# Patient Record
Sex: MALE | Race: BLACK OR AFRICAN AMERICAN | NOT HISPANIC OR LATINO
[De-identification: names, ages, dates, MRNs, and addresses within clinical notes are randomized per-mention and may not be internally consistent; named-entity substitution may affect disease eponyms.]

---

## 2017-01-05 ENCOUNTER — RESULT REVIEW (OUTPATIENT)
Age: 32
End: 2017-01-05

## 2017-01-09 ENCOUNTER — APPOINTMENT (OUTPATIENT)
Dept: NEPHROLOGY | Facility: CLINIC | Age: 32
End: 2017-01-09

## 2017-01-09 ENCOUNTER — LABORATORY RESULT (OUTPATIENT)
Age: 32
End: 2017-01-09

## 2017-01-09 VITALS
BODY MASS INDEX: 20.45 KG/M2 | WEIGHT: 151.01 LBS | DIASTOLIC BLOOD PRESSURE: 99 MMHG | HEIGHT: 72 IN | SYSTOLIC BLOOD PRESSURE: 146 MMHG | OXYGEN SATURATION: 98 % | HEART RATE: 70 BPM

## 2017-02-01 LAB
ALBUMIN SERPL ELPH-MCNC: 4 G/DL
ALP BLD-CCNC: 62 U/L
ALT SERPL-CCNC: 14 U/L
ANION GAP SERPL CALC-SCNC: 19 MMOL/L
APPEARANCE: CLEAR
AST SERPL-CCNC: 11 U/L
BASOPHILS # BLD AUTO: 0.01 K/UL
BASOPHILS NFR BLD AUTO: 0.1 %
BILIRUB SERPL-MCNC: 0.5 MG/DL
BILIRUBIN URINE: NEGATIVE
BKV DNA SPEC QL NAA+PROBE: NORMAL
BLOOD URINE: ABNORMAL
BUN SERPL-MCNC: 59 MG/DL
CALCIUM SERPL-MCNC: 9.2 MG/DL
CHLORIDE SERPL-SCNC: 106 MMOL/L
CMV DNA SPEC QL NAA+PROBE: NOT DETECTED IU/ML
CMVPCR LOG: NOT DETECTED LOGIU/ML
CO2 SERPL-SCNC: 21 MMOL/L
COLOR: YELLOW
CREAT SERPL-MCNC: 5.88 MG/DL
EOSINOPHIL # BLD AUTO: 0.13 K/UL
EOSINOPHIL NFR BLD AUTO: 1.6 %
GLUCOSE QUALITATIVE U: NORMAL MG/DL
GLUCOSE SERPL-MCNC: 84 MG/DL
HCT VFR BLD CALC: 34 %
HGB BLD-MCNC: 10.8 G/DL
IMM GRANULOCYTES NFR BLD AUTO: 0.3 %
KETONES URINE: NEGATIVE
LEUKOCYTE ESTERASE URINE: NEGATIVE
LYMPHOCYTES # BLD AUTO: 1.91 K/UL
LYMPHOCYTES NFR BLD AUTO: 24 %
MAN DIFF?: NORMAL
MCHC RBC-ENTMCNC: 27.5 PG
MCHC RBC-ENTMCNC: 31.8 GM/DL
MCV RBC AUTO: 86.5 FL
MONOCYTES # BLD AUTO: 0.72 K/UL
MONOCYTES NFR BLD AUTO: 9 %
NEUTROPHILS # BLD AUTO: 5.18 K/UL
NEUTROPHILS NFR BLD AUTO: 65 %
NITRITE URINE: NEGATIVE
PH URINE: 6
PLATELET # BLD AUTO: 193 K/UL
POTASSIUM SERPL-SCNC: 4.1 MMOL/L
PROT SERPL-MCNC: 6.6 G/DL
PROTEIN URINE: 100 MG/DL
RBC # BLD: 3.93 M/UL
RBC # FLD: 14.2 %
SODIUM SERPL-SCNC: 146 MMOL/L
SPECIFIC GRAVITY URINE: 1.01
TACROLIMUS SERPL-MCNC: 5.8 NG/ML
UROBILINOGEN URINE: NORMAL MG/DL
WBC # FLD AUTO: 7.97 K/UL

## 2017-02-13 ENCOUNTER — APPOINTMENT (OUTPATIENT)
Dept: NEPHROLOGY | Facility: CLINIC | Age: 32
End: 2017-02-13

## 2017-02-13 VITALS
BODY MASS INDEX: 20.81 KG/M2 | SYSTOLIC BLOOD PRESSURE: 148 MMHG | HEART RATE: 61 BPM | DIASTOLIC BLOOD PRESSURE: 103 MMHG | OXYGEN SATURATION: 98 % | WEIGHT: 153.63 LBS | HEIGHT: 72 IN

## 2017-04-10 ENCOUNTER — OTHER (OUTPATIENT)
Age: 32
End: 2017-04-10

## 2017-05-04 ENCOUNTER — RX RENEWAL (OUTPATIENT)
Age: 32
End: 2017-05-04

## 2017-05-16 ENCOUNTER — RX RENEWAL (OUTPATIENT)
Age: 32
End: 2017-05-16

## 2017-06-01 ENCOUNTER — RX RENEWAL (OUTPATIENT)
Age: 32
End: 2017-06-01

## 2017-06-19 ENCOUNTER — APPOINTMENT (OUTPATIENT)
Dept: NEPHROLOGY | Facility: CLINIC | Age: 32
End: 2017-06-19

## 2017-07-01 ENCOUNTER — OUTPATIENT (OUTPATIENT)
Dept: OUTPATIENT SERVICES | Facility: HOSPITAL | Age: 32
LOS: 1 days | End: 2017-07-01
Payer: MEDICAID

## 2017-07-14 DIAGNOSIS — R69 ILLNESS, UNSPECIFIED: ICD-10-CM

## 2017-07-31 ENCOUNTER — OTHER (OUTPATIENT)
Age: 32
End: 2017-07-31

## 2017-08-01 PROCEDURE — G9001: CPT

## 2017-08-14 ENCOUNTER — APPOINTMENT (OUTPATIENT)
Dept: NEPHROLOGY | Facility: CLINIC | Age: 32
End: 2017-08-14
Payer: MEDICARE

## 2017-08-14 ENCOUNTER — LABORATORY RESULT (OUTPATIENT)
Age: 32
End: 2017-08-14

## 2017-08-14 VITALS
WEIGHT: 154.32 LBS | DIASTOLIC BLOOD PRESSURE: 110 MMHG | HEIGHT: 72 IN | HEART RATE: 67 BPM | BODY MASS INDEX: 20.9 KG/M2 | OXYGEN SATURATION: 98 % | SYSTOLIC BLOOD PRESSURE: 160 MMHG

## 2017-08-14 VITALS — SYSTOLIC BLOOD PRESSURE: 160 MMHG | DIASTOLIC BLOOD PRESSURE: 100 MMHG

## 2017-08-14 PROCEDURE — 99214 OFFICE O/P EST MOD 30 MIN: CPT

## 2017-08-14 RX ORDER — AMLODIPINE BESYLATE 5 MG/1
5 TABLET ORAL DAILY
Qty: 30 | Refills: 5 | Status: DISCONTINUED | COMMUNITY
Start: 2016-12-31 | End: 2017-08-14

## 2017-08-16 LAB
25(OH)D3 SERPL-MCNC: 51 NG/ML
ALBUMIN SERPL ELPH-MCNC: 4.6 G/DL
ALP BLD-CCNC: 76 U/L
ALT SERPL-CCNC: 12 U/L
ANION GAP SERPL CALC-SCNC: 20 MMOL/L
APPEARANCE: CLEAR
AST SERPL-CCNC: 12 U/L
BASOPHILS # BLD AUTO: 0.02 K/UL
BASOPHILS NFR BLD AUTO: 0.2 %
BILIRUB SERPL-MCNC: 0.9 MG/DL
BILIRUBIN URINE: NEGATIVE
BLOOD URINE: ABNORMAL
BUN SERPL-MCNC: 99 MG/DL
CALCIUM SERPL-MCNC: 9.7 MG/DL
CALCIUM SERPL-MCNC: 9.7 MG/DL
CHLORIDE SERPL-SCNC: 100 MMOL/L
CHOLEST SERPL-MCNC: 212 MG/DL
CHOLEST/HDLC SERPL: 4.1 RATIO
CO2 SERPL-SCNC: 18 MMOL/L
COLOR: YELLOW
CREAT SERPL-MCNC: 11.38 MG/DL
CREAT SPEC-SCNC: 117 MG/DL
CREAT/PROT UR: 1.2 RATIO
EOSINOPHIL # BLD AUTO: 0.07 K/UL
EOSINOPHIL NFR BLD AUTO: 0.8 %
GLUCOSE QUALITATIVE U: NORMAL MG/DL
GLUCOSE SERPL-MCNC: 101 MG/DL
HBA1C MFR BLD HPLC: 4.9 %
HCT VFR BLD CALC: 37.4 %
HDLC SERPL-MCNC: 52 MG/DL
HGB BLD-MCNC: 12.9 G/DL
IMM GRANULOCYTES NFR BLD AUTO: 0.2 %
KETONES URINE: NEGATIVE
LDLC SERPL CALC-MCNC: 145 MG/DL
LEUKOCYTE ESTERASE URINE: NEGATIVE
LYMPHOCYTES # BLD AUTO: 1.67 K/UL
LYMPHOCYTES NFR BLD AUTO: 19 %
MAGNESIUM SERPL-MCNC: 2.1 MG/DL
MAN DIFF?: NORMAL
MCHC RBC-ENTMCNC: 29.3 PG
MCHC RBC-ENTMCNC: 34.5 GM/DL
MCV RBC AUTO: 85 FL
MONOCYTES # BLD AUTO: 0.48 K/UL
MONOCYTES NFR BLD AUTO: 5.5 %
NEUTROPHILS # BLD AUTO: 6.51 K/UL
NEUTROPHILS NFR BLD AUTO: 74.3 %
NITRITE URINE: NEGATIVE
PARATHYROID HORMONE INTACT: 287 PG/ML
PH URINE: 6
PHOSPHATE SERPL-MCNC: 3.6 MG/DL
PLATELET # BLD AUTO: 192 K/UL
POTASSIUM SERPL-SCNC: 5.1 MMOL/L
PROT SERPL-MCNC: 7.3 G/DL
PROT UR-MCNC: 139 MG/DL
PROTEIN URINE: 300 MG/DL
RBC # BLD: 4.4 M/UL
RBC # FLD: 13.2 %
SODIUM SERPL-SCNC: 138 MMOL/L
SPECIFIC GRAVITY URINE: 1.01
TACROLIMUS SERPL-MCNC: 3.9 NG/ML
TRIGL SERPL-MCNC: 75 MG/DL
URATE SERPL-MCNC: 9.2 MG/DL
UROBILINOGEN URINE: NORMAL MG/DL
WBC # FLD AUTO: 8.77 K/UL

## 2017-09-28 ENCOUNTER — APPOINTMENT (OUTPATIENT)
Dept: TRANSPLANT | Facility: CLINIC | Age: 32
End: 2017-09-28

## 2017-09-29 ENCOUNTER — LABORATORY RESULT (OUTPATIENT)
Age: 32
End: 2017-09-29

## 2017-09-29 ENCOUNTER — APPOINTMENT (OUTPATIENT)
Dept: NEPHROLOGY | Facility: CLINIC | Age: 32
End: 2017-09-29
Payer: MEDICARE

## 2017-09-29 VITALS
WEIGHT: 149.91 LBS | HEART RATE: 63 BPM | BODY MASS INDEX: 20.31 KG/M2 | SYSTOLIC BLOOD PRESSURE: 147 MMHG | OXYGEN SATURATION: 98 % | DIASTOLIC BLOOD PRESSURE: 100 MMHG | HEIGHT: 72 IN

## 2017-09-29 PROCEDURE — 99214 OFFICE O/P EST MOD 30 MIN: CPT

## 2017-09-30 LAB
ALBUMIN SERPL ELPH-MCNC: 4.5 G/DL
ALP BLD-CCNC: 71 U/L
ALT SERPL-CCNC: 9 U/L
ANION GAP SERPL CALC-SCNC: 19 MMOL/L
APPEARANCE: CLEAR
AST SERPL-CCNC: 10 U/L
BASOPHILS # BLD AUTO: 0.01 K/UL
BASOPHILS NFR BLD AUTO: 0.1 %
BILIRUB SERPL-MCNC: 1.2 MG/DL
BILIRUBIN URINE: NEGATIVE
BLOOD URINE: NEGATIVE
BUN SERPL-MCNC: 85 MG/DL
CALCIUM SERPL-MCNC: 9.9 MG/DL
CHLORIDE SERPL-SCNC: 103 MMOL/L
CO2 SERPL-SCNC: 22 MMOL/L
COLOR: YELLOW
CREAT SERPL-MCNC: 13.2 MG/DL
CREAT SPEC-SCNC: 148 MG/DL
CREAT/PROT UR: 1.1 RATIO
EOSINOPHIL # BLD AUTO: 0.1 K/UL
EOSINOPHIL NFR BLD AUTO: 1.2 %
GLUCOSE QUALITATIVE U: NORMAL MG/DL
GLUCOSE SERPL-MCNC: 86 MG/DL
HCT VFR BLD CALC: 35.6 %
HGB BLD-MCNC: 12 G/DL
IMM GRANULOCYTES NFR BLD AUTO: 0.1 %
KETONES URINE: NEGATIVE
LDH SERPL-CCNC: 186 U/L
LEUKOCYTE ESTERASE URINE: NEGATIVE
LYMPHOCYTES # BLD AUTO: 1.77 K/UL
LYMPHOCYTES NFR BLD AUTO: 21.4 %
MAGNESIUM SERPL-MCNC: 2 MG/DL
MAN DIFF?: NORMAL
MCHC RBC-ENTMCNC: 29.6 PG
MCHC RBC-ENTMCNC: 33.7 GM/DL
MCV RBC AUTO: 87.7 FL
MONOCYTES # BLD AUTO: 0.72 K/UL
MONOCYTES NFR BLD AUTO: 8.7 %
NEUTROPHILS # BLD AUTO: 5.65 K/UL
NEUTROPHILS NFR BLD AUTO: 68.5 %
NITRITE URINE: NEGATIVE
PH URINE: 6.5
PHOSPHATE SERPL-MCNC: 5.2 MG/DL
PLATELET # BLD AUTO: 182 K/UL
POTASSIUM SERPL-SCNC: 5.4 MMOL/L
PROT SERPL-MCNC: 7.5 G/DL
PROT UR-MCNC: 156 MG/DL
PROTEIN URINE: 300 MG/DL
RBC # BLD: 4.06 M/UL
RBC # FLD: 13.4 %
SODIUM SERPL-SCNC: 144 MMOL/L
SPECIFIC GRAVITY URINE: 1.01
TACROLIMUS SERPL-MCNC: 3 NG/ML
URATE SERPL-MCNC: 9 MG/DL
UROBILINOGEN URINE: NORMAL MG/DL
WBC # FLD AUTO: 8.26 K/UL

## 2017-10-02 ENCOUNTER — APPOINTMENT (OUTPATIENT)
Dept: NEPHROLOGY | Facility: CLINIC | Age: 32
End: 2017-10-02
Payer: MEDICARE

## 2017-10-04 LAB — CMV DNA SPEC QL NAA+PROBE: NOT DETECTED

## 2017-10-16 ENCOUNTER — RX RENEWAL (OUTPATIENT)
Age: 32
End: 2017-10-16

## 2018-01-01 ENCOUNTER — OUTPATIENT (OUTPATIENT)
Dept: OUTPATIENT SERVICES | Facility: HOSPITAL | Age: 33
LOS: 1 days | End: 2018-01-01
Payer: MEDICAID

## 2018-01-01 PROCEDURE — G9001: CPT

## 2018-01-02 ENCOUNTER — APPOINTMENT (OUTPATIENT)
Dept: NEPHROLOGY | Facility: CLINIC | Age: 33
End: 2018-01-02
Payer: MEDICARE

## 2018-01-02 ENCOUNTER — OTHER (OUTPATIENT)
Age: 33
End: 2018-01-02

## 2018-01-02 VITALS
BODY MASS INDEX: 21 KG/M2 | HEIGHT: 72 IN | RESPIRATION RATE: 14 BRPM | SYSTOLIC BLOOD PRESSURE: 151 MMHG | WEIGHT: 155.05 LBS | DIASTOLIC BLOOD PRESSURE: 93 MMHG | HEART RATE: 66 BPM | TEMPERATURE: 98 F | OXYGEN SATURATION: 97 %

## 2018-01-02 PROCEDURE — 99214 OFFICE O/P EST MOD 30 MIN: CPT

## 2018-01-03 ENCOUNTER — INPATIENT (INPATIENT)
Facility: HOSPITAL | Age: 33
LOS: 6 days | Discharge: HOME | DRG: 698 | End: 2018-01-10
Attending: INTERNAL MEDICINE | Admitting: STUDENT IN AN ORGANIZED HEALTH CARE EDUCATION/TRAINING PROGRAM
Payer: MEDICARE

## 2018-01-03 VITALS
TEMPERATURE: 98 F | RESPIRATION RATE: 20 BRPM | DIASTOLIC BLOOD PRESSURE: 106 MMHG | HEART RATE: 66 BPM | OXYGEN SATURATION: 98 % | SYSTOLIC BLOOD PRESSURE: 158 MMHG

## 2018-01-03 DIAGNOSIS — N18.5 CHRONIC KIDNEY DISEASE, STAGE 5: ICD-10-CM

## 2018-01-03 DIAGNOSIS — E87.5 HYPERKALEMIA: ICD-10-CM

## 2018-01-03 DIAGNOSIS — I10 ESSENTIAL (PRIMARY) HYPERTENSION: ICD-10-CM

## 2018-01-03 DIAGNOSIS — N17.9 ACUTE KIDNEY FAILURE, UNSPECIFIED: ICD-10-CM

## 2018-01-03 DIAGNOSIS — Z94.0 KIDNEY TRANSPLANT STATUS: ICD-10-CM

## 2018-01-03 DIAGNOSIS — D89.9 DISORDER INVOLVING THE IMMUNE MECHANISM, UNSPECIFIED: ICD-10-CM

## 2018-01-03 LAB
25(OH)D3 SERPL-MCNC: 54.9 NG/ML
ALBUMIN SERPL ELPH-MCNC: 4.3 G/DL — SIGNIFICANT CHANGE UP (ref 3.3–5)
ALBUMIN SERPL ELPH-MCNC: 4.4 G/DL
ALP BLD-CCNC: 52 U/L
ALP SERPL-CCNC: 65 U/L — SIGNIFICANT CHANGE UP (ref 40–120)
ALT FLD-CCNC: 32 U/L RC — SIGNIFICANT CHANGE UP (ref 10–45)
ALT SERPL-CCNC: 29 U/L
ANION GAP SERPL CALC-SCNC: 16 MMOL/L — SIGNIFICANT CHANGE UP (ref 5–17)
ANION GAP SERPL CALC-SCNC: 20 MMOL/L — HIGH (ref 5–17)
ANION GAP SERPL CALC-SCNC: 22 MMOL/L
APPEARANCE UR: CLEAR — SIGNIFICANT CHANGE UP
APTT BLD: 28.5 SEC — SIGNIFICANT CHANGE UP (ref 27.5–37.4)
AST SERPL-CCNC: 14 U/L
AST SERPL-CCNC: 14 U/L — SIGNIFICANT CHANGE UP (ref 10–40)
BASOPHILS # BLD AUTO: 0 K/UL
BASOPHILS # BLD AUTO: 0 K/UL — SIGNIFICANT CHANGE UP (ref 0–0.2)
BASOPHILS NFR BLD AUTO: 0 %
BASOPHILS NFR BLD AUTO: 0.3 % — SIGNIFICANT CHANGE UP (ref 0–2)
BILIRUB SERPL-MCNC: 0.4 MG/DL — SIGNIFICANT CHANGE UP (ref 0.2–1.2)
BILIRUB SERPL-MCNC: 0.9 MG/DL
BILIRUB UR-MCNC: NEGATIVE — SIGNIFICANT CHANGE UP
BUN SERPL-MCNC: 119 MG/DL — HIGH (ref 7–23)
BUN SERPL-MCNC: 120 MG/DL
BUN SERPL-MCNC: 121 MG/DL — HIGH (ref 7–23)
CALCIUM SERPL-MCNC: 10.2 MG/DL — SIGNIFICANT CHANGE UP (ref 8.4–10.5)
CALCIUM SERPL-MCNC: 9.4 MG/DL — SIGNIFICANT CHANGE UP (ref 8.4–10.5)
CALCIUM SERPL-MCNC: 9.8 MG/DL
CALCIUM SERPL-MCNC: 9.8 MG/DL
CHLORIDE SERPL-SCNC: 96 MMOL/L — SIGNIFICANT CHANGE UP (ref 96–108)
CHLORIDE SERPL-SCNC: 97 MMOL/L
CHLORIDE SERPL-SCNC: 98 MMOL/L — SIGNIFICANT CHANGE UP (ref 96–108)
CHOLEST SERPL-MCNC: 171 MG/DL
CHOLEST/HDLC SERPL: 4.2 RATIO
CO2 SERPL-SCNC: 20 MMOL/L
CO2 SERPL-SCNC: 23 MMOL/L — SIGNIFICANT CHANGE UP (ref 22–31)
CO2 SERPL-SCNC: 24 MMOL/L — SIGNIFICANT CHANGE UP (ref 22–31)
COLOR SPEC: COLORLESS — SIGNIFICANT CHANGE UP
CREAT SERPL-MCNC: 21.5 MG/DL — HIGH (ref 0.5–1.3)
CREAT SERPL-MCNC: 21.62 MG/DL — HIGH (ref 0.5–1.3)
CREAT SERPL-MCNC: 23.41 MG/DL
DIFF PNL FLD: NEGATIVE — SIGNIFICANT CHANGE UP
EOSINOPHIL # BLD AUTO: 0.05 K/UL
EOSINOPHIL # BLD AUTO: 0.3 K/UL — SIGNIFICANT CHANGE UP (ref 0–0.5)
EOSINOPHIL NFR BLD AUTO: 0.6 %
EOSINOPHIL NFR BLD AUTO: 3.4 % — SIGNIFICANT CHANGE UP (ref 0–6)
FERRITIN SERPL-MCNC: 599 NG/ML
GAS PNL BLDV: SIGNIFICANT CHANGE UP
GAS PNL BLDV: SIGNIFICANT CHANGE UP
GLUCOSE BLDC GLUCOMTR-MCNC: 172 MG/DL — HIGH (ref 70–99)
GLUCOSE SERPL-MCNC: 100 MG/DL
GLUCOSE SERPL-MCNC: 65 MG/DL — LOW (ref 70–99)
GLUCOSE SERPL-MCNC: 87 MG/DL — SIGNIFICANT CHANGE UP (ref 70–99)
GLUCOSE UR QL: NEGATIVE — SIGNIFICANT CHANGE UP
HBA1C MFR BLD HPLC: 4.7 %
HCT VFR BLD CALC: 28.9 % — LOW (ref 39–50)
HCT VFR BLD CALC: 30.4 %
HDLC SERPL-MCNC: 41 MG/DL
HGB BLD-MCNC: 10.3 G/DL
HGB BLD-MCNC: 9.8 G/DL — LOW (ref 13–17)
IMM GRANULOCYTES NFR BLD AUTO: 0 %
INR BLD: 0.95 RATIO — SIGNIFICANT CHANGE UP (ref 0.88–1.16)
IRON SATN MFR SERPL: 42 %
IRON SERPL-MCNC: 96 UG/DL
KETONES UR-MCNC: NEGATIVE — SIGNIFICANT CHANGE UP
LDLC SERPL CALC-MCNC: 117 MG/DL
LEUKOCYTE ESTERASE UR-ACNC: NEGATIVE — SIGNIFICANT CHANGE UP
LYMPHOCYTES # BLD AUTO: 1.46 K/UL
LYMPHOCYTES # BLD AUTO: 3.5 K/UL — HIGH (ref 1–3.3)
LYMPHOCYTES # BLD AUTO: 38 % — SIGNIFICANT CHANGE UP (ref 13–44)
LYMPHOCYTES NFR BLD AUTO: 17.8 %
MAGNESIUM SERPL-MCNC: 2.2 MG/DL — SIGNIFICANT CHANGE UP (ref 1.6–2.6)
MAGNESIUM SERPL-MCNC: 2.3 MG/DL
MAN DIFF?: NORMAL
MCHC RBC-ENTMCNC: 29.5 PG
MCHC RBC-ENTMCNC: 30.7 PG — SIGNIFICANT CHANGE UP (ref 27–34)
MCHC RBC-ENTMCNC: 33.8 GM/DL — SIGNIFICANT CHANGE UP (ref 32–36)
MCHC RBC-ENTMCNC: 33.9 GM/DL
MCV RBC AUTO: 87.1 FL
MCV RBC AUTO: 90.8 FL — SIGNIFICANT CHANGE UP (ref 80–100)
MONOCYTES # BLD AUTO: 0.58 K/UL
MONOCYTES # BLD AUTO: 1.1 K/UL — HIGH (ref 0–0.9)
MONOCYTES NFR BLD AUTO: 12.4 % — SIGNIFICANT CHANGE UP (ref 2–14)
MONOCYTES NFR BLD AUTO: 7.1 %
NEUTROPHILS # BLD AUTO: 4.2 K/UL — SIGNIFICANT CHANGE UP (ref 1.8–7.4)
NEUTROPHILS # BLD AUTO: 6.1 K/UL
NEUTROPHILS NFR BLD AUTO: 45.9 % — SIGNIFICANT CHANGE UP (ref 43–77)
NEUTROPHILS NFR BLD AUTO: 74.5 %
NITRITE UR-MCNC: NEGATIVE — SIGNIFICANT CHANGE UP
PARATHYROID HORMONE INTACT: 321 PG/ML
PH UR: 7 — SIGNIFICANT CHANGE UP (ref 5–8)
PHOSPHATE SERPL-MCNC: 4.2 MG/DL
PHOSPHATE SERPL-MCNC: 5.2 MG/DL — HIGH (ref 2.5–4.5)
PLATELET # BLD AUTO: 154 K/UL — SIGNIFICANT CHANGE UP (ref 150–400)
PLATELET # BLD AUTO: 166 K/UL
POTASSIUM SERPL-MCNC: 4.3 MMOL/L — SIGNIFICANT CHANGE UP (ref 3.5–5.3)
POTASSIUM SERPL-MCNC: 5.8 MMOL/L — HIGH (ref 3.5–5.3)
POTASSIUM SERPL-SCNC: 4.3 MMOL/L — SIGNIFICANT CHANGE UP (ref 3.5–5.3)
POTASSIUM SERPL-SCNC: 5.6 MMOL/L
POTASSIUM SERPL-SCNC: 5.8 MMOL/L — HIGH (ref 3.5–5.3)
PROT SERPL-MCNC: 7.1 G/DL — SIGNIFICANT CHANGE UP (ref 6–8.3)
PROT SERPL-MCNC: 7.5 G/DL
PROT UR-MCNC: 150 MG/DL
PROTHROM AB SERPL-ACNC: 10.2 SEC — SIGNIFICANT CHANGE UP (ref 9.8–12.7)
RAPID RVP RESULT: SIGNIFICANT CHANGE UP
RBC # BLD: 3.18 M/UL — LOW (ref 4.2–5.8)
RBC # BLD: 3.49 M/UL
RBC # FLD: 11.9 % — SIGNIFICANT CHANGE UP (ref 10.3–14.5)
RBC # FLD: 13.2 %
SODIUM SERPL-SCNC: 138 MMOL/L — SIGNIFICANT CHANGE UP (ref 135–145)
SODIUM SERPL-SCNC: 139 MMOL/L
SODIUM SERPL-SCNC: 139 MMOL/L — SIGNIFICANT CHANGE UP (ref 135–145)
SP GR SPEC: 1.01 — LOW (ref 1.01–1.02)
TACROLIMUS SERPL-MCNC: 6.8 NG/ML
TIBC SERPL-MCNC: 227 UG/DL
TRIGL SERPL-MCNC: 67 MG/DL
UIBC SERPL-MCNC: 131 UG/DL
URATE SERPL-MCNC: 10.6 MG/DL
UROBILINOGEN FLD QL: NEGATIVE — SIGNIFICANT CHANGE UP
WBC # BLD: 9.1 K/UL — SIGNIFICANT CHANGE UP (ref 3.8–10.5)
WBC # FLD AUTO: 8.19 K/UL
WBC # FLD AUTO: 9.1 K/UL — SIGNIFICANT CHANGE UP (ref 3.8–10.5)

## 2018-01-03 PROCEDURE — 71045 X-RAY EXAM CHEST 1 VIEW: CPT | Mod: 26

## 2018-01-03 PROCEDURE — 99223 1ST HOSP IP/OBS HIGH 75: CPT | Mod: GC

## 2018-01-03 PROCEDURE — 99285 EMERGENCY DEPT VISIT HI MDM: CPT | Mod: GC

## 2018-01-03 PROCEDURE — 36556 INSERT NON-TUNNEL CV CATH: CPT

## 2018-01-03 PROCEDURE — 99291 CRITICAL CARE FIRST HOUR: CPT | Mod: 25

## 2018-01-03 RX ORDER — INSULIN HUMAN 100 [IU]/ML
10 INJECTION, SOLUTION SUBCUTANEOUS ONCE
Qty: 0 | Refills: 0 | Status: COMPLETED | OUTPATIENT
Start: 2018-01-03 | End: 2018-01-03

## 2018-01-03 RX ORDER — DEXTROSE 50 % IN WATER 50 %
50 SYRINGE (ML) INTRAVENOUS ONCE
Qty: 0 | Refills: 0 | Status: COMPLETED | OUTPATIENT
Start: 2018-01-03 | End: 2018-01-03

## 2018-01-03 RX ORDER — SODIUM CHLORIDE 9 MG/ML
1000 INJECTION, SOLUTION INTRAVENOUS
Qty: 0 | Refills: 0 | Status: DISCONTINUED | OUTPATIENT
Start: 2018-01-03 | End: 2018-01-10

## 2018-01-03 RX ORDER — DESMOPRESSIN ACETATE 0.1 MG/1
20 TABLET ORAL ONCE
Qty: 0 | Refills: 0 | Status: COMPLETED | OUTPATIENT
Start: 2018-01-03 | End: 2018-01-03

## 2018-01-03 RX ORDER — INFLUENZA VIRUS VACCINE 15; 15; 15; 15 UG/.5ML; UG/.5ML; UG/.5ML; UG/.5ML
0.5 SUSPENSION INTRAMUSCULAR ONCE
Qty: 0 | Refills: 0 | Status: COMPLETED | OUTPATIENT
Start: 2018-01-03 | End: 2018-01-10

## 2018-01-03 RX ORDER — ALBUTEROL 90 UG/1
10 AEROSOL, METERED ORAL ONCE
Qty: 0 | Refills: 0 | Status: COMPLETED | OUTPATIENT
Start: 2018-01-03 | End: 2018-01-03

## 2018-01-03 RX ORDER — TACROLIMUS 5 MG/1
3 CAPSULE ORAL
Qty: 0 | Refills: 0 | Status: DISCONTINUED | OUTPATIENT
Start: 2018-01-03 | End: 2018-01-10

## 2018-01-03 RX ORDER — GLUCAGON INJECTION, SOLUTION 0.5 MG/.1ML
1 INJECTION, SOLUTION SUBCUTANEOUS ONCE
Qty: 0 | Refills: 0 | Status: DISCONTINUED | OUTPATIENT
Start: 2018-01-03 | End: 2018-01-05

## 2018-01-03 RX ORDER — CALCIUM GLUCONATE 100 MG/ML
2 VIAL (ML) INTRAVENOUS ONCE
Qty: 0 | Refills: 0 | Status: COMPLETED | OUTPATIENT
Start: 2018-01-03 | End: 2018-01-03

## 2018-01-03 RX ORDER — DEXTROSE 50 % IN WATER 50 %
1 SYRINGE (ML) INTRAVENOUS ONCE
Qty: 0 | Refills: 0 | Status: DISCONTINUED | OUTPATIENT
Start: 2018-01-03 | End: 2018-01-04

## 2018-01-03 RX ORDER — DEXTROSE 50 % IN WATER 50 %
25 SYRINGE (ML) INTRAVENOUS ONCE
Qty: 0 | Refills: 0 | Status: DISCONTINUED | OUTPATIENT
Start: 2018-01-03 | End: 2018-01-04

## 2018-01-03 RX ORDER — PANTOPRAZOLE SODIUM 20 MG/1
40 TABLET, DELAYED RELEASE ORAL
Qty: 0 | Refills: 0 | Status: COMPLETED | OUTPATIENT
Start: 2018-01-03 | End: 2018-01-04

## 2018-01-03 RX ORDER — SODIUM BICARBONATE 1 MEQ/ML
650 SYRINGE (ML) INTRAVENOUS THREE TIMES A DAY
Qty: 0 | Refills: 0 | Status: DISCONTINUED | OUTPATIENT
Start: 2018-01-03 | End: 2018-01-05

## 2018-01-03 RX ORDER — FELODIPINE 5 MG/1
1 TABLET, FILM COATED, EXTENDED RELEASE ORAL
Qty: 0 | Refills: 0 | COMMUNITY

## 2018-01-03 RX ORDER — FUROSEMIDE 40 MG
40 TABLET ORAL ONCE
Qty: 0 | Refills: 0 | Status: COMPLETED | OUTPATIENT
Start: 2018-01-03 | End: 2018-01-03

## 2018-01-03 RX ORDER — DEXTROSE 50 % IN WATER 50 %
12.5 SYRINGE (ML) INTRAVENOUS ONCE
Qty: 0 | Refills: 0 | Status: DISCONTINUED | OUTPATIENT
Start: 2018-01-03 | End: 2018-01-04

## 2018-01-03 RX ADMIN — DESMOPRESSIN ACETATE 220 MICROGRAM(S): 0.1 TABLET ORAL at 22:48

## 2018-01-03 RX ADMIN — Medication 40 MILLIGRAM(S): at 19:50

## 2018-01-03 RX ADMIN — INSULIN HUMAN 10 UNIT(S): 100 INJECTION, SOLUTION SUBCUTANEOUS at 19:55

## 2018-01-03 RX ADMIN — Medication 50 MILLILITER(S): at 20:35

## 2018-01-03 RX ADMIN — Medication 50 MILLILITER(S): at 19:50

## 2018-01-03 RX ADMIN — ALBUTEROL 10 MILLIGRAM(S): 90 AEROSOL, METERED ORAL at 21:55

## 2018-01-03 RX ADMIN — Medication 400 GRAM(S): at 18:50

## 2018-01-03 NOTE — ED ADULT NURSE REASSESSMENT NOTE - NS ED NURSE REASSESS COMMENT FT1
patient c/o dizziness, FSBS re-checked 31mg/dl, D50 1 amp given, MD made aware. A cup of orange juice given, will re-checked FSBS after 30 minutes.

## 2018-01-03 NOTE — ED PROVIDER NOTE - CARE PLAN
Principal Discharge DX:	Hyperkalemia  Secondary Diagnosis:	Acute renal failure superimposed on chronic kidney disease, unspecified CKD stage, unspecified acute renal failure type  Secondary Diagnosis:	S/P kidney transplant

## 2018-01-03 NOTE — CONSULT NOTE ADULT - ATTENDING COMMENTS
Patient with failing renal allograft, now uremic  HTN  Anemia  Plan:  Hemodialysis- reviewed prescription and flow sheet.  Continue Tacrolimus, prednisone, off MMF  I/J catheter will change to tunneled catheter on discharge  Has live donor in work up.  Possible early transplant.    Joshua Prieto MD  [phone]

## 2018-01-03 NOTE — CONSULT NOTE ADULT - SUBJECTIVE AND OBJECTIVE BOX
[facility] DIVISION OF KIDNEY DISEASES AND HYPERTENSION -- INITIAL CONSULT NOTE  --------------------------------------------------------------------------------  HPI:  Patient is a 31 y/o M w/ renal transplant in 2005 from mother @ [facility], follows with Dr. Prieto from [facility] presents with worsening renal failure and hyperkalemia with EKG changes.  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  Prior to transplant, patient had a LUE fistula placed in anticipation of dialysis but was able to be transplanted prior to starting HD.  Patient has been experiencing progressive chronic renal failure.  He had routine lab work done which showed continued worsening of renal function and was sent into the hospital for initiation of dialysis.  In the ED patient was found with hyperkalemia induced hyperacute T-waves in leads v2-v4 which improved but did not resolve with management of hyperkalemia.  Patient reports decreased appetite with weight loss, intermittent LE edema, worsening shaking in upper extremities.  Patient is currently taking prednisone + 3mg BID of tacrolimus.  He reports that anti-metabolite (mycophenolate) was stopped one year prior due to severe diarrhea.            PAST HISTORY  --------------------------------------------------------------------------------  PAST MEDICAL & SURGICAL HISTORY:  CKD (chronic kidney disease), stage 4 (severe)  Hypertension  Glomerulonephritis  S/P kidney transplant: Right kidney in 2004    FAMILY HISTORY:  Denies any first degree family members with renal failure    PAST SOCIAL HISTORY:  denies any toxic habits (history taken in front of mother and cousin)    ALLERGIES & MEDICATIONS  --------------------------------------------------------------------------------  Allergies    No Known Allergies    Intolerances      Standing Inpatient Medications  ALBUTerol    0.083%. 10 milliGRAM(s) Nebulizer once    PRN Inpatient Medications      REVIEW OF SYSTEMS  --------------------------------------------------------------------------------  Gen: No fevers/chills, +weakness, +fatigue  Skin: No rashes  Head/Eyes/Ears/Mouth: No headache, no sore throat  Respiratory: No dyspnea, cough  CV: No chest pain, orthopnea  GI: No abdominal pain, diarrhea, constipation, nausea, vomiting  : No increased frequency, dysuria  MSK: No joint pain/swelling; +intermittent edema  Neuro: No dizziness/lightheadedness, + increased upper extremity tremor  Heme: No easy bruising or bleeding  Endo: No heat/cold intolerance    All other systems were reviewed and are negative, except as noted.    VITALS/PHYSICAL EXAM  --------------------------------------------------------------------------------  T(C): 36.9 (01-03-18 @ 19:14), Max: 37 (01-03-18 @ 18:15)  HR: 62 (01-03-18 @ 19:14) (60 - 66)  BP: 164/109 (01-03-18 @ 19:14) (158/106 - 171/110)  RR: 16 (01-03-18 @ 19:14) (16 - 20)  SpO2: 98% (01-03-18 @ 19:14) (98% - 100%)  Wt(kg): --        Physical Exam:  	Gen: NAD, well-appearing  	HEENT: MMM  	Pulm: CTA B/L  	CV: RRR, S1S2; no rub  	Back: No spinal or CVA tenderness; no sacral edema  	Abd: +BS, soft, nontender/nondistended  	: No suprapubic tenderness  	UE: Warm, FROM, intact strength; no edema, + severe resting tremor  	LE: Warm, FROM, intact strength; no edema  	Neuro: No focal deficits, no hand flapping  	Psych: Normal affect and mood  	Skin: Warm, without rashes    LABS/STUDIES  --------------------------------------------------------------------------------              9.8    9.1   >-----------<  154      [01-03-18 @ 18:32]              28.9     138  |  98  |  121  ----------------------------<  87      [01-03-18 @ 18:32]  5.8   |  24  |  21.62        Ca     9.4     [01-03-18 @ 18:32]      Mg     2.2     [01-03-18 @ 18:32]      Phos  5.2     [01-03-18 @ 18:32]    TPro  7.1  /  Alb  4.3  /  TBili  0.4  /  DBili  x   /  AST  14  /  ALT  32  /  AlkPhos  65  [01-03-18 @ 18:32]          Creatinine Trend:  SCr 21.62 [01-03 @ 18:32]    Urinalysis - [12-29-16 @ 10:55]      Color PL Yellow / Appearance Clear / SG 1.008 / pH 6.0      Gluc Negative / Ketone Negative  / Bili Negative / Urobili Negative       Blood Negative / Protein 30 / Leuk Est Negative / Nitrite Negative      RBC  / WBC 3-5 / Hyaline  / Gran  / Sq Epi  / Non Sq Epi  / Bacteria       HbA1c 5.1      [05-10-16 @ 12:59]    HBsAb Nonreact      [04-28-16 @ 19:35]  HBsAg Nonreact      [04-28-16 @ 19:35]  HCV 0.10, Nonreact      [04-28-16 @ 19:35]  HIV Nonreact      [12-29-16 @ 15:17]

## 2018-01-03 NOTE — ED PROVIDER NOTE - SECONDARY DIAGNOSIS.
Acute renal failure superimposed on chronic kidney disease, unspecified CKD stage, unspecified acute renal failure type S/P kidney transplant

## 2018-01-03 NOTE — CONSULT NOTE ADULT - ASSESSMENT
Patient is a 33 y/o man with LURT from mother in 2005 @ [place], suspected renal failure from FSGS, presents with progressive chronic renal failure and hyperkalemia with EKG changes.

## 2018-01-03 NOTE — ED PROVIDER NOTE - MEDICAL DECISION MAKING DETAILS
Femi: 32M w/GN s/p renal transplant p/w anuria, likely hyperK and b/l LE edema in setting of likely MARK. Labs, EKG, anticipate CA and hyperK tx, CXR. Nephrology c/s. MICU c/s if remains hyperk.

## 2018-01-03 NOTE — ED ADULT NURSE NOTE - OBJECTIVE STATEMENT
32 y.o M arrived to ED as per PCP instruction. A&Ox3. PMH kidney transplant 13 years ago for focal glomerulonephritis. for past month pt has had increased swelling B/L lower extremities, increased lethargy, decreased PO intake, and is making less urine, states he is urinating 3x per day. pt presented to PCP yesterday for his symptoms, blood work done. PCP called pt today and instructed him to come to ED for emergent dialysis; pt has never had dialysis before. Pt is on antirejection meds and HTN meds. 32 y.o M arrived to ED as per PCP instruction. A&Ox3. PMH kidney transplant 13 years ago for focal glomerulonephritis. for past month pt has had increased swelling B/L lower extremities, increased lethargy, looser stools, decreased PO intake, and is making less urine, states he is urinating 3x per day. pt presented to PCP yesterday for his symptoms, blood work done. PCP called pt today and instructed him to come to ED for emergent dialysis; pt has never had dialysis before. Pt is on antirejection meds and HTN meds. upon assessment respirations nonlabored, pt in no acute distress, abdomen soft nontender, neuro intact, moves all extremities. Denies CP, SOB, N/V, fevers, chills, HA, dizziness, pain/burning upon urination. Safety and comfort provided/maintained. EKG performed, pt on cardiac monitoring. Family @ bedside. MD Raygoza @ bedside.

## 2018-01-03 NOTE — ED PROVIDER NOTE - OBJECTIVE STATEMENT
32M w/PMH renal transplant 10y ago in setting of glomerulonephritis p/w progressive renal failure over 1mo with OP labs showing abnormalities (assume hyperk). Referred by nephrologist for emergent HD. Patient with anuria x1mo (3x/day), b/l LE edema (mild), loose stool, dec appetite. No f/c, cp/sob, abd pain, n/v, change in MS, changes in meds. Took meds this AM.

## 2018-01-03 NOTE — H&P ADULT - NSHPPHYSICALEXAM_GEN_ALL_CORE
ICU Vital Signs Last 24 Hrs  T(C): 36.7 (03 Jan 2018 22:35), Max: 37 (03 Jan 2018 18:15)  T(F): 98 (03 Jan 2018 22:35), Max: 98.6 (03 Jan 2018 18:15)  HR: 78 (03 Jan 2018 22:35) (60 - 85)  BP: 143/88 (03 Jan 2018 22:35) (143/88 - 171/110)  BP(mean): 111 (03 Jan 2018 22:35) (111 - 111)  ABP: --  ABP(mean): --  RR: 15 (03 Jan 2018 22:35) (15 - 20)  SpO2: 99% (03 Jan 2018 22:35) (95% - 100%)    GENERAL APPEARANCE: Well developed, NAD, alert and cooperative  HEENT:  EOMI, hearing grossly intact.  CARDIAC: regular rate and rhythm, S1/S2 present, no murmurs, rub, or gallops  LUNGS: Clear to auscultations; no rales, rhonchi or wheezing. normal respiratory effort  ABDOMEN: BS+, Soft, nondistended, nontender. No guarding or rebound.   EXTREMITIES: No significant deformity or joint abnormality. 2+ peripheral pulses, no LE edema  SKIN: Skin normal color, texture and turgor with no lesions or eruptions.  PSYCHIATRIC: Normal affect, anxious appearing ICU Vital Signs Last 24 Hrs  T(C): 36.7 (03 Jan 2018 22:35), Max: 37 (03 Jan 2018 18:15)  T(F): 98 (03 Jan 2018 22:35), Max: 98.6 (03 Jan 2018 18:15)  HR: 78 (03 Jan 2018 22:35) (60 - 85)  BP: 143/88 (03 Jan 2018 22:35) (143/88 - 171/110)  BP(mean): 111 (03 Jan 2018 22:35) (111 - 111)  RR: 15 (03 Jan 2018 22:35) (15 - 20)  SpO2: 99% (03 Jan 2018 22:35) (95% - 100%)    GENERAL APPEARANCE: Well developed, NAD, alert and cooperative  HEENT:  EOMI, hearing grossly intact.  CARDIAC: regular rate and rhythm, S1/S2 present, no murmurs, rub, or gallops  LUNGS: Clear to auscultations; no rales, rhonchi or wheezing. normal respiratory effort  ABDOMEN: BS+, Soft, nondistended, nontender. No guarding or rebound.   EXTREMITIES: No significant deformity or joint abnormality. 2+ peripheral pulses, no LE edema  SKIN: Skin normal color, texture and turgor with no lesions or eruptions.  PSYCHIATRIC: Normal affect, anxious appearing

## 2018-01-03 NOTE — H&P ADULT - NSHPLABSRESULTS_GEN_ALL_CORE
9.8    9.1   )-----------( 154      ( 2018 18:32 )             28.9     01-03    138  |  98  |  121<H>  ----------------------------<  87  5.8<H>   |  24  |  21.62<H>    Ca    9.4      2018 18:32  Phos  5.2     -  Mg     2.2         TPro  7.1  /  Alb  4.3  /  TBili  0.4  /  DBili  x   /  AST  14  /  ALT  32  /  AlkPhos  65  -      Urinalysis Basic - ( 2018 20:55 )    Color: Colorless / Appearance: Clear / S.009 / pH: x  Gluc: x / Ketone: Negative  / Bili: Negative / Urobili: Negative   Blood: x / Protein: 150 mg/dL / Nitrite: Negative   Leuk Esterase: Negative / RBC: 0-2 /HPF / WBC 5-10 /HPF   Sq Epi: x / Non Sq Epi: x / Bacteria: x

## 2018-01-03 NOTE — CONSULT NOTE ADULT - PROBLEM SELECTOR RECOMMENDATION 3
Continue immune suppression as patient is highly likely to receive at least one more transplant in his lifetime.  -continue prednisone and tacrolimus 3mg po BID  -tacrolimus trough level of 6.8 on 1/2 :  slightly high for transplant that occurred 12 years ago, would trend daily AM trough levels and will make adjustments as indicated

## 2018-01-03 NOTE — H&P ADULT - NSHPREVIEWOFSYSTEMS_GEN_ALL_CORE
CONSTITUTIONAL:  No weight loss, fever, chills; +weakness  CARDIOVASCULAR:  No chest pain, chest pressure.+palpitations, +LE edema  RESPIRATORY:  No shortness of breath  GASTROINTESTINAL:  No anorexia, nausea, vomiting.  +black stools x 2 days, +diarrhea at baseline after eating  GENITOURINARY: +decreased urinary frequency  NEUROLOGICAL:  No headache, dizziness  PSYCHIATRIC:  +anxious CONSTITUTIONAL:  No weight loss, fever, chills; +weakness, and fatigue  CARDIOVASCULAR:  No chest pain, chest pressure; +palpitations x 3 months, +LE edema 2 days ago  RESPIRATORY:  No shortness of breath, cough or sputum.  GASTROINTESTINAL:  No nausea, vomiting. No abdominal pain. +diarrhea, +melena  GENITOURINARY:  denies changes in urinary frequency, no hematuria   NEUROLOGICAL:  No headache, dizziness,   PSYCHIATRIC:  +anxious

## 2018-01-03 NOTE — H&P ADULT - ASSESSMENT
31 y/o M PMH focal glomerulonephritis s/p R kidney transplant (2005 @ [facility]), CKD IV, and HTN who presents with progressive chronic renal failure and hyperkalemia with EKG changes.     Neuro:  - awake and alert    Pulm:  - no active issues    Cardio:  - Hx of HTN  -> will consider restarting HTN medications after HD    GI:  - reports 2 episodes of melena; hb 9.8  -> f/u FOBT    Renal:  - Hyperkalemia with EKG changes s/p 2 g calcium gluconate, 5 IV insulin, and albuterol  -> trend serum K  - BUN/Cr: 121/21.62  -> trend BUN/Cr  -> urgent HD today; will need dialysis as an outpatient  -> will restart prednisone 5 mg and tacrolimus 3 mg   -> will obtain temporary line for HD; after 3 cycles of HD, will need tunneled catheter placement by IR; vascular consult in AM for AV fistula placement    ID:   - no active issues 31 y/o M PMH focal glomerulonephritis s/p R kidney transplant (2005 @ [facility]), CKD IV, and HTN who presents with progressive chronic renal failure and hyperkalemia with EKG changes.     Neuro:  - awake and alert    Pulm:  - no active issues    Cardio:  - Hx of HTN  -> will consider restarting HTN medications after HD    GI:  - reports 2 episodes of melena; hb 9.8  -> f/u FOBT    Renal:  - Hyperkalemia with EKG changes s/p 2 g calcium gluconate, 5 IV insulin, and albuterol  -> trend serum K  - BUN/Cr: 121/21.62  -> trend BUN/Cr  -> urgent HD today; will need dialysis as an outpatient  -> will restart prednisone 5 mg and tacrolimus 3 mg   -> will obtain temporary line for HD; after 3 cycles of HD, will need tunneled catheter placement by IR; vascular consult in AM for AV fistula placement    ID:   - no active issues    Heme:   - hb 9.8 (last admission 9.8-10.5) like 2/2 to CKD and ?blood loss anemia (melena)  -> trend hb 31 y/o M PMH focal glomerulonephritis s/p R [facility] ([facility] CKD IV, and HTN who presents with progressive chronic kidney disease and hyperkalemia with EKG changes.     Neuro:  - awake and alert    Pulm:  - no active issues    Cardio:  - Hx of HTN  -> will consider restarting HTN medications after HD    GI:  - reports 2 episodes of melena; hb 9.8  -> f/u FOBT    Renal:  - Hyperkalemia with EKG changes s/p 2 g calcium gluconate, 5 IV insulin, and albuterol  -> trend serum K  - BUN/Cr: 121/21.62  -> trend BUN/Cr  -> urgent HD today; will need dialysis as an outpatient  -> will restart prednisone 5 mg and tacrolimus 3 mg   -> will obtain temporary line for HD; after 3 cycles of HD, will need tunneled catheter placement by IR; vascular consult in AM for AV fistula placement    ID:   - no active issues    Heme:   - hb 9.8 (last admission 9.8-10.5) like 2/2 to CKD and ?blood loss anemia (melena)  -> trend hb    DVT PPx: SCDs 33 y/o M PMH focal glomerulonephritis s/p R kidney transplant ([facility]), CKD IV, and HTN who presents with progressive chronic kidney disease and hyperkalemia with EKG changes.     Neuro:  - awake and alert    Pulm:  - no active issues    Cardio:  - Hx of HTN  -> will consider restarting HTN medications after HD    GI:  - reports 2 episodes of dark stool; hb 9.8  -> f/u FOBT; if + will consult GI in AM  -->protonix q12h    Renal:  - Hyperkalemia with EKG changes s/p 2 g calcium gluconate, 5 IV insulin, and albuterol  -> trend serum K  - BUN/Cr: 121/21.62  -> trend BUN/Cr  -> urgent HD today; will need dialysis as an outpatient  -> will restart prednisone 5 mg and tacrolimus 3 mg   -> will obtain temporary line for HD; after 3 cycles of HD, will need tunneled catheter placement by IR; vascular consult in AM for AV fistula placement    ID:   - no active issues    Heme:   - hb 9.8 (last admission 9.8-10.5) like 2/2 to CKD and ?blood loss anemia (melena)  -> trend hb    DVT PPx: SCDs

## 2018-01-03 NOTE — H&P ADULT - HISTORY OF PRESENT ILLNESS
33 y/o M PMH focal glomerulophritis s/p R kidney transplant (2005 @ [facility]), CKD IV, and HTN who presents to the ED for urgent hemodialysis per PMD.  Patient had routine lab work yesterday that showed worsening renal function and was sent to the ED for emergent dialysis.  Patient has never undergone dialysis in the past.  Patient endorses increased lethargy, loose stools, decreased PO intake and urinating less (3x/day).  He denies shortness of breath, weight loss, abdominal pain, nausea, and vomiting.  He endorses palpitations, anxiety, diarrhea at baseline, black stools for 2 days. He also had LE swelling for 2 days that has since resolved.   Patient is on prendnisone and tacrolimus at home. Was on mycophenylate but it was stopped 2/2 to diarrhea.  In the ED, patient's vital signs 98.5, HR 66, /106, RR 20, 98% on room air.  Labs pertinent for hyperkalemia of 5.8, , Cr 21.62, and Phos 5.2.  EKG in the ED showed hyperacute T waves in V2-V4.  Patient received 2 g IV calcium gluconate, albuterol 10 mg neb, 10 mg IV insulin, and 40 mg IV furosemide. Pt was hypoglycemic in the ED to 31 and was given 1 amp of D50. 31 y/o M PMH focal glomerulophritis s/p R kidney transplant (2005 @ [facility]), CKD IV, and HTN who presents to the ED for urgent hemodialysis per PMD.  Patient had routine lab work yesterday that showed worsening renal function and was sent to the ED for emergent dialysis.  Patient has never undergone dialysis in the past.  Patient has been experiencing weakness, lethargy and fatigue that has been going on for the past month.  Prior to coming in, patient felt fine.  He feels that he is having palpitations for 3 months.  He denies HA, dizziness, shortness of breath, weight loss, abdominal pain, nausea, vomiting.  He endorses diarrhea that is ongoing for 7 months. He has never seen a gastroenterologist for this.  The past few day, he has been having black stools.  Also endorsing LE swelling for 2 days that has since resolved.   Patient is on prendnisone and tacrolimus at home. Was on mycophenylate but it was stopped 2/2 to diarrhea.  In the ED, patient's vital signs 98.5, HR 66, /106, RR 20, 98% on room air.  Labs pertinent for hyperkalemia of 5.8, , Cr 21.62, and Phos 5.2.  EKG in the ED showed hyperacute T waves in V2-V4.  Patient received 2 g IV calcium gluconate, albuterol 10 mg neb, 10 mg IV insulin, and 40 mg IV furosemide. Pt was hypoglycemic in the ED to 31 and was given 1 amp of D50. 33 y/o M PMH focal glomerulophritis s/p R kidney transplant (2005 @ [facility]), CKD IV, and HTN who presents to the ED for urgent hemodialysis per PMD.  Patient had routine lab work yesterday that showed worsening renal function and was sent to the ED for emergent dialysis.  Patient has never undergone dialysis in the past.  Prior to presenting to the hospital, patient felt fine (at baseline).  However, he notes that he has been experiencing weakness, lethargy and fatigue that has been going on for the past few months.   He also endorses palpitations for 3 months and diarrhea ongoing for 7 months.  He has never seen a gastroenterologist for this. He endorses black stools for the past two days and LE swelling for two days that has since resolved.  He denies HA, dizziness, shortness of breath, weight loss, abdominal pain, nausea, vomiting.  Patient is on prednisone and tacrolimus at home. Was on mycophenolate but it was stopped 2/2 to diarrhea about 1 year ago.  In the ED, patient's vital signs 98.5, HR 66, /106, RR 20, 98% on room air.  Labs pertinent for hyperkalemia of 5.8, , Cr 21.62, and Phos 5.2.  EKG in the ED showed hyperacute T waves in V2-V4.  Patient received 2 g IV calcium gluconate, albuterol 10 mg neb, 10 mg IV insulin, and 40 mg IV furosemide. Pt was hypoglycemic in the ED to 31 and was given 1 amp of D50.

## 2018-01-03 NOTE — ED PROVIDER NOTE - ATTENDING CONTRIBUTION TO CARE
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  Pt reports decreased urine output over the past 1 month, prompting eval by nephrologist. 32M hx ESRD 2/2 FSGN s/p renal transplant 13 years ago at [facility] sent in my nephrologist for hyperkalemia and potential HD.  Pt reports decreased urine output over the past 1 month, prompting eval by nephrologist.  Found to be hyperkalemic on outpatient labs.  Pt has felt increased fatigue over the past 1 months.  Otherwise ROS negative.  Denies fever/chills, chest pain, sob, edema.   VSS  (Attending - Jaret) NAD, AAOx3, NCAT, MMM, Trachea midline, PERRL, CTAB, Non-tachy, Normal perfusion, soft, NTND, RLQ transplant site with no ttp, No edema, No deformity of extremities, Appropriate, Cooperative, No rashes, CN grossly intact, no focal motor deficits  EKG.  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  Pt reports decreased urine output over the past 1 month, prompting eval by nephrologist.  Found to be hyperkalemic on outpatient labs.  Pt has felt increased fatigue over the past 1 months.  Otherwise ROS negative.  Denies fever/chills, chest pain, sob, edema.   VSS  (Attending - Jaret) NAD, AAOx3, NCAT, MMM, Trachea midline, PERRL, CTAB, Non-tachy, Normal perfusion, soft, NTND, RLQ transplant site with no ttp, No edema, No deformity of extremities, Appropriate, Cooperative, No rashes, CN grossly intact, no focal motor deficits  EKG.  labs including lytes, reassess    Progress:  episode of hypoglycemia after insulin treatment for hyperkalemia despite  amp of D50.  Improved with additional ampule D50 and po intake.  will continue to monitor with serial FS

## 2018-01-03 NOTE — CONSULT NOTE ADULT - PROBLEM SELECTOR RECOMMENDATION 5
Continue home BP medications  Will remove 0.5kg fluid today and continue to optimize volume status with ultrafiltration.

## 2018-01-03 NOTE — CONSULT NOTE ADULT - PROBLEM SELECTOR RECOMMENDATION 2
Progression of chronic renal failure  Patient does not currently have a definitive kidney donor and will need intermediate term hemodialysis while he awaits a transplant  After 3 cycles of HD, will request tunneled catheter placement by IR.  Needs vascular surgery consultation for AV fistula placement.  Social work for placement into outpatient diaylsis center

## 2018-01-03 NOTE — H&P ADULT - ATTENDING COMMENTS
critical care time 40 mins  care provided 1/3/18  Patient seen and examined.   Agree with resident note as above.  Patient with ESRD due to focal glomerulonephritis who was transplanted in 2005.  Has recently had progressive decline of his graft and was requiring antihypertensives and bicarb.  Now presents with worsening lethargy and fatigue, and was found to have a Cr=21 and hyperkalemia, mild LE edema.  Renal recommended emergent HD.  Pt now accepted to MICU for urgent HD.  Will temporize K medically.  Place hemodialysis catheter.  HD.  Continue immunosuppressants.  Check tacro level.  FULL CODE.

## 2018-01-04 DIAGNOSIS — N18.9 CHRONIC KIDNEY DISEASE, UNSPECIFIED: ICD-10-CM

## 2018-01-04 DIAGNOSIS — N18.6 END STAGE RENAL DISEASE: ICD-10-CM

## 2018-01-04 DIAGNOSIS — I10 ESSENTIAL (PRIMARY) HYPERTENSION: ICD-10-CM

## 2018-01-04 DIAGNOSIS — D89.9 DISORDER INVOLVING THE IMMUNE MECHANISM, UNSPECIFIED: ICD-10-CM

## 2018-01-04 LAB
ANION GAP SERPL CALC-SCNC: 15 MMOL/L — SIGNIFICANT CHANGE UP (ref 5–17)
ANION GAP SERPL CALC-SCNC: 16 MMOL/L — SIGNIFICANT CHANGE UP (ref 5–17)
ANION GAP SERPL CALC-SCNC: 18 MMOL/L — HIGH (ref 5–17)
ANION GAP SERPL CALC-SCNC: 19 MMOL/L — HIGH (ref 5–17)
ANION GAP SERPL CALC-SCNC: 8 MMOL/L — SIGNIFICANT CHANGE UP (ref 5–17)
BASE EXCESS BLDV CALC-SCNC: -0.6 MMOL/L — SIGNIFICANT CHANGE UP (ref -2–2)
BASE EXCESS BLDV CALC-SCNC: 3.6 MMOL/L — HIGH (ref -2–2)
BLD GP AB SCN SERPL QL: NEGATIVE — SIGNIFICANT CHANGE UP
BUN SERPL-MCNC: 114 MG/DL — HIGH (ref 7–23)
BUN SERPL-MCNC: 119 MG/DL — HIGH (ref 7–23)
BUN SERPL-MCNC: 78 MG/DL — HIGH (ref 7–23)
BUN SERPL-MCNC: 79 MG/DL — HIGH (ref 7–23)
BUN SERPL-MCNC: 87 MG/DL — HIGH (ref 7–23)
CA-I SERPL-SCNC: 1.21 MMOL/L — SIGNIFICANT CHANGE UP (ref 1.12–1.3)
CALCIUM SERPL-MCNC: 8.8 MG/DL — SIGNIFICANT CHANGE UP (ref 8.4–10.5)
CALCIUM SERPL-MCNC: 9 MG/DL — SIGNIFICANT CHANGE UP (ref 8.4–10.5)
CALCIUM SERPL-MCNC: 9.3 MG/DL — SIGNIFICANT CHANGE UP (ref 8.4–10.5)
CALCIUM SERPL-MCNC: 9.4 MG/DL — SIGNIFICANT CHANGE UP (ref 8.4–10.5)
CALCIUM SERPL-MCNC: 9.5 MG/DL — SIGNIFICANT CHANGE UP (ref 8.4–10.5)
CHLORIDE BLDV-SCNC: 101 MMOL/L — SIGNIFICANT CHANGE UP (ref 96–108)
CHLORIDE BLDV-SCNC: 98 MMOL/L — SIGNIFICANT CHANGE UP (ref 96–108)
CHLORIDE SERPL-SCNC: 95 MMOL/L — LOW (ref 96–108)
CHLORIDE SERPL-SCNC: 96 MMOL/L — SIGNIFICANT CHANGE UP (ref 96–108)
CHLORIDE SERPL-SCNC: 96 MMOL/L — SIGNIFICANT CHANGE UP (ref 96–108)
CHLORIDE SERPL-SCNC: 97 MMOL/L — SIGNIFICANT CHANGE UP (ref 96–108)
CHLORIDE SERPL-SCNC: 97 MMOL/L — SIGNIFICANT CHANGE UP (ref 96–108)
CK MB BLD-MCNC: 0.7 % — SIGNIFICANT CHANGE UP (ref 0–3.5)
CK MB CFR SERPL CALC: 1 NG/ML — SIGNIFICANT CHANGE UP (ref 0–6.7)
CK SERPL-CCNC: 141 U/L — SIGNIFICANT CHANGE UP (ref 30–200)
CO2 BLDV-SCNC: 26 MMOL/L — SIGNIFICANT CHANGE UP (ref 22–30)
CO2 BLDV-SCNC: 29 MMOL/L — SIGNIFICANT CHANGE UP (ref 22–30)
CO2 SERPL-SCNC: 23 MMOL/L — SIGNIFICANT CHANGE UP (ref 22–31)
CO2 SERPL-SCNC: 24 MMOL/L — SIGNIFICANT CHANGE UP (ref 22–31)
CO2 SERPL-SCNC: 24 MMOL/L — SIGNIFICANT CHANGE UP (ref 22–31)
CO2 SERPL-SCNC: 26 MMOL/L — SIGNIFICANT CHANGE UP (ref 22–31)
CO2 SERPL-SCNC: 28 MMOL/L — SIGNIFICANT CHANGE UP (ref 22–31)
CREAT SERPL-MCNC: 15.4 MG/DL — HIGH (ref 0.5–1.3)
CREAT SERPL-MCNC: 15.44 MG/DL — HIGH (ref 0.5–1.3)
CREAT SERPL-MCNC: 16.79 MG/DL — HIGH (ref 0.5–1.3)
CREAT SERPL-MCNC: 20.82 MG/DL — HIGH (ref 0.5–1.3)
CREAT SERPL-MCNC: 21.82 MG/DL — HIGH (ref 0.5–1.3)
GAS PNL BLDV: 136 MMOL/L — SIGNIFICANT CHANGE UP (ref 136–145)
GAS PNL BLDV: 137 MMOL/L — SIGNIFICANT CHANGE UP (ref 136–145)
GAS PNL BLDV: SIGNIFICANT CHANGE UP
GLUCOSE BLDV-MCNC: 101 MG/DL — HIGH (ref 70–99)
GLUCOSE SERPL-MCNC: 100 MG/DL — HIGH (ref 70–99)
GLUCOSE SERPL-MCNC: 115 MG/DL — HIGH (ref 70–99)
GLUCOSE SERPL-MCNC: 173 MG/DL — HIGH (ref 70–99)
GLUCOSE SERPL-MCNC: 97 MG/DL — SIGNIFICANT CHANGE UP (ref 70–99)
GLUCOSE SERPL-MCNC: 98 MG/DL — SIGNIFICANT CHANGE UP (ref 70–99)
HBV CORE AB SER-ACNC: SIGNIFICANT CHANGE UP
HBV SURFACE AB SER-ACNC: <3 MIU/ML — LOW
HBV SURFACE AG SER-ACNC: SIGNIFICANT CHANGE UP
HCO3 BLDV-SCNC: 25 MMOL/L — SIGNIFICANT CHANGE UP (ref 21–29)
HCO3 BLDV-SCNC: 28 MMOL/L — SIGNIFICANT CHANGE UP (ref 21–29)
HCT VFR BLD CALC: 28 % — LOW (ref 39–50)
HCT VFR BLDA CALC: 27 % — LOW (ref 39–50)
HCV AB S/CO SERPL IA: 0.29 S/CO — SIGNIFICANT CHANGE UP
HCV AB SERPL-IMP: SIGNIFICANT CHANGE UP
HGB BLD CALC-MCNC: 8.7 G/DL — LOW (ref 13–17)
HGB BLD-MCNC: 9.8 G/DL — LOW (ref 13–17)
HOROWITZ INDEX BLDV+IHG-RTO: 21 — SIGNIFICANT CHANGE UP
LACTATE BLDV-MCNC: 0.8 MMOL/L — SIGNIFICANT CHANGE UP (ref 0.7–2)
MAGNESIUM SERPL-MCNC: 1.8 MG/DL — SIGNIFICANT CHANGE UP (ref 1.6–2.6)
MAGNESIUM SERPL-MCNC: 1.9 MG/DL — SIGNIFICANT CHANGE UP (ref 1.6–2.6)
MAGNESIUM SERPL-MCNC: 2.1 MG/DL — SIGNIFICANT CHANGE UP (ref 1.6–2.6)
MAGNESIUM SERPL-MCNC: 2.2 MG/DL — SIGNIFICANT CHANGE UP (ref 1.6–2.6)
MCHC RBC-ENTMCNC: 31.5 PG — SIGNIFICANT CHANGE UP (ref 27–34)
MCHC RBC-ENTMCNC: 34.8 GM/DL — SIGNIFICANT CHANGE UP (ref 32–36)
MCV RBC AUTO: 90.6 FL — SIGNIFICANT CHANGE UP (ref 80–100)
OTHER CELLS CSF MANUAL: 10 ML/DL — LOW (ref 18–22)
PCO2 BLDV: 44 MMHG — SIGNIFICANT CHANGE UP (ref 35–50)
PCO2 BLDV: 48 MMHG — SIGNIFICANT CHANGE UP (ref 35–50)
PH BLDV: 7.33 — LOW (ref 7.35–7.45)
PH BLDV: 7.42 — SIGNIFICANT CHANGE UP (ref 7.35–7.45)
PHOSPHATE SERPL-MCNC: 2.6 MG/DL — SIGNIFICANT CHANGE UP (ref 2.5–4.5)
PHOSPHATE SERPL-MCNC: 3.3 MG/DL — SIGNIFICANT CHANGE UP (ref 2.5–4.5)
PHOSPHATE SERPL-MCNC: 4.5 MG/DL — SIGNIFICANT CHANGE UP (ref 2.5–4.5)
PHOSPHATE SERPL-MCNC: 5.2 MG/DL — HIGH (ref 2.5–4.5)
PLATELET # BLD AUTO: 133 K/UL — LOW (ref 150–400)
PO2 BLDV: 46 MMHG — HIGH (ref 25–45)
PO2 BLDV: 70 MMHG — HIGH (ref 25–45)
POTASSIUM BLDV-SCNC: 4.7 MMOL/L — SIGNIFICANT CHANGE UP (ref 3.5–5)
POTASSIUM BLDV-SCNC: 5.1 MMOL/L — HIGH (ref 3.5–5)
POTASSIUM SERPL-MCNC: 4.9 MMOL/L — SIGNIFICANT CHANGE UP (ref 3.5–5.3)
POTASSIUM SERPL-MCNC: 4.9 MMOL/L — SIGNIFICANT CHANGE UP (ref 3.5–5.3)
POTASSIUM SERPL-MCNC: 5 MMOL/L — SIGNIFICANT CHANGE UP (ref 3.5–5.3)
POTASSIUM SERPL-MCNC: 5.4 MMOL/L — HIGH (ref 3.5–5.3)
POTASSIUM SERPL-MCNC: 8 MMOL/L — CRITICAL HIGH (ref 3.5–5.3)
POTASSIUM SERPL-SCNC: 4.9 MMOL/L — SIGNIFICANT CHANGE UP (ref 3.5–5.3)
POTASSIUM SERPL-SCNC: 4.9 MMOL/L — SIGNIFICANT CHANGE UP (ref 3.5–5.3)
POTASSIUM SERPL-SCNC: 5 MMOL/L — SIGNIFICANT CHANGE UP (ref 3.5–5.3)
POTASSIUM SERPL-SCNC: 5.4 MMOL/L — HIGH (ref 3.5–5.3)
POTASSIUM SERPL-SCNC: 8 MMOL/L — CRITICAL HIGH (ref 3.5–5.3)
RBC # BLD: 3.09 M/UL — LOW (ref 4.2–5.8)
RBC # FLD: 12 % — SIGNIFICANT CHANGE UP (ref 10.3–14.5)
RH IG SCN BLD-IMP: POSITIVE — SIGNIFICANT CHANGE UP
SAO2 % BLDV: 81 % — SIGNIFICANT CHANGE UP (ref 67–88)
SAO2 % BLDV: 96 % — HIGH (ref 67–88)
SODIUM SERPL-SCNC: 131 MMOL/L — LOW (ref 135–145)
SODIUM SERPL-SCNC: 136 MMOL/L — SIGNIFICANT CHANGE UP (ref 135–145)
SODIUM SERPL-SCNC: 137 MMOL/L — SIGNIFICANT CHANGE UP (ref 135–145)
SODIUM SERPL-SCNC: 138 MMOL/L — SIGNIFICANT CHANGE UP (ref 135–145)
SODIUM SERPL-SCNC: 140 MMOL/L — SIGNIFICANT CHANGE UP (ref 135–145)
TACROLIMUS SERPL-MCNC: <2 NG/ML — SIGNIFICANT CHANGE UP
TROPONIN T SERPL-MCNC: 0.03 NG/ML — SIGNIFICANT CHANGE UP (ref 0–0.06)
WBC # BLD: 8.6 K/UL — SIGNIFICANT CHANGE UP (ref 3.8–10.5)
WBC # FLD AUTO: 8.6 K/UL — SIGNIFICANT CHANGE UP (ref 3.8–10.5)

## 2018-01-04 PROCEDURE — 90935 HEMODIALYSIS ONE EVALUATION: CPT

## 2018-01-04 PROCEDURE — 71045 X-RAY EXAM CHEST 1 VIEW: CPT | Mod: 26

## 2018-01-04 PROCEDURE — 36556 INSERT NON-TUNNEL CV CATH: CPT

## 2018-01-04 PROCEDURE — 93010 ELECTROCARDIOGRAM REPORT: CPT | Mod: 77

## 2018-01-04 RX ORDER — INSULIN HUMAN 100 [IU]/ML
5 INJECTION, SOLUTION SUBCUTANEOUS ONCE
Qty: 0 | Refills: 0 | Status: DISCONTINUED | OUTPATIENT
Start: 2018-01-04 | End: 2018-01-04

## 2018-01-04 RX ORDER — AMLODIPINE BESYLATE 2.5 MG/1
5 TABLET ORAL DAILY
Qty: 0 | Refills: 0 | Status: DISCONTINUED | OUTPATIENT
Start: 2018-01-04 | End: 2018-01-05

## 2018-01-04 RX ORDER — INSULIN HUMAN 100 [IU]/ML
5 INJECTION, SOLUTION SUBCUTANEOUS ONCE
Qty: 0 | Refills: 0 | Status: COMPLETED | OUTPATIENT
Start: 2018-01-04 | End: 2018-01-04

## 2018-01-04 RX ORDER — FENTANYL CITRATE 50 UG/ML
50 INJECTION INTRAVENOUS ONCE
Qty: 0 | Refills: 0 | Status: DISCONTINUED | OUTPATIENT
Start: 2018-01-04 | End: 2018-01-04

## 2018-01-04 RX ORDER — CALCIUM GLUCONATE 100 MG/ML
1 VIAL (ML) INTRAVENOUS ONCE
Qty: 0 | Refills: 0 | Status: DISCONTINUED | OUTPATIENT
Start: 2018-01-04 | End: 2018-01-04

## 2018-01-04 RX ORDER — DEXTROSE 50 % IN WATER 50 %
50 SYRINGE (ML) INTRAVENOUS ONCE
Qty: 0 | Refills: 0 | Status: COMPLETED | OUTPATIENT
Start: 2018-01-04 | End: 2018-01-04

## 2018-01-04 RX ORDER — DARBEPOETIN ALFA IN POLYSORBAT 200MCG/0.4
60 PEN INJECTOR (ML) SUBCUTANEOUS ONCE
Qty: 0 | Refills: 0 | Status: COMPLETED | OUTPATIENT
Start: 2018-01-04 | End: 2018-01-05

## 2018-01-04 RX ORDER — AMLODIPINE BESYLATE 2.5 MG/1
5 TABLET ORAL ONCE
Qty: 0 | Refills: 0 | Status: COMPLETED | OUTPATIENT
Start: 2018-01-04 | End: 2018-01-04

## 2018-01-04 RX ORDER — FENTANYL CITRATE 50 UG/ML
50 INJECTION INTRAVENOUS EVERY 6 HOURS
Qty: 0 | Refills: 0 | Status: DISCONTINUED | OUTPATIENT
Start: 2018-01-04 | End: 2018-01-04

## 2018-01-04 RX ADMIN — AMLODIPINE BESYLATE 5 MILLIGRAM(S): 2.5 TABLET ORAL at 22:33

## 2018-01-04 RX ADMIN — Medication 650 MILLIGRAM(S): at 21:43

## 2018-01-04 RX ADMIN — TACROLIMUS 3 MILLIGRAM(S): 5 CAPSULE ORAL at 18:39

## 2018-01-04 RX ADMIN — PANTOPRAZOLE SODIUM 40 MILLIGRAM(S): 20 TABLET, DELAYED RELEASE ORAL at 06:01

## 2018-01-04 RX ADMIN — Medication 650 MILLIGRAM(S): at 12:53

## 2018-01-04 RX ADMIN — Medication 650 MILLIGRAM(S): at 06:01

## 2018-01-04 RX ADMIN — FENTANYL CITRATE 50 MICROGRAM(S): 50 INJECTION INTRAVENOUS at 00:00

## 2018-01-04 RX ADMIN — Medication 50 MILLILITER(S): at 01:43

## 2018-01-04 RX ADMIN — FENTANYL CITRATE 50 MICROGRAM(S): 50 INJECTION INTRAVENOUS at 00:30

## 2018-01-04 RX ADMIN — TACROLIMUS 3 MILLIGRAM(S): 5 CAPSULE ORAL at 06:01

## 2018-01-04 RX ADMIN — Medication 5 MILLIGRAM(S): at 06:01

## 2018-01-04 RX ADMIN — INSULIN HUMAN 5 UNIT(S): 100 INJECTION, SOLUTION SUBCUTANEOUS at 01:43

## 2018-01-04 RX ADMIN — AMLODIPINE BESYLATE 5 MILLIGRAM(S): 2.5 TABLET ORAL at 20:16

## 2018-01-04 NOTE — PROCEDURE NOTE - NSPOSTPRCRAD_GEN_A_CORE
central line located in the superior vena cava no pneumothorax/central line located in the superior vena cava

## 2018-01-04 NOTE — PROCEDURE NOTE - NSPROCDETAILS_GEN_ALL_CORE
guidewire recovered lumen(s) aspirated and flushed/sterile dressing applied/ultrasound guidance/guidewire recovered

## 2018-01-04 NOTE — CHART NOTE - NSCHARTNOTEFT_GEN_A_CORE
MICU Transfer Note    Transfer from: MICU    Transfer to: (X) Medicine    (  ) Telemetry     (   ) RCU        (    ) Palliative         (   ) Stroke Unit          (   ) __________________    Accepting Physician:  Signout given to:     MICU COURSE: Pt is a 31 y/o M PMH w/ a pmh significant for focal glomerulonephritis s/p R kidney transplant (2005 @ [facility]), CKD IV, and HTN who presents to the ED per referral from PMD for urgent hemodialysis. Pt was in his usual state of health when he was at his PMD for routine lab work yesterday which showed worsening renal function and was sent to the ED for urgent HD. Pt endorses a recent hx of weakness, lethargy and sensation of "fluttering in the chest" over the last three months. Patient has never undergone dialysis in the past. Of note, he says he had worsening b/l LE swelling that has since resolved over the last 3 days. Pt says he is adherent to his home medication regimen of prednisone and tacrolimus but self d/c mycophenolate 1 year ago 2/2 frequent bouts of diarrhea. In the ED, labs pertinent for hyperkalemia of 5.8, , Cr 21.62, and Phos 5.2.  EKG in the ED showed hyperacute T waves in V2-V4.  Patient received 2 g IV calcium gluconate, albuterol 10 mg neb, 10 mg IV insulin, and 40 mg IV furosemide. Pt was subsequently admitted to the MICU for urgent HD.     Pt underwent HD on 1/4 at 10 am w/ subsequent labs...........        ASSESSMENT & PLAN: MICU Transfer Note    Transfer from: MICU    Transfer to: (X) Medicine    (  ) Telemetry     (   ) RCU        (    ) Palliative         (   ) Stroke Unit          (   ) __________________    Accepting Physician: Dr. Linda Lawson  Signout given to:     MICU COURSE: Pt is a 33 y/o M PMH w/ a pmh significant for focal glomerulonephritis s/p R kidney transplant (2005 @ [facility]), CKD IV, and HTN who presents to the ED per referral from PMD for urgent hemodialysis. Pt was in his usual state of health when he was at his PMD for routine lab work yesterday which showed worsening renal function and was sent to the ED for urgent HD. Pt endorses a recent hx of weakness, lethargy and sensation of "fluttering in the chest" over the last three months. Patient has never undergone dialysis in the past. Of note, he says he had worsening b/l LE swelling that has since resolved over the last 3 days. Pt says he is adherent to his home medication regimen of prednisone and tacrolimus but self d/c mycophenolate 1 year ago 2/2 frequent bouts of diarrhea. In the ED, labs pertinent for hyperkalemia of 5.8, , Cr 21.62, and Phos 5.2.  EKG in the ED showed hyperacute T waves in V2-V4.  Patient received 2 g IV calcium gluconate, albuterol 10 mg neb, 10 mg IV insulin, and 40 mg IV furosemide. Pt was subsequently admitted to the MICU for urgent HD.     Pt underwent HD on 1/4 at 10 am w/ subsequent labs...........        ASSESSMENT & PLAN:    33 y/o M PMH focal glomerulonephritis s/p R kidney transplant (2005 @ [facility]), CKD IV, and HTN who presents with progressively worsening CKD, hyperkalemia with EKG changes admitted to MICU for Urgent HD.     Neuro:  - No active issues. AAOx3     Cardio:  - PMH significant for essential HTN- consider starting home HTN medications (Felodipine 10mg 2tabs QD, Lopressor 50mg BID) post HD when indicated.       Pulm:  - no active issues, satting well on RA- no signs of fluid overload or respiratory distress.       GI:  - Pt reporting 2 bouts of black stool over the last 3 days. Fecal occult sent. No active signs of bleeding.  - Hb 9.8 this admission- baseline 10.5. Likely in setting of CKD.   - If Hb continues to downtrend, consider Iron studies, hemolytic anemia work up, GI consult in setting of acute blood loss anemia.  - s/p Protonix 40mg BID x 1 in ED.   - f/u fecal occult    Renal:  - Hyperkalemia (5.8) with EKG changes s/p 2 g calcium gluconate, 5 IV insulin, and albuterol  - BUN/Cr: 121/21.62 on admission.   - c/w prednisone 5 mg and tacrolimus 3 mg. Check daily tacrolimus levels.   - temporary RIJ placed for HD. After 3 cycles of HD, pt will need tunneled catheter placement by IR/ Consider Vascular surgery consult for AV fistula placement    ID:   - No active issues. Monitor for infectious stigmata in setting of immunosuppression.     Heme:   - Hb 9.8 (last admission 9.8-10.5) like 2/2 to CKD. ? Component of acute blood loss anemia as pt endorsing hx of melanotic stool over last 3 days.  - Trend Hb; transfuse for Hb >7      DVT PPx: SCDs     Dispo  - Social work for placement into outpatient dialysis center. MICU Transfer Note    Transfer from: MICU    Transfer to: (X) Medicine    (  ) Telemetry     (   ) RCU        (    ) Palliative         (   ) Stroke Unit          (   ) __________________    Accepting Physician: Dr. Linda Lawson  Signout given to:     MICU COURSE: Pt is a 31 y/o M PMH w/ a pmh significant for focal glomerulonephritis s/p R kidney transplant (2005 @ [facility]), CKD IV (baseline Cr 5.5) , and HTN who presents to the ED per referral from PMD for urgent hemodialysis. Pt was in his usual state of health when he was at his PMD for routine lab work yesterday which showed worsening renal function and was sent to the ED for urgent HD. Pt endorses a recent hx of weakness, lethargy and sensation of "fluttering in the chest" over the last three months. Patient has never undergone dialysis in the past. Of note, he says he had worsening b/l LE swelling that has since resolved over the last 3 days. Pt says he is adherent to his home medication regimen of prednisone and tacrolimus but self d/c mycophenolate 1 year ago 2/2 frequent bouts of diarrhea. In the ED, labs pertinent for hyperkalemia of 5.8, , Cr 21.62, and Phos 5.2.  EKG in the ED showed hyperacute T waves in V2-V4.  Patient received 2 g IV calcium gluconate, albuterol 10 mg neb, 10 mg IV insulin, and 40 mg IV furosemide. Pt was subsequently admitted to the MICU for urgent HD.     Pt underwent HD on 1/4 w/ subsequent labs...........        ASSESSMENT & PLAN:    31 y/o M PMH focal glomerulonephritis s/p R kidney transplant (2005 @ [facility]), CKD IV, and HTN who presents with progressively worsening CKD, hyperkalemia with EKG changes admitted to MICU for Urgent HD.     Neuro:  - No active issues. AAOx3     Cardio:  - PMH significant for essential HTN- consider starting home HTN medications (Felodipine 10mg 2tabs QD, Lopressor 50mg BID) post HD when indicated.       Pulm:  - no active issues, satting well on RA- no signs of fluid overload or respiratory distress.       GI:  - Pt reporting 2 bouts of black stool over the last 3 days. Fecal occult sent. No active signs of bleeding.  - Hb 9.8 this admission- baseline 10.5. Likely in setting of CKD.   - If Hb continues to downtrend, consider Iron studies, hemolytic anemia work up, GI consult in setting of acute blood loss anemia.  - s/p Protonix 40mg BID x 1 in ED.   - f/u fecal occult    Renal:  - Hyperkalemia (5.8) with EKG changes s/p 2 g calcium gluconate, 5 IV insulin, and albuterol  - BUN/Cr: 121/21.62 on admission.   - c/w prednisone 5 mg and tacrolimus 3 mg. Check daily tacrolimus levels.   - temporary RIJ placed for HD. After 3 cycles of HD, pt will need tunneled catheter placement by IR/ Consider Vascular surgery consult for AV fistula placement    ID:   - No active issues. Monitor for infectious stigmata in setting of immunosuppression.     Heme:   - Hb 9.8 (last admission 9.8-10.5) like 2/2 to CKD. ? Component of acute blood loss anemia as pt endorsing hx of melanotic stool over last 3 days.  - Trend Hb; transfuse for Hb >7      DVT PPx: SCDs     Dispo  - Social work for placement into outpatient dialysis center. MICU Transfer Note    Transfer from: MICU    Transfer to: (X) Medicine    (  ) Telemetry     (   ) RCU        (    ) Palliative         (   ) Stroke Unit          (   ) __________________    Accepting Physician: Dr. Linda Lawson  Signout given to:     MICU COURSE: Pt is a 33 y/o M PMH w/ a pmh significant for focal glomerulonephritis s/p R kidney transplant (2005 @ [facility]), CKD IV (baseline Cr 5.5) , and HTN who presents to the ED per referral from PMD for urgent hemodialysis. Pt was in his usual state of health when he was at his PMD for routine lab work one day prior to admission which showed worsening renal function and was sent to the ED for urgent HD. Pt endorses a recent hx of weakness, lethargy, and a sensation of "fluttering in the chest" over the last three months. Patient has never undergone dialysis in the past. Of note, he says he had worsening b/l LE swelling that has since resolved over the last 3 days. Pt says he is adherent to his home medication regimen of prednisone and tacrolimus but self d/c mycophenolate 1 year ago 2/2 frequent bouts of diarrhea. In the ED, labs pertinent for hyperkalemia of 5.8, , Cr 21.62, and Phos 5.2.  EKG in the ED showed hyperacute T waves in V2-V4.  Patient received 2 g IV calcium gluconate, albuterol 10 mg neb, 10 mg IV insulin, and 40 mg IV furosemide. Nephrology was consulted and pt was subsequently admitted to the MICU for urgent HD.     On admission,  pt underwent HD on 1/4 w/ subsequent labs significant for BUN/Cr (78/15.4). Pt underwent HD on 1/5 as well per nephrology recommendations. His hospital course was complicated by HTN w/ -160 for which pt was restarted on home Amlodipine 10mg qd. Pt is scheduled for HD on 1/6 and possibly 1/8 per Transplant Medicine team. Pt was hemodynamically stable for transfer to general medicine floors.        ASSESSMENT & PLAN:    33 y/o M PMH focal glomerulonephritis s/p R kidney transplant (2005 @ [facility]), CKD IV, and HTN who presents with progressively worsening CKD, hyperkalemia with EKG changes admitted to MICU for Urgent HD.     Neuro:  - No active issues. AAOx3     Cardio:  - PMH significant for essential HTN- pt was started on Amlodipine 10mg QD for -160's. If SBP >140 continue would consider restarting home Lopressor 50mg BID.      Pulm:  - No active issues, satting well on RA- no signs of fluid overload or respiratory distress.     GI:  - Pt reporting 2 bouts of black stool over the last 3 days. Fecal occult sent. No active signs of bleeding.  - Hb 9 (9.8) this admission- baseline 10.5. Likely in setting of CKD.   - If Hb continues to downtrend, consider Iron studies, hemolytic anemia work up, GI consult in setting of acute blood loss anemia.  - s/p Protonix 40mg BID x 1 in ED.   - f/u fecal occult    Renal:  - Admission hyperkalemia (5.8) with EKG changes s/p 2 g calcium gluconate, 5 IV insulin, and albuterol  - Pt underwent HD on 1/4 & 1/5. He is scheduled for HD on 1/6 and possibly 1/8 per Transplant Medicine recs.  - c/w prednisone 5 mg and tacrolimus 3 mg. Check daily tacrolimus levels.   - Temporary RIJ placed for HD. Will be switched to tunneled dialysis catheter on Monday (1/8/18)  - Consider Vascular surgery consult for AV fistula placement  - Appreciate Transplant Medicine recs.    ID:   - No active issues. Monitor for infectious stigmata in setting of immunosuppression.     Heme:   - Hb 9 (9.8) (last admission 9.8-10.5) like 2/2 to CKD. ? Component of acute blood loss anemia as pt endorsing hx of melanotic stool over last 3 days that have since resolved.  - Trend Hb; transfuse for Hb >7      DVT PPx: SCDs       Dispo  - Social work for placement into outpatient dialysis center.  - Pt requesting placement in [facility] near his home.    Address: [address] [place] [address]  Phone: [phone] MICU Transfer Note    Transfer from: MICU    Transfer to: (X) Medicine    (  ) Telemetry     (   ) RCU        (    ) Palliative         (   ) Stroke Unit          (   ) __________________    Accepting Physician: Dr. Linda Lawson  Signout given to:     MICU COURSE: Pt is a 33 y/o M PMH w/ a pmh significant for focal glomerulonephritis s/p R kidney transplant (2005 @ [facility]), CKD IV (baseline Cr 5.5) , and HTN who presents to the ED per referral from PMD for urgent hemodialysis. Pt was in his usual state of health when he was at his PMD for routine lab work one day prior to admission which showed worsening renal function and was sent to the ED for urgent HD. Pt endorses a recent hx of weakness, lethargy, and a sensation of "fluttering in the chest" over the last three months. Patient has never undergone dialysis in the past. Of note, he says he had worsening b/l LE swelling that has since resolved over the last 3 days. Pt says he is adherent to his home medication regimen of prednisone and tacrolimus but self d/c mycophenolate 1 year ago 2/2 frequent bouts of diarrhea. In the ED, labs pertinent for hyperkalemia of 5.8, , Cr 21.62, and Phos 5.2.  EKG in the ED showed hyperacute T waves in V2-V4.  Patient received 2 g IV calcium gluconate, albuterol 10 mg neb, 10 mg IV insulin, and 40 mg IV furosemide. Nephrology was consulted and pt was subsequently admitted to the MICU for urgent HD.     On admission,  pt underwent HD on 1/4 w/ subsequent labs significant for BUN/Cr (78/15.4). Pt underwent HD on 1/5 as well per nephrology recommendations. His hospital course was complicated by HTN w/ -160 for which pt was restarted on home Amlodipine 10mg qd. Pt is scheduled for HD on 1/6 and possibly 1/8 per Transplant Medicine team. Pt was hemodynamically stable for transfer to general medicine floors.        ASSESSMENT & PLAN:    33 y/o M PMH focal glomerulonephritis s/p R kidney transplant (2005 @ [facility]), CKD IV, and HTN who presents with progressively worsening CKD, hyperkalemia with EKG changes admitted to MICU for Urgent HD.     Neuro:  - No active issues. AAOx3     Cardio:  - PMH significant for essential HTN- pt was started on Amlodipine 10mg QD for -160's. If SBP >140 continue would consider restarting home Lopressor 50mg BID.      Pulm:  - No active issues, satting well on RA- no signs of fluid overload or respiratory distress.     GI:  - Pt reporting 2 bouts of black stool over the last 4 days that have since resolved. Fecal occult sent. No active signs of bleeding.  - Hb 9 (9.8) this admission- baseline 10.5. Likely in setting of CKD.   - If Hb continues to downtrend, consider Iron studies, hemolytic anemia work up, GI consult in setting of acute blood loss anemia.  - s/p Protonix 40mg BID x 1 in ED.   - f/u fecal occult    Renal:  - Admission hyperkalemia (5.8) with EKG changes s/p 2 g calcium gluconate, 5 IV insulin, and albuterol  - Pt underwent HD on 1/4 & 1/5. He is scheduled for HD on 1/6 and possibly 1/8 per Transplant Medicine recs.  - c/w prednisone 5 mg and tacrolimus 3 mg. Check daily tacrolimus levels.   - Temporary RIJ placed for HD. Will be switched to tunneled dialysis catheter on Monday (1/8/18)  - Consider Vascular surgery consult for AV fistula placement  - Appreciate Transplant Medicine recs.    ID:   - No active issues. Monitor for infectious stigmata in setting of immunosuppression.     Heme:   - Hb 9 (9.8) (last admission 9.8-10.5) like 2/2 to CKD. ? Component of acute blood loss anemia as pt endorsing hx of melanotic stool over last 3 days that have since resolved.  - Trend Hb; transfuse for Hb >7      DVT PPx:   - SCDs       Dispo  - Pt requesting placement in [facility] near his home.  Address: [address] [place] [address]  Phone: [phone] MICU Transfer Note    Transfer from: MICU    Transfer to: (X) Medicine    (  ) Telemetry     (   ) RCU        (    ) Palliative         (   ) Stroke Unit          (   ) __________________    Accepting Physician: Dr. Linda Lawson  Signout given to:     MICU COURSE: Pt is a 31 y/o M PMH w/ a pmh significant for focal glomerulonephritis s/p R kidney transplant (2005 @ [facility]), CKD IV (baseline Cr 5.5) , and HTN who presents to the ED per referral from PMD for urgent hemodialysis. Pt was in his usual state of health when he was at his PMD for routine lab work one day prior to admission which showed worsening renal function and was sent to the ED for urgent HD. Pt endorses a recent hx of weakness, lethargy, and a sensation of "fluttering in the chest" over the last three months. Patient has never undergone dialysis in the past. Of note, he says he had worsening b/l LE swelling that has since resolved over the last 3 days. Pt says he is adherent to his home medication regimen of prednisone and tacrolimus but self d/c mycophenolate 1 year ago 2/2 frequent bouts of diarrhea. In the ED, labs pertinent for hyperkalemia of 5.8, , Cr 21.62, and Phos 5.2.  EKG in the ED showed hyperacute T waves in V2-V4.  Patient received 2 g IV calcium gluconate, albuterol 10 mg neb, 10 mg IV insulin, and 40 mg IV furosemide. Nephrology was consulted and pt was subsequently admitted to the MICU for urgent HD.     On admission,  pt underwent HD on 1/4 w/ subsequent labs significant for BUN/Cr (78/15.4). Pt underwent HD on 1/5 as well per nephrology recommendations. His hospital course was complicated by HTN w/ -160 for which pt was restarted on home Amlodipine 10mg qd. Pt is scheduled for HD on 1/6 and possibly 1/8 per Transplant Medicine team. Pt was hemodynamically stable for transfer to general medicine floors.        ASSESSMENT & PLAN:    31 y/o M PMH focal glomerulonephritis s/p R kidney transplant (2005 @ [facility]), CKD IV, and HTN who presents with progressively worsening CKD, hyperkalemia with EKG changes admitted to MICU for Urgent HD.     Neuro:  - No active issues. AAOx3     Cardio:  - PMH significant for essential HTN- pt was started on Amlodipine 10mg QD for -160's.  - If SBP >140 continue would consider restarting home Lopressor 50mg BID.      Pulm:  - No active issues, satting well on RA- no signs of fluid overload or respiratory distress.     GI:  - Pt reporting 2 bouts of black stool over the last 4 days that have since resolved. Fecal occult sent. No active signs of bleeding.  - Hb 9 (9.8) this admission- baseline 10.5. Likely in setting of CKD.   - If Hb continues to downtrend, consider Iron studies, hemolytic anemia work up, GI consult in setting of acute blood loss anemia.  - s/p Protonix 40mg BID x 1 in ED.   - f/u fecal occult    Renal:  - Admission hyperkalemia (5.8) with EKG changes s/p 2 g calcium gluconate, 5 IV insulin, and albuterol  - Pt underwent HD on 1/4 & 1/5. He is scheduled for HD on 1/6 and possibly 1/8 per Transplant Medicine recs.  - c/w prednisone 5 mg and tacrolimus 3 mg. Check daily tacrolimus levels.   - Temporary RIJ placed for HD. Will be switched to tunneled dialysis catheter on Monday (1/8/18)  - Consider Vascular surgery consult for AV fistula placement  - Appreciate Transplant Medicine recs.    ID:   - No active issues. Monitor for infectious stigmata in setting of immunosuppression.     Heme:   - Hb 9 (9.8) (last admission 9.8-10.5) like 2/2 to CKD. ? Component of acute blood loss anemia as pt endorsing hx of melanotic stool over last 3 days that have since resolved.  - Trend Hb; transfuse for Hb >7      DVT PPx:   - SCDs       Dispo  - Pt requesting placement in [facility] near his home.  Address: [address] [place] [address]  Phone: [phone] MICU Transfer Note    Transfer from: MICU    Transfer to: (X) Medicine    (  ) Telemetry     (   ) RCU        (    ) Palliative         (   ) Stroke Unit             Accepting Physician: Dr. Ghassan Lawson  Signout given to:     MICU COURSE: Pt is a 31 y/o M PMH w/ a pmh significant for focal glomerulonephritis s/p R kidney transplant (2005 @ [facility]), CKD IV (baseline Cr 5.5) , and HTN who presents to the ED per referral from PMD for urgent hemodialysis. Pt was in his usual state of health when he was at his PMD for routine lab work one day prior to admission which showed worsening renal function and was sent to the ED for urgent HD. Pt endorses a recent hx of weakness, lethargy, and a sensation of "fluttering in the chest" over the last three months. Patient has never undergone dialysis in the past. Of note, he says he had worsening b/l LE swelling that has since resolved over the last 3 days. Pt says he is adherent to his home medication regimen of prednisone and tacrolimus but self d/c mycophenolate 1 year ago 2/2 frequent bouts of diarrhea. In the ED, labs pertinent for hyperkalemia of 5.8, , Cr 21.62, and Phos 5.2.  EKG in the ED showed hyperacute T waves in V2-V4.  Patient received 2 g IV calcium gluconate, albuterol 10 mg neb, 10 mg IV insulin, and 40 mg IV furosemide. Nephrology was consulted and pt was subsequently admitted to the MICU for urgent HD.     On admission,  pt underwent HD on 1/4 w/ subsequent labs significant for BUN/Cr (78/15.4). Pt underwent HD on 1/5 as well per nephrology recommendations. His hospital course was complicated by HTN w/ -160 for which pt was restarted on home Amlodipine 10mg qd. Pt is scheduled for HD on 1/6 and possibly 1/8 per Transplant Medicine team. Pt was hemodynamically stable for transfer to general medicine floors.        ASSESSMENT & PLAN:    31 y/o M PMH focal glomerulonephritis s/p R kidney transplant (2005 @ [facility]), CKD IV, and HTN who presents with progressively worsening CKD, hyperkalemia with EKG changes admitted to MICU for Urgent HD.     Neuro:  - No active issues. AAOx3     Cardio:  - PMH significant for essential HTN- pt was started on Amlodipine 10mg QD for -160's.  - If SBP >140 continue would consider restarting home Lopressor 50mg BID.      Pulm:  - No active issues, satting well on RA- no signs of fluid overload or respiratory distress.     GI:  - Pt reporting 2 bouts of black stool over the last 4 days that have since resolved. Fecal occult sent. No active signs of bleeding.  - Hb 9 (9.8) this admission- baseline 10.5. Likely in setting of CKD.   - If Hb continues to downtrend, consider Iron studies, hemolytic anemia work up, GI consult in setting of acute blood loss anemia.  - s/p Protonix 40mg BID x 1 in ED.   - f/u fecal occult    Renal:  - Admission hyperkalemia (5.8) with EKG changes s/p 2 g calcium gluconate, 5 IV insulin, and albuterol  - Pt underwent HD on 1/4 & 1/5. He is scheduled for HD on 1/6 and possibly 1/8 per Transplant Medicine recs.  - c/w prednisone 5 mg and tacrolimus 3 mg. Check daily tacrolimus levels.   - Temporary RIJ placed for HD. Will be switched to tunneled dialysis catheter on Monday (1/8/18)  - Consider Vascular surgery consult for AV fistula placement  - Appreciate Transplant Medicine recs.    ID:   - No active issues. Monitor for infectious stigmata in setting of immunosuppression.     Heme:   - Hb 9 (9.8) (last admission 9.8-10.5) like 2/2 to CKD. ? Component of acute blood loss anemia as pt endorsing hx of melanotic stool over last 3 days that have since resolved.  - Trend Hb; transfuse for Hb >7      DVT PPx:   - SCDs       Dispo  - Pt requesting placement in [facility] near his home.  Address: [address] [place] [address]  Phone: [phone] MICU Transfer Note    Transfer from: MICU    Transfer to: (X) Medicine    (  ) Telemetry     (   ) RCU        (    ) Palliative         (   ) Stroke Unit             Accepting Physician: Dr. Ghassan Lawson  Signout given to: RICHARD Zepeda    MICU COURSE: Pt is a 31 y/o M PMH w/ a pmh significant for focal glomerulonephritis s/p R kidney transplant (2005 @ [facility]), CKD IV (baseline Cr 5.5) , and HTN who presents to the ED per referral from PMD for urgent hemodialysis. Pt was in his usual state of health when he was at his PMD for routine lab work one day prior to admission which showed worsening renal function and was sent to the ED for urgent HD. Pt endorses a recent hx of weakness, lethargy, and a sensation of "fluttering in the chest" over the last three months. Patient has never undergone dialysis in the past. Of note, he says he had worsening b/l LE swelling that has since resolved over the last 3 days. Pt says he is adherent to his home medication regimen of prednisone and tacrolimus but self d/c mycophenolate 1 year ago 2/2 frequent bouts of diarrhea. In the ED, labs pertinent for hyperkalemia of 5.8, , Cr 21.62, and Phos 5.2.  EKG in the ED showed hyperacute T waves in V2-V4.  Patient received 2 g IV calcium gluconate, albuterol 10 mg neb, 10 mg IV insulin, and 40 mg IV furosemide. Nephrology was consulted and pt was subsequently admitted to the MICU for urgent HD.     On admission,  pt underwent HD on 1/4 w/ subsequent labs significant for BUN/Cr (78/15.4). Pt underwent HD on 1/5 as well per nephrology recommendations. His hospital course was complicated by HTN w/ -160 for which pt was restarted on home Amlodipine 10mg qd. Pt is scheduled for HD on 1/6 and possibly 1/8 per Transplant Medicine team. Pt was hemodynamically stable for transfer to general medicine floors.        ASSESSMENT & PLAN:    31 y/o M PMH focal glomerulonephritis s/p R kidney transplant (2005 @ [facility]), CKD IV, and HTN who presents with progressively worsening CKD, hyperkalemia with EKG changes admitted to MICU for Urgent HD.     Neuro:  - No active issues. AAOx3     Cardio:  - PMH significant for essential HTN- pt was started on Amlodipine 10mg QD for -160's.  - If SBP >140 continue would consider restarting home Lopressor 50mg BID.      Pulm:  - No active issues, satting well on RA- no signs of fluid overload or respiratory distress.     GI:  - Pt reporting 2 bouts of black stool over the last 4 days that have since resolved. Fecal occult sent. No active signs of bleeding.  - Hb 9 (9.8) this admission- baseline 10.5. Likely in setting of CKD.   - If Hb continues to downtrend, consider Iron studies, hemolytic anemia work up, GI consult in setting of acute blood loss anemia.  - s/p Protonix 40mg BID x 1 in ED.   - f/u fecal occult    Renal:  - Admission hyperkalemia (5.8) with EKG changes s/p 2 g calcium gluconate, 5 IV insulin, and albuterol  - Pt underwent HD on 1/4 & 1/5. He is scheduled for HD on 1/6 and possibly 1/8 per Transplant Medicine recs.  - c/w prednisone 5 mg and tacrolimus 3 mg. Check daily tacrolimus levels.   - Temporary RIJ placed for HD. Will be switched to tunneled dialysis catheter on Monday (1/8/18)  - Consider Vascular surgery consult for AV fistula placement  - Appreciate Transplant Medicine recs.    ID:   - No active issues. Monitor for infectious stigmata in setting of immunosuppression.     Heme:   - Hb 9 (9.8) (last admission 9.8-10.5) like 2/2 to CKD. ? Component of acute blood loss anemia as pt endorsing hx of melanotic stool over last 3 days that have since resolved.  - Trend Hb; transfuse for Hb >7      DVT PPx:   - SCDs       Dispo  - Pt requesting placement in [facility] unit near his home.  Address: [address] [place] [address]  Phone: [phone]

## 2018-01-05 DIAGNOSIS — Z94.0 KIDNEY TRANSPLANT STATUS: ICD-10-CM

## 2018-01-05 DIAGNOSIS — R69 ILLNESS, UNSPECIFIED: ICD-10-CM

## 2018-01-05 LAB
ALBUMIN SERPL ELPH-MCNC: 4 G/DL — SIGNIFICANT CHANGE UP (ref 3.3–5)
ALP SERPL-CCNC: 50 U/L — SIGNIFICANT CHANGE UP (ref 40–120)
ALT FLD-CCNC: 29 U/L RC — SIGNIFICANT CHANGE UP (ref 10–45)
ANION GAP SERPL CALC-SCNC: 17 MMOL/L — SIGNIFICANT CHANGE UP (ref 5–17)
AST SERPL-CCNC: 12 U/L — SIGNIFICANT CHANGE UP (ref 10–40)
BASOPHILS # BLD AUTO: 0 K/UL — SIGNIFICANT CHANGE UP (ref 0–0.2)
BASOPHILS NFR BLD AUTO: 0.4 % — SIGNIFICANT CHANGE UP (ref 0–2)
BILIRUB SERPL-MCNC: 0.6 MG/DL — SIGNIFICANT CHANGE UP (ref 0.2–1.2)
BUN SERPL-MCNC: 91 MG/DL — HIGH (ref 7–23)
CALCIUM SERPL-MCNC: 9.3 MG/DL — SIGNIFICANT CHANGE UP (ref 8.4–10.5)
CHLORIDE SERPL-SCNC: 97 MMOL/L — SIGNIFICANT CHANGE UP (ref 96–108)
CK MB BLD-MCNC: 0.8 % — SIGNIFICANT CHANGE UP (ref 0–3.5)
CK MB CFR SERPL CALC: 1 NG/ML — SIGNIFICANT CHANGE UP (ref 0–6.7)
CK SERPL-CCNC: 132 U/L — SIGNIFICANT CHANGE UP (ref 30–200)
CO2 SERPL-SCNC: 25 MMOL/L — SIGNIFICANT CHANGE UP (ref 22–31)
CREAT SERPL-MCNC: 17.03 MG/DL — HIGH (ref 0.5–1.3)
EOSINOPHIL # BLD AUTO: 0.2 K/UL — SIGNIFICANT CHANGE UP (ref 0–0.5)
EOSINOPHIL NFR BLD AUTO: 2.6 % — SIGNIFICANT CHANGE UP (ref 0–6)
GLUCOSE SERPL-MCNC: 96 MG/DL — SIGNIFICANT CHANGE UP (ref 70–99)
HCT VFR BLD CALC: 26.3 % — LOW (ref 39–50)
HGB BLD-MCNC: 9 G/DL — LOW (ref 13–17)
LYMPHOCYTES # BLD AUTO: 2.1 K/UL — SIGNIFICANT CHANGE UP (ref 1–3.3)
LYMPHOCYTES # BLD AUTO: 27.8 % — SIGNIFICANT CHANGE UP (ref 13–44)
MAGNESIUM SERPL-MCNC: 2 MG/DL — SIGNIFICANT CHANGE UP (ref 1.6–2.6)
MCHC RBC-ENTMCNC: 31.1 PG — SIGNIFICANT CHANGE UP (ref 27–34)
MCHC RBC-ENTMCNC: 34 GM/DL — SIGNIFICANT CHANGE UP (ref 32–36)
MCV RBC AUTO: 91.5 FL — SIGNIFICANT CHANGE UP (ref 80–100)
MONOCYTES # BLD AUTO: 1 K/UL — HIGH (ref 0–0.9)
MONOCYTES NFR BLD AUTO: 12.8 % — SIGNIFICANT CHANGE UP (ref 2–14)
NEUTROPHILS # BLD AUTO: 4.3 K/UL — SIGNIFICANT CHANGE UP (ref 1.8–7.4)
NEUTROPHILS NFR BLD AUTO: 56.4 % — SIGNIFICANT CHANGE UP (ref 43–77)
PHOSPHATE SERPL-MCNC: 5.6 MG/DL — HIGH (ref 2.5–4.5)
PLATELET # BLD AUTO: 114 K/UL — LOW (ref 150–400)
POTASSIUM SERPL-MCNC: 5.1 MMOL/L — SIGNIFICANT CHANGE UP (ref 3.5–5.3)
POTASSIUM SERPL-SCNC: 5.1 MMOL/L — SIGNIFICANT CHANGE UP (ref 3.5–5.3)
PROT SERPL-MCNC: 6.6 G/DL — SIGNIFICANT CHANGE UP (ref 6–8.3)
RBC # BLD: 2.88 M/UL — LOW (ref 4.2–5.8)
RBC # FLD: 11.9 % — SIGNIFICANT CHANGE UP (ref 10.3–14.5)
SODIUM SERPL-SCNC: 139 MMOL/L — SIGNIFICANT CHANGE UP (ref 135–145)
TACROLIMUS SERPL-MCNC: 2.8 NG/ML — SIGNIFICANT CHANGE UP
TROPONIN T SERPL-MCNC: 0.03 NG/ML — SIGNIFICANT CHANGE UP (ref 0–0.06)
WBC # BLD: 7.6 K/UL — SIGNIFICANT CHANGE UP (ref 3.8–10.5)
WBC # FLD AUTO: 7.6 K/UL — SIGNIFICANT CHANGE UP (ref 3.8–10.5)

## 2018-01-05 PROCEDURE — 90935 HEMODIALYSIS ONE EVALUATION: CPT

## 2018-01-05 PROCEDURE — 99233 SBSQ HOSP IP/OBS HIGH 50: CPT | Mod: GC

## 2018-01-05 RX ORDER — KETOROLAC TROMETHAMINE 30 MG/ML
15 SYRINGE (ML) INJECTION ONCE
Qty: 0 | Refills: 0 | Status: DISCONTINUED | OUTPATIENT
Start: 2018-01-05 | End: 2018-01-05

## 2018-01-05 RX ORDER — AMLODIPINE BESYLATE 2.5 MG/1
10 TABLET ORAL DAILY
Qty: 0 | Refills: 0 | Status: DISCONTINUED | OUTPATIENT
Start: 2018-01-05 | End: 2018-01-10

## 2018-01-05 RX ADMIN — Medication 15 MILLIGRAM(S): at 17:46

## 2018-01-05 RX ADMIN — Medication 60 MICROGRAM(S): at 12:55

## 2018-01-05 RX ADMIN — AMLODIPINE BESYLATE 10 MILLIGRAM(S): 2.5 TABLET ORAL at 21:40

## 2018-01-05 RX ADMIN — Medication 15 MILLIGRAM(S): at 17:31

## 2018-01-05 RX ADMIN — TACROLIMUS 3 MILLIGRAM(S): 5 CAPSULE ORAL at 05:30

## 2018-01-05 RX ADMIN — Medication 5 MILLIGRAM(S): at 05:31

## 2018-01-05 RX ADMIN — TACROLIMUS 3 MILLIGRAM(S): 5 CAPSULE ORAL at 17:08

## 2018-01-05 RX ADMIN — Medication 650 MILLIGRAM(S): at 05:30

## 2018-01-05 NOTE — DIETITIAN INITIAL EVALUATION ADULT. - OTHER INFO
Pt seen for LOS in MICU. Pt's current wt. noted to be 151.6 pounds, indicating 13 pound wt. loss in 3 months. Pt endorses good po intake, consuming % of meals- pt expresses that this has been more than his consumption in the past 2 weeks. Pt reports having episodes of diarrhea immediately following each meal PTA, but reports this has resolved since admission- no BM since admission. Per H&P, 2 pt experienced 2 bouts of black stool 2 days PTA. No other GI intolerances reported. No chewing/swallowing difficulty reported.

## 2018-01-05 NOTE — PROGRESS NOTE ADULT - SUBJECTIVE AND OBJECTIVE BOX
INTERVAL HPI/ OVERNIGHT EVENTS: Cardiac enzymes negative x 2. Pt restarted on amlodipine 10mg qd    SUBJECTIVE: Patient seen and examined at bedside. Resting comfortably with no active complaints. Pt  denies any HA, blurry visions, CP, palpitations, SOB, abdominal pain, diarrhea, melena, hematochezia, or dysuria.     CONSTITUTIONAL: No weakness, fevers or chills  EYES/ENT: No visual changes;  No vertigo or throat pain   NECK: No pain or stiffness  RESPIRATORY: No cough, wheezing, hemoptysis; No shortness of breath  CARDIOVASCULAR: No chest pain or palpitations  GASTROINTESTINAL: No abdominal or epigastric pain. No nausea, vomiting, or hematemesis; No diarrhea or constipation. No melena or hematochezia.  GENITOURINARY: No dysuria, frequency or hematuria  NEUROLOGICAL: No numbness or weakness  SKIN: No itching, rashes    OBJECTIVE:    VITAL SIGNS:  ICU Vital Signs Last 24 Hrs  T(C): 37.1 (2018 04:00), Max: 37.1 (2018 09:45)  T(F): 98.8 (2018 04:00), Max: 98.8 (2018 09:45)  HR: 68 (2018 06:00) (61 - 88)  BP: 151/91 (2018 06:00) (134/76 - 178/95)  BP(mean): 113 (2018 06:00) (98 - 130)  ABP: --  ABP(mean): --  RR: 15 (2018 06:00) (14 - 32)  SpO2: 100% (2018 06:00) (98% - 100%)         @ 07: @ 07:00  --------------------------------------------------------  IN: 175 mL / OUT: 1100 mL / NET: -925 mL     @ 07:01  -  05 @ 06:33  --------------------------------------------------------  IN: 850 mL / OUT: 1850 mL / NET: -1000 mL      CAPILLARY BLOOD GLUCOSE      POCT Blood Glucose.: 172 mg/dL (2018 21:27)      PHYSICAL EXAM:    General: NAD  HEENT: NC/AT; PERRL, clear conjunctiva  Neck: supple  Respiratory: CTA b/l  Cardiovascular: +S1/S2; RRR  Abdomen: soft, NT/ND; +BS x4  Extremities: WWP, 2+ peripheral pulses b/l; no LE edema  Skin: normal color and turgor; no rash  Neurological:    MEDICATIONS:  MEDICATIONS  (STANDING):  amLODIPine   Tablet 10 milliGRAM(s) Oral daily  darbepoetin Injectable ViaL 60 MICROGram(s) IV Push once  dextrose 5%. 1000 milliLiter(s) (50 mL/Hr) IV Continuous <Continuous>  influenza   Vaccine 0.5 milliLiter(s) IntraMuscular once  predniSONE   Tablet 5 milliGRAM(s) Oral daily  sodium bicarbonate 650 milliGRAM(s) Oral three times a day  tacrolimus 3 milliGRAM(s) Oral two times a day    MEDICATIONS  (PRN):  glucagon  Injectable 1 milliGRAM(s) IntraMuscular once PRN Glucose <70 milliGRAM(s)/deciLiter      ALLERGIES:  Allergies    No Known Allergies    Intolerances        LABS:                        9.0    7.6   )-----------( 114      ( 2018 04:40 )             26.3     -    139  |  97  |  91<H>  ----------------------------<  96  5.1   |  25  |  17.03<H>    Ca    9.3      2018 04:42  Phos  5.6     -  Mg     2.0     -05    TPro  6.6  /  Alb  4.0  /  TBili  0.6  /  DBili  x   /  AST  12  /  ALT  29  /  AlkPhos  50  -05    PT/INR - ( 2018 21:59 )   PT: 10.2 sec;   INR: 0.95 ratio         PTT - ( 2018 21:59 )  PTT:28.5 sec  Urinalysis Basic - ( 2018 20:55 )    Color: Colorless / Appearance: Clear / S.009 / pH: x  Gluc: x / Ketone: Negative  / Bili: Negative / Urobili: Negative   Blood: x / Protein: 150 mg/dL / Nitrite: Negative   Leuk Esterase: Negative / RBC: 0-2 /HPF / WBC 5-10 /HPF   Sq Epi: x / Non Sq Epi: x / Bacteria: x        RADIOLOGY & ADDITIONAL TESTS: Reviewed.

## 2018-01-05 NOTE — CHART NOTE - NSCHARTNOTEFT_GEN_A_CORE
Upon Nutritional Assessment by the Registered Dietitian your patient was determined to meet criteria / has evidence of the following diagnosis/diagnoses:          [ ]  Mild Protein Calorie Malnutrition        [ ]  Moderate Protein Calorie Malnutrition        [x ] Severe Protein Calorie Malnutrition        [ ] Unspecified Protein Calorie Malnutrition        [ ] Underweight / BMI <19        [ ] Morbid Obesity / BMI > 40      Findings as based on:  [x ] Comprehensive nutrition assessment   [x ] Nutrition Focused Physical Exam  [ ] Other:       Nutrition Plan/Recommendations:  renal diet, Nepro 2 cans/day        PROVIDER Section:     By signing this assessment you are acknowledging and agree with the diagnosis/diagnoses assigned by the Registered Dietitian    Comments:

## 2018-01-05 NOTE — DIETITIAN INITIAL EVALUATION ADULT. - NS AS NUTRI INTERV MEDICAL AND FOOD SUPPLEMENTS
Commercial beverage/Nepro 2x daily Commercial beverage/Recommending Nepro 2x daily. Pt receptive to trying supplement.

## 2018-01-05 NOTE — DIETITIAN INITIAL EVALUATION ADULT. - PHYSICAL APPEARANCE
BMI: 20.6 kg/m2 Thin in appearance; BMI: 20.6 kg/m2; Nutrition Focused Physical Exam performed with pt consent and RD present, revealing moderate fat loss in his triceps region, moderate muscle loss in his dorsal hand region, and severe muscle loss in the posterior calf, patellar and anterior thigh regions.

## 2018-01-05 NOTE — DIETITIAN INITIAL EVALUATION ADULT. - ORAL INTAKE PTA
poor/Pt reports consuming only blueberries and crackers- pt expressed concern regarding the consumption of other foods due to his LE swelling. Home vitamins/supplements: calcium Pt reports consuming only blueberries and crackers- pt expressed concern regarding the consumption of other foods due to his LE swelling. Prior to that, patient reports eating 2-3  meals/day. Usual intake: breakfast: frosted flakes, lunch: snacks such as potato chips and snickers, dinner: salmon or chicken breast and vegetables (dinner prepared by pt's mother). Home vitamins/supplements: calcium/poor poor/Pt reports consuming only blueberries and crackers 2 weeks PTA- pt expressed concern regarding the consumption of other foods due to his LE swelling. Prior to that, patient reports eating 2-3  meals/day. Usual intake: breakfast: frosted flakes, lunch: snacks such as potato chips and snickers, dinner: salmon or chicken breast and vegetables (dinner prepared by pt's mother). Home vitamins/supplements: calcium

## 2018-01-05 NOTE — DIETITIAN INITIAL EVALUATION ADULT. - ENERGY NEEDS
ht: 70 inches wt: 151.6 pounds IBW: 178 pounds BMI: 20.6   skin: no pressure injuries noted   edema: none noted    Other pertinent information: 33 Y/O male with PMH of focal glomerulophritis S/P right kidney transplant (2005), CKD4, and HTN, admitted for failed renal allograft and worsening kidney function. Hospital course includes emergent HD, and MICU monitoring. Plan: transfer to medicine floor. ht: 70 inches wt: 151.6 pounds IBW: 178 pounds BMI: 20.6   skin: no pressure injuries noted   edema: none noted    Other pertinent information: 31 Y/O male with PMH of focal glomerulophritis S/P right kidney transplant (2005), CKD4, and HTN, admitted for failed renal allograft and worsening kidney function. Hospital course includes emergent HD (1/4), and MICU monitoring. Plan: HD today (1/5), transfer to medical floor.

## 2018-01-05 NOTE — PROGRESS NOTE ADULT - SUBJECTIVE AND OBJECTIVE BOX
--------------------------------------------------------------------------------  Chief Complaint: No aute symptoms    24 hour events/subjective:    reviewed    PAST HISTORY  --------------------------------------------------------------------------------  No significant changes to PMH, PSH, FHx, SHx, unless otherwise noted    ALLERGIES & MEDICATIONS  --------------------------------------------------------------------------------  Allergies    No Known Allergies    Intolerances      Standing Inpatient Medications  amLODIPine   Tablet 10 milliGRAM(s) Oral daily  dextrose 5%. 1000 milliLiter(s) IV Continuous <Continuous>  influenza   Vaccine 0.5 milliLiter(s) IntraMuscular once  predniSONE   Tablet 5 milliGRAM(s) Oral daily  tacrolimus 3 milliGRAM(s) Oral two times a day    PRN Inpatient Medications      REVIEW OF SYSTEMS  --------------------------------------------------------------------------------  Gen: feels fatigue, no fevers/chills, weakness  Skin: No rashes  Head/Eyes/Ears/Mouth: No headache;No sore throat  Respiratory: No dyspnea, cough,   CV: No chest pain, PND, orthopnea  GI: No abdominal pain, diarrhea, constipation, nausea, vomiting  Transplant: No pain  : No increased frequency, dysuria, hematuria, nocturia  MSK: No joint pain/swelling; no back pain; no edema  Neuro: No dizziness/lightheadedness, weakness, seizures, numbness, tingling  Psych: No significant nervousness, anxiety, stress, depression    All other systems were reviewed and are negative, except as noted.    VITALS/PHYSICAL EXAM  --------------------------------------------------------------------------------  T(C): 36.6 (01-05-18 @ 08:00), Max: 37.1 (01-05-18 @ 04:00)  HR: 82 (01-05-18 @ 11:00) (61 - 87)  BP: 145/94 (01-05-18 @ 11:00) (134/76 - 178/95)  RR: 14 (01-05-18 @ 11:00) (14 - 37)  SpO2: 99% (01-05-18 @ 11:00) (98% - 100%)    Height (cm): 182.88 (01-03-18 @ 22:35)  Weight (kg): 68.8 (01-03-18 @ 22:35)  BMI (kg/m2): 20.6 (01-03-18 @ 22:35)  BSA (m2): 1.89 (01-03-18 @ 22:35)      01-04-18 @ 07:01  -  01-05-18 @ 07:00  --------------------------------------------------------  IN: 850 mL / OUT: 1850 mL / NET: -1000 mL    01-05-18 @ 07:01  -  01-05-18 @ 13:55  --------------------------------------------------------  IN: 0 mL / OUT: 350 mL / NET: -350 mL      Physical Exam:  	Gen: NAD, well-appearing  	HEENT: PERRL, supple neck, clear oropharynx  	Pulm: CTA B/L  	CV: RRR, S1S2; no rub  	Back: No spinal or CVA tenderness; no sacral edema  	Abd: +BS, soft, nontender/nondistended                      Transplant: No tenderness, swelling  	: No suprapubic tenderness  	UE: Warm, FROM, intact strength; no edema; no asterixis  	LE: Warm, FROM, intact strength; no edema  	Neuro: No focal deficits, intact gait  	Psych: Normal affect and mood  	Skin: Warm, without rashes  Right I/J non tunneled dialysis catheter noted      LABS/STUDIES  --------------------------------------------------------------------------------              9.0    7.6   >-----------<  114      [01-05-18 @ 04:40]              26.3     139  |  97  |  91  ----------------------------<  96      [01-05-18 @ 04:42]  5.1   |  25  |  17.03        Ca     9.3     [01-05-18 @ 04:42]      Mg     2.0     [01-05-18 @ 04:42]      Phos  5.6     [01-05-18 @ 04:42]    TPro  6.6  /  Alb  4.0  /  TBili  0.6  /  DBili  x   /  AST  12  /  ALT  29  /  AlkPhos  50  [01-05-18 @ 04:42]    PT/INR: PT 10.2 , INR 0.95       [01-03-18 @ 21:59]  PTT: 28.5       [01-03-18 @ 21:59]    Troponin 0.03      [01-05-18 @ 04:42]        [01-05-18 @ 04:42]    Creatinine Trend:  SCr 17.03 [01-05 @ 04:42]  SCr 16.79 [01-04 @ 20:49]  SCr 15.44 [01-04 @ 14:04]  SCr 15.40 [01-04 @ 12:28]  SCr 21.82 [01-04 @ 03:33]    Tacrolimus (), Serum: 2.8 ng/mL (01-05 @ 08:08)  Tacrolimus (), Serum: <2.0 ng/mL (01-04 @ 08:04)            Urinalysis - [01-03-18 @ 20:55]      Color Colorless / Appearance Clear / SG 1.009 / pH 7.0      Gluc Negative / Ketone Negative  / Bili Negative / Urobili Negative       Blood Negative / Protein 150 / Leuk Est Negative / Nitrite Negative      RBC 0-2 / WBC 5-10 / Hyaline  / Gran  / Sq Epi  / Non Sq Epi  / Bacteria       HbA1c 5.1      [05-10-16 @ 12:59]    HBsAb <3.0      [01-04-18 @ 11:00]  HBsAg Nonreact      [01-04-18 @ 11:00]  HBcAb Nonreact      [01-04-18 @ 11:00]  HCV 0.29, Nonreact      [01-04-18 @ 11:00]

## 2018-01-05 NOTE — DIETITIAN INITIAL EVALUATION ADULT. - SIGNS/SYMPTOMS
moderate-severe muscle and fat wasting, significant wt. loss moderate-severe muscle and fat wasting, 13% wt. loss in 3 months, <75% nutrient needs >1 month

## 2018-01-05 NOTE — DIETITIAN INITIAL EVALUATION ADULT. - NUTRITION INTERVENTION
Medical Food Supplements/Collaboration and Referral of Nutrition Care/Meals and Snack Meals and Snack/Nutrition Education/Collaboration and Referral of Nutrition Care/Medical Food Supplements Vitamin/Discharge and Transfer of Nutrition Care to New Setting/Medical Food Supplements/Meals and Snack/Nutrition Education/Collaboration and Referral of Nutrition Care

## 2018-01-05 NOTE — DIETITIAN INITIAL EVALUATION ADULT. - NS AS NUTRI INTERV ED CONTENT
Renal restrictions for patients on HD. Discussed high phosphorus and potassium food sources. Discussed high protein needs, and HBV protein sources./Purpose of the nutrition education/Recommended modifications Renal restrictions for patients on HD. Discussed high phosphorus and potassium food sources. Discussed high protein needs, and HBV protein sources. Pt receptive and expressed understanding./Purpose of the nutrition education/Recommended modifications

## 2018-01-05 NOTE — DIETITIAN INITIAL EVALUATION ADULT. - ADHERENCE
fair/Pt reports restrictions of protein, phosphorus and potassium. When discussed in depth, pt reported inaccurate information regarding food sources to restrict and low/high levels of these nutrients. fair/Pt reports restrictions of protein, phosphorus and potassium. When discussed in depth, pt reported inaccurate information regarding food sources to restrict and low/high levels of these nutrients. Provided extensive verbal and written education on best protein sources, and high/low potassium and phosphorus food sources. NKFA.

## 2018-01-06 DIAGNOSIS — I10 ESSENTIAL (PRIMARY) HYPERTENSION: ICD-10-CM

## 2018-01-06 LAB
ALBUMIN SERPL ELPH-MCNC: 3.8 G/DL — SIGNIFICANT CHANGE UP (ref 3.3–5)
ALP SERPL-CCNC: 62 U/L — SIGNIFICANT CHANGE UP (ref 40–120)
ALT FLD-CCNC: 44 U/L — SIGNIFICANT CHANGE UP (ref 10–45)
ANION GAP SERPL CALC-SCNC: 16 MMOL/L — SIGNIFICANT CHANGE UP (ref 5–17)
AST SERPL-CCNC: 28 U/L — SIGNIFICANT CHANGE UP (ref 10–40)
BASOPHILS # BLD AUTO: 0.02 K/UL — SIGNIFICANT CHANGE UP (ref 0–0.2)
BASOPHILS NFR BLD AUTO: 0.3 % — SIGNIFICANT CHANGE UP (ref 0–2)
BILIRUB SERPL-MCNC: 0.6 MG/DL — SIGNIFICANT CHANGE UP (ref 0.2–1.2)
BUN SERPL-MCNC: 57 MG/DL — HIGH (ref 7–23)
CALCIUM SERPL-MCNC: 9.8 MG/DL — SIGNIFICANT CHANGE UP (ref 8.4–10.5)
CHLORIDE SERPL-SCNC: 99 MMOL/L — SIGNIFICANT CHANGE UP (ref 96–108)
CO2 SERPL-SCNC: 25 MMOL/L — SIGNIFICANT CHANGE UP (ref 22–31)
CREAT SERPL-MCNC: 11.48 MG/DL — HIGH (ref 0.5–1.3)
EOSINOPHIL # BLD AUTO: 0.2 K/UL — SIGNIFICANT CHANGE UP (ref 0–0.5)
EOSINOPHIL NFR BLD AUTO: 2.7 % — SIGNIFICANT CHANGE UP (ref 0–6)
GLUCOSE SERPL-MCNC: 93 MG/DL — SIGNIFICANT CHANGE UP (ref 70–99)
HCT VFR BLD CALC: 28.3 % — LOW (ref 39–50)
HGB BLD-MCNC: 9.6 G/DL — LOW (ref 13–17)
IMM GRANULOCYTES NFR BLD AUTO: 0.4 % — SIGNIFICANT CHANGE UP (ref 0–1.5)
LYMPHOCYTES # BLD AUTO: 2 K/UL — SIGNIFICANT CHANGE UP (ref 1–3.3)
LYMPHOCYTES # BLD AUTO: 27.5 % — SIGNIFICANT CHANGE UP (ref 13–44)
MAGNESIUM SERPL-MCNC: 2 MG/DL — SIGNIFICANT CHANGE UP (ref 1.6–2.6)
MCHC RBC-ENTMCNC: 29.4 PG — SIGNIFICANT CHANGE UP (ref 27–34)
MCHC RBC-ENTMCNC: 33.9 GM/DL — SIGNIFICANT CHANGE UP (ref 32–36)
MCV RBC AUTO: 86.5 FL — SIGNIFICANT CHANGE UP (ref 80–100)
MONOCYTES # BLD AUTO: 1.07 K/UL — HIGH (ref 0–0.9)
MONOCYTES NFR BLD AUTO: 14.7 % — HIGH (ref 2–14)
NEUTROPHILS # BLD AUTO: 3.96 K/UL — SIGNIFICANT CHANGE UP (ref 1.8–7.4)
NEUTROPHILS NFR BLD AUTO: 54.4 % — SIGNIFICANT CHANGE UP (ref 43–77)
OB PNL STL: NEGATIVE — SIGNIFICANT CHANGE UP
PHOSPHATE SERPL-MCNC: 6.5 MG/DL — HIGH (ref 2.5–4.5)
PLATELET # BLD AUTO: 135 K/UL — LOW (ref 150–400)
POTASSIUM SERPL-MCNC: 4.3 MMOL/L — SIGNIFICANT CHANGE UP (ref 3.5–5.3)
POTASSIUM SERPL-SCNC: 4.3 MMOL/L — SIGNIFICANT CHANGE UP (ref 3.5–5.3)
PROT SERPL-MCNC: 6.9 G/DL — SIGNIFICANT CHANGE UP (ref 6–8.3)
RBC # BLD: 3.27 M/UL — LOW (ref 4.2–5.8)
RBC # FLD: 13 % — SIGNIFICANT CHANGE UP (ref 10.3–14.5)
SODIUM SERPL-SCNC: 140 MMOL/L — SIGNIFICANT CHANGE UP (ref 135–145)
TACROLIMUS SERPL-MCNC: 3.6 NG/ML — SIGNIFICANT CHANGE UP
WBC # BLD: 7.28 K/UL — SIGNIFICANT CHANGE UP (ref 3.8–10.5)
WBC # FLD AUTO: 7.28 K/UL — SIGNIFICANT CHANGE UP (ref 3.8–10.5)

## 2018-01-06 PROCEDURE — 99233 SBSQ HOSP IP/OBS HIGH 50: CPT

## 2018-01-06 RX ORDER — METOPROLOL TARTRATE 50 MG
50 TABLET ORAL ONCE
Qty: 0 | Refills: 0 | Status: DISCONTINUED | OUTPATIENT
Start: 2018-01-06 | End: 2018-01-06

## 2018-01-06 RX ORDER — KETOROLAC TROMETHAMINE 30 MG/ML
15 SYRINGE (ML) INJECTION ONCE
Qty: 0 | Refills: 0 | Status: DISCONTINUED | OUTPATIENT
Start: 2018-01-06 | End: 2018-01-06

## 2018-01-06 RX ADMIN — TACROLIMUS 3 MILLIGRAM(S): 5 CAPSULE ORAL at 05:54

## 2018-01-06 RX ADMIN — Medication 15 MILLIGRAM(S): at 23:42

## 2018-01-06 RX ADMIN — TACROLIMUS 3 MILLIGRAM(S): 5 CAPSULE ORAL at 21:31

## 2018-01-06 RX ADMIN — AMLODIPINE BESYLATE 10 MILLIGRAM(S): 2.5 TABLET ORAL at 21:31

## 2018-01-06 RX ADMIN — Medication 15 MILLIGRAM(S): at 22:32

## 2018-01-06 RX ADMIN — Medication 5 MILLIGRAM(S): at 05:55

## 2018-01-06 NOTE — PROGRESS NOTE ADULT - SUBJECTIVE AND OBJECTIVE BOX
Patient is a 32y old  Male who presents with a chief complaint of Progressive CKD, needs HD. (03 Jan 2018 21:55)      SUBJECTIVE / OVERNIGHT EVENTS:  No new symptoms  Review of Systems:   CONSTITUTIONAL: No fever, weight loss, or fatigue  EYES: No eye pain, visual disturbances, or discharge  ENMT:  No difficulty hearing, tinnitus, vertigo; No sinus or throat pain  NECK: No pain or stiffness  BREASTS: No pain, masses, or nipple discharge  RESPIRATORY: No cough, wheezing, chills or hemoptysis; No shortness of breath  CARDIOVASCULAR: No chest pain, palpitations, dizziness, or leg swelling  GASTROINTESTINAL: No abdominal or epigastric pain. No nausea, vomiting, or hematemesis; No diarrhea or constipation. No melena or hematochezia.  GENITOURINARY: No dysuria, frequency, hematuria, or incontinence  NEUROLOGICAL: No headaches, memory loss, loss of strength, numbness, or tremors  SKIN: No itching, burning, rashes, or lesions   LYMPH NODES: No enlarged glands  ENDOCRINE: No heat or cold intolerance; No hair loss  MUSCULOSKELETAL: No joint pain or swelling; No muscle, back, or extremity pain  PSYCHIATRIC: No depression, anxiety, mood swings, or difficulty sleeping  HEME/LYMPH: No easy bruising, or bleeding gums  ALLERY AND IMMUNOLOGIC: No hives or eczema    MEDICATIONS  (STANDING):  amLODIPine   Tablet 10 milliGRAM(s) Oral daily  dextrose 5%. 1000 milliLiter(s) (50 mL/Hr) IV Continuous <Continuous>  influenza   Vaccine 0.5 milliLiter(s) IntraMuscular once  predniSONE   Tablet 5 milliGRAM(s) Oral daily  tacrolimus 3 milliGRAM(s) Oral two times a day    MEDICATIONS  (PRN):      PHYSICAL EXAM:  Vital Signs Last 24 Hrs  T(C): 37.1 (06 Jan 2018 16:45), Max: 37.1 (06 Jan 2018 16:45)  T(F): 98.7 (06 Jan 2018 16:45), Max: 98.7 (06 Jan 2018 16:45)  HR: 78 (06 Jan 2018 16:45) (71 - 87)  BP: 160/101 (06 Jan 2018 16:45) (144/92 - 160/101)  BP(mean): --  RR: 17 (06 Jan 2018 16:45) (16 - 20)  SpO2: 97% (06 Jan 2018 16:45) (97% - 98%)  I&O's Summary    05 Jan 2018 07:01  -  06 Jan 2018 07:00  --------------------------------------------------------  IN: 200 mL / OUT: 1100 mL / NET: -900 mL    06 Jan 2018 07:01  -  06 Jan 2018 20:08  --------------------------------------------------------  IN: 540 mL / OUT: 300 mL / NET: 240 mL      GENERAL: NAD, well-developed  HEAD:  Atraumatic, Normocephalic  EYES: EOMI, PERRLA, conjunctiva and sclera clear  NECK: Supple, No JVD  CHEST/LUNG: Clear to auscultation bilaterally; No wheeze  HEART: Regular rate and rhythm; No murmurs, rubs, or gallops  ABDOMEN: Soft, Nontender, Nondistended; Bowel sounds present  EXTREMITIES:  2+ Peripheral Pulses, No clubbing, cyanosis, or edema  PSYCH: AAOx3  NEUROLOGY: non-focal  SKIN: No rashes or lesions    LABS:  CAPILLARY BLOOD GLUCOSE                              9.6    7.28  )-----------( 135      ( 06 Jan 2018 08:24 )             28.3     01-06    140  |  99  |  57<H>  ----------------------------<  93  4.3   |  25  |  11.48<H>    Ca    9.8      06 Jan 2018 08:33  Phos  6.5     01-06  Mg     2.0     01-06    TPro  6.9  /  Alb  3.8  /  TBili  0.6  /  DBili  x   /  AST  28  /  ALT  44  /  AlkPhos  62  01-06      CARDIAC MARKERS ( 05 Jan 2018 04:42 )  x     / 0.03 ng/mL / 132 U/L / x     / 1.0 ng/mL  CARDIAC MARKERS ( 04 Jan 2018 20:49 )  x     / 0.03 ng/mL / 141 U/L / x     / 1.0 ng/mL          RADIOLOGY & ADDITIONAL TESTS:    Imaging Personally Reviewed:    Consultant(s) Notes Reviewed:      Care Discussed with Consultants/Other Providers:

## 2018-01-06 NOTE — PROGRESS NOTE ADULT - SUBJECTIVE AND OBJECTIVE BOX
[facility] DIVISION OF KIDNEY DISEASES AND HYPERTENSION -- PROGRESS NOTE    Chief complaint: ESRD, s/p renal transplant, now back on HD    24 hour events/subjective: received maintenance HD        PAST HISTORY  --------------------------------------------------------------------------------  No significant changes to PMH, PSH, FHx, SHx, unless otherwise noted    ALLERGIES & MEDICATIONS  --------------------------------------------------------------------------------  Allergies    No Known Allergies    Intolerances      Standing Inpatient Medications  amLODIPine   Tablet 10 milliGRAM(s) Oral daily  dextrose 5%. 1000 milliLiter(s) IV Continuous <Continuous>  influenza   Vaccine 0.5 milliLiter(s) IntraMuscular once  predniSONE   Tablet 5 milliGRAM(s) Oral daily  tacrolimus 3 milliGRAM(s) Oral two times a day    PRN Inpatient Medications      REVIEW OF SYSTEMS  --------------------------------------------------------------------------------  Constitutional: [x ]no Fever [ ] Chills [ ] Fatigue [ ] Weight change   HEENT: [ ] Blurred vision [ ] Eye Pain [ ] Headache [ ] Runny nose [ ] Sore Throat   Respiratory: [ ] Cough [ ] Wheezing [ ] Shortness of breath  Cardiovascular: [ ] Chest Pain [ ] Palpitations [ ] TINEO [ ] PND [ ] Orthopnea  Gastrointestinal: [ ] Abdominal Pain [ ] Diarrhea [ ] Constipation [ ] Hemorrhoids [ ] Nausea [ ] Vomiting  Genitourinary: [ ] Nocturia [ ] Dysuria [ ] Incontinence  Extremities: [ ] Swelling [ ] Joint Pain  Neurologic: [ ] Focal deficit [ ] Paresthesias [ ] Syncope  Lymphatic: [ ] Swelling [ ] Lymphadenopathy   Skin: [ ] Rash [ ] Ecchymoses [ ] Wounds [ ] Lesions  Psychiatry: [ ] Depression [ ] Suicidal/Homicidal Ideation [ ] Anxiety [ ] Sleep Disturbances  [ x] 10 point review of systems is otherwise negative except as mentioned above              [ ]Unable to obtain due to   All other systems were reviewed and are negative, except as noted.    VITALS/PHYSICAL EXAM  --------------------------------------------------------------------------------  T(C): 37.1 (01-06-18 @ 16:45), Max: 37.1 (01-06-18 @ 16:45)  HR: 78 (01-06-18 @ 16:45) (71 - 87)  BP: 160/101 (01-06-18 @ 16:45) (144/92 - 160/101)  RR: 17 (01-06-18 @ 16:45) (16 - 20)  SpO2: 97% (01-06-18 @ 16:45) (97% - 98%)  Wt(kg): --        01-05-18 @ 07:01  -  01-06-18 @ 07:00  --------------------------------------------------------  IN: 200 mL / OUT: 1100 mL / NET: -900 mL    01-06-18 @ 07:01  -  01-06-18 @ 21:13  --------------------------------------------------------  IN: 540 mL / OUT: 300 mL / NET: 240 mL      Physical Exam:  	Gen: NAD, well-appearing  	HEENT: on room air  	Pulm: CTA B/L  	CV: normal S1S2; no rub  	Abd: soft                      Back : No sacral edema  	: No fuller  	LE: no edema  	Skin: Warm, without rashes  	Vascular access: RIJ non tunnelled dialysis catheter present, no swelling/ discharge around the site. Dried blood present, no active bleeding noted.    LABS/STUDIES  --------------------------------------------------------------------------------              9.6    7.28  >-----------<  135      [01-06-18 @ 08:24]              28.3     140  |  99  |  57  ----------------------------<  93      [01-06-18 @ 08:33]  4.3   |  25  |  11.48        Ca     9.8     [01-06-18 @ 08:33]      Mg     2.0     [01-06-18 @ 08:33]      Phos  6.5     [01-06-18 @ 08:33]    TPro  6.9  /  Alb  3.8  /  TBili  0.6  /  DBili  x   /  AST  28  /  ALT  44  /  AlkPhos  62  [01-06-18 @ 08:33]        Troponin 0.03      [01-05-18 @ 04:42]        [01-05-18 @ 04:42]    Creatinine Trend:  SCr 11.48 [01-06 @ 08:33]  SCr 17.03 [01-05 @ 04:42]  SCr 16.79 [01-04 @ 20:49]  SCr 15.44 [01-04 @ 14:04]  SCr 15.40 [01-04 @ 12:28]    Urinalysis - [01-03-18 @ 20:55]      Color Colorless / Appearance Clear / SG 1.009 / pH 7.0      Gluc Negative / Ketone Negative  / Bili Negative / Urobili Negative       Blood Negative / Protein 150 / Leuk Est Negative / Nitrite Negative      RBC 0-2 / WBC 5-10 / Hyaline  / Gran  / Sq Epi  / Non Sq Epi  / Bacteria       HbA1c 5.1      [05-10-16 @ 12:59]    HBsAb <3.0      [01-04-18 @ 11:00]  HBsAg Nonreact      [01-04-18 @ 11:00]  HBcAb Nonreact      [01-04-18 @ 11:00]  HCV 0.29, Nonreact      [01-04-18 @ 11:00]

## 2018-01-06 NOTE — PROVIDER CONTACT NOTE (OTHER) - ASSESSMENT
Pt returned from HD. 36.9C, 170/112, 97% RA, 18RR. Pt c/o mild chest discomfort, 4/10 towards end of HD, aching, nonradiating. Pain not alleviated.

## 2018-01-07 LAB — TACROLIMUS SERPL-MCNC: 5.4 NG/ML — SIGNIFICANT CHANGE UP

## 2018-01-07 RX ADMIN — Medication 5 MILLIGRAM(S): at 05:47

## 2018-01-07 RX ADMIN — AMLODIPINE BESYLATE 10 MILLIGRAM(S): 2.5 TABLET ORAL at 21:28

## 2018-01-07 RX ADMIN — TACROLIMUS 3 MILLIGRAM(S): 5 CAPSULE ORAL at 05:47

## 2018-01-07 RX ADMIN — TACROLIMUS 3 MILLIGRAM(S): 5 CAPSULE ORAL at 18:02

## 2018-01-07 NOTE — PROGRESS NOTE ADULT - SUBJECTIVE AND OBJECTIVE BOX
Patient is a 32y old  Male who presents with a chief complaint of Progressive CKD, needs HD. (03 Jan 2018 21:55)      SUBJECTIVE / OVERNIGHT EVENTS:  No new symptoms  Review of Systems:   CONSTITUTIONAL: No fever, weight loss, or fatigue  EYES: No eye pain, visual disturbances, or discharge  ENMT:  No difficulty hearing, tinnitus, vertigo; No sinus or throat pain  NECK: No pain or stiffness  BREASTS: No pain, masses, or nipple discharge  RESPIRATORY: No cough, wheezing, chills or hemoptysis; No shortness of breath  CARDIOVASCULAR: No chest pain, palpitations, dizziness, or leg swelling  GASTROINTESTINAL: No abdominal or epigastric pain. No nausea, vomiting, or hematemesis; No diarrhea or constipation. No melena or hematochezia.  GENITOURINARY: No dysuria, frequency, hematuria, or incontinence  NEUROLOGICAL: No headaches, memory loss, loss of strength, numbness, or tremors  SKIN: No itching, burning, rashes, or lesions   LYMPH NODES: No enlarged glands  ENDOCRINE: No heat or cold intolerance; No hair loss  MUSCULOSKELETAL: No joint pain or swelling; No muscle, back, or extremity pain  PSYCHIATRIC: No depression, anxiety, mood swings, or difficulty sleeping  HEME/LYMPH: No easy bruising, or bleeding gums  ALLERY AND IMMUNOLOGIC: No hives or eczema    MEDICATIONS  (STANDING):  amLODIPine   Tablet 10 milliGRAM(s) Oral daily  dextrose 5%. 1000 milliLiter(s) (50 mL/Hr) IV Continuous <Continuous>  influenza   Vaccine 0.5 milliLiter(s) IntraMuscular once  predniSONE   Tablet 5 milliGRAM(s) Oral daily  tacrolimus 3 milliGRAM(s) Oral two times a day    MEDICATIONS  (PRN):      PHYSICAL EXAM:  Vital Signs Last 24 Hrs  T(C): 36.6 (07 Jan 2018 07:38), Max: 37.1 (06 Jan 2018 16:45)  T(F): 97.9 (07 Jan 2018 07:38), Max: 98.8 (06 Jan 2018 23:51)  HR: 80 (07 Jan 2018 07:38) (66 - 89)  BP: 139/89 (07 Jan 2018 07:38) (139/89 - 170/112)  BP(mean): --  RR: 20 (07 Jan 2018 07:38) (16 - 20)  SpO2: 98% (07 Jan 2018 07:38) (97% - 99%)  I&O's Summary    06 Jan 2018 07:01  -  07 Jan 2018 07:00  --------------------------------------------------------  IN: 1820 mL / OUT: 2180 mL / NET: -360 mL    07 Jan 2018 07:01  -  07 Jan 2018 13:22  --------------------------------------------------------  IN: 280 mL / OUT: 0 mL / NET: 280 mL      GENERAL: NAD, well-developed  HEAD:  Atraumatic, Normocephalic  EYES: EOMI, PERRLA, conjunctiva and sclera clear  NECK: Supple, No JVD  CHEST/LUNG: Clear to auscultation bilaterally; No wheeze  HEART: Regular rate and rhythm; No murmurs, rubs, or gallops  ABDOMEN: Soft, Nontender, Nondistended; Bowel sounds present  EXTREMITIES:  2+ Peripheral Pulses, No clubbing, cyanosis, or edema  PSYCH: AAOx3  NEUROLOGY: non-focal  SKIN: No rashes or lesions    LABS:  CAPILLARY BLOOD GLUCOSE                              9.6    7.28  )-----------( 135      ( 06 Jan 2018 08:24 )             28.3     01-06    140  |  99  |  57<H>  ----------------------------<  93  4.3   |  25  |  11.48<H>    Ca    9.8      06 Jan 2018 08:33  Phos  6.5     01-06  Mg     2.0     01-06    TPro  6.9  /  Alb  3.8  /  TBili  0.6  /  DBili  x   /  AST  28  /  ALT  44  /  AlkPhos  62  01-06              RADIOLOGY & ADDITIONAL TESTS:    Imaging Personally Reviewed:    Consultant(s) Notes Reviewed:      Care Discussed with Consultants/Other Providers:

## 2018-01-08 LAB
ANION GAP SERPL CALC-SCNC: 15 MMOL/L — SIGNIFICANT CHANGE UP (ref 5–17)
BUN SERPL-MCNC: 61 MG/DL — HIGH (ref 7–23)
CALCIUM SERPL-MCNC: 9.8 MG/DL — SIGNIFICANT CHANGE UP (ref 8.4–10.5)
CHLORIDE SERPL-SCNC: 95 MMOL/L — LOW (ref 96–108)
CO2 SERPL-SCNC: 27 MMOL/L — SIGNIFICANT CHANGE UP (ref 22–31)
CREAT SERPL-MCNC: 11.9 MG/DL — HIGH (ref 0.5–1.3)
GLUCOSE SERPL-MCNC: 119 MG/DL — HIGH (ref 70–99)
HCT VFR BLD CALC: 29.7 % — LOW (ref 39–50)
HGB BLD-MCNC: 10.5 G/DL — LOW (ref 13–17)
MCHC RBC-ENTMCNC: 32.3 PG — SIGNIFICANT CHANGE UP (ref 27–34)
MCHC RBC-ENTMCNC: 35.5 GM/DL — SIGNIFICANT CHANGE UP (ref 32–36)
MCV RBC AUTO: 91 FL — SIGNIFICANT CHANGE UP (ref 80–100)
PLATELET # BLD AUTO: 157 K/UL — SIGNIFICANT CHANGE UP (ref 150–400)
POTASSIUM SERPL-MCNC: 4.8 MMOL/L — SIGNIFICANT CHANGE UP (ref 3.5–5.3)
POTASSIUM SERPL-SCNC: 4.8 MMOL/L — SIGNIFICANT CHANGE UP (ref 3.5–5.3)
RBC # BLD: 3.27 M/UL — LOW (ref 4.2–5.8)
RBC # FLD: 12.2 % — SIGNIFICANT CHANGE UP (ref 10.3–14.5)
SODIUM SERPL-SCNC: 137 MMOL/L — SIGNIFICANT CHANGE UP (ref 135–145)
TACROLIMUS SERPL-MCNC: 3.9 NG/ML — SIGNIFICANT CHANGE UP
WBC # BLD: 8.8 K/UL — SIGNIFICANT CHANGE UP (ref 3.8–10.5)
WBC # FLD AUTO: 8.8 K/UL — SIGNIFICANT CHANGE UP (ref 3.8–10.5)

## 2018-01-08 PROCEDURE — 99232 SBSQ HOSP IP/OBS MODERATE 35: CPT

## 2018-01-08 PROCEDURE — 93306 TTE W/DOPPLER COMPLETE: CPT | Mod: 26

## 2018-01-08 RX ADMIN — Medication 5 MILLIGRAM(S): at 06:06

## 2018-01-08 RX ADMIN — TACROLIMUS 3 MILLIGRAM(S): 5 CAPSULE ORAL at 18:28

## 2018-01-08 RX ADMIN — AMLODIPINE BESYLATE 10 MILLIGRAM(S): 2.5 TABLET ORAL at 21:19

## 2018-01-08 RX ADMIN — TACROLIMUS 3 MILLIGRAM(S): 5 CAPSULE ORAL at 06:06

## 2018-01-08 NOTE — PROGRESS NOTE ADULT - SUBJECTIVE AND OBJECTIVE BOX
MEDICINE, PROGRESS NOTE [phone]    GABE JOSEPH 32y [id number]    Patient seen and examined.  Patient is a 32y old  Male who presents with a chief complaint of Progressive CKD, needs HD. (03 Jan 2018 21:55)  Pt feels ok.    PAST MEDICAL & SURGICAL HISTORY:  CKD (chronic kidney disease), stage 4 (severe)  Hypertension  Glomerulonephritis  S/P kidney transplant: Right kidney in 2004    MEDICATIONS  (STANDING):  amLODIPine   Tablet 10 milliGRAM(s) Oral daily  dextrose 5%. 1000 milliLiter(s) (50 mL/Hr) IV Continuous <Continuous>  influenza   Vaccine 0.5 milliLiter(s) IntraMuscular once  predniSONE   Tablet 5 milliGRAM(s) Oral daily  tacrolimus 3 milliGRAM(s) Oral two times a day    MEDICATIONS  (PRN):    Allergies    No Known Allergies    Intolerances        PHYSICAL EXAM:  Constitutional: NAD  HEENT: Normocephalic, EOMI  Neck:  No JVD  Respiratory: CTA B/L, No wheezes  Cardiovascular: S1, S2, RRR, + systolic murmur  Gastrointestinal: BS+, soft, NT/ND  Extremities: No peripheral edema  Neurological: AAOX3, no focal deficits  Psychiatric: Normal mood, normal affect  : No [place]    Vital Signs Last 24 Hrs  T(C): 37.1 (08 Jan 2018 16:20), Max: 37.1 (08 Jan 2018 16:20)  T(F): 98.7 (08 Jan 2018 16:20), Max: 98.7 (08 Jan 2018 16:20)  HR: 86 (08 Jan 2018 16:20) (74 - 98)  BP: 142/82 (08 Jan 2018 16:20) (102/71 - 160/105)  BP(mean): --  RR: 18 (08 Jan 2018 16:20) (16 - 18)  SpO2: 98% (08 Jan 2018 16:20) (96% - 98%)  I&O's Summary    07 Jan 2018 07:01  -  08 Jan 2018 07:00  --------------------------------------------------------  IN: 1400 mL / OUT: 550 mL / NET: 850 mL    08 Jan 2018 07:01  -  08 Jan 2018 17:25  --------------------------------------------------------  IN: 180 mL / OUT: 0 mL / NET: 180 mL        LABS:                        10.5   8.8   )-----------( 157      ( 08 Jan 2018 14:28 )             29.7     01-08    137  |  95<L>  |  61<H>  ----------------------------<  119<H>  4.8   |  27  |  11.90<H>    Ca    9.8      08 Jan 2018 14:28          Tacrolimus (), Serum: 3.9 ng/mL (01-08 @ 08:59)

## 2018-01-08 NOTE — PROGRESS NOTE ADULT - SUBJECTIVE AND OBJECTIVE BOX
--------------------------------------------------------------------------------  Chief Complaint:    24 hour events/subjective:        PAST HISTORY  --------------------------------------------------------------------------------  No significant changes to PMH, PSH, FHx, SHx, unless otherwise noted    ALLERGIES & MEDICATIONS  --------------------------------------------------------------------------------  Allergies    No Known Allergies    Intolerances      Standing Inpatient Medications  amLODIPine   Tablet 10 milliGRAM(s) Oral daily  dextrose 5%. 1000 milliLiter(s) IV Continuous <Continuous>  influenza   Vaccine 0.5 milliLiter(s) IntraMuscular once  predniSONE   Tablet 5 milliGRAM(s) Oral daily  tacrolimus 3 milliGRAM(s) Oral two times a day    PRN Inpatient Medications      REVIEW OF SYSTEMS  --------------------------------------------------------------------------------  Gen: No fatigue, fevers/chills, weakness  Skin: No rashes  Head/Eyes/Ears/Mouth: No headache;No sore throat  Respiratory: No dyspnea, cough,   CV: No chest pain, PND, orthopnea  GI: No abdominal pain, diarrhea, constipation, nausea, vomiting  Transplant: No pain  : No increased frequency, dysuria, hematuria, nocturia  MSK: No joint pain/swelling; no back pain; no edema  Neuro: No dizziness/lightheadedness, weakness, seizures, numbness, tingling  Psych: No significant nervousness, anxiety, stress, depression    All other systems were reviewed and are negative, except as noted.    VITALS/PHYSICAL EXAM  --------------------------------------------------------------------------------  T(C): 36.8 (01-08-18 @ 12:19), Max: 36.8 (01-07-18 @ 21:25)  HR: 98 (01-08-18 @ 12:19) (74 - 98)  BP: 160/105 (01-08-18 @ 12:19) (102/71 - 160/105)  RR: 18 (01-08-18 @ 12:19) (16 - 18)  SpO2: 96% (01-08-18 @ 12:19) (96% - 98%)          01-07-18 @ 07:01  -  01-08-18 @ 07:00  --------------------------------------------------------  IN: 1400 mL / OUT: 550 mL / NET: 850 mL    01-08-18 @ 07:01  -  01-08-18 @ 14:33  --------------------------------------------------------  IN: 180 mL / OUT: 0 mL / NET: 180 mL      Physical Exam:  	Gen: NAD, well-appearing  	HEENT: PERRL, supple neck, clear oropharynx  Right I/J double lumen catheter noted.  	Pulm: CTA B/L  	CV: RRR, S1S2; no rub  	Back: No spinal or CVA tenderness; no sacral edema  	Abd: +BS, soft, nontender/nondistended                      Transplant: No tenderness, swelling  	: No suprapubic tenderness  	UE: Warm, FROM, intact strength; no edema; no asterixis  	LE: Warm, FROM, intact strength; no edema  	Neuro: No focal deficits, intact gait  	Psych: Normal affect and mood  	Skin: Warm, without rashes      LABS/STUDIES  --------------------------------------------------------------------------------                Creatinine Trend:  SCr 11.48 [01-06 @ 08:33]  SCr 17.03 [01-05 @ 04:42]  SCr 16.79 [01-04 @ 20:49]  SCr 15.44 [01-04 @ 14:04]  SCr 15.40 [01-04 @ 12:28]    Tacrolimus (), Serum: 3.9 ng/mL (01-08 @ 08:59)  Tacrolimus (), Serum: 5.4 ng/mL (01-07 @ 09:53)  Tacrolimus (), Serum: 3.6 ng/mL (01-06 @ 08:24)  Tacrolimus (), Serum: 2.8 ng/mL (01-05 @ 08:08)            Urinalysis - [01-03-18 @ 20:55]      Color Colorless / Appearance Clear / SG 1.009 / pH 7.0      Gluc Negative / Ketone Negative  / Bili Negative / Urobili Negative       Blood Negative / Protein 150 / Leuk Est Negative / Nitrite Negative      RBC 0-2 / WBC 5-10 / Hyaline  / Gran  / Sq Epi  / Non Sq Epi  / Bacteria       HbA1c 5.1      [05-10-16 @ 12:59]    HBsAb <3.0      [01-04-18 @ 11:00]  HBsAg Nonreact      [01-04-18 @ 11:00]  HBcAb Nonreact      [01-04-18 @ 11:00]  HCV 0.29, Nonreact      [01-04-18 @ 11:00]

## 2018-01-08 NOTE — CHART NOTE - NSCHARTNOTEFT_GEN_A_CORE
Pt seen for malnutrition follow up. Pt with PMH of focal glomerulophritis S/P right kidney transplant (2005), CKD4, and HTN, admitted for failed renal allograft and worsening kidney function. Hospital course includes emergent HD in MICU, now plan for long term HD as outpatient. Pt undergoing work up for additional renal transplant.     Source: Patient [ x]    Family [ x]     other [ ] Mother Tyesha at bedside    Diet : DASH, Renal diet with nepro 2 x daily       Patient reports [ ] nausea  [ ] vomiting [ ] diarrhea [ ] constipation  [ ]chewing problems [ ] swallowing issues  [ ] other: No N+V, last BM last night per pt report     PO intake:  < 50% [ ] 50-75% [ ]   % [x ]  other : Pt reports ongoing good appetite in house consuming >75% of meals supplemented by nepro 2 x daily as well as foods from cafeteria and home. Observed breakfast ~75% complete.         Current Weight: 151.6 lbs (1/3), 147.9 lbs (1/6 standing), wt slightly decreased, likely related to intradialytic fluid shifts.   % Weight Change    Pertinent Medications: MEDICATIONS  (STANDING):  amLODIPine   Tablet 10 milliGRAM(s) Oral daily  dextrose 5%. 1000 milliLiter(s) (50 mL/Hr) IV Continuous <Continuous>  influenza   Vaccine 0.5 milliLiter(s) IntraMuscular once  predniSONE   Tablet 5 milliGRAM(s) Oral daily  tacrolimus 3 milliGRAM(s) Oral two times a day    MEDICATIONS  (PRN):    Pertinent Labs:  Reviewed, none since 1/6    Skin: No edema, skin intact    Estimated Needs:   [ x] no change since previous assessment  [ ] recalculated:       Previous Nutrition Diagnosis:     [x ] Malnutrition (severe)         Nutrition Diagnosis is [x ] ongoing  [ ] resolved [ ] not applicable          New Nutrition Diagnosis: [x ] not applicable       Interventions:     Recommend    [ ] Change Diet To:    [ ] Nutrition Supplement    [ ] Nutrition Support    [ ] Other:     1. Consider liberalizing diet to Renal (low Na, low K, low PO4, 1L fluid restriction)  2. Continue to encourage po intake and obtain/honor food preferences as able   3. Continue to review renal diet education as able-answered all questions at time of visit.        Monitoring and Evaluation:     [x ] PO intake [ x] Tolerance to diet prescription [ x] weights [x ] follow up per protocol    [ ] other:

## 2018-01-09 ENCOUNTER — TRANSCRIPTION ENCOUNTER (OUTPATIENT)
Age: 33
End: 2018-01-09

## 2018-01-09 LAB
ANION GAP SERPL CALC-SCNC: 21 MMOL/L — HIGH (ref 5–17)
BUN SERPL-MCNC: 74 MG/DL — HIGH (ref 7–23)
CALCIUM SERPL-MCNC: 9.6 MG/DL — SIGNIFICANT CHANGE UP (ref 8.4–10.5)
CHLORIDE SERPL-SCNC: 96 MMOL/L — SIGNIFICANT CHANGE UP (ref 96–108)
CO2 SERPL-SCNC: 25 MMOL/L — SIGNIFICANT CHANGE UP (ref 22–31)
CREAT SERPL-MCNC: 12.86 MG/DL — HIGH (ref 0.5–1.3)
GLUCOSE SERPL-MCNC: 83 MG/DL — SIGNIFICANT CHANGE UP (ref 70–99)
HCT VFR BLD CALC: 29.2 % — LOW (ref 39–50)
HGB BLD-MCNC: 9.7 G/DL — LOW (ref 13–17)
MCHC RBC-ENTMCNC: 28.4 PG — SIGNIFICANT CHANGE UP (ref 27–34)
MCHC RBC-ENTMCNC: 33.2 GM/DL — SIGNIFICANT CHANGE UP (ref 32–36)
MCV RBC AUTO: 85.4 FL — SIGNIFICANT CHANGE UP (ref 80–100)
PLATELET # BLD AUTO: 172 K/UL — SIGNIFICANT CHANGE UP (ref 150–400)
POTASSIUM SERPL-MCNC: 4.1 MMOL/L — SIGNIFICANT CHANGE UP (ref 3.5–5.3)
POTASSIUM SERPL-SCNC: 4.1 MMOL/L — SIGNIFICANT CHANGE UP (ref 3.5–5.3)
RBC # BLD: 3.42 M/UL — LOW (ref 4.2–5.8)
RBC # FLD: 13 % — SIGNIFICANT CHANGE UP (ref 10.3–14.5)
SODIUM SERPL-SCNC: 142 MMOL/L — SIGNIFICANT CHANGE UP (ref 135–145)
TACROLIMUS SERPL-MCNC: 4.5 NG/ML — SIGNIFICANT CHANGE UP
WBC # BLD: 11.86 K/UL — HIGH (ref 3.8–10.5)
WBC # FLD AUTO: 11.86 K/UL — HIGH (ref 3.8–10.5)

## 2018-01-09 PROCEDURE — 36558 INSERT TUNNELED CV CATH: CPT | Mod: 53

## 2018-01-09 PROCEDURE — 77001 FLUOROGUIDE FOR VEIN DEVICE: CPT | Mod: 26

## 2018-01-09 PROCEDURE — 90935 HEMODIALYSIS ONE EVALUATION: CPT

## 2018-01-09 PROCEDURE — 76937 US GUIDE VASCULAR ACCESS: CPT | Mod: 26

## 2018-01-09 RX ORDER — AMLODIPINE BESYLATE 2.5 MG/1
1 TABLET ORAL
Qty: 0 | Refills: 0 | COMMUNITY
Start: 2018-01-09

## 2018-01-09 RX ORDER — ACETAMINOPHEN 500 MG
1000 TABLET ORAL ONCE
Qty: 0 | Refills: 0 | Status: COMPLETED | OUTPATIENT
Start: 2018-01-09 | End: 2018-01-09

## 2018-01-09 RX ORDER — OXYCODONE HYDROCHLORIDE 5 MG/1
5 TABLET ORAL ONCE
Qty: 0 | Refills: 0 | Status: DISCONTINUED | OUTPATIENT
Start: 2018-01-09 | End: 2018-01-09

## 2018-01-09 RX ADMIN — TACROLIMUS 3 MILLIGRAM(S): 5 CAPSULE ORAL at 18:33

## 2018-01-09 RX ADMIN — Medication 400 MILLIGRAM(S): at 18:20

## 2018-01-09 RX ADMIN — TACROLIMUS 3 MILLIGRAM(S): 5 CAPSULE ORAL at 06:01

## 2018-01-09 RX ADMIN — Medication 5 MILLIGRAM(S): at 06:01

## 2018-01-09 RX ADMIN — AMLODIPINE BESYLATE 10 MILLIGRAM(S): 2.5 TABLET ORAL at 23:23

## 2018-01-09 RX ADMIN — OXYCODONE HYDROCHLORIDE 5 MILLIGRAM(S): 5 TABLET ORAL at 23:00

## 2018-01-09 NOTE — PROGRESS NOTE ADULT - SUBJECTIVE AND OBJECTIVE BOX
Patient was seen and examined at bedside. Patient is well appearing with NAD.  Right chest wall permacath was examined. No active bleeding was identified. No swelling, redness or purulent discharge was noted. Patient reports no pain at this time.  If active bleeding is noted, IR team may be consulted.

## 2018-01-09 NOTE — PROGRESS NOTE ADULT - PROBLEM SELECTOR PLAN 3
Failed allograft, on minimized immunosuppression until next transplant in near future, has live donor, being worked up at [facility] kidney transplant program.
Failed allograft, on minimized immunosuppression until next transplant in near future, has live donor, being worked up at [facility] kidney transplant program.
Failed allograft, on minimized immunosuppression until next transplant in near future, has live donor, being worked up at [facility] kidney transplant program.
On low dose Tac and prednisone.
Failed allograft, on minimized immunosuppression until next transplant in near future, has live donor, being worked up at [facility] kidney transplant program.
start aranesp 60 mcg with dialysis

## 2018-01-09 NOTE — DISCHARGE NOTE ADULT - CARE PLAN
Principal Discharge DX:	Acute renal failure superimposed on chronic kidney disease, unspecified CKD stage, unspecified acute renal failure type  Goal:	tolerate hemodialysis  Instructions for follow-up, activity and diet:	Continue hemodialysis outpatient center with Dr. Boyle  Secondary Diagnosis:	Essential hypertension  Goal:	maintain goal blood pressure reading  Instructions for follow-up, activity and diet:	Continue Amlodipine  Secondary Diagnosis:	Renal transplant recipient Principal Discharge DX:	Acute renal failure superimposed on chronic kidney disease, unspecified CKD stage, unspecified acute renal failure type  Goal:	tolerate hemodialysis  Instructions for follow-up, activity and diet:	Continue hemodialysis outpatient center with Dr. Boyle  Scheduled for Tuesday, Thursday, and Saturday  Secondary Diagnosis:	Essential hypertension  Goal:	maintain goal blood pressure reading  Instructions for follow-up, activity and diet:	Continue antihypertensive medications   Low salt diet  Activity as tolerated.  Take all medication as prescribed.  Follow up with your medical doctor for routine blood pressure monitoring at your next visit.  Notify your doctor if you have any of the following symptoms:   Dizziness, Lightheadedness, Blurry vision, Headache, Chest pain, Shortness of breath  Secondary Diagnosis:	Renal transplant recipient  Instructions for follow-up, activity and diet:	Continue Tacro and Prednisone daily  Please follow up with Nephrology Principal Discharge DX:	Acute renal failure superimposed on chronic kidney disease, unspecified CKD stage, unspecified acute renal failure type  Goal:	tolerate hemodialysis  Instructions for follow-up, activity and diet:	Continue hemodialysis outpatient center with Dr. Boyle  Scheduled for Tuesday, Thursday, and Saturday  Secondary Diagnosis:	Essential hypertension  Goal:	maintain goal blood pressure reading  Instructions for follow-up, activity and diet:	Continue antihypertensive medications as follows:   - Amlodipine 10mg daily   - Metoprolol 25mg every 12hrs     Low salt diet  Activity as tolerated.  Take all medication as prescribed.  Follow up with your medical doctor for routine blood pressure monitoring at your next visit.  Notify your doctor if you have any of the following symptoms:   Dizziness, Lightheadedness, Blurry vision, Headache, Chest pain, Shortness of breath  Secondary Diagnosis:	Renal transplant recipient  Instructions for follow-up, activity and diet:	Continue Tacro and Prednisone daily  Please follow up with Nephrology

## 2018-01-09 NOTE — DISCHARGE NOTE ADULT - HOSPITAL COURSE
32yr old male with PMH of focal glomerulonephritis s/p R kidney transplant (2005 @ [facility]), CKD IV, and HTN who presents to the ED for urgent hemodialysis per PMD.  Patient had routine lab work yesterday that showed worsening renal function and was sent to the ED for emergent dialysis.  Patient has never undergone dialysis in the past.  Prior to presenting to the hospital, patient felt fine (at baseline).  However, he notes that he has been experiencing weakness, lethargy and fatigue that has been going on for the past few months.   He also endorses palpitations for 3 months and diarrhea ongoing for 7 months.  He has never seen a gastroenterologist for this. He endorses black stools for the past two days and LE swelling for two days that has since resolved.  He denies HA, dizziness, shortness of breath, weight loss, abdominal pain, nausea, vomiting.  Patient is on prednisone and tacrolimus at home. Was on mycophenolate but it was stopped 2/2 to diarrhea about 1 year ago. 32yr old male with PMH of focal glomerulonephritis s/p R kidney transplant (2005 @ [facility]), CKD IV, and HTN who presents to the ED for urgent hemodialysis per PMD.  Patient had routine lab work yesterday that showed worsening renal function and was sent to the ED for emergent dialysis.  Patient has never undergone dialysis in the past.  Prior to presenting to the hospital, patient felt fine (at baseline).  However, he notes that he has been experiencing weakness, lethargy and fatigue that has been going on for the past few months. Transplant team/Nephrology consulted, patient sp emergent dialysis and PermCath placement. HD Center obtained and patient scheduled for Tuesday, Thursday, Saturday HD. Now stable for discharge home with follow up with Nephrology and HD tomorrow 1/11/2018.

## 2018-01-09 NOTE — PROGRESS NOTE ADULT - ATTENDING COMMENTS
I was present during and reviewed clinical and lab data as well as assessment and plan as documented . Please contact if any additional questions with any change in clinical condition or on availability of any additional information or reports.
32 year old man with failed renal transplant presented with elevated BUN/Creatinine and was admitted to MICU for emergent HD had one session last night , repeat session today  patient is not acidemic or volume overloaded  outpatient medications for BP control have been restarted  continue immunosuppressive medications  eligible for transfer to floor
DC Planning for out patient
I was present during and reviewed clinical and lab data as well as assessment and plan as documented . Please contact if any additional questions with any change in clinical condition or on availability of any additional information or reports.
I was present during and reviewed clinical and lab data as well as assessment and plan as documented . Please contact if any additional questions with any change in clinical condition or on availability of any additional information or reports.
I was present during and reviewed clinical and lab data as well as assessment and plan as documented. Please contact if any additional questions with any change in clinical condition or on availability of any additional information or reports.

## 2018-01-09 NOTE — PROGRESS NOTE ADULT - SUBJECTIVE AND OBJECTIVE BOX
Interventional Radiology Brief- Operative Note    Procedure: Tunneled dialysis catheter placement, removal of non tunneled dialysis catheter    Operators: Cleo Eric    Anesthesia (type): MAC per anesthesiology    Contrast: None    EBL: Minimal    Findings/Follow up Plan of Care: Successful placement of 19 cm Palindrome tunneled dialysis catheter with tip in SVC. Prior non tunneled dialysis catheter removed. No immediate complications. No pneumothorax.    Specimens Removed: Non tunneled dialysis catheter    Implants:  19 cm Palindrome tunneled dialysis catheter     Complications: None    Condition/Disposition: Stable/Recovery    Please call Interventional Radiology x [phone] with any questions, concerns, or issues.

## 2018-01-09 NOTE — PROGRESS NOTE ADULT - PROBLEM SELECTOR PROBLEM 3
S/P kidney transplant
Hypertension
Immunosuppression
S/P kidney transplant
S/P kidney transplant
Anemia due to chronic kidney disease
S/P kidney transplant

## 2018-01-09 NOTE — PROGRESS NOTE ADULT - SUBJECTIVE AND OBJECTIVE BOX
Interventional Radiology Pre-Procedure Note    Procedure: Tunneled dialysis catheter placement, removal of right neck non tunneled dialysis catheter    Diagnosis/Indication: Patient is a 31 y/o male with failed kidney transplant presenting for tunneled dialysis catheter placement for hemodialysis. Patient currently has a non tunneled dialysis catheter in the right neck. Patient has a left upper extremity fistula.    PAST MEDICAL & SURGICAL HISTORY:  CKD (chronic kidney disease), stage 4 (severe)  Hypertension  Glomerulonephritis  S/P kidney transplant: Right kidney in 2004     Allergies: No Known Allergies    LABS:  CBC Full  -  ( 09 Jan 2018 07:46 )  WBC Count : 11.86 K/uL  Hemoglobin : 9.7 g/dL  Hematocrit : 29.2 %  Platelet Count - Automated : 172 K/uL  Mean Cell Volume : 85.4 fl  Mean Cell Hemoglobin : 28.4 pg  Mean Cell Hemoglobin Concentration : 33.2 gm/dL  Auto Neutrophil # : x  Auto Lymphocyte # : x  Auto Monocyte # : x  Auto Eosinophil # : x  Auto Basophil # : x  Auto Neutrophil % : x  Auto Lymphocyte % : x  Auto Monocyte % : x  Auto Eosinophil % : x  Auto Basophil % : x    01-09    142  |  96  |  74<H>  ----------------------------<  83  4.1   |  25  |  12.86<H>    Ca    9.6      09 Jan 2018 09:18    Procedure/ risks/ benefits/ alternatives were explained, informed consent obtained from patient, verbalizes understanding. Interventional Radiology Pre-Procedure Note    Procedure: Tunneled dialysis catheter placement, removal of right neck non tunneled dialysis catheter    Diagnosis/Indication: Patient is a 33 y/o male with failed kidney transplant presenting for tunneled dialysis catheter placement for hemodialysis. Patient currently has a non tunneled dialysis catheter in the right neck. Patient has a left upper extremity fistula.    Patient has mild leukocytosis this morning, but is afebrile and this may be due to prednisone administration rather than an infectious process.    PAST MEDICAL & SURGICAL HISTORY:  CKD (chronic kidney disease), stage 4 (severe)  Hypertension  Glomerulonephritis  S/P kidney transplant: Right kidney in 2004     Allergies: No Known Allergies    LABS:  CBC Full  -  ( 09 Jan 2018 07:46 )  WBC Count : 11.86 K/uL  Hemoglobin : 9.7 g/dL  Hematocrit : 29.2 %  Platelet Count - Automated : 172 K/uL  Mean Cell Volume : 85.4 fl  Mean Cell Hemoglobin : 28.4 pg  Mean Cell Hemoglobin Concentration : 33.2 gm/dL  Auto Neutrophil # : x  Auto Lymphocyte # : x  Auto Monocyte # : x  Auto Eosinophil # : x  Auto Basophil # : x  Auto Neutrophil % : x  Auto Lymphocyte % : x  Auto Monocyte % : x  Auto Eosinophil % : x  Auto Basophil % : x    01-09    142  |  96  |  74<H>  ----------------------------<  83  4.1   |  25  |  12.86<H>    Ca    9.6      09 Jan 2018 09:18    Procedure/ risks/ benefits/ alternatives were explained, informed consent obtained from patient, verbalizes understanding. Interventional Radiology Pre-Procedure Note    Procedure: Tunneled dialysis catheter placement, removal of right neck non tunneled dialysis catheter    Diagnosis/Indication: Patient is a 33 y/o male with failed kidney transplant presenting for tunneled dialysis catheter placement for hemodialysis. Patient currently has a non tunneled dialysis catheter in the right neck. Patient has a left upper extremity fistula.    Patient has mild leukocytosis this morning, but is afebrile. This was discussed with nephrology.Dr. Prieto and is most likely due to prednisone administration rather than an infectious process.     PAST MEDICAL & SURGICAL HISTORY:  CKD (chronic kidney disease), stage 4 (severe)  Hypertension  Glomerulonephritis  S/P kidney transplant: Right kidney in 2004     Allergies: No Known Allergies    LABS:  CBC Full  -  ( 09 Jan 2018 07:46 )  WBC Count : 11.86 K/uL  Hemoglobin : 9.7 g/dL  Hematocrit : 29.2 %  Platelet Count - Automated : 172 K/uL  Mean Cell Volume : 85.4 fl  Mean Cell Hemoglobin : 28.4 pg  Mean Cell Hemoglobin Concentration : 33.2 gm/dL  Auto Neutrophil # : x  Auto Lymphocyte # : x  Auto Monocyte # : x  Auto Eosinophil # : x  Auto Basophil # : x  Auto Neutrophil % : x  Auto Lymphocyte % : x  Auto Monocyte % : x  Auto Eosinophil % : x  Auto Basophil % : x    01-09    142  |  96  |  74<H>  ----------------------------<  83  4.1   |  25  |  12.86<H>    Ca    9.6      09 Jan 2018 09:18    Procedure/ risks/ benefits/ alternatives were explained, informed consent obtained from patient, verbalizes understanding.

## 2018-01-09 NOTE — PROGRESS NOTE ADULT - PROBLEM SELECTOR PROBLEM 1
End stage kidney disease
End stage kidney disease
Renal transplant recipient
Renal transplant recipient
End stage kidney disease

## 2018-01-09 NOTE — DISCHARGE NOTE ADULT - PATIENT PORTAL LINK FT
“You can access the [facility] Patient Portal, offered by [facility], by registering with the following website: [email or web address]”

## 2018-01-09 NOTE — DISCHARGE NOTE ADULT - MEDICATION SUMMARY - MEDICATIONS TO CHANGE
I will SWITCH the dose or number of times a day I take the medications listed below when I get home from the hospital:  None I will SWITCH the dose or number of times a day I take the medications listed below when I get home from the hospital:    metoprolol tartrate 50 mg oral tablet  -- 1 tab(s) by mouth 2 times a day

## 2018-01-09 NOTE — PROGRESS NOTE ADULT - PROBLEM SELECTOR PLAN 1
I have seen the patient and reviewed dialysis prescription and flow sheet. Dialysis access is functioning well. Patient is tolerating dialysis well with no acute symptoms or distress. Dialysis prescription has been adjusted for optimized control of volume status, uremia and electrolytes. Management of additional metabolic abnormalities/anemia will continue to be addressed on follow up.
Off MMF, On tac/pred
Hemodialysis in AM
Pt. s/p failed renal allograft, received HD yesterday that he tolerated well without any intradialytic complications or access issues. Plan to have tunnelled dialysis catheter placed on Monday. Appears clinically stable. plan for next maintenance HD on Monday.  Patient wants to go to [facility] dialysis unit ([place] patient[place]) on discharge, discussed with Dr. Meghna Boyle, who goes there and follows patient for disposition.

## 2018-01-09 NOTE — PROGRESS NOTE ADULT - PROBLEM SELECTOR PROBLEM 2
Immunosuppression
End stage kidney disease
End stage kidney disease
Immunosuppression
Immunosuppression
Hypertension
Immunosuppression

## 2018-01-09 NOTE — DISCHARGE NOTE ADULT - PLAN OF CARE
tolerate hemodialysis Continue hemodialysis outpatient center with Dr. Boyle maintain goal blood pressure reading Continue Amlodipine Continue hemodialysis outpatient center with Dr. Boyle  Scheduled for Tuesday, Thursday, and Saturday Continue antihypertensive medications   Low salt diet  Activity as tolerated.  Take all medication as prescribed.  Follow up with your medical doctor for routine blood pressure monitoring at your next visit.  Notify your doctor if you have any of the following symptoms:   Dizziness, Lightheadedness, Blurry vision, Headache, Chest pain, Shortness of breath Continue Tacro and Prednisone daily  Please follow up with Nephrology Continue antihypertensive medications as follows:   - Amlodipine 10mg daily   - Metoprolol 25mg every 12hrs     Low salt diet  Activity as tolerated.  Take all medication as prescribed.  Follow up with your medical doctor for routine blood pressure monitoring at your next visit.  Notify your doctor if you have any of the following symptoms:   Dizziness, Lightheadedness, Blurry vision, Headache, Chest pain, Shortness of breath

## 2018-01-09 NOTE — PROGRESS NOTE ADULT - SUBJECTIVE AND OBJECTIVE BOX
S: Had catheter placed today, dialysis    MEDICATIONS  (STANDING):  acetaminophen  IVPB. 1000 milliGRAM(s) IV Intermittent once  amLODIPine   Tablet 10 milliGRAM(s) Oral daily  dextrose 5%. 1000 milliLiter(s) (50 mL/Hr) IV Continuous <Continuous>  influenza   Vaccine 0.5 milliLiter(s) IntraMuscular once  predniSONE   Tablet 5 milliGRAM(s) Oral daily  tacrolimus 3 milliGRAM(s) Oral two times a day    MEDICATIONS  (PRN):    O: Vital Signs Last 24 Hrs  T(C): 36.8 (01-09-18 @ 17:20)  T(F): 98.2 (01-09-18 @ 17:20), Max: 98.5 (01-09-18 @ 05:00)  HR: 89 (01-09-18 @ 17:20) (72 - 89)  BP: 146/98 (01-09-18 @ 17:20)  BP(mean): --  RR: 18 (01-09-18 @ 17:20) (18 - 19)  SpO2: 97% (01-09-18 @ 17:20) (97% - 100%)  Wt(kg): --    01-08 @ 07:01  -  01-09 @ 07:00  --------------------------------------------------------  IN: 180 mL / OUT: 1 mL / NET: 179 mL    01-09 @ 07:01  -  01-09 @ 17:53  --------------------------------------------------------  IN: 0 mL / OUT: 1000 mL / NET: -1000 mL    General: No distress/not dyspneic.Appears comfortable at rest.  HENT: No acute signs noted  NECK: No JVD/No goiter/Neck supple  Chest: right I/J tunneled catheter, dressing has mild blood ooze  Resp: Bilateral breath sounds equal with no crackles or wheeze  CV: Heart sounds well heard. no rub/No gallop.  Abd: Soft, non tender. No palpable mass/liver/spleen/kidneys. Bladder not distended on exam.  Extremities: pedal pulses noted and normal; No acute signs. No edema.  Neuro: A,O X 3; No acute focal signs, no asterixis.  Skin: No rash/ no acute lesions noted.      LABS/STUDIES  --------------------------------------------------------------------------------              9.7    11.86 >-----------<  172      [01-09-18 @ 07:46]              29.2     142  |  96  |  74  ----------------------------<  83      [01-09-18 @ 09:18]  4.1   |  25  |  12.86        Ca     9.6     [01-09-18 @ 09:18]            Creatinine Trend:  SCr 12.86 [01-09 @ 09:18]  SCr 11.90 [01-08 @ 14:28]  SCr 11.48 [01-06 @ 08:33]  SCr 17.03 [01-05 @ 04:42]  SCr 16.79 [01-04 @ 20:49]    Tacrolimus (), Serum: 4.5 ng/mL (01-09 @ 07:46)  Tacrolimus (), Serum: 3.9 ng/mL (01-08 @ 08:59)  Tacrolimus (), Serum: 5.4 ng/mL (01-07 @ 09:53)  Tacrolimus (), Serum: 3.6 ng/mL (01-06 @ 08:24)

## 2018-01-09 NOTE — PROGRESS NOTE ADULT - PROBLEM SELECTOR PLAN 4
continue Tac/pred.
HD today. If SBP remains above 140 after HD, will add another antihypertensive agent. Monitor BP.
Will monitor.

## 2018-01-09 NOTE — DISCHARGE NOTE ADULT - CARE PROVIDER_API CALL
Meghna Boyle), Internal Medicine  [address] [place] [address]  Phone: [phone]  Fax: [phone]

## 2018-01-09 NOTE — DISCHARGE NOTE ADULT - MEDICATION SUMMARY - MEDICATIONS TO TAKE
I will START or STAY ON the medications listed below when I get home from the hospital:    predniSONE 5 mg oral tablet  -- 1 tab(s) by mouth once a day  -- Indication: For Immunosuppressed status    sodium bicarbonate 650 mg oral tablet  -- 1 tab(s) by mouth 3 times a day  -- Indication: For CKD (chronic kidney disease) stage 5, GFR less than 15 ml/min    metoprolol tartrate 50 mg oral tablet  -- 1 tab(s) by mouth 2 times a day  -- Indication: For Hypertension    felodipine 10 mg oral tablet, extended release  -- 2 tab(s) by mouth once a day  -- Indication: For Hypertension    amLODIPine 10 mg oral tablet  -- 1 tab(s) by mouth once a day  -- Indication: For Hypertension    tacrolimus 1 mg oral capsule  -- 3 cap(s) by mouth 2 times a day  -- Indication: For Immunosuppression I will START or STAY ON the medications listed below when I get home from the hospital:    predniSONE 5 mg oral tablet  -- 1 tab(s) by mouth once a day  -- Indication: For Immunosuppressed status    sodium bicarbonate 650 mg oral tablet  -- 1 tab(s) by mouth 3 times a day  -- Indication: For CKD (chronic kidney disease) stage 5, GFR less than 15 ml/min    metoprolol tartrate 25 mg oral tablet  -- 1 tab(s) by mouth every 12 hours   -- It is very important that you take or use this exactly as directed.  Do not skip doses or discontinue unless directed by your doctor.  May cause drowsiness.  Alcohol may intensify this effect.  Use care when operating dangerous machinery.  Some non-prescription drugs may aggravate your condition.  Read all labels carefully.  If a warning appears, check with your doctor before taking.  Take with food or milk.  This drug may impair the ability to drive or operate machinery.  Use care until you become familiar with its effects.    -- Indication: For Hypertension    amLODIPine 10 mg oral tablet  -- 1 tab(s) by mouth once a day  -- Indication: For Hypertension    tacrolimus 1 mg oral capsule  -- 3 cap(s) by mouth 2 times a day  -- Indication: For Immunosuppression

## 2018-01-10 VITALS — SYSTOLIC BLOOD PRESSURE: 150 MMHG | DIASTOLIC BLOOD PRESSURE: 93 MMHG | HEART RATE: 85 BPM

## 2018-01-10 LAB
ANION GAP SERPL CALC-SCNC: 17 MMOL/L — SIGNIFICANT CHANGE UP (ref 5–17)
BUN SERPL-MCNC: 51 MG/DL — HIGH (ref 7–23)
CALCIUM SERPL-MCNC: 9.2 MG/DL — SIGNIFICANT CHANGE UP (ref 8.4–10.5)
CHLORIDE SERPL-SCNC: 97 MMOL/L — SIGNIFICANT CHANGE UP (ref 96–108)
CO2 SERPL-SCNC: 26 MMOL/L — SIGNIFICANT CHANGE UP (ref 22–31)
CREAT SERPL-MCNC: 10.17 MG/DL — HIGH (ref 0.5–1.3)
GLUCOSE SERPL-MCNC: 67 MG/DL — LOW (ref 70–99)
HCT VFR BLD CALC: 29.4 % — LOW (ref 39–50)
HGB BLD-MCNC: 9.7 G/DL — LOW (ref 13–17)
MCHC RBC-ENTMCNC: 29 PG — SIGNIFICANT CHANGE UP (ref 27–34)
MCHC RBC-ENTMCNC: 33 GM/DL — SIGNIFICANT CHANGE UP (ref 32–36)
MCV RBC AUTO: 87.8 FL — SIGNIFICANT CHANGE UP (ref 80–100)
PLATELET # BLD AUTO: 176 K/UL — SIGNIFICANT CHANGE UP (ref 150–400)
POTASSIUM SERPL-MCNC: 4.2 MMOL/L — SIGNIFICANT CHANGE UP (ref 3.5–5.3)
POTASSIUM SERPL-SCNC: 4.2 MMOL/L — SIGNIFICANT CHANGE UP (ref 3.5–5.3)
RBC # BLD: 3.35 M/UL — LOW (ref 4.2–5.8)
RBC # FLD: 13.5 % — SIGNIFICANT CHANGE UP (ref 10.3–14.5)
SODIUM SERPL-SCNC: 140 MMOL/L — SIGNIFICANT CHANGE UP (ref 135–145)
TACROLIMUS SERPL-MCNC: 4.9 NG/ML — SIGNIFICANT CHANGE UP
WBC # BLD: 10.26 K/UL — SIGNIFICANT CHANGE UP (ref 3.8–10.5)
WBC # FLD AUTO: 10.26 K/UL — SIGNIFICANT CHANGE UP (ref 3.8–10.5)

## 2018-01-10 PROCEDURE — 96375 TX/PRO/DX INJ NEW DRUG ADDON: CPT

## 2018-01-10 PROCEDURE — 96374 THER/PROPH/DIAG INJ IV PUSH: CPT

## 2018-01-10 PROCEDURE — 82962 GLUCOSE BLOOD TEST: CPT

## 2018-01-10 PROCEDURE — 77001 FLUOROGUIDE FOR VEIN DEVICE: CPT

## 2018-01-10 PROCEDURE — 84295 ASSAY OF SERUM SODIUM: CPT

## 2018-01-10 PROCEDURE — 82553 CREATINE MB FRACTION: CPT

## 2018-01-10 PROCEDURE — 87486 CHLMYD PNEUM DNA AMP PROBE: CPT

## 2018-01-10 PROCEDURE — 83605 ASSAY OF LACTIC ACID: CPT

## 2018-01-10 PROCEDURE — 36558 INSERT TUNNELED CV CATH: CPT

## 2018-01-10 PROCEDURE — 87633 RESP VIRUS 12-25 TARGETS: CPT

## 2018-01-10 PROCEDURE — 82947 ASSAY GLUCOSE BLOOD QUANT: CPT

## 2018-01-10 PROCEDURE — C1894: CPT

## 2018-01-10 PROCEDURE — 93005 ELECTROCARDIOGRAM TRACING: CPT

## 2018-01-10 PROCEDURE — 82272 OCCULT BLD FECES 1-3 TESTS: CPT

## 2018-01-10 PROCEDURE — 86901 BLOOD TYPING SEROLOGIC RH(D): CPT

## 2018-01-10 PROCEDURE — 76937 US GUIDE VASCULAR ACCESS: CPT

## 2018-01-10 PROCEDURE — 85610 PROTHROMBIN TIME: CPT

## 2018-01-10 PROCEDURE — C1750: CPT

## 2018-01-10 PROCEDURE — 83735 ASSAY OF MAGNESIUM: CPT

## 2018-01-10 PROCEDURE — 85014 HEMATOCRIT: CPT

## 2018-01-10 PROCEDURE — 87581 M.PNEUMON DNA AMP PROBE: CPT

## 2018-01-10 PROCEDURE — 85730 THROMBOPLASTIN TIME PARTIAL: CPT

## 2018-01-10 PROCEDURE — 80048 BASIC METABOLIC PNL TOTAL CA: CPT

## 2018-01-10 PROCEDURE — 86706 HEP B SURFACE ANTIBODY: CPT

## 2018-01-10 PROCEDURE — 81001 URINALYSIS AUTO W/SCOPE: CPT

## 2018-01-10 PROCEDURE — 86704 HEP B CORE ANTIBODY TOTAL: CPT

## 2018-01-10 PROCEDURE — 87340 HEPATITIS B SURFACE AG IA: CPT

## 2018-01-10 PROCEDURE — 71045 X-RAY EXAM CHEST 1 VIEW: CPT

## 2018-01-10 PROCEDURE — 80197 ASSAY OF TACROLIMUS: CPT

## 2018-01-10 PROCEDURE — 86900 BLOOD TYPING SEROLOGIC ABO: CPT

## 2018-01-10 PROCEDURE — 99285 EMERGENCY DEPT VISIT HI MDM: CPT | Mod: 25

## 2018-01-10 PROCEDURE — C1769: CPT

## 2018-01-10 PROCEDURE — 86803 HEPATITIS C AB TEST: CPT

## 2018-01-10 PROCEDURE — 84132 ASSAY OF SERUM POTASSIUM: CPT

## 2018-01-10 PROCEDURE — 85027 COMPLETE CBC AUTOMATED: CPT

## 2018-01-10 PROCEDURE — 94640 AIRWAY INHALATION TREATMENT: CPT

## 2018-01-10 PROCEDURE — 82550 ASSAY OF CK (CPK): CPT

## 2018-01-10 PROCEDURE — 93306 TTE W/DOPPLER COMPLETE: CPT

## 2018-01-10 PROCEDURE — 99261: CPT

## 2018-01-10 PROCEDURE — 82565 ASSAY OF CREATININE: CPT

## 2018-01-10 PROCEDURE — 82330 ASSAY OF CALCIUM: CPT

## 2018-01-10 PROCEDURE — 82803 BLOOD GASES ANY COMBINATION: CPT

## 2018-01-10 PROCEDURE — 82435 ASSAY OF BLOOD CHLORIDE: CPT

## 2018-01-10 PROCEDURE — 96376 TX/PRO/DX INJ SAME DRUG ADON: CPT

## 2018-01-10 PROCEDURE — 87798 DETECT AGENT NOS DNA AMP: CPT

## 2018-01-10 PROCEDURE — 80053 COMPREHEN METABOLIC PANEL: CPT

## 2018-01-10 PROCEDURE — 86850 RBC ANTIBODY SCREEN: CPT

## 2018-01-10 PROCEDURE — 84484 ASSAY OF TROPONIN QUANT: CPT

## 2018-01-10 PROCEDURE — 84100 ASSAY OF PHOSPHORUS: CPT

## 2018-01-10 RX ORDER — ACETAMINOPHEN 500 MG
1000 TABLET ORAL ONCE
Qty: 0 | Refills: 0 | Status: COMPLETED | OUTPATIENT
Start: 2018-01-10 | End: 2018-01-10

## 2018-01-10 RX ORDER — FELODIPINE 5 MG/1
2 TABLET, FILM COATED, EXTENDED RELEASE ORAL
Qty: 0 | Refills: 0 | COMMUNITY

## 2018-01-10 RX ORDER — AMLODIPINE BESYLATE 2.5 MG/1
1 TABLET ORAL
Qty: 30 | Refills: 0 | OUTPATIENT
Start: 2018-01-10 | End: 2018-02-08

## 2018-01-10 RX ORDER — METOPROLOL TARTRATE 50 MG
1 TABLET ORAL
Qty: 60 | Refills: 0 | OUTPATIENT
Start: 2018-01-10 | End: 2018-02-08

## 2018-01-10 RX ADMIN — Medication 1000 MILLIGRAM(S): at 07:41

## 2018-01-10 RX ADMIN — Medication 400 MILLIGRAM(S): at 06:30

## 2018-01-10 RX ADMIN — OXYCODONE HYDROCHLORIDE 5 MILLIGRAM(S): 5 TABLET ORAL at 00:00

## 2018-01-10 RX ADMIN — Medication 5 MILLIGRAM(S): at 06:30

## 2018-01-10 RX ADMIN — TACROLIMUS 3 MILLIGRAM(S): 5 CAPSULE ORAL at 18:26

## 2018-01-10 RX ADMIN — INFLUENZA VIRUS VACCINE 0.5 MILLILITER(S): 15; 15; 15; 15 SUSPENSION INTRAMUSCULAR at 18:25

## 2018-01-10 RX ADMIN — TACROLIMUS 3 MILLIGRAM(S): 5 CAPSULE ORAL at 06:30

## 2018-01-10 NOTE — PROGRESS NOTE ADULT - PROVIDER SPECIALTY LIST ADULT
Internal Medicine
Intervent Radiology
Nephrology
Transplant Medicine
MICU
Internal Medicine
Transplant Medicine

## 2018-01-10 NOTE — PROGRESS NOTE ADULT - SUBJECTIVE AND OBJECTIVE BOX
Called to see pt for bleeding from permacath. Pt s/p  tunneled HD catheter on 1/9. no active bleeding noted at this time. Dressing changed. Quick clot applied. Please Call IR with questions at [phone]

## 2018-01-10 NOTE — PROGRESS NOTE ADULT - SUBJECTIVE AND OBJECTIVE BOX
MEDICINE, PROGRESS NOTE [phone]    GABE JOSEPH 32y [id number]    Patient seen and examined.  Patient is a 32y old  Male who presents with a chief complaint of Progressive CKD, needs HD. (09 Jan 2018 18:37)  no new complaints.    PAST MEDICAL & SURGICAL HISTORY:  CKD (chronic kidney disease), stage 4 (severe)  Hypertension  Glomerulonephritis  S/P kidney transplant: Right kidney in 2004    MEDICATIONS  (STANDING):  amLODIPine   Tablet 10 milliGRAM(s) Oral daily  dextrose 5%. 1000 milliLiter(s) (50 mL/Hr) IV Continuous <Continuous>  influenza   Vaccine 0.5 milliLiter(s) IntraMuscular once  predniSONE   Tablet 5 milliGRAM(s) Oral daily  tacrolimus 3 milliGRAM(s) Oral two times a day    MEDICATIONS  (PRN):    Allergies    No Known Allergies    Intolerances        PHYSICAL EXAM:  Constitutional: NAD  HEENT: Normocephalic, EOMI  Neck:  No JVD  Respiratory: CTA B/L, No wheezes  Cardiovascular: S1, S2, RRR, + systolic murmur  Gastrointestinal: BS+, soft, NT/ND  Extremities: No peripheral edema  Neurological: AAOX3, no focal deficits  Psychiatric: Normal mood, normal affect  : No [place]    Vital Signs Last 24 Hrs  T(C): 37 (10 Guille 2018 16:40), Max: 37 (10 Guille 2018 16:40)  T(F): 98.6 (10 Guille 2018 16:40), Max: 98.6 (10 Guille 2018 16:40)  HR: 85 (10 Guille 2018 17:14) (83 - 108)  BP: 150/93 (10 Guille 2018 17:14) (145/81 - 160/98)  BP(mean): --  RR: 18 (10 Guille 2018 16:40) (18 - 18)  SpO2: 97% (10 Guille 2018 16:40) (95% - 98%)  I&O's Summary    09 Jan 2018 07:01  -  10 Guille 2018 07:00  --------------------------------------------------------  IN: 580 mL / OUT: 1000 mL / NET: -420 mL    10 Guille 2018 07:01  -  10 Guille 2018 18:14  --------------------------------------------------------  IN: 300 mL / OUT: 0 mL / NET: 300 mL        LABS:                        9.7    10.26 )-----------( 176      ( 10 Guille 2018 10:16 )             29.4     01-10    140  |  97  |  51<H>  ----------------------------<  67<L>  4.2   |  26  |  10.17<H>    Ca    9.2      10 Gulile 2018 10:16          Tacrolimus (), Serum: 4.9 ng/mL (01-10 @ 10:16)

## 2018-01-10 NOTE — PROGRESS NOTE ADULT - ASSESSMENT
31 y/o M PMH focal glomerulonephritis s/p R kidney transplant (2005 @ [facility]), CKD IV, and HTN who presents with progressively worsening CKD, hyperkalemia with EKG changes admitted to MICU for Urgent HD.     Neuro:  - No active issues. AAOx3     Cardio:  - PMH significant for essential HTN- pt restarted on amlodipine 10mg 2tabs QD.   - Yesterday evening pt reported crushing chest pain with "fluttering in the chest". Initial concern for uremic pericarditis. Pt says he's had these symptoms for the last 4 months. EKG was unchanged with no evidence of peaked T waves, ST elevations, or [place] depressions. Cardiac enzymes negative x 2. Pt reports these symptoms ceased after 1hour.   - Will consider restarting Lopressor 50mg BID post HD today.       Pulm:  - no active issues, satting well on RA- no signs of fluid overload or respiratory distress.       GI:  - Pt reporting 2 bouts of black stool over the last 3 days. Fecal occult sent. No active signs of bleeding.  - Hb 9.8 this admission- baseline 10.5. Likely in setting of CKD.   - If Hb continues to downtrend, will considcer Iron studies, hemolytic anemia work up, GI consult in setting of acute blood loss anemia.  - s/p Protonix 40mg BID x 1 in ED.   - f/u fecal occult    Renal:  - Hyperkalemia 5.1 (5.8) improving. HD today. .   - c/w prednisone 5 mg and tacrolimus 3 mg. Check daily tacrolimus levels.   - temporary RIJ placed for HD. After 3 cycles of HD, pt will need tunneled catheter placement by IR/ Consider Vascular surgery consult for AV fistula placement    ID:   - No active issues. Monitor for infectious stigmata in setting of immunosuppression.     Heme:   - Hb 9.8 (last admission 9.8-10.5) like 2/2 to CKD. ? Component of acute blood loss anemia as pt endorsing hx of melanotic stool over last 3 days.  - Trend Hb; transfuse for Hb >7      DVT PPx: SCDs     Dispo  - Social work for placement into outpatient dialysis center.
Failed transplant on hd via permacath    continue current care  d/c pt home today to f/u at dialysis center  f/u at transplant center as well
Patient had right I/j tunneled catheter placed, dialyzed today.  I have seen the patient and reviewed dialysis prescription and flow sheet. Dialysis access is functioning well. Patient is tolerating dialysis well with no acute symptoms or distress. Dialysis prescription has been adjusted for optimized control of volume status, uremia and electrolytes. Management of additional metabolic abnormalities/anemia will continue to be addressed on follow up.
Renal transplant recipient with filing allograft, anemia, hypertension, now started on dialysis.  Plan:  Hemodialysis- as per nephrology
Renal transplant recipient with filing allograft, anemia, hypertension, now started on dialysis.  Plan:  Hemodialysis- today, tomorrow (Saturday), possibly Monday or Tuesday.  Switch to tunneled dialysis catheter on Monday  Continue Tac/Prednisone  If SBP remains >140, will adjust anti hypertensive therapy.    Patient wants to go to [facility] dialysis unit ([place], [place] near patient's home) on discharge, discussed with Dr. Meghna Boyle, who goes there and follows patient for disposition.
failed renal allograft now on hd    plan for change to permacath hong  continue hd  plan for d/c after permacath and outpt hd set up, pt will not need vein mappng or avf/avg as he has a living donor  currently being worked up and may potentially bridge into transplantation.
failed renal transplant s/p permacath    continue current care  reinforce dressing if IR unable to see tonight  recheck labs hong  d/c planning once hd set up and bleeding under control
Patient with failed allograft, awaiting retransplant, admitted with uremia, started hemodialsis.  Plan:  Change access to tunmneled catheter, dialysis tomorrow and discharge to out patient dialysis unit near his home.  Case d/w NP  Joshua Prieto MD  [phone]
36 year old male, Renal transplant recipient with failing allograft, anemia, hypertension, now started on dialysis.
Patient with failed renal allograft, now on dialysis
Renal transplant recipient with filing allograft, anemia, hypertension, now started on dialysis.  Plan:  Hemodialysis- as per nephrology. Out patient arrangements have been made

## 2018-01-12 ENCOUNTER — EMERGENCY (EMERGENCY)
Facility: HOSPITAL | Age: 33
LOS: 1 days | Discharge: HOME | End: 2018-01-12
Attending: EMERGENCY MEDICINE | Admitting: EMERGENCY MEDICINE
Payer: MEDICARE

## 2018-01-12 VITALS
OXYGEN SATURATION: 97 % | HEART RATE: 94 BPM | TEMPERATURE: 99 F | SYSTOLIC BLOOD PRESSURE: 138 MMHG | WEIGHT: 145.06 LBS | DIASTOLIC BLOOD PRESSURE: 87 MMHG | RESPIRATION RATE: 17 BRPM

## 2018-01-12 VITALS
TEMPERATURE: 99 F | SYSTOLIC BLOOD PRESSURE: 144 MMHG | DIASTOLIC BLOOD PRESSURE: 101 MMHG | OXYGEN SATURATION: 98 % | RESPIRATION RATE: 18 BRPM | HEART RATE: 80 BPM

## 2018-01-12 LAB
ALBUMIN SERPL ELPH-MCNC: 4.1 G/DL — SIGNIFICANT CHANGE UP (ref 3.3–5)
ALP SERPL-CCNC: 54 U/L — SIGNIFICANT CHANGE UP (ref 40–120)
ALT FLD-CCNC: 49 U/L RC — HIGH (ref 10–45)
ANION GAP SERPL CALC-SCNC: 13 MMOL/L — SIGNIFICANT CHANGE UP (ref 5–17)
APTT BLD: 27.8 SEC — SIGNIFICANT CHANGE UP (ref 27.5–37.4)
AST SERPL-CCNC: 21 U/L — SIGNIFICANT CHANGE UP (ref 10–40)
BASOPHILS # BLD AUTO: 0 K/UL — SIGNIFICANT CHANGE UP (ref 0–0.2)
BASOPHILS NFR BLD AUTO: 0.5 % — SIGNIFICANT CHANGE UP (ref 0–2)
BILIRUB SERPL-MCNC: 0.8 MG/DL — SIGNIFICANT CHANGE UP (ref 0.2–1.2)
BUN SERPL-MCNC: 45 MG/DL — HIGH (ref 7–23)
CALCIUM SERPL-MCNC: 9.1 MG/DL — SIGNIFICANT CHANGE UP (ref 8.4–10.5)
CHLORIDE SERPL-SCNC: 93 MMOL/L — LOW (ref 96–108)
CO2 SERPL-SCNC: 30 MMOL/L — SIGNIFICANT CHANGE UP (ref 22–31)
CREAT SERPL-MCNC: 8.24 MG/DL — HIGH (ref 0.5–1.3)
EOSINOPHIL # BLD AUTO: 0.1 K/UL — SIGNIFICANT CHANGE UP (ref 0–0.5)
EOSINOPHIL NFR BLD AUTO: 1 % — SIGNIFICANT CHANGE UP (ref 0–6)
GLUCOSE SERPL-MCNC: 110 MG/DL — HIGH (ref 70–99)
HCT VFR BLD CALC: 28 % — LOW (ref 39–50)
HGB BLD-MCNC: 9.6 G/DL — LOW (ref 13–17)
INR BLD: 0.98 RATIO — SIGNIFICANT CHANGE UP (ref 0.88–1.16)
LYMPHOCYTES # BLD AUTO: 1.6 K/UL — SIGNIFICANT CHANGE UP (ref 1–3.3)
LYMPHOCYTES # BLD AUTO: 17.5 % — SIGNIFICANT CHANGE UP (ref 13–44)
MCHC RBC-ENTMCNC: 30.6 PG — SIGNIFICANT CHANGE UP (ref 27–34)
MCHC RBC-ENTMCNC: 34.4 GM/DL — SIGNIFICANT CHANGE UP (ref 32–36)
MCV RBC AUTO: 89 FL — SIGNIFICANT CHANGE UP (ref 80–100)
MONOCYTES # BLD AUTO: 1 K/UL — HIGH (ref 0–0.9)
MONOCYTES NFR BLD AUTO: 10.9 % — SIGNIFICANT CHANGE UP (ref 2–14)
NEUTROPHILS # BLD AUTO: 6.3 K/UL — SIGNIFICANT CHANGE UP (ref 1.8–7.4)
NEUTROPHILS NFR BLD AUTO: 70.2 % — SIGNIFICANT CHANGE UP (ref 43–77)
PLATELET # BLD AUTO: 136 K/UL — LOW (ref 150–400)
POTASSIUM SERPL-MCNC: 3.3 MMOL/L — LOW (ref 3.5–5.3)
POTASSIUM SERPL-SCNC: 3.3 MMOL/L — LOW (ref 3.5–5.3)
PROT SERPL-MCNC: 6.6 G/DL — SIGNIFICANT CHANGE UP (ref 6–8.3)
PROTHROM AB SERPL-ACNC: 10.7 SEC — SIGNIFICANT CHANGE UP (ref 9.8–12.7)
RBC # BLD: 3.15 M/UL — LOW (ref 4.2–5.8)
RBC # FLD: 13.5 % — SIGNIFICANT CHANGE UP (ref 10.3–14.5)
SODIUM SERPL-SCNC: 136 MMOL/L — SIGNIFICANT CHANGE UP (ref 135–145)
WBC # BLD: 9 K/UL — SIGNIFICANT CHANGE UP (ref 3.8–10.5)
WBC # FLD AUTO: 9 K/UL — SIGNIFICANT CHANGE UP (ref 3.8–10.5)

## 2018-01-12 PROCEDURE — 99284 EMERGENCY DEPT VISIT MOD MDM: CPT | Mod: GC

## 2018-01-12 PROCEDURE — 85027 COMPLETE CBC AUTOMATED: CPT

## 2018-01-12 PROCEDURE — 71045 X-RAY EXAM CHEST 1 VIEW: CPT | Mod: 26

## 2018-01-12 PROCEDURE — 80053 COMPREHEN METABOLIC PANEL: CPT

## 2018-01-12 PROCEDURE — 85610 PROTHROMBIN TIME: CPT

## 2018-01-12 PROCEDURE — 85730 THROMBOPLASTIN TIME PARTIAL: CPT

## 2018-01-12 PROCEDURE — 99283 EMERGENCY DEPT VISIT LOW MDM: CPT | Mod: 25

## 2018-01-12 PROCEDURE — 71045 X-RAY EXAM CHEST 1 VIEW: CPT

## 2018-01-12 NOTE — ED PROVIDER NOTE - PMH
CKD (chronic kidney disease), stage 4 (severe)    Dialysis complication    Glomerulonephritis    Hypertension

## 2018-01-12 NOTE — ED PROVIDER NOTE - ATTENDING CONTRIBUTION TO CARE
pt is a 33 y/o male with h/o esrd, failed kidney transplant, htn recently d.c from [facility] 2 days ago presents with bleeding to his permacath site s/p hd yesterday, pressure placed to site, labs, cxr. IR placed line to call them.

## 2018-01-12 NOTE — PROGRESS NOTE ADULT - SUBJECTIVE AND OBJECTIVE BOX
32M with ESRD with tunneled HD catheter on 1/9 with bleeding. Pt had bleeding on 1/10 dressing changes were done no presented to IR for suture placement for control of bleeding.

## 2018-01-12 NOTE — ED ADULT NURSE REASSESSMENT NOTE - NS ED NURSE REASSESS COMMENT FT1
Patient awaiting IR for assessment of port. Port covered with gauze dressing. Patient in no acute distress. Family at bedside. VSS. Side rails up, call bell within reach.

## 2018-01-12 NOTE — ED PROVIDER NOTE - PHYSICAL EXAMINATION
Zee Calzada M.D.:   patient awake alert seen lying on stretcher NAD .   LUNGS CTAB no wheeze no crackle.   CARD RRR no m/r/g.    Abdomen soft NT ND no rebound no guarding no CVA tenderness.   EXT WWP no edema no calf tenderness CV 2+DP/PT bilaterally.   neuro A&Ox3 gait normal.    skin warm and dry no rash bleeding from around right chest permacath without clear source.  HEENT: moist mucous membranes, PERRL, EOMI

## 2018-01-12 NOTE — ED PROVIDER NOTE - PROGRESS NOTE DETAILS
discussed case with radiology resident who states that no one from IR in house now, someone will be in at 7:30. would recommend holding pressure but otherwise cannot give further recs at this time. will wait for IR. IR called again and will bring him up within an hour for suture

## 2018-01-12 NOTE — ED ADULT NURSE NOTE - OBJECTIVE STATEMENT
33 yo male complaining of port leaking. Pt was admitted to the hospital for kidney failure and emergent dialysis last week, port placed (dialysis cath) - [facility]. Pt supposed to do dialysis on Tuesdays, Thursdays and Saturdays. Pt went for dialysis yesterday, did it for 2 1/2 hours and as per pt, he had to stop because facility was closing. AS per pt, blood started to come out of the cath at 12:30 am. Pt active bleeding at this moment, gauze placed by MD on port. Pt complains of dizziness when standing, no sings of acute distress noted at this moment. Will continue to monitor closely.

## 2018-02-05 ENCOUNTER — APPOINTMENT (OUTPATIENT)
Dept: OPHTHALMOLOGY | Facility: CLINIC | Age: 33
End: 2018-02-05
Payer: MEDICARE

## 2018-02-05 DIAGNOSIS — D31.02 BENIGN NEOPLASM OF LEFT CONJUNCTIVA: ICD-10-CM

## 2018-02-05 PROCEDURE — 92014 COMPRE OPH EXAM EST PT 1/>: CPT

## 2018-02-05 PROCEDURE — 92285 EXTERNAL OCULAR PHOTOGRAPHY: CPT

## 2018-02-05 RX ORDER — SCOPOLAMINE 1.5 MG/1
1 PATCH, EXTENDED RELEASE TRANSDERMAL
Qty: 3 | Refills: 0 | Status: DISCONTINUED | COMMUNITY
Start: 2017-08-14 | End: 2018-02-05

## 2018-02-12 ENCOUNTER — APPOINTMENT (OUTPATIENT)
Dept: NEPHROLOGY | Facility: CLINIC | Age: 33
End: 2018-02-12
Payer: MEDICARE

## 2018-02-12 VITALS
DIASTOLIC BLOOD PRESSURE: 107 MMHG | BODY MASS INDEX: 20.6 KG/M2 | HEIGHT: 72 IN | WEIGHT: 152.12 LBS | OXYGEN SATURATION: 97 % | HEART RATE: 72 BPM | SYSTOLIC BLOOD PRESSURE: 147 MMHG

## 2018-02-12 PROCEDURE — 99214 OFFICE O/P EST MOD 30 MIN: CPT

## 2018-02-12 RX ORDER — METOPROLOL TARTRATE 25 MG/1
25 TABLET, FILM COATED ORAL
Qty: 60 | Refills: 0 | Status: DISCONTINUED | COMMUNITY
Start: 2018-01-10

## 2018-04-07 ENCOUNTER — INPATIENT (INPATIENT)
Facility: HOSPITAL | Age: 33
LOS: 11 days | Discharge: HOME | DRG: 312 | End: 2018-04-19
Attending: INTERNAL MEDICINE | Admitting: INTERNAL MEDICINE
Payer: MEDICARE

## 2018-04-07 VITALS
TEMPERATURE: 99 F | RESPIRATION RATE: 18 BRPM | HEART RATE: 99 BPM | DIASTOLIC BLOOD PRESSURE: 95 MMHG | SYSTOLIC BLOOD PRESSURE: 137 MMHG | OXYGEN SATURATION: 98 %

## 2018-04-07 DIAGNOSIS — R55 SYNCOPE AND COLLAPSE: ICD-10-CM

## 2018-04-07 LAB
ALBUMIN SERPL ELPH-MCNC: 4 G/DL — SIGNIFICANT CHANGE UP (ref 3.3–5)
ALP SERPL-CCNC: 99 U/L — SIGNIFICANT CHANGE UP (ref 40–120)
ALT FLD-CCNC: 12 U/L RC — SIGNIFICANT CHANGE UP (ref 10–45)
ANION GAP SERPL CALC-SCNC: 18 MMOL/L — HIGH (ref 5–17)
APTT BLD: 30.5 SEC — SIGNIFICANT CHANGE UP (ref 27.5–37.4)
AST SERPL-CCNC: 12 U/L — SIGNIFICANT CHANGE UP (ref 10–40)
BASOPHILS # BLD AUTO: 0 K/UL — SIGNIFICANT CHANGE UP (ref 0–0.2)
BILIRUB SERPL-MCNC: 0.7 MG/DL — SIGNIFICANT CHANGE UP (ref 0.2–1.2)
BUN SERPL-MCNC: 49 MG/DL — HIGH (ref 7–23)
CALCIUM SERPL-MCNC: 9.8 MG/DL — SIGNIFICANT CHANGE UP (ref 8.4–10.5)
CHLORIDE SERPL-SCNC: 96 MMOL/L — SIGNIFICANT CHANGE UP (ref 96–108)
CO2 SERPL-SCNC: 24 MMOL/L — SIGNIFICANT CHANGE UP (ref 22–31)
CREAT SERPL-MCNC: 17.5 MG/DL — HIGH (ref 0.5–1.3)
EOSINOPHIL # BLD AUTO: 3.6 K/UL — HIGH (ref 0–0.5)
EOSINOPHIL NFR BLD AUTO: 27 % — HIGH (ref 0–6)
GAS PNL BLDV: SIGNIFICANT CHANGE UP
GLUCOSE SERPL-MCNC: 67 MG/DL — LOW (ref 70–99)
HCT VFR BLD CALC: 36.3 % — LOW (ref 39–50)
HGB BLD-MCNC: 12.8 G/DL — LOW (ref 13–17)
INR BLD: 1.1 RATIO — SIGNIFICANT CHANGE UP (ref 0.88–1.16)
LYMPHOCYTES # BLD AUTO: 23 % — SIGNIFICANT CHANGE UP (ref 13–44)
LYMPHOCYTES # BLD AUTO: 3.3 K/UL — SIGNIFICANT CHANGE UP (ref 1–3.3)
MAGNESIUM SERPL-MCNC: 2.2 MG/DL — SIGNIFICANT CHANGE UP (ref 1.6–2.6)
MCHC RBC-ENTMCNC: 31.3 PG — SIGNIFICANT CHANGE UP (ref 27–34)
MCHC RBC-ENTMCNC: 35.1 GM/DL — SIGNIFICANT CHANGE UP (ref 32–36)
MCV RBC AUTO: 89.1 FL — SIGNIFICANT CHANGE UP (ref 80–100)
MONOCYTES # BLD AUTO: 0.8 K/UL — SIGNIFICANT CHANGE UP (ref 0–0.9)
MONOCYTES NFR BLD AUTO: 6 % — SIGNIFICANT CHANGE UP (ref 2–14)
NEUTROPHILS # BLD AUTO: 5.5 K/UL — SIGNIFICANT CHANGE UP (ref 1.8–7.4)
NEUTROPHILS NFR BLD AUTO: 42 % — LOW (ref 43–77)
PHOSPHATE SERPL-MCNC: 3.8 MG/DL — SIGNIFICANT CHANGE UP (ref 2.5–4.5)
PLATELET # BLD AUTO: 164 K/UL — SIGNIFICANT CHANGE UP (ref 150–400)
POTASSIUM SERPL-MCNC: 4.5 MMOL/L — SIGNIFICANT CHANGE UP (ref 3.5–5.3)
POTASSIUM SERPL-SCNC: 4.5 MMOL/L — SIGNIFICANT CHANGE UP (ref 3.5–5.3)
PROT SERPL-MCNC: 8.4 G/DL — HIGH (ref 6–8.3)
PROTHROM AB SERPL-ACNC: 11.9 SEC — SIGNIFICANT CHANGE UP (ref 9.8–12.7)
RBC # BLD: 4.07 M/UL — LOW (ref 4.2–5.8)
RBC # FLD: 12.3 % — SIGNIFICANT CHANGE UP (ref 10.3–14.5)
SODIUM SERPL-SCNC: 138 MMOL/L — SIGNIFICANT CHANGE UP (ref 135–145)
WBC # BLD: 13.2 K/UL — HIGH (ref 3.8–10.5)
WBC # FLD AUTO: 13.2 K/UL — HIGH (ref 3.8–10.5)

## 2018-04-07 PROCEDURE — 99285 EMERGENCY DEPT VISIT HI MDM: CPT | Mod: GC

## 2018-04-07 PROCEDURE — 71046 X-RAY EXAM CHEST 2 VIEWS: CPT | Mod: 26

## 2018-04-07 RX ORDER — TACROLIMUS 5 MG/1
3 CAPSULE ORAL ONCE
Qty: 0 | Refills: 0 | Status: DISCONTINUED | OUTPATIENT
Start: 2018-04-07 | End: 2018-04-07

## 2018-04-07 NOTE — ED PROVIDER NOTE - NS ED ROS FT
*Constitutional: no fevers, no chills  *Eyes: no vision changes  *ENMT: no hearing changes, no nasal congestion, no sore throat  *CV: + CP, no palpitations  *Resp: no shortness of breath, no cough  *GI: no abdominal pain, no nausea, no vomiting  *G/U: no dysuria, no hematuria  *Neuro: no headache, no lightheadedness/dizziness, + SYNCOPE  *MSK: no joint pain, no swelling  *Skin: no rashes, no wounds  *Heme/Lymph: no bleeding, no bruising  *Allergic/Immunologic: no immunosuppressive disorder   ~ Pamela Rodriguez M.D.

## 2018-04-07 NOTE — ED PROVIDER NOTE - OBJECTIVE STATEMENT
32 year-old amle with history of FSGS with history of renal transplant approx. 14 years ago and now on HD for the past 2 months b/c of worsening kidney function presents to the Emergency Department for dialysis.  Is on dialysis T/Th/Sat; last dialysis was Th - not a full session b/c patient had a syncopal episode.  Has had prior episodes like this in the past; thought to be due to decreased PO intake.  No seizure like activity - no tongue biting, extremity movements, bowel/urinary incontinence.  Mentions episodes of CP during dialysis and Nephrology decreased output.  No fevers, chills, nausea, vomiting, shortness of breath.  + fatigue 32 year-old amle with history of FSGS with history of renal transplant approx. 14 years ago and now on HD for the past 2 months b/c of worsening kidney function presents to the Emergency Department for dialysis.  Is on dialysis T/Th/Sat; last dialysis was Th - not a full session b/c patient had a syncopal episode.  Has had prior episodes like this in the past; thought to be due to decreased PO intake.  No seizure like activity - no tongue biting, extremity movements, bowel/urinary incontinence.  Mentions episodes of CP during dialysis and Nephrology decreased output.  No fevers, chills, nausea, vomiting, shortness of breath.  + fatigue  Nephro: Dr. Morgan; no PCP 32 year-old male with history of FSGS with history of renal transplant approx. 14 years ago and now on HD for the past 2 months b/c of worsening kidney function presents to the Emergency Department for dialysis.  Is on dialysis T/Th/Sat; last dialysis was Th - not a full session b/c patient had a syncopal episode.  Has had prior episodes like this in the past; thought to be due to decreased PO intake.  No seizure like activity - no tongue biting, extremity movements, bowel/urinary incontinence.  Mentions episodes of CP during dialysis and Nephrology decreased output.  No fevers, chills, nausea, vomiting, shortness of breath.  + fatigue  Nephro: Dr. Morgan; no PCP

## 2018-04-07 NOTE — ED ADULT NURSE NOTE - OBJECTIVE STATEMENT
33yo male complaining of passing out on dialysis on Thursday as per mother "pt passed out on Thursday and every since then he has not being out of bed, he is not eating and today, he was supposed to be on dialysis and he did not go because he was afraid of passing out again".   pt alerted and oriented x3, no sings of acute distress noted at this moment. Vitals WNL. Pt denies being dizzy, SOB, nausea and vomiting. Pt states being weak at this moment. IV placed on right AC 20G, blood drawn, sent to lab. Will continue to monitor closely. EKG done.   Dialysis catheter on right chest.

## 2018-04-07 NOTE — ED PROVIDER NOTE - PROGRESS NOTE DETAILS
Michael LAW: Spoke with Dr. Lawson - admit under Dr. Segovia since he is away.  Patient's nephrologist was contacted.

## 2018-04-07 NOTE — ED PROVIDER NOTE - PHYSICAL EXAMINATION
*Gen: NAD, AAO*3  *HEENT: NC/AT, MMM, airway patent, trachea midline  *CV: RRR, S1/S2 present, no murmurs/rubs/gallops  *Resp: no respiratory distress, LCTAB, no wheezing/rales/rhonchi  *Abd: non-distended, soft N/Tx4, no guarding or rigidity  *Neuro: no focal neuro deficits, moving all limbs appropriately  *Extremities: no gross deformity  *Skin: no rashes, no wounds   ~ Pamela Rodriguez M.D.

## 2018-04-07 NOTE — ED PROVIDER NOTE - MEDICAL DECISION MAKING DETAILS
FLAIVO Flynn MD: Pt is a 32 year-old male with PMH of FSGS with PMH of renal transplant approx. 14 years ago (now failing), on HD for the past 2 months b/c of worsening kidney function presents to the Emergency Department for passing out during HD. Per patient, he is on dialysis T/Th/Sat; last dialysis was Th - not a full session b/c patient had a syncopal episode. Had syncope on Tuesday as well.  Has had prior episodes like this in the past; thought to be due to decreased PO intake. Pt states that his appetite has been poor and that he has been eating very little. States that he is given a fluid restriction so only drinks as much fluids as he is supposed to have. States that Nephrology began taking off less fluid but still has syncope during HD.  No fevers, chills, nausea, vomiting, shortness of breath, leg pain/swelling, travel/trauma, immobilization.  + fatigue. States that he is due for HD today, but didn't go because he is afraid of passing out again during HD.  DDx: metabolic d/o, electrolyte abnormality, dehydration, worsening renal fxn  Plan: basic labs, ekg, cxr, speak with Neprhology to arrange HD and likely admit

## 2018-04-07 NOTE — ED ADULT TRIAGE NOTE - CHIEF COMPLAINT QUOTE
pt states "I passed out during my dialysis tx on Thursday and my mom thinks I should have gotten it checked out. The treatment wasn't completed on Thursday. I was suppose to have dialysis today but didn't go because I was worried I might pass out again." Pt denies any complaints at present

## 2018-04-08 DIAGNOSIS — I10 ESSENTIAL (PRIMARY) HYPERTENSION: ICD-10-CM

## 2018-04-08 DIAGNOSIS — N05.9 UNSPECIFIED NEPHRITIC SYNDROME WITH UNSPECIFIED MORPHOLOGIC CHANGES: ICD-10-CM

## 2018-04-08 DIAGNOSIS — R55 SYNCOPE AND COLLAPSE: ICD-10-CM

## 2018-04-08 DIAGNOSIS — R63.0 ANOREXIA: ICD-10-CM

## 2018-04-08 DIAGNOSIS — N18.6 END STAGE RENAL DISEASE: ICD-10-CM

## 2018-04-08 DIAGNOSIS — E83.39 OTHER DISORDERS OF PHOSPHORUS METABOLISM: ICD-10-CM

## 2018-04-08 LAB
ANION GAP SERPL CALC-SCNC: 19 MMOL/L — HIGH (ref 5–17)
APPEARANCE UR: CLEAR — SIGNIFICANT CHANGE UP
BILIRUB UR-MCNC: NEGATIVE — SIGNIFICANT CHANGE UP
BUN SERPL-MCNC: 55 MG/DL — HIGH (ref 7–23)
CALCIUM SERPL-MCNC: 9.9 MG/DL — SIGNIFICANT CHANGE UP (ref 8.4–10.5)
CHLORIDE SERPL-SCNC: 94 MMOL/L — LOW (ref 96–108)
CK MB BLD-MCNC: 1 % — SIGNIFICANT CHANGE UP (ref 0–3.5)
CK MB CFR SERPL CALC: 1 NG/ML — SIGNIFICANT CHANGE UP (ref 0–6.7)
CK MB CFR SERPL CALC: 1 NG/ML — SIGNIFICANT CHANGE UP (ref 0–6.7)
CK SERPL-CCNC: 100 U/L — SIGNIFICANT CHANGE UP (ref 30–200)
CK SERPL-CCNC: 98 U/L — SIGNIFICANT CHANGE UP (ref 30–200)
CO2 SERPL-SCNC: 25 MMOL/L — SIGNIFICANT CHANGE UP (ref 22–31)
COLOR SPEC: YELLOW — SIGNIFICANT CHANGE UP
CREAT SERPL-MCNC: 18.83 MG/DL — HIGH (ref 0.5–1.3)
DIFF PNL FLD: ABNORMAL
GLUCOSE BLDC GLUCOMTR-MCNC: 101 MG/DL — HIGH (ref 70–99)
GLUCOSE BLDC GLUCOMTR-MCNC: 94 MG/DL — SIGNIFICANT CHANGE UP (ref 70–99)
GLUCOSE SERPL-MCNC: 124 MG/DL — HIGH (ref 70–99)
GLUCOSE UR QL: NEGATIVE — SIGNIFICANT CHANGE UP
KETONES UR-MCNC: NEGATIVE — SIGNIFICANT CHANGE UP
LEUKOCYTE ESTERASE UR-ACNC: NEGATIVE — SIGNIFICANT CHANGE UP
MAGNESIUM SERPL-MCNC: 2.2 MG/DL — SIGNIFICANT CHANGE UP (ref 1.6–2.6)
NITRITE UR-MCNC: NEGATIVE — SIGNIFICANT CHANGE UP
PH UR: 7 — SIGNIFICANT CHANGE UP (ref 5–8)
PHOSPHATE SERPL-MCNC: 2.2 MG/DL — LOW (ref 2.5–4.5)
POTASSIUM SERPL-MCNC: 4.6 MMOL/L — SIGNIFICANT CHANGE UP (ref 3.5–5.3)
POTASSIUM SERPL-SCNC: 4.6 MMOL/L — SIGNIFICANT CHANGE UP (ref 3.5–5.3)
PROT UR-MCNC: 300 MG/DL
RBC CASTS # UR COMP ASSIST: ABNORMAL /HPF (ref 0–2)
SODIUM SERPL-SCNC: 138 MMOL/L — SIGNIFICANT CHANGE UP (ref 135–145)
SP GR SPEC: 1.01 — SIGNIFICANT CHANGE UP (ref 1.01–1.02)
TACROLIMUS SERPL-MCNC: <2 NG/ML — SIGNIFICANT CHANGE UP
TROPONIN T SERPL-MCNC: 0.02 NG/ML — SIGNIFICANT CHANGE UP (ref 0–0.06)
TROPONIN T SERPL-MCNC: 0.02 NG/ML — SIGNIFICANT CHANGE UP (ref 0–0.06)
TSH SERPL-MCNC: 1.61 UIU/ML — SIGNIFICANT CHANGE UP (ref 0.27–4.2)
UROBILINOGEN FLD QL: NEGATIVE — SIGNIFICANT CHANGE UP
WBC UR QL: SIGNIFICANT CHANGE UP /HPF (ref 0–5)

## 2018-04-08 PROCEDURE — 99223 1ST HOSP IP/OBS HIGH 75: CPT | Mod: AI

## 2018-04-08 RX ORDER — AMLODIPINE BESYLATE 2.5 MG/1
2.5 TABLET ORAL DAILY
Qty: 0 | Refills: 0 | Status: DISCONTINUED | OUTPATIENT
Start: 2018-04-08 | End: 2018-04-19

## 2018-04-08 RX ORDER — METOPROLOL TARTRATE 50 MG
12.5 TABLET ORAL
Qty: 0 | Refills: 0 | Status: DISCONTINUED | OUTPATIENT
Start: 2018-04-08 | End: 2018-04-19

## 2018-04-08 RX ORDER — HEPARIN SODIUM 5000 [USP'U]/ML
5000 INJECTION INTRAVENOUS; SUBCUTANEOUS EVERY 12 HOURS
Qty: 0 | Refills: 0 | Status: DISCONTINUED | OUTPATIENT
Start: 2018-04-08 | End: 2018-04-19

## 2018-04-08 RX ADMIN — AMLODIPINE BESYLATE 2.5 MILLIGRAM(S): 2.5 TABLET ORAL at 05:43

## 2018-04-08 RX ADMIN — Medication 1 TABLET(S): at 18:07

## 2018-04-08 RX ADMIN — Medication 5 MILLIGRAM(S): at 01:53

## 2018-04-08 RX ADMIN — Medication 12.5 MILLIGRAM(S): at 05:44

## 2018-04-08 RX ADMIN — Medication 12.5 MILLIGRAM(S): at 18:07

## 2018-04-08 NOTE — CONSULT NOTE ADULT - SUBJECTIVE AND OBJECTIVE BOX
[facility] - Martinez / Shanel S /Angus/ S Montana/ S Kunz/ Basilio Lucio / COLUMBA Njeru  ---------------------------------------------------------------------------------------------------------------  Patient seen and examined in ER    32 m h/o ESRD 2/2 FSGS s/p renal tx , s/p failure, now on HD x 2 mos, //sat at [facility], well known to me, presented to ER last night with c/o "passed out" during last HD 3/5, reports drop in BP during HD, felt better after rinsing back and went home. Mother bedside provided collaterals, states pt didn't eat anything yesterday, was afraid BP would drop again during hd yesterday, hence brought him to ER for evaluation. Pt reports poor appetite and poor po intake lately. outpt HD record from 3/5 reviewed, came lost 1kg since 3/3 hd, completed HD rx, no documented hypotensive episodes noted, left w/0.7kg +ve balance. Pt denies dizzyness/sob/V/D/abd pain/fever/chills. Pt self tapered off prednisone and prograf, hasn't followed up w/xplant team. takes Norvasc and lopressor at home, doesn't recall doses.    PAST MEDICAL & SURGICAL HISTORY:  ESRD  Hypertension  Glomerulonephritis  S/P kidney transplant: Right kidney in       Allergies: No Known Allergies    Home Medications Reviewed  Hospital Medications:   MEDICATIONS  (STANDING):  amLODIPine   Tablet 2.5 milliGRAM(s) Oral daily  heparin  Injectable 5000 Unit(s) SubCutaneous every 12 hours  metoprolol tartrate 12.5 milliGRAM(s) Oral two times a day  predniSONE   Tablet 5 milliGRAM(s) Oral daily    SOCIAL HISTORY:  Denies ETOh,Smoking, illicit drug use  FAMILY HISTORY:  Family history of glomerulonephritis (Uncle)      REVIEW OF SYSTEMS:  CONSTITUTIONAL: No weakness, fevers or chills; +poor appetite  EYES/ENT: No visual changes;  No vertigo or throat pain   NECK: No pain or stiffness  RESPIRATORY: No cough, wheezing, hemoptysis; No shortness of breath  CARDIOVASCULAR: No chest pain or palpitations.  GASTROINTESTINAL: No abdominal or epigastric pain. No nausea, vomiting, or hematemesis; No diarrhea or constipation. No melena or hematochezia.  GENITOURINARY: No dysuria, frequency, foamy urine, urinary urgency, incontinence or hematuria  NEUROLOGICAL: No numbness or weakness  SKIN: No itching, burning, rashes, or lesions   VASCULAR: No bilateral lower extremity edema.   All other review of systems is negative unless indicated above.    VITALS:  T(F): 97.7 (18 @ 12:42), Max: 98.8 (18 @ 19:10)  HR: 64 (18 @ 12:42)  BP: 120/80 (18 @ 12:42)  RR: 16 (18 @ 12:42)  SpO2: 100% (18 @ 12:42)  Wt(kg): --        PHYSICAL EXAM:  Constitutional: NAD  HEENT: anicteric sclera, oropharynx clear, MMM  Neck: No JVD  Respiratory: CTAB, no wheezes, rales or rhonchi  Cardiovascular: S1, S2, RRR  Gastrointestinal: BS+, soft, NT/ND, no tenderness over RLQ xplant kidney  Extremities: No cyanosis or clubbing. No peripheral edema  Neurological: A/O x 3, no focal deficits  Psychiatric: Normal mood, normal affect  : No CVA tenderness. No fuller.   Skin: No rashes  Vascular Access: rt IJ PC    LABS:      138  |  94<L>  |  55<H>  ----------------------------<  124<H>  4.6   |  25  |  18.83<H>    Ca    9.9      2018 05:31  Phos  2.2       Mg     2.2         TPro  8.4<H>  /  Alb  4.0  /  TBili  0.7  /  DBili      /  AST  12  /  ALT  12  /  AlkPhos  99      Creatinine Trend: 18.83 <--, 17.50 <--                        12.8   13.2  )-----------( 164      ( 2018 21:07 )             36.3     Urine Studies:  Urinalysis Basic - ( 2018 06:24 )    Color: Yellow / Appearance: Clear / S.013 / pH:   Gluc:  / Ketone: Negative  / Bili: Negative / Urobili: Negative   Blood:  / Protein: 300 mg/dL / Nitrite: Negative   Leuk Esterase: Negative / RBC: 5-10 /HPF / WBC 0-2 /HPF   Sq Epi:  / Non Sq Epi:  / Bacteria:     RADIOLOGY & ADDITIONAL STUDIES:    Xray Chest 2 Views PA/Lat (18 @ 21:04) >  IMPRESSION:  Right-sided infusion catheter terminates in the SVC.  Clear lungs.

## 2018-04-08 NOTE — CONSULT NOTE ADULT - SUBJECTIVE AND OBJECTIVE BOX
CARDIOLOGY ATTENDING      HISTORY OF PRESENT ILLNESS: He is a pleasant 33 y/o male P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 EKG/echo unremarkable.    PAST MEDICAL & SURGICAL HISTORY:  ESRD secondary to FSGS  HTN  S/P kidney transplant: Right kidney in 2004  right sided permacath    MEDICATIONS  (STANDING):  amLODIPine   Tablet 2.5 milliGRAM(s) Oral daily  heparin  Injectable 5000 Unit(s) SubCutaneous every 12 hours  metoprolol tartrate 12.5 milliGRAM(s) Oral two times a day  Nephro-kenneth 1 Tablet(s) Oral daily  predniSONE   Tablet 5 milliGRAM(s) Oral daily    No Known Allergies    FAMILY HISTORY:  Family history of glomerulonephritis (Uncle)  Non-contributary for premature coronary disease or sudden cardiac death    SOCIAL HISTORY:    [x ] Non-smoker  [ ] Smoker  [ ] Alcohol      REVIEW OF SYSTEMS:  [ ]chest pain  [  ]shortness of breath  [  ]palpitations  [ x ]syncope  [ ]near syncope [ ]upper extremity weakness   [ ] lower extremity weakness  [  ]diplopia  [  ]altered mental status   [  ]fevers  [ ]chills [ ]nausea  [ ]vomitting  [  ]dysphagia    [ ]abdominal pain  [ ]melena  [ ]BRBPR    [  ]epistaxis  [  ]rash    [ ]lower extremity edema        [x ] All others negative	  [ ] Unable to obtain    PHYSICAL EXAM:  T(C): 36.6 (04-08-18 @ 20:04), Max: 37.1 (04-08-18 @ 01:25)  HR: 75 (04-08-18 @ 20:04) (64 - 99)  BP: 130/87 (04-08-18 @ 20:04) (111/71 - 137/95)  RR: 18 (04-08-18 @ 20:04) (16 - 18)  SpO2: 97% (04-08-18 @ 20:04) (97% - 100%)  Wt(kg): --    Appearance: Normal	  HEENT:   Normal oral mucosa, PERRL, EOMI	  Lymphatic: No lymphadenopathy , no edema  Cardiovascular: Normal S1 S2, No JVD, No murmurs , Peripheral pulses palpable 2+ bilaterally  Respiratory: Lungs clear to auscultation, normal effort 	  Gastrointestinal:  Soft, Non-tender, + BS	  Skin: No rashes, No ecchymoses, No cyanosis, warm to touch  Musculoskeletal: Normal range of motion, normal strength  Psychiatry:  Mood & affect appropriate      TELEMETRY: 	    ECG:  	    Echo:  NST:  Cath:  	  	  LABS:	 	                          12.8   13.2  )-----------( 164      ( 07 Apr 2018 21:07 )             36.3     04-08    138  |  94<L>  |  55<H>  ----------------------------<  124<H>  4.6   |  25  |  18.83<H>    Ca    9.9      08 Apr 2018 05:31  Phos  2.2     04-08  Mg     2.2     04-08    TPro  8.4<H>  /  Alb  4.0  /  TBili  0.7  /  DBili  x   /  AST  12  /  ALT  12  /  AlkPhos  99  04-07    proBNP:   Lipid Profile:   HgA1c:   TSH: Thyroid Stimulating Hormone, Serum: 1.61 uIU/mL (04-08 @ 07:50)      A/P)  He is a pleasant 33 y/o male P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 EKG/echo unremarkable.    -given unremarkable echo and EKG he is extremely unlikely to have a malignant arrhythmia as the etiology of his syncope  -recommend outpatient event monitoring  -no further inpatient cardiac workup needed      Rohan Zamudio M.D., [facility]  Cardiac Electrophysiology  [facility]  [address] [place] [address], [address] [place], [place] [id number]  [email or web address]    office [phone]  pager [phone]

## 2018-04-08 NOTE — H&P ADULT - HISTORY OF PRESENT ILLNESS
32 m h/o FSGS s/p renal tx 14 yrs ago with progressive renal failure now on HD x 2 mos tu/th/sat via tunneled catheter presents with recurrent syncope during HD.  Pt and mother give interview.  Pt reportedly had anasarca and hyperkalemia at onset of HD.  They state initially on HD pt felt well and had more energy though had episodes of chest pressure during HD attributed to hyperkalemia at the time.  Pt became euvolemic and states K returned to normal.  Pt also states that when he started HD he stopped taking prednisone and tacrolimus completely (prior was on x 14 yrs).  Two weeks later he told his PMD about this and was instructed to taper his prednisone and take it every 3 days which he has been doing (last Wednesday).  Pt states that he then began to have syncopal episodes with HD and was told 2nd instructions on fluid restriction - his fluid intake was liberalized and less fluid was removed during HD.  He then developed progressive weakness and generalized fatigue with severe cold intolerance - wraps in blankets with two heaters in the room.  Pt now with very poor appetite.  No dizziness on standing.    Pt went for HD last Tuesday.  Pt had HD thursday and reportedly had low bp 3 hours in and then had LOC, did not complete HD session.  pt also states he still has pressure like chest pain 3 hours into HD almost every session.  Since thursday pt has been in bed with minimal PO intake, increased chills.  Was afraid to had HD yesterday 2nd past syncope and came to ED for further evaluation.  Pt states he had htn but since starting HD has had meds titrated down 2nd lower bp's.  No sick contacts.  On disability from school.  had flu shot in January.    In ED HDS.  no fever.  Renal contacted from ED

## 2018-04-08 NOTE — H&P ADULT - ASSESSMENT
32 m FSGS s/p renal tx now failed on HD with progressive fatigue, anorexia, weakness and inability to tolerate HD 2nd syncope and low bp's - r/o infectious vs cardiac causes and consider ? MANZANARES

## 2018-04-08 NOTE — CONSULT NOTE ADULT - PROBLEM SELECTOR RECOMMENDATION 9
no indication for urgent hd at this time. Has RRF  will arrange for HD for tomorrow AM w/2k bath, isovolemic, low bath temp  low Na diet. encouraged po intake. start Nephrovite

## 2018-04-08 NOTE — H&P ADULT - PROBLEM SELECTOR PLAN 4
will lower doses of metoprolol and norvasc in preparation for HD tomorrow to assess difference in ability to tolerate session

## 2018-04-08 NOTE — H&P ADULT - PROBLEM SELECTOR PLAN 1
given pt with intermittent chest pain since HD will monitor on tele and ck Rosario x 3  repeat tte, consider cards consult  ck orthostatics  d/w renal ? change in HD filtrate  ck tsh  SQH for DVT prophylaxis given ESRD and relative immobility given pt with intermittent chest pain since HD will monitor on tele and ck Rosario x 3  repeat tte, consider cards consult  ck orthostatics  d/w renal ? change in HD filtrate  ck tsh, ck cmv pcr given recent immunosuppression  monitor FSs for hypoglycemia  SQH for DVT prophylaxis given ESRD and relative immobility

## 2018-04-08 NOTE — H&P ADULT - NSHPLABSRESULTS_GEN_ALL_CORE
labs/studies/ekg reviewed personally by me  ekg nl intervals, lvh  cxr clear with r.cath in position  wbc 13.2 with 27 eos  hb 12.8, plt 164  na 138, k 4.5, bun 49, creat 17.5  po4 3.8  trop 0.02  lact 1.2  1/18 TTE with LVH

## 2018-04-08 NOTE — H&P ADULT - PROBLEM SELECTOR PLAN 2
pt with signs (low bp's in HD, eosinophilia and symptoms (fatigue, anorexia, syncope, cold intolerance) of relative adrenal insufficiency given he was on chronic daily steroids x 14 yrs and stopped with with rapid taper to 5mg every 3 d  -will restart prednisone 5mg daily for now and consider stress dose steroids to resume HD and assess efficacy pt with signs (low bp's in HD, eosinophilia, hypoglycemia) and symptoms (fatigue, anorexia, syncope, cold intolerance) of relative adrenal insufficiency given he was on chronic daily steroids x 14 yrs and stopped with with rapid taper to 5mg every 3 d  -will restart prednisone 5mg daily for now and consider stress dose steroids to resume HD and assess efficacy

## 2018-04-08 NOTE — CONSULT NOTE ADULT - ASSESSMENT
32 m h/o ESRD 2/2 FSGS s/p renal tx 2004, s/p failure, now on HD x 2 mos, tu/th/sat at [facility], well known to me, presented to ER last night with c/o hypotension and syncope during last HD 3/5, missed HD 3/7. Renal consulted for HD    ESRD on HD TTS outpt. s/p last hd 3/5. K, vol acceptable  HTN controlled. orthostatic VS reviewed- neg  Hypophosphatemia phos goal 3-5.5   s/p failed kidney xplant- c/w prednisone 5mg daily. off Prograft  wt loss likely 2/2 poor po intake  Anemia in CKD-Hb >goal. no indication for JORGE    labs, rad, chart reviewed  Informed consent obtained for HD from pt

## 2018-04-08 NOTE — CONSULT NOTE ADULT - SUBJECTIVE AND OBJECTIVE BOX
[facility] DIVISION OF KIDNEY DISEASES AND HYPERTENSION -- INITIAL CONSULT NOTE  --------------------------------------------------------------------------------  HPI:     Patient is a 33 y/o M w/ renal transplant in 2005 from mother @ [facility], follows with Dr. Prieto presented after missing HD..    PAST HISTORY  --------------------------------------------------------------------------------  PAST MEDICAL & SURGICAL HISTORY:  Dialysis complication  CKD (chronic kidney disease), stage 4 (severe)  Hypertension  Glomerulonephritis  S/P kidney transplant: Right kidney in 2004    FAMILY HISTORY:  Family history of glomerulonephritis (Uncle)    PAST SOCIAL HISTORY:    ALLERGIES & MEDICATIONS  --------------------------------------------------------------------------------  Allergies    No Known Allergies    Intolerances      Standing Inpatient Medications  amLODIPine   Tablet 2.5 milliGRAM(s) Oral daily  heparin  Injectable 5000 Unit(s) SubCutaneous every 12 hours  metoprolol tartrate 12.5 milliGRAM(s) Oral two times a day  predniSONE   Tablet 5 milliGRAM(s) Oral daily    PRN Inpatient Medications      REVIEW OF SYSTEMS  --------------------------------------------------------------------------------  Gen: No weight changes, fatigue, fevers/chills, weakness  Skin: No rashes  Head/Eyes/Ears/Mouth: No headache; Normal hearing; Normal vision w/o blurriness; No sinus pain/discomfort, sore throat  Respiratory: No dyspnea, cough, wheezing, hemoptysis  CV: No chest pain, PND, orthopnea  GI: No abdominal pain, diarrhea, constipation, nausea, vomiting, melena, hematochezia  : No increased frequency, dysuria, hematuria, nocturia  MSK: No joint pain/swelling; no back pain; no edema  Neuro: No dizziness/lightheadedness, weakness, seizures, numbness, tingling  Heme: No easy bruising or bleeding  Endo: No heat/cold intolerance  Psych: No significant nervousness, anxiety, stress, depression    All other systems were reviewed and are negative, except as noted.    VITALS/PHYSICAL EXAM  --------------------------------------------------------------------------------  T(C): 36.5 (04-08-18 @ 12:42), Max: 37.1 (04-07-18 @ 19:10)  HR: 64 (04-08-18 @ 12:42) (64 - 99)  BP: 120/80 (04-08-18 @ 12:42) (111/71 - 137/95)  RR: 16 (04-08-18 @ 12:42) (16 - 18)  SpO2: 100% (04-08-18 @ 12:42) (97% - 100%)  Wt(kg): --        Physical Exam:  	Gen: NAD, well-appearing  	HEENT: PERRL, supple neck, clear oropharynx  	Pulm: CTA B/L  	CV: RRR, S1S2; no rub  	Back: No spinal or CVA tenderness; no sacral edema  	Abd: +BS, soft, nontender/nondistended  	: No suprapubic tenderness  	UE: Warm, FROM, no clubbing, intact strength; no edema; no asterixis  	LE: Warm, FROM, no clubbing, intact strength; no edema  	Neuro: No focal deficits, intact gait  	Psych: Normal affect and mood  	Skin: Warm, without rashes  	Vascular access:    LABS/STUDIES  --------------------------------------------------------------------------------              12.8   13.2  >-----------<  164      [04-07-18 @ 21:07]              36.3     138  |  94  |  55  ----------------------------<  124      [04-08-18 @ 05:31]  4.6   |  25  |  18.83        Ca     9.9     [04-08-18 @ 05:31]      Mg     2.2     [04-08-18 @ 05:31]      Phos  2.2     [04-08-18 @ 05:31]    TPro  8.4  /  Alb  4.0  /  TBili  0.7  /  DBili  x   /  AST  12  /  ALT  12  /  AlkPhos  99  [04-07-18 @ 21:11]    PT/INR: PT 11.9 , INR 1.10       [04-07-18 @ 21:07]  PTT: 30.5       [04-07-18 @ 21:07]    Troponin 0.02      [04-08-18 @ 11:37]  CK 98      [04-08-18 @ 11:37]    Creatinine Trend:  SCr 18.83 [04-08 @ 05:31]  SCr 17.50 [04-07 @ 21:11]    Urinalysis - [04-08-18 @ 06:24]      Color Yellow / Appearance Clear / SG 1.013 / pH 7.0      Gluc Negative / Ketone Negative  / Bili Negative / Urobili Negative       Blood Trace / Protein 300 / Leuk Est Negative / Nitrite Negative      RBC 5-10 / WBC 0-2 / Hyaline  / Gran  / Sq Epi  / Non Sq Epi  / Bacteria       HbA1c 5.1      [05-10-16 @ 12:59]  TSH 1.61      [04-08-18 @ 07:50]    HBsAb <3.0      [01-04-18 @ 11:00]  HBsAb Nonreact      [04-28-16 @ 19:35]  HBsAg Nonreact      [01-04-18 @ 11:00]  HBcAb Nonreact      [01-04-18 @ 11:00]  HCV 0.29, Nonreact      [01-04-18 @ 11:00]  HIV Nonreact      [12-29-16 @ 15:17]

## 2018-04-09 LAB
ANION GAP SERPL CALC-SCNC: 20 MMOL/L — HIGH (ref 5–17)
BASOPHILS # BLD AUTO: 0.03 K/UL — SIGNIFICANT CHANGE UP (ref 0–0.2)
BASOPHILS NFR BLD AUTO: 0.2 % — SIGNIFICANT CHANGE UP (ref 0–2)
BUN SERPL-MCNC: 75 MG/DL — HIGH (ref 7–23)
CALCIUM SERPL-MCNC: 9.6 MG/DL — SIGNIFICANT CHANGE UP (ref 8.4–10.5)
CHLORIDE SERPL-SCNC: 95 MMOL/L — LOW (ref 96–108)
CO2 SERPL-SCNC: 23 MMOL/L — SIGNIFICANT CHANGE UP (ref 22–31)
CREAT SERPL-MCNC: 21.52 MG/DL — HIGH (ref 0.5–1.3)
EOSINOPHIL # BLD AUTO: 3.07 K/UL — HIGH (ref 0–0.5)
EOSINOPHIL NFR BLD AUTO: 24.7 % — HIGH (ref 0–6)
GLUCOSE SERPL-MCNC: 87 MG/DL — SIGNIFICANT CHANGE UP (ref 70–99)
HCT VFR BLD CALC: 34 % — LOW (ref 39–50)
HGB BLD-MCNC: 11.8 G/DL — LOW (ref 13–17)
IMM GRANULOCYTES NFR BLD AUTO: 0.2 % — SIGNIFICANT CHANGE UP (ref 0–1.5)
LYMPHOCYTES # BLD AUTO: 2.65 K/UL — SIGNIFICANT CHANGE UP (ref 1–3.3)
LYMPHOCYTES # BLD AUTO: 21.3 % — SIGNIFICANT CHANGE UP (ref 13–44)
MCHC RBC-ENTMCNC: 30.3 PG — SIGNIFICANT CHANGE UP (ref 27–34)
MCHC RBC-ENTMCNC: 34.7 GM/DL — SIGNIFICANT CHANGE UP (ref 32–36)
MCV RBC AUTO: 87.2 FL — SIGNIFICANT CHANGE UP (ref 80–100)
MONOCYTES # BLD AUTO: 1.09 K/UL — HIGH (ref 0–0.9)
MONOCYTES NFR BLD AUTO: 8.8 % — SIGNIFICANT CHANGE UP (ref 2–14)
NEUTROPHILS # BLD AUTO: 5.57 K/UL — SIGNIFICANT CHANGE UP (ref 1.8–7.4)
NEUTROPHILS NFR BLD AUTO: 44.8 % — SIGNIFICANT CHANGE UP (ref 43–77)
PLATELET # BLD AUTO: 199 K/UL — SIGNIFICANT CHANGE UP (ref 150–400)
POTASSIUM SERPL-MCNC: 4.1 MMOL/L — SIGNIFICANT CHANGE UP (ref 3.5–5.3)
POTASSIUM SERPL-SCNC: 4.1 MMOL/L — SIGNIFICANT CHANGE UP (ref 3.5–5.3)
RBC # BLD: 3.9 M/UL — LOW (ref 4.2–5.8)
RBC # FLD: 12.8 % — SIGNIFICANT CHANGE UP (ref 10.3–14.5)
SODIUM SERPL-SCNC: 138 MMOL/L — SIGNIFICANT CHANGE UP (ref 135–145)
TACROLIMUS SERPL-MCNC: <2 — SIGNIFICANT CHANGE UP
WBC # BLD: 12.44 K/UL — HIGH (ref 3.8–10.5)
WBC # FLD AUTO: 12.44 K/UL — HIGH (ref 3.8–10.5)

## 2018-04-09 RX ADMIN — Medication 5 MILLIGRAM(S): at 06:30

## 2018-04-09 RX ADMIN — AMLODIPINE BESYLATE 2.5 MILLIGRAM(S): 2.5 TABLET ORAL at 06:30

## 2018-04-09 RX ADMIN — Medication 12.5 MILLIGRAM(S): at 17:25

## 2018-04-09 RX ADMIN — Medication 1 TABLET(S): at 17:25

## 2018-04-09 RX ADMIN — Medication 12.5 MILLIGRAM(S): at 06:30

## 2018-04-09 NOTE — DIETITIAN INITIAL EVALUATION ADULT. - NS AS NUTRI INTERV ED CONTENT
Reviewed hemodialysis diet education specifically low potassium, low phosphorus, adequate protein, fluid restriction as defined by medical team-pt and family receptive to education, declined written materials at this time stating they have copies at home.

## 2018-04-09 NOTE — PROGRESS NOTE ADULT - SUBJECTIVE AND OBJECTIVE BOX
Patient is a 32y old  Male who presents with a chief complaint of syncope during dialysis (2018 09:49)      SUBJECTIVE / OVERNIGHT EVENTS:   Feels better.  Denies CP/SOB/Palpitation/HA.    MEDICATIONS  (STANDING):  amLODIPine   Tablet 2.5 milliGRAM(s) Oral daily  heparin  Injectable 5000 Unit(s) SubCutaneous every 12 hours  metoprolol tartrate 12.5 milliGRAM(s) Oral two times a day  Nephro-kenneth 1 Tablet(s) Oral daily  predniSONE   Tablet 5 milliGRAM(s) Oral daily    MEDICATIONS  (PRN):        CAPILLARY BLOOD GLUCOSE        I&O's Summary    2018 07:01  -  2018 07:00  --------------------------------------------------------  IN: 0 mL / OUT: 150 mL / NET: -150 mL    2018 07:01  -  2018 23:24  --------------------------------------------------------  IN: 1020 mL / OUT: 200 mL / NET: 820 mL        PHYSICAL EXAM:  GENERAL: NAD, well-developed  HEAD:  Atraumatic, Normocephalic  NECK: Supple, No JVD  CHEST/LUNG: Clear to auscultation bilaterally; No wheezing.  HEART: Regular rate and rhythm; No murmurs, rubs, or gallops  ABDOMEN: Soft, Nontender, Nondistended; Bowel sounds present  EXTREMITIES:   No clubbing, cyanosis, or edema  NEUROLOGY: AAO X 3  SKIN: No rashes    LABS:                        11.8   12.44 )-----------( 199      ( 2018 07:46 )             34.0     04-09    138  |  95<L>  |  75<H>  ----------------------------<  87  4.1   |  23  |  21.52<H>    Ca    9.6      2018 06:58  Phos  2.2     04-08  Mg     2.2     04-08        CARDIAC MARKERS ( 2018 11:37 )  x     / 0.02 ng/mL / 98 U/L / x     / 1.0 ng/mL  CARDIAC MARKERS ( 2018 05:31 )  x     / 0.02 ng/mL / 100 U/L / x     / 1.0 ng/mL      Urinalysis Basic - ( 2018 06:24 )    Color: Yellow / Appearance: Clear / S.013 / pH: x  Gluc: x / Ketone: Negative  / Bili: Negative / Urobili: Negative   Blood: x / Protein: 300 mg/dL / Nitrite: Negative   Leuk Esterase: Negative / RBC: 5-10 /HPF / WBC 0-2 /HPF   Sq Epi: x / Non Sq Epi: x / Bacteria: x      CAPILLARY BLOOD GLUCOSE                    RADIOLOGY & ADDITIONAL TESTS:    Imaging Personally Reviewed:    Consultant(s) Notes Reviewed:      Care Discussed with Consultants/Other Providers:

## 2018-04-09 NOTE — DIETITIAN INITIAL EVALUATION ADULT. - PROBLEM SELECTOR PLAN 1
given pt with intermittent chest pain since HD will monitor on tele and ck Rosario x 3  repeat tte, consider cards consult  ck orthostatics  d/w renal ? change in HD filtrate  ck tsh, ck cmv pcr given recent immunosuppression  monitor FSs for hypoglycemia  SQH for DVT prophylaxis given ESRD and relative immobility

## 2018-04-09 NOTE — PROGRESS NOTE ADULT - ASSESSMENT
32 m h/o ESRD 2/2 FSGS s/p renal tx 2004, s/p failure, now on HD x 2 mos, tu/th/sat at [facility], well known to me, presented to ER last night with c/o hypotension and syncope during last HD 3/5, missed HD 3/7. Renal consulted for HD    ESRD on HD TTS outpt. last hemodialysis today, tolerated well, no cramps  Hypertension - blood pressure acceptable today,   Hypophosphatemia phos goal 3-5.5   s/p failed kidney xplant- c/w prednisone 5mg daily. off Prograft  Anemia in CKD-Hb >goal. no indication for JORGE

## 2018-04-09 NOTE — DIETITIAN INITIAL EVALUATION ADULT. - PROBLEM SELECTOR PLAN 2
pt with signs (low bp's in HD, eosinophilia, hypoglycemia) and symptoms (fatigue, anorexia, syncope, cold intolerance) of relative adrenal insufficiency given he was on chronic daily steroids x 14 yrs and stopped with with rapid taper to 5mg every 3 d  -will restart prednisone 5mg daily for now and consider stress dose steroids to resume HD and assess efficacy

## 2018-04-09 NOTE — DIETITIAN INITIAL EVALUATION ADULT. - ORAL INTAKE PTA
poor/Per pt and mother pt was eating normally with good appetite consuming 3 meals per day supplemented by nepro intermittently until 1.5 weeks PTA where pt had been consuming ~25% baseline intake due to decreased appetite.

## 2018-04-09 NOTE — PROGRESS NOTE ADULT - SUBJECTIVE AND OBJECTIVE BOX
[facility] - Martinez / Shanel RENTERIA /Angus/ DEXTER Boyle/ DEXTER Kunz/ Basilio Lucio / COLUMBA Njhillu  ---------------------------------------------------------------------------------------------------------------  seen and examined today for End Stage Renal Disease on Dialysis   Interval : s/p hemodialysis today  VITALS:  T(F): 98.2 (04-09-18 @ 13:06), Max: 99.3 (04-09-18 @ 09:10)  HR: 71 (04-09-18 @ 13:06)  BP: 128/84 (04-09-18 @ 13:06)  RR: 18 (04-09-18 @ 13:06)  SpO2: 96% (04-09-18 @ 13:06)    04-08 @ 07:01  -  04-09 @ 07:00  --------------------------------------------------------  IN: 0 mL / OUT: 150 mL / NET: -150 mL    Physical Exam :-  Constitutional: NAD  Neck: Supple.  Respiratory: Bilateral equal breath sounds,  no Crackles present.  Cardiovascular: S1, S2 normal, positive Murmur  Gastrointestinal: Bowel Sounds present, soft, non tender.  Extremities: no Edema Feet  Neurological: Alert and Oriented x 3, no focal deficits  Psychiatric: Normal mood, normal affect  left upper arm- Arterio-venous Fistula present thrills  Data:-  No Known Allergies    Hospital Medications:   MEDICATIONS  (STANDING):  amLODIPine   Tablet 2.5 milliGRAM(s) Oral daily  heparin  Injectable 5000 Unit(s) SubCutaneous every 12 hours  metoprolol tartrate 12.5 milliGRAM(s) Oral two times a day  Nephro-kenneth 1 Tablet(s) Oral daily  predniSONE   Tablet 5 milliGRAM(s) Oral daily    04-09    138  |  95<L>  |  75<H>  ----------------------------<  87  4.1   |  23  |  21.52<H>    Ca    9.6      09 Apr 2018 06:58  Phos  2.2     04-08  Mg     2.2     04-08    TPro  8.4<H>  /  Alb  4.0  /  TBili  0.7  /  DBili      /  AST  12  /  ALT  12  /  AlkPhos  99  04-07    Creatinine Trend: 21.52 <--, 18.83 <--, 17.50 <--                        11.8   12.44 )-----------( 199      ( 09 Apr 2018 07:46 )             34.0

## 2018-04-09 NOTE — DIETITIAN INITIAL EVALUATION ADULT. - OTHER INFO
Pt reports UBW of 160 lbs, due to eating well with improved appetite pt had been able to gain weight to ~170 lbs and stated he was last at this weight 2 weeks ago, mother and patient stated they feel this was a dry weight and that he did not have excess fluid at this time. Noted weight today of 158.7 lbs  indicating a 12 lb (7%) wt loss within 2 weeks. PTA. Pt with no food allergies, no micronutrient supplements at home, pt did mention he takes a phosphorus binder. Pt with no N+V, last BM yesterday. No difficulty chewing/swallowing. In house pt and mother mentioned that appetite is improved, pt had hard boiled eggs for breakfast prior to dialysis, is looking forward to a burger for lunch.

## 2018-04-09 NOTE — DIETITIAN INITIAL EVALUATION ADULT. - ADHERENCE
fair/Pt and mother verbalized foods high in potassium and phosphorus which they actively avoid, pt stated he will follow a low sodium diet and is mindful of his fluid intake and limiting to 32 oz daily.

## 2018-04-09 NOTE — DIETITIAN INITIAL EVALUATION ADULT. - ENERGY NEEDS
Pt seen for nutrition consult for assessment, education, and calorie count. 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    Ht: 6'0", Wt: 158.7 lbs, BMI: 21.5 kg/m2, IBW:178 lbs +/- 10%, %IBW: 89%  1+ oriana ankle edema, Skin intact Pt seen for nutrition consult for assessment, education, and calorie count, per discussion with medical team, pt does not require a calorie count at this time. 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    Ht: 6'0", Wt: 158.7 lbs, BMI: 21.5 kg/m2, IBW:178 lbs +/- 10%, %IBW: 89%  1+ oriana ankle edema, Skin intact

## 2018-04-09 NOTE — DIETITIAN INITIAL EVALUATION ADULT. - NS AS NUTRI INTERV MEALS SNACK
1. Continue low sodium, no concentrated potassium diet as ordered, closely monitor PO4 for need of restriction-fluid restriction deferred to medical team, 2. Consider adding nepro 3 x daily to further optimize po intake (425 Kcal, 18g protein per 8 oz serving), 3. Continue to encourage po intake and obtain/honor food preferences as able, 4. Monitor PO intake, diet tolerance, weight trends, labs, and skin integrity, 5. RD to remain available as needed

## 2018-04-09 NOTE — CHART NOTE - NSCHARTNOTEFT_GEN_A_CORE
Upon Nutritional Assessment by the Registered Dietitian your patient was determined to meet criteria / has evidence of the following diagnosis/diagnoses:          [ ]  Mild Protein Calorie Malnutrition        [ ]  Moderate Protein Calorie Malnutrition        [ x] Severe Protein Calorie Malnutrition (acute)        [ ] Unspecified Protein Calorie Malnutrition        [ ] Underweight / BMI <19        [ ] Morbid Obesity / BMI > 40      Findings as based on:  [ x] Comprehensive nutrition assessment: 7% wt loss within 2 weeks, consuming <50% estimated needs x >5 days  [ ] Nutrition Focused Physical Exam  [ ] Other:       Nutrition Plan/Recommendations:  Add nepro 3 daily (425 Kcal, 18g protein per 8 oz serving), continue to encourage po intake and obtain/honor food preferences as able         PROVIDER Section:     By signing this assessment you are acknowledging and agree with the diagnosis/diagnoses assigned by the Registered Dietitian    Comments:

## 2018-04-09 NOTE — PROGRESS NOTE ADULT - ATTENDING COMMENTS
discussed with family and the pt  discussed dialysis prescription  discussed fluid removal on hemodialysis  discussed electrolytes imbalance with the pt

## 2018-04-09 NOTE — PROGRESS NOTE ADULT - ASSESSMENT
· Assessment	  32 m FSGS s/p renal tx now failed on HD with progressive fatigue, anorexia, weakness and inability to tolerate HD 2nd syncope and low bp's - r/o infectious vs cardiac causes and consider ? MANZANARES     Problem/Plan - 1:  ·  Problem: Syncope, unspecified syncope type.  Plan: Cardio eval noted. No work up.     Problem/Plan - 2:  ·  Problem: Anorexia.  Plan: pt with signs (low bp's in HD, eosinophilia, hypoglycemia) and symptoms (fatigue, anorexia, syncope, cold intolerance) of relative adrenal insufficiency given he was on chronic daily steroids x 14 yrs and stopped with with rapid taper to 5mg every 3 d  - restarted prednisone 5mg daily for now.      Problem/Plan - 3:  ·  Problem: Glomerulonephritis.  Plan: Renal f/up noted.       Problem/Plan - 4:  ·  Problem: Hypertension, unspecified type.  Plan: Norvasc/Metoprolol

## 2018-04-09 NOTE — PROGRESS NOTE ADULT - SUBJECTIVE AND OBJECTIVE BOX
Patient denies CP, SOB,  Review of systems otherwise (-)  	  MEDICATIONS:  MEDICATIONS  (STANDING):  amLODIPine   Tablet 2.5 milliGRAM(s) Oral daily  heparin  Injectable 5000 Unit(s) SubCutaneous every 12 hours  metoprolol tartrate 12.5 milliGRAM(s) Oral two times a day  Nephro-kenneth 1 Tablet(s) Oral daily  predniSONE   Tablet 5 milliGRAM(s) Oral daily      LABS:	 	    CARDIAC MARKERS:  CARDIAC MARKERS ( 08 Apr 2018 11:37 )  x     / 0.02 ng/mL / 98 U/L / x     / 1.0 ng/mL  CARDIAC MARKERS ( 08 Apr 2018 05:31 )  x     / 0.02 ng/mL / 100 U/L / x     / 1.0 ng/mL  CARDIAC MARKERS ( 07 Apr 2018 21:11 )  x     / 0.02 ng/mL / x     / x     / <1.0 ng/mL                                11.8   12.44 )-----------( 199      ( 09 Apr 2018 07:46 )             34.0     Hemoglobin: 11.8 g/dL (04-09 @ 07:46)  Hemoglobin: 12.8 g/dL (04-07 @ 21:07)      04-09    138  |  95<L>  |  75<H>  ----------------------------<  87  4.1   |  23  |  21.52<H>    Ca    9.6      09 Apr 2018 06:58  Phos  2.2     04-08  Mg     2.2     04-08    TPro  8.4<H>  /  Alb  4.0  /  TBili  0.7  /  DBili  x   /  AST  12  /  ALT  12  /  AlkPhos  99  04-07    Creatinine Trend: 21.52<--, 18.83<--, 17.50<--    C    PHYSICAL EXAM:  T(C): 36.8 (04-09-18 @ 13:06), Max: 37.4 (04-09-18 @ 09:10)  HR: 71 (04-09-18 @ 13:06) (67 - 100)  BP: 128/84 (04-09-18 @ 13:06) (120/83 - 145/86)  RR: 18 (04-09-18 @ 13:06) (16 - 18)  SpO2: 96% (04-09-18 @ 13:06) (96% - 99%)  Wt(kg): --  I&O's Summary    08 Apr 2018 07:01  -  09 Apr 2018 07:00  --------------------------------------------------------  IN: 0 mL / OUT: 150 mL / NET: -150 mL      Height (cm): 182.88 (04-08 @ 20:04)  Weight (kg): 74.5 (04-08 @ 20:04)  BMI (kg/m2): 22.3 (04-08 @ 20:04)  BSA (m2): 1.96 (04-08 @ 20:04)    HEENT:   Normal oral mucosa, PERRL, EOMI	  Lymphatic: No obvious lymphadenopathy , no edema  Cardiovascular: Normal S1 S2, No JVD, 1/6 KYARA murmur, Peripheral pulses palpable 2+ bilaterally  Respiratory: Lungs clear to auscultation, normal effort 	  Gastrointestinal:  Soft, Non-tender, + BS	  Skin: No rashes,  No cyanosis, warm to touch  Musculoskeletal: Normal range of motion, normal strength  Psychiatry:  Appropriate Mood & affect         ASSESSMENT/PLAN: 	32y Male 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 EKG/echo unremarkable.    - outpatient event monitoring  - No need for further inpatient cardiac work up.    Clark Multani MD, [facility]  [facility], [facility]  [address] [place] [address]  PHONE:  [phone]  BEEPER : [phone]

## 2018-04-10 LAB
ANION GAP SERPL CALC-SCNC: 18 MMOL/L — HIGH (ref 5–17)
APPEARANCE UR: ABNORMAL
BACTERIA # UR AUTO: NEGATIVE — SIGNIFICANT CHANGE UP
BILIRUB UR-MCNC: NEGATIVE — SIGNIFICANT CHANGE UP
BUN SERPL-MCNC: 46 MG/DL — HIGH (ref 7–23)
CALCIUM SERPL-MCNC: 9.4 MG/DL — SIGNIFICANT CHANGE UP (ref 8.4–10.5)
CHLORIDE SERPL-SCNC: 92 MMOL/L — LOW (ref 96–108)
CO2 SERPL-SCNC: 26 MMOL/L — SIGNIFICANT CHANGE UP (ref 22–31)
COLOR SPEC: YELLOW — SIGNIFICANT CHANGE UP
CREAT SERPL-MCNC: 13.93 MG/DL — HIGH (ref 0.5–1.3)
DIFF PNL FLD: ABNORMAL
EPI CELLS # UR: 9 /HPF — HIGH (ref 0–5)
GLUCOSE SERPL-MCNC: 78 MG/DL — SIGNIFICANT CHANGE UP (ref 70–99)
GLUCOSE UR QL: NEGATIVE MG/DL — SIGNIFICANT CHANGE UP
HCT VFR BLD CALC: 32.7 % — LOW (ref 39–50)
HGB BLD-MCNC: 11.1 G/DL — LOW (ref 13–17)
HYALINE CASTS # UR AUTO: 0 /LPF — SIGNIFICANT CHANGE UP (ref 0–7)
KETONES UR-MCNC: NEGATIVE — SIGNIFICANT CHANGE UP
LEUKOCYTE ESTERASE UR-ACNC: NEGATIVE — SIGNIFICANT CHANGE UP
MCHC RBC-ENTMCNC: 29.7 PG — SIGNIFICANT CHANGE UP (ref 27–34)
MCHC RBC-ENTMCNC: 33.9 GM/DL — SIGNIFICANT CHANGE UP (ref 32–36)
MCV RBC AUTO: 87.4 FL — SIGNIFICANT CHANGE UP (ref 80–100)
NITRITE UR-MCNC: NEGATIVE — SIGNIFICANT CHANGE UP
PH UR: 7 — SIGNIFICANT CHANGE UP (ref 5–8)
PLATELET # BLD AUTO: 190 K/UL — SIGNIFICANT CHANGE UP (ref 150–400)
POTASSIUM SERPL-MCNC: 3.7 MMOL/L — SIGNIFICANT CHANGE UP (ref 3.5–5.3)
POTASSIUM SERPL-SCNC: 3.7 MMOL/L — SIGNIFICANT CHANGE UP (ref 3.5–5.3)
PROT UR-MCNC: 300 MG/DL
RBC # BLD: 3.74 M/UL — LOW (ref 4.2–5.8)
RBC # FLD: 12.8 % — SIGNIFICANT CHANGE UP (ref 10.3–14.5)
RBC CASTS # UR COMP ASSIST: 20 /HPF — HIGH (ref 0–4)
SODIUM SERPL-SCNC: 136 MMOL/L — SIGNIFICANT CHANGE UP (ref 135–145)
SP GR SPEC: 1.01 — SIGNIFICANT CHANGE UP (ref 1.01–1.02)
UROBILINOGEN FLD QL: NEGATIVE MG/DL — SIGNIFICANT CHANGE UP
WBC # BLD: 10.97 K/UL — HIGH (ref 3.8–10.5)
WBC # FLD AUTO: 10.97 K/UL — HIGH (ref 3.8–10.5)
WBC UR QL: 18 /HPF — HIGH (ref 0–5)

## 2018-04-10 RX ORDER — ACETAMINOPHEN 500 MG
650 TABLET ORAL ONCE
Qty: 0 | Refills: 0 | Status: COMPLETED | OUTPATIENT
Start: 2018-04-10 | End: 2018-04-10

## 2018-04-10 RX ORDER — ONDANSETRON 8 MG/1
4 TABLET, FILM COATED ORAL EVERY 6 HOURS
Qty: 0 | Refills: 0 | Status: DISCONTINUED | OUTPATIENT
Start: 2018-04-10 | End: 2018-04-19

## 2018-04-10 RX ADMIN — ONDANSETRON 4 MILLIGRAM(S): 8 TABLET, FILM COATED ORAL at 10:32

## 2018-04-10 RX ADMIN — Medication 12.5 MILLIGRAM(S): at 05:00

## 2018-04-10 RX ADMIN — Medication 650 MILLIGRAM(S): at 01:32

## 2018-04-10 RX ADMIN — AMLODIPINE BESYLATE 2.5 MILLIGRAM(S): 2.5 TABLET ORAL at 05:00

## 2018-04-10 RX ADMIN — Medication 5 MILLIGRAM(S): at 05:00

## 2018-04-10 RX ADMIN — Medication 1 TABLET(S): at 12:52

## 2018-04-10 NOTE — PROGRESS NOTE ADULT - SUBJECTIVE AND OBJECTIVE BOX
pt seen and examined, no complaints on exam.   ROS neg     amLODIPine   Tablet 2.5 milliGRAM(s) Oral daily  heparin  Injectable 5000 Unit(s) SubCutaneous every 12 hours  metoprolol tartrate 12.5 milliGRAM(s) Oral two times a day  Nephro-kenneth 1 Tablet(s) Oral daily  predniSONE   Tablet 5 milliGRAM(s) Oral daily                            11.8   12.44 )-----------( 199      ( 09 Apr 2018 07:46 )             34.0       Hemoglobin: 11.8 g/dL (04-09 @ 07:46)  Hemoglobin: 12.8 g/dL (04-07 @ 21:07)      04-09    138  |  95<L>  |  75<H>  ----------------------------<  87  4.1   |  23  |  21.52<H>    Ca    9.6      09 Apr 2018 06:58  Phos  2.2     04-08  Mg     2.2     04-08      Creatinine Trend: 21.52<--, 18.83<--, 17.50<--    COAGS:     CARDIAC MARKERS ( 08 Apr 2018 11:37 )  x     / 0.02 ng/mL / 98 U/L / x     / 1.0 ng/mL  CARDIAC MARKERS ( 08 Apr 2018 05:31 )  x     / 0.02 ng/mL / 100 U/L / x     / 1.0 ng/mL  CARDIAC MARKERS ( 07 Apr 2018 21:11 )  x     / 0.02 ng/mL / x     / x     / <1.0 ng/mL        T(C): 37.1 (04-10-18 @ 04:48), Max: 38.2 (04-10-18 @ 00:46)  HR: 72 (04-10-18 @ 04:48) (67 - 92)  BP: 130/84 (04-10-18 @ 04:48) (122/83 - 145/86)  RR: 18 (04-10-18 @ 04:48) (18 - 18)  SpO2: 98% (04-10-18 @ 04:48) (96% - 99%)  Wt(kg): --    I&O's Summary    08 Apr 2018 07:01  -  09 Apr 2018 07:00  --------------------------------------------------------  IN: 0 mL / OUT: 150 mL / NET: -150 mL    09 Apr 2018 07:01  -  10 Apr 2018 05:05  --------------------------------------------------------  IN: 1220 mL / OUT: 300 mL / NET: 920 mL    echo : < from: Transthoracic Echocardiogram (01.08.18 @ 09:10) >  ------------------------------------------------------------------------  Conclusions:  1. Normal mitral valve with redundant chordae. Minimal  mitral regurgitation.  2. Normal trileaflet aortic valve. No aortic valve  regurgitation seen.  3. Moderate concentric left ventricular hypertrophy.  4. Normal left ventricular systolic function. No segmental  wallmotion abnormalities.  5. Normal right ventricular size and function.  *** No previous Echo exam.    < end of copied text >      HEENT:   Normal oral mucosa, PERRL, EOMI	  Lymphatic: No obvious lymphadenopathy , no edema  Cardiovascular: Normal S1 S2, No JVD, 1/6 KYARA murmur, Peripheral pulses palpable 2+ bilaterally  Respiratory: Lungs clear to auscultation, normal effort 	  Gastrointestinal:  Soft, Non-tender, + BS	        ASSESSMENT/PLAN: 	32y Male 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 EKG/echo unremarkable.     GI / DVT prophylaxis.   keep K>4, mag >2.0   cont low dose BB/ Norvasc - bP stable  Enc supplements with Meals  no s/s of chf on exam  no further CV work up needed at present.  D/W Dr Multani

## 2018-04-10 NOTE — PROGRESS NOTE ADULT - SUBJECTIVE AND OBJECTIVE BOX
Patient is a 32y old  Male who presents with a chief complaint of syncope during dialysis (2018 09:49)      SUBJECTIVE / OVERNIGHT EVENTS:   Feels better.  Denies CP/SOB/Palpitation/HA.    MEDICATIONS  (STANDING):  amLODIPine   Tablet 2.5 milliGRAM(s) Oral daily  heparin  Injectable 5000 Unit(s) SubCutaneous every 12 hours  metoprolol tartrate 12.5 milliGRAM(s) Oral two times a day  Nephro-kenneth 1 Tablet(s) Oral daily  predniSONE   Tablet 5 milliGRAM(s) Oral daily    MEDICATIONS  (PRN):  ondansetron Injectable 4 milliGRAM(s) IV Push every 6 hours PRN Nausea and/or Vomiting        CAPILLARY BLOOD GLUCOSE        I&O's Summary    2018 07:  -  10 Apr 2018 07:00  --------------------------------------------------------  IN: 1220 mL / OUT: 300 mL / NET: 920 mL    10 Apr 2018 07:01  -  10 Apr 2018 22:07  --------------------------------------------------------  IN: 1420 mL / OUT: 1700 mL / NET: -280 mL        PHYSICAL EXAM:  GENERAL: NAD, well-developed  HEAD:  Atraumatic, Normocephalic  NECK: Supple, No JVD  CHEST/LUNG: Clear to auscultation bilaterally; No wheezing.  HEART: Regular rate and rhythm; No murmurs, rubs, or gallops  ABDOMEN: Soft, Nontender, Nondistended; Bowel sounds present  EXTREMITIES:   No clubbing, cyanosis, or edema  NEUROLOGY: AAO X 3  SKIN: No rashes    LABS:                        11.1   10. )-----------( 190      ( 10 Apr 2018 09:48 )             32.7     04-10    136  |  92<L>  |  46<H>  ----------------------------<  78  3.7   |  26  |  13.93<H>    Ca    9.4      10 Apr 2018 07:13            Urinalysis Basic - ( 10 Apr 2018 02:15 )    Color: Yellow / Appearance: Slightly Turbid / S.014 / pH: x  Gluc: x / Ketone: Negative  / Bili: Negative / Urobili: Negative mg/dL   Blood: x / Protein: 300 mg/dL / Nitrite: Negative   Leuk Esterase: Negative / RBC: 20 /HPF / WBC 18 /HPF   Sq Epi: x / Non Sq Epi: 9 /HPF / Bacteria: Negative      CAPILLARY BLOOD GLUCOSE                    RADIOLOGY & ADDITIONAL TESTS:    Imaging Personally Reviewed:    Consultant(s) Notes Reviewed:      Care Discussed with Consultants/Other Providers:

## 2018-04-10 NOTE — CONSULT NOTE ADULT - SUBJECTIVE AND OBJECTIVE BOX
HPI:   Patient is a 32y male with failed second renal transplant on dialysis via permacath for the past 2 months and had an av fistula placed 2 weeks ago. He came into hospital because over the past several days he has felt very weak, syncope occurred with dialysis, he has a very poor appetite and poor po intake for unclear duration of time. He had a low grade fever last night and reports he has felt feverish in the recent past but has not been taking his temperature. Of note, 2 months ago he stopped his tacrolimus and he very rapidly tapered off his prednisone. He has no travel, no recent dental work, no ha, confusion, sob, cp, nvd, dysuria, dizzy, pain anywhere. He has no known sick contacts. Last hospital stay in january. He has a puppy who is well.     REVIEW OF SYSTEMS:  All other review of systems negative (Comprehensive ROS)    PAST MEDICAL & SURGICAL HISTORY:  Dialysis complication  CKD (chronic kidney disease), stage 4 (severe)  Hypertension  Glomerulonephritis  S/P kidney transplant: Right kidney in       Allergies    No Known Allergies    Intolerances        Antimicrobials Day #  :    Other Medications:  amLODIPine   Tablet 2.5 milliGRAM(s) Oral daily  heparin  Injectable 5000 Unit(s) SubCutaneous every 12 hours  metoprolol tartrate 12.5 milliGRAM(s) Oral two times a day  Nephro-kenneth 1 Tablet(s) Oral daily  ondansetron Injectable 4 milliGRAM(s) IV Push every 6 hours PRN  predniSONE   Tablet 5 milliGRAM(s) Oral daily      FAMILY HISTORY:  Family history of glomerulonephritis (Uncle)      SOCIAL HISTORY:  Smoking: [ ]Yes [ xo  ETOH: [ ]Yes [x ]No  Drug Use: [ ]Yes [ ]xNo   [ ] Singlex[ ]    T(F): 98 (04-10-18 @ 14:26), Max: 100.8 (04-10-18 @ 00:46)  HR: 77 (04-10-18 @ 14:26)  BP: 133/86 (04-10-18 @ 14:26)  RR: 18 (04-10-18 @ 14:26)  SpO2: 97% (04-10-18 @ 14:26)  Wt(kg): --    PHYSICAL EXAM:  General: alert, no acute distress  Eyes:  anicteric, no conjunctival injection, no discharge  Oropharynx: no lesions or injection 	  Neck: supple, without adenopathy  Lungs: clear to auscultation  Heart: regular rate and rhythm; no murmur, rubs or gallops  Abdomen: soft, nondistended, nontender, without mass or organomegaly  Skin: no lesions  Extremities: no clubbing, cyanosis, or edema. left arm av fistula good thrilll  right chest permacath clean  scrotum not swollen or tender  backside no tenderness  Neurologic: alert, oriented, moves all extremities    LAB RESULTS:                        11.1   10.97 )-----------( 190      ( 10 Apr 2018 09:48 )             32.7     04-10    136  |  92<L>  |  46<H>  ----------------------------<  78  3.7   |  26  |  13.93<H>    Ca    9.4      10 Apr 2018 07:13        Urinalysis Basic - ( 10 Apr 2018 02:15 )    Color: Yellow / Appearance: Slightly Turbid / S.014 / pH: x  Gluc: x / Ketone: Negative  / Bili: Negative / Urobili: Negative mg/dL   Blood: x / Protein: 300 mg/dL / Nitrite: Negative   Leuk Esterase: Negative / RBC: 20 /HPF / WBC 18 /HPF   Sq Epi: x / Non Sq Epi: 9 /HPF / Bacteria: Negative        MICROBIOLOGY:  RECENT CULTURES:        RADIOLOGY REVIEWED:      < from: Xray Chest 2 Views PA/Lat (18 @ 21:04) >  IMPRESSION:  Right-sided infusion catheter terminates in the SVC.  Clear lungs.      < end of copied text >      Impression:    Young man with failed renal transplant recent restart of hd via permacath and rapid prednisone taper admitted with syncope at dialysis, feels weak, poor appetite, had a low grade fever, labs notable for eosinophilia, low glucose. suspect he has adrenal insufficiency given above but cannot exclude line sepsis, parasite or OI related to recent immunosuppression though nothing specific is apparent.       Recommendations:  will carefully monitor off antibiotic  check stool o and p  check strongyloides titer  follow up blood and urine culture  if fever persists without source favor ct chest abd and pelvis  check fungitel  any decompensation start zosyn and dose with vanco  cmv pcr

## 2018-04-10 NOTE — PROGRESS NOTE ADULT - SUBJECTIVE AND OBJECTIVE BOX
Patient seen and examined  no complaints    No Known Allergies    Hospital Medications:   MEDICATIONS  (STANDING):  amLODIPine   Tablet 2.5 milliGRAM(s) Oral daily  heparin  Injectable 5000 Unit(s) SubCutaneous every 12 hours  metoprolol tartrate 12.5 milliGRAM(s) Oral two times a day  Nephro-kenneth 1 Tablet(s) Oral daily  predniSONE   Tablet 5 milliGRAM(s) Oral daily      VITALS:  T(F): 98.4 (04-10-18 @ 09:37), Max: 100.8 (04-10-18 @ 00:46)  HR: 77 (04-10-18 @ 09:37)  BP: 117/73 (04-10-18 @ 09:37)  RR: 18 (04-10-18 @ 09:37)  SpO2: 96% (04-10-18 @ 09:37)  Wt(kg): --     @ 07:01  -  04-10 @ 07:00  --------------------------------------------------------  IN: 1220 mL / OUT: 300 mL / NET: 920 mL        PHYSICAL EXAM:  Constitutional: NAD  HEENT: anicteric sclera, oropharynx clear, MMM  Neck: No JVD  Respiratory: CTAB, no wheezes, rales or rhonchi  Cardiovascular: S1, S2, RRR  Gastrointestinal: BS+, soft, NT/ND  Extremities: No cyanosis or clubbing. No peripheral edema  Neurological: A/O x 3, no focal deficits  Psychiatric: Normal mood, normal affect  : No CVA tenderness. No fuller.   Skin: No rashes  Vascular Access: + PC    LABS:  04-10    136  |  92<L>  |  46<H>  ----------------------------<  78  3.7   |  26  |  13.93<H>    Ca    9.4      10 Apr 2018 07:13      Creatinine Trend: 13.93 <--, 21.52 <--, 18.83 <--, 17.50 <--                        11.1   10.97 )-----------( 190      ( 10 Apr 2018 09:48 )             32.7     Urine Studies:  Urinalysis Basic - ( 10 Apr 2018 02:15 )    Color: Yellow / Appearance: Slightly Turbid / S.014 / pH:   Gluc:  / Ketone: Negative  / Bili: Negative / Urobili: Negative mg/dL   Blood:  / Protein: 300 mg/dL / Nitrite: Negative   Leuk Esterase: Negative / RBC: 20 /HPF / WBC 18 /HPF   Sq Epi:  / Non Sq Epi: 9 /HPF / Bacteria: Negative        RADIOLOGY & ADDITIONAL STUDIES:

## 2018-04-10 NOTE — PROGRESS NOTE ADULT - ATTENDING COMMENTS
Agree with above assessment and plan as outlined above.    - No need for further inpatient cardiac work up.    Clark Multani MD, [facility], [facility]  [address] [place] [address]  PHONE:  [phone]  BEEPER : [phone]

## 2018-04-10 NOTE — PROGRESS NOTE ADULT - ASSESSMENT
32 m h/o ESRD 2/2 FSGS s/p renal tx 2004, s/p failure, now on HD x 2 mos, tu/th/sat at [facility], well known to me, presented to ER last night with c/o hypotension and syncope during last HD 3/5, missed HD 3/7. Renal consulted for HD    ESRD on HD TTS outpt.   Hypertension - blood pressure acceptable today,   s/p failed kidney xplant- c/w prednisone 5mg daily. off Prograft  Anemia in CKD-Hb >goal. no indication for JORGE

## 2018-04-10 NOTE — PROGRESS NOTE ADULT - ASSESSMENT
· Assessment	  32 m FSGS s/p renal tx now failed on HD with progressive fatigue, anorexia, weakness and inability to tolerate HD 2nd syncope and low bp's - r/o infectious vs cardiac causes and consider ? MANZANARES    Fever:  ID eval noted.     Problem/Plan - 1:  ·  Problem: Syncope, unspecified syncope type.  Plan: Cardio f/up noted. No work up.     Problem/Plan - 2:  ·  Problem: Anorexia.  Plan: pt with signs (low bp's in HD, eosinophilia, hypoglycemia) and symptoms (fatigue, anorexia, syncope, cold intolerance) of relative adrenal insufficiency given he was on chronic daily steroids x 14 yrs and stopped with with rapid taper to 5mg every 3 d  - restarted prednisone 5mg daily for now.      Problem/Plan - 3:  ·  Problem: Glomerulonephritis.  Plan: Renal f/up noted.       Problem/Plan - 4:  ·  Problem: Hypertension, unspecified type.  Plan: Norvasc/Metoprolol

## 2018-04-11 LAB
CMV DNA CSF QL NAA+PROBE: SIGNIFICANT CHANGE UP
CMV DNA SPEC NAA+PROBE-LOG#: SIGNIFICANT CHANGE UP LOGIU/ML
STRONGYLOIDES AB SER-ACNC: NEGATIVE — SIGNIFICANT CHANGE UP

## 2018-04-11 RX ADMIN — Medication 1 TABLET(S): at 12:50

## 2018-04-11 RX ADMIN — AMLODIPINE BESYLATE 2.5 MILLIGRAM(S): 2.5 TABLET ORAL at 06:57

## 2018-04-11 RX ADMIN — Medication 12.5 MILLIGRAM(S): at 06:57

## 2018-04-11 RX ADMIN — Medication 5 MILLIGRAM(S): at 06:57

## 2018-04-11 RX ADMIN — Medication 12.5 MILLIGRAM(S): at 17:36

## 2018-04-11 NOTE — PROGRESS NOTE ADULT - SUBJECTIVE AND OBJECTIVE BOX
Patient seen and examined  no complaints    No Known Allergies    Hospital Medications:   MEDICATIONS  (STANDING):  amLODIPine   Tablet 2.5 milliGRAM(s) Oral daily  heparin  Injectable 5000 Unit(s) SubCutaneous every 12 hours  metoprolol tartrate 12.5 milliGRAM(s) Oral two times a day  Nephro-kenneth 1 Tablet(s) Oral daily  predniSONE   Tablet 5 milliGRAM(s) Oral daily      VITALS:  T(F): 100.2 (18 @ 06:44), Max: 100.2 (04-10-18 @ 22:01)  HR: 91 (18 @ 06:44)  BP: 122/80 (18 @ 06:44)  RR: 18 (18 @ 06:44)  SpO2: 97% (18 @ 06:44)  Wt(kg): --    04-10 @ 07:01  -   @ 07:00  --------------------------------------------------------  IN: 1600 mL / OUT: 1700 mL / NET: -100 mL        PHYSICAL EXAM:  Constitutional: NAD  HEENT: anicteric sclera, oropharynx clear, MMM  Neck: No JVD  Respiratory: CTAB, no wheezes, rales or rhonchi  Cardiovascular: S1, S2, RRR  Gastrointestinal: BS+, soft, NT/ND  Extremities: No cyanosis or clubbing. No peripheral edema  Neurological: A/O x 3, no focal deficits  Psychiatric: Normal mood, normal affect  : No CVA tenderness. No fuller.   Skin: No rashes  Vascular Access: + PC; + left upper ext avf    LABS:  04-10    136  |  92<L>  |  46<H>  ----------------------------<  78  3.7   |  26  |  13.93<H>    Ca    9.4      10 Apr 2018 07:13      Creatinine Trend: 13.93 <--, 21.52 <--, 18.83 <--, 17.50 <--                        11.1   10.97 )-----------( 190      ( 10 Apr 2018 09:48 )             32.7     Urine Studies:  Urinalysis Basic - ( 10 Apr 2018 02:15 )    Color: Yellow / Appearance: Slightly Turbid / S.014 / pH:   Gluc:  / Ketone: Negative  / Bili: Negative / Urobili: Negative mg/dL   Blood:  / Protein: 300 mg/dL / Nitrite: Negative   Leuk Esterase: Negative / RBC: 20 /HPF / WBC 18 /HPF   Sq Epi:  / Non Sq Epi: 9 /HPF / Bacteria: Negative        RADIOLOGY & ADDITIONAL STUDIES:

## 2018-04-11 NOTE — PROGRESS NOTE ADULT - ASSESSMENT
· Assessment	  32 m FSGS s/p renal tx now failed on HD with progressive fatigue, anorexia, weakness and inability to tolerate HD 2nd syncope and low bp's - r/o infectious vs cardiac causes and consider ? MANZANARES    Fever:  ID f/up noted.  No abx.     Problem/Plan - 1:  ·  Problem: Syncope, unspecified syncope type.  Plan: Cardio f/up noted. No work up.     Problem/Plan - 2:  ·  Problem: Anorexia.  Plan: pt with signs (low bp's in HD, eosinophilia, hypoglycemia) and symptoms (fatigue, anorexia, syncope, cold intolerance) of relative adrenal insufficiency given he was on chronic daily steroids x 14 yrs and stopped with with rapid taper to 5mg every 3 d  - restarted prednisone 5mg daily for now.      Problem/Plan - 3:  ·  Problem: Glomerulonephritis.  Plan: Renal f/up noted.       Problem/Plan - 4:  ·  Problem: Hypertension, unspecified type.  Plan: Norvasc/Metoprolol

## 2018-04-11 NOTE — PROGRESS NOTE ADULT - SUBJECTIVE AND OBJECTIVE BOX
Patient is a 32y old  Male who presents with a chief complaint of syncope during dialysis (2018 09:49)      SUBJECTIVE / OVERNIGHT EVENTS:   Feels better.  Denies CP/SOB/Palpitation/HA.    MEDICATIONS  (STANDING):  amLODIPine   Tablet 2.5 milliGRAM(s) Oral daily  heparin  Injectable 5000 Unit(s) SubCutaneous every 12 hours  metoprolol tartrate 12.5 milliGRAM(s) Oral two times a day  Nephro-kenneth 1 Tablet(s) Oral daily  predniSONE   Tablet 5 milliGRAM(s) Oral daily    MEDICATIONS  (PRN):  ondansetron Injectable 4 milliGRAM(s) IV Push every 6 hours PRN Nausea and/or Vomiting        CAPILLARY BLOOD GLUCOSE        I&O's Summary    10 Apr 2018 07:  -  2018 07:00  --------------------------------------------------------  IN: 1600 mL / OUT: 1700 mL / NET: -100 mL    2018 07:01  -  2018 22:51  --------------------------------------------------------  IN: 720 mL / OUT: 0 mL / NET: 720 mL        PHYSICAL EXAM:  GENERAL: NAD, well-developed  HEAD:  Atraumatic, Normocephalic  NECK: Supple, No JVD  CHEST/LUNG: Clear to auscultation bilaterally; No wheezing.  HEART: Regular rate and rhythm; No murmurs, rubs, or gallops  ABDOMEN: Soft, Nontender, Nondistended; Bowel sounds present  EXTREMITIES:   No clubbing, cyanosis, or edema  NEUROLOGY: AAO X 3  SKIN: No rashes    LABS:                        11.1   10. )-----------( 190      ( 10 Apr 2018 09:48 )             32.7     04-10    136  |  92<L>  |  46<H>  ----------------------------<  78  3.7   |  26  |  13.93<H>    Ca    9.4      10 Apr 2018 07:13            Urinalysis Basic - ( 10 Apr 2018 02:15 )    Color: Yellow / Appearance: Slightly Turbid / S.014 / pH: x  Gluc: x / Ketone: Negative  / Bili: Negative / Urobili: Negative mg/dL   Blood: x / Protein: 300 mg/dL / Nitrite: Negative   Leuk Esterase: Negative / RBC: 20 /HPF / WBC 18 /HPF   Sq Epi: x / Non Sq Epi: 9 /HPF / Bacteria: Negative      CAPILLARY BLOOD GLUCOSE        04-10 @ 05:22  Culture-urine --  Culture results   No growth to date.  method type --  Organism --  Organism Identification --  Specimen source .Blood Blood-Peripheral           04-10 @ 05:22  Culture blood --  Culture results   No growth to date.  Gram stain --  Gram stain blood --  Method type --  Organism --  Organism identification --  Specimen source .Blood Blood-Peripheral      RADIOLOGY & ADDITIONAL TESTS:    Imaging Personally Reviewed:    Consultant(s) Notes Reviewed:      Care Discussed with Consultants/Other Providers:

## 2018-04-11 NOTE — PROGRESS NOTE ADULT - SUBJECTIVE AND OBJECTIVE BOX
Patient with no anginal chest pain or shortness of breath  ROS otherwise negative       MEDICATIONS  (STANDING):  amLODIPine   Tablet 2.5 milliGRAM(s) Oral daily  heparin  Injectable 5000 Unit(s) SubCutaneous every 12 hours  metoprolol tartrate 12.5 milliGRAM(s) Oral two times a day  Nephro-kenneth 1 Tablet(s) Oral daily  predniSONE   Tablet 5 milliGRAM(s) Oral daily    MEDICATIONS  (PRN):  ondansetron Injectable 4 milliGRAM(s) IV Push every 6 hours PRN Nausea and/or Vomiting      LABS:                        11.1   10.97 )-----------( 190      ( 10 Apr 2018 09:48 )             32.7     Hemoglobin: 11.1 g/dL (04-10 @ 09:48)  Hemoglobin: 11.8 g/dL (04-09 @ 07:46)  Hemoglobin: 12.8 g/dL (04-07 @ 21:07)    04-10    136  |  92<L>  |  46<H>  ----------------------------<  78  3.7   |  26  |  13.93<H>    Ca    9.4      10 Apr 2018 07:13      Creatinine Trend: 13.93<--, 21.52<--, 18.83<--, 17.50<--     CARDIAC MARKERS ( 08 Apr 2018 11:37 )  x     / 0.02 ng/mL / 98 U/L / x     / 1.0 ng/mL        PHYSICAL EXAM  Vital Signs Last 24 Hrs  T(C): 37.9 (11 Apr 2018 06:44), Max: 37.9 (10 Apr 2018 22:01)  T(F): 100.2 (11 Apr 2018 06:44), Max: 100.2 (10 Apr 2018 22:01)  HR: 91 (11 Apr 2018 06:44) (66 - 97)  BP: 122/80 (11 Apr 2018 06:44) (111/69 - 150/92)  BP(mean): --  RR: 18 (11 Apr 2018 06:44) (18 - 18)  SpO2: 97% (11 Apr 2018 06:44) (95% - 98%)    echo : < from: Transthoracic Echocardiogram (01.08.18 @ 09:10) >  ------------------------------------------------------------------------  Conclusions:  1. Normal mitral valve with redundant chordae. Minimal  mitral regurgitation.  2. Normal trileaflet aortic valve. No aortic valve  regurgitation seen.  3. Moderate concentric left ventricular hypertrophy.  4. Normal left ventricular systolic function. No segmental  wallmotion abnormalities.  5. Normal right ventricular size and function.  *** No previous Echo exam.    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 NL TSH. The patient now has low grade fevers.     -- blood cx thus far negative   -- cont low dose BB/ Norvasc - however if patient remains orthostatic, could consider dc norvasc as his BP is currently well controlled   -- no s/s of chf on exam  -- no further CV work up needed at present  -- outpt event monitor  -- will arrange outpt cardiac follow up with Dr Navarro upon DC  -- HD per renal   -- care per medicine    Archana Mark [facility]  O:  [phone]  P: [phone]

## 2018-04-11 NOTE — PROVIDER CONTACT NOTE (OTHER) - SITUATION
pt due for AM meds (dialysis done yesterday) temp 100.2; pt refusing heparin; and pt hasn't voided this shift

## 2018-04-11 NOTE — PROGRESS NOTE ADULT - ASSESSMENT
32 m h/o ESRD 2/2 FSGS s/p renal tx 2004, s/p failure, now on HD x 2 mos, tu/th/sat at [facility], well known to me, presented to ER last night with c/o hypotension and syncope during last HD 3/5, missed HD 3/7. Renal consulted for HD    ESRD on HD TTS   Hypertension - blood pressure acceptable today,   s/p failed kidney xplant- c/w prednisone 5mg daily. off Prograft  Anemia in CKD-Hb >goal. no indication for JORGE

## 2018-04-11 NOTE — PROGRESS NOTE ADULT - ASSESSMENT
Young man with failed renal transplant, recent restart of HD via permacath and rapid prednisone taper, admitted with syncope at dialysis  Weak, poor appetite, low grade fever, labs notable for eosinophilia, low glucose, likely adrenal insufficiency   Sxs moderating back on Prednisone daily  No findings to suggest occult infex- Cxs negative, CXR clear    Plan:  Observe off antibiotics.   Follow CBC/diff  No GI Sxs, but if Eosinophilia persists, check stool O&P

## 2018-04-11 NOTE — PROGRESS NOTE ADULT - ATTENDING COMMENTS
Patient seen and examined.  Agree with above.   -follow up ID  -no further cardiac workup needed at this time    Wild Aldana MD  [facility]  [address], [id number]  [place] [id number]  office: [phone]  pager: [phone]

## 2018-04-11 NOTE — PROGRESS NOTE ADULT - SUBJECTIVE AND OBJECTIVE BOX
CC: f/u for fever    Patient reports feeling better, back on Prednisone daily; no resp or GI Sxs    REVIEW OF SYSTEMS:  All other review of systems negative (Comprehensive ROS)    Antimicrobials off  Other Medications Reviewed    T(F): 99.8 (18 @ 12:05), Max: 100.2 (04-10-18 @ 22:01)  HR: 98 (18 @ 12:05)  BP: 116/73 (18 @ 12:05)  RR: 18 (18 @ 12:05)  SpO2: 96% (18 @ 12:05)  Wt(kg): --    PHYSICAL EXAM:  General: alert, no acute distress  Eyes:  anicteric, no conjunctival injection, no discharge  Oropharynx: no lesions or injection 	  Neck: supple, without adenopathy  R Permacath site clean  Lungs: clear to auscultation  Heart: regular rate and rhythm; no murmur, rubs or gallops  Abdomen: soft, nondistended, nontender, without mass or organomegaly  Skin: no lesions  Extremities: no clubbing, cyanosis, or edema  Neurologic: alert, oriented, moves all extremities    LAB RESULTS:                        11.1   10.97 )-----------( 190      ( 10 Apr 2018 09:48 )             32.7     04-10    136  |  92<L>  |  46<H>  ----------------------------<  78  3.7   |  26  |  13.93<H>    Ca    9.4      10 Apr 2018 07:13        Urinalysis Basic - ( 10 Apr 2018 02:15 )    Color: Yellow / Appearance: Slightly Turbid / S.014 / pH: x  Gluc: x / Ketone: Negative  / Bili: Negative / Urobili: Negative mg/dL   Blood: x / Protein: 300 mg/dL / Nitrite: Negative   Leuk Esterase: Negative / RBC: 20 /HPF / WBC 18 /HPF   Sq Epi: x / Non Sq Epi: 9 /HPF / Bacteria: Negative    MICROBIOLOGY:  RECENT CULTURES:  04-10 @ 05:22 .Blood Blood-Peripheral     No growth to date.    RADIOLOGY REVIEWED:  Xray Chest 2 Views PA/Lat (18 @ 21:04) >  Clear lungs.

## 2018-04-12 LAB
ANION GAP SERPL CALC-SCNC: 17 MMOL/L — SIGNIFICANT CHANGE UP (ref 5–17)
BKV DNA UR QL NAA+PROBE: SIGNIFICANT CHANGE UP
BUN SERPL-MCNC: 54 MG/DL — HIGH (ref 7–23)
CALCIUM SERPL-MCNC: 9.5 MG/DL — SIGNIFICANT CHANGE UP (ref 8.4–10.5)
CHLORIDE SERPL-SCNC: 94 MMOL/L — LOW (ref 96–108)
CO2 SERPL-SCNC: 28 MMOL/L — SIGNIFICANT CHANGE UP (ref 22–31)
CREAT SERPL-MCNC: 14.7 MG/DL — HIGH (ref 0.5–1.3)
GLUCOSE SERPL-MCNC: 82 MG/DL — SIGNIFICANT CHANGE UP (ref 70–99)
HCT VFR BLD CALC: 32.6 % — LOW (ref 39–50)
HGB BLD-MCNC: 11.1 G/DL — LOW (ref 13–17)
MCHC RBC-ENTMCNC: 30 PG — SIGNIFICANT CHANGE UP (ref 27–34)
MCHC RBC-ENTMCNC: 34 GM/DL — SIGNIFICANT CHANGE UP (ref 32–36)
MCV RBC AUTO: 88.1 FL — SIGNIFICANT CHANGE UP (ref 80–100)
PLATELET # BLD AUTO: 231 K/UL — SIGNIFICANT CHANGE UP (ref 150–400)
POTASSIUM SERPL-MCNC: 3.8 MMOL/L — SIGNIFICANT CHANGE UP (ref 3.5–5.3)
POTASSIUM SERPL-SCNC: 3.8 MMOL/L — SIGNIFICANT CHANGE UP (ref 3.5–5.3)
RBC # BLD: 3.7 M/UL — LOW (ref 4.2–5.8)
RBC # FLD: 12.8 % — SIGNIFICANT CHANGE UP (ref 10.3–14.5)
SODIUM SERPL-SCNC: 139 MMOL/L — SIGNIFICANT CHANGE UP (ref 135–145)
WBC # BLD: 13.77 K/UL — HIGH (ref 3.8–10.5)
WBC # FLD AUTO: 13.77 K/UL — HIGH (ref 3.8–10.5)

## 2018-04-12 PROCEDURE — 36581 REPLACE TUNNELED CV CATH: CPT

## 2018-04-12 PROCEDURE — 77001 FLUOROGUIDE FOR VEIN DEVICE: CPT | Mod: 26

## 2018-04-12 RX ORDER — ACETAMINOPHEN 500 MG
650 TABLET ORAL ONCE
Qty: 0 | Refills: 0 | Status: COMPLETED | OUTPATIENT
Start: 2018-04-12 | End: 2018-04-12

## 2018-04-12 RX ORDER — ALTEPLASE 100 MG
2 KIT INTRAVENOUS ONCE
Qty: 0 | Refills: 0 | Status: COMPLETED | OUTPATIENT
Start: 2018-04-12 | End: 2018-04-12

## 2018-04-12 RX ADMIN — Medication 5 MILLIGRAM(S): at 13:40

## 2018-04-12 RX ADMIN — ALTEPLASE 2 MILLIGRAM(S): KIT at 10:59

## 2018-04-12 RX ADMIN — Medication 12.5 MILLIGRAM(S): at 17:58

## 2018-04-12 RX ADMIN — Medication 1 TABLET(S): at 13:40

## 2018-04-12 RX ADMIN — Medication 650 MILLIGRAM(S): at 13:41

## 2018-04-12 NOTE — PROGRESS NOTE ADULT - ASSESSMENT
· Assessment	  32 m FSGS s/p renal tx now failed on HD with progressive fatigue, anorexia, weakness and inability to tolerate HD 2nd syncope and low bp's - r/o infectious vs cardiac causes and consider ? MANZANARES    Fever:  ID f/up noted.  No abx.       Problem/Plan - 4:  ·  Problem: Hypertension, unspecified type.  Plan: Norvasc/Metoprolol    ESRD:  On HD  S/p PC exchange

## 2018-04-12 NOTE — PROGRESS NOTE ADULT - ASSESSMENT
32 m h/o ESRD 2/2 FSGS s/p renal tx 2004, s/p failure, now on HD x 2 mos, tu/th/sat at [facility], well known to me, presented to ER last night with c/o hypotension and syncope during last HD 3/5, missed HD 3/7. Renal consulted for HD    ESRD on HD TTS   Hypertension - blood pressure acceptable today,   s/p failed kidney xplant- c/w prednisone 5mg daily. off Prograft  Anemia in CKD-Hb >goal. no indication for JORGE

## 2018-04-12 NOTE — PROGRESS NOTE ADULT - ASSESSMENT
Young man with failed renal transplant, recent restart of HD via permacath and rapid prednisone taper, admitted with syncope at dialysis  Weak, poor appetite, low grade fever, labs notable for eosinophilia, low glucose, question of adrenal insufficiency   Back on Prednisone daily  Cxs negative, CXR clear  Still febrile earlier, otherwise, no new findings    Plan:  Will approach as FUO   Observe off antibiotics.   Follow CBC/diff- if Eosinophilia persists, check stool O&P  If fever persists, may require CT ABD as part of approach to FUO  Consider Endocrine eval to address any ? of adrenal insuff  D/w NP

## 2018-04-12 NOTE — PROGRESS NOTE ADULT - SUBJECTIVE AND OBJECTIVE BOX
Patient seen and examined  seen on HD  had fevers earlier- catheter with poor flows s/p tpa with some improvement then again with high pressures  HD terminated after 2hours and 45 min    No Known Allergies    Hospital Medications:   MEDICATIONS  (STANDING):  amLODIPine   Tablet 2.5 milliGRAM(s) Oral daily  heparin  Injectable 5000 Unit(s) SubCutaneous every 12 hours  metoprolol tartrate 12.5 milliGRAM(s) Oral two times a day  Nephro-kenneth 1 Tablet(s) Oral daily  predniSONE   Tablet 5 milliGRAM(s) Oral daily      VITALS:  T(F): 99.8 (18 @ 09:10), Max: 101.2 (18 @ 08:35)  HR: 80 (18 @ 09:10)  BP: 141/73 (18 @ 09:10)  RR: 18 (18 @ 08:35)  SpO2: 97% (18 @ 08:35)  Wt(kg): --     @ 07:01  -   @ 07:00  --------------------------------------------------------  IN: 840 mL / OUT: 0 mL / NET: 840 mL    PHYSICAL EXAM:  Constitutional: NAD  HEENT: anicteric sclera, oropharynx clear, MMM  Neck: No JVD  Respiratory: CTAB, no wheezes, rales or rhonchi  Cardiovascular: S1, S2, RRR  Gastrointestinal: BS+, soft, NT/ND  Extremities: No cyanosis or clubbing. No peripheral edema  Neurological: A/O x 3, no focal deficits  Psychiatric: Normal mood, normal affect  : No CVA tenderness. No fuller.   Skin: No rashes  Vascular Access: + PC; + left upper ext avf    LABS:      139  |  94<L>  |  54<H>  ----------------------------<  82  3.8   |  28  |  14.70<H>    Ca    9.5      2018 09:13      Creatinine Trend: 14.70 <--, 13.93 <--, 21.52 <--, 18.83 <--, 17.50 <--                        11.1   13.77 )-----------( 231      ( 2018 11:24 )             32.6     Urine Studies:  Urinalysis Basic - ( 10 Apr 2018 02:15 )    Color: Yellow / Appearance: Slightly Turbid / S.014 / pH:   Gluc:  / Ketone: Negative  / Bili: Negative / Urobili: Negative mg/dL   Blood:  / Protein: 300 mg/dL / Nitrite: Negative   Leuk Esterase: Negative / RBC: 20 /HPF / WBC 18 /HPF   Sq Epi:  / Non Sq Epi: 9 /HPF / Bacteria: Negative        RADIOLOGY & ADDITIONAL STUDIES:

## 2018-04-12 NOTE — PROGRESS NOTE ADULT - SUBJECTIVE AND OBJECTIVE BOX
Patient is a 32y old  Male who presents with a chief complaint of syncope during dialysis (08 Apr 2018 09:49)      SUBJECTIVE / OVERNIGHT EVENTS:   Feels better.  Denies CP/SOB/Palpitation/HA.    MEDICATIONS  (STANDING):  amLODIPine   Tablet 2.5 milliGRAM(s) Oral daily  heparin  Injectable 5000 Unit(s) SubCutaneous every 12 hours  metoprolol tartrate 12.5 milliGRAM(s) Oral two times a day  Nephro-kenneth 1 Tablet(s) Oral daily  predniSONE   Tablet 5 milliGRAM(s) Oral daily    MEDICATIONS  (PRN):  ondansetron Injectable 4 milliGRAM(s) IV Push every 6 hours PRN Nausea and/or Vomiting        CAPILLARY BLOOD GLUCOSE        I&O's Summary    11 Apr 2018 07:01  -  12 Apr 2018 07:00  --------------------------------------------------------  IN: 840 mL / OUT: 0 mL / NET: 840 mL    12 Apr 2018 07:01  -  12 Apr 2018 23:54  --------------------------------------------------------  IN: 720 mL / OUT: 560 mL / NET: 160 mL        PHYSICAL EXAM:  GENERAL: NAD, well-developed  HEAD:  Atraumatic, Normocephalic  NECK: Supple, No JVD  CHEST/LUNG: Clear to auscultation bilaterally; No wheezing.  HEART: Regular rate and rhythm; No murmurs, rubs, or gallops  ABDOMEN: Soft, Nontender, Nondistended; Bowel sounds present  EXTREMITIES:   No clubbing, cyanosis, or edema  NEUROLOGY: AAO X 3  SKIN: No rashes    LABS:                        11.1   13.77 )-----------( 231      ( 12 Apr 2018 11:24 )             32.6     04-12    139  |  94<L>  |  54<H>  ----------------------------<  82  3.8   |  28  |  14.70<H>    Ca    9.5      12 Apr 2018 09:13              CAPILLARY BLOOD GLUCOSE        04-10 @ 05:22  Culture-urine --  Culture results   No growth to date.  method type --  Organism --  Organism Identification --  Specimen source .Blood Blood-Peripheral           04-10 @ 05:22  Culture blood --  Culture results   No growth to date.  Gram stain --  Gram stain blood --  Method type --  Organism --  Organism identification --  Specimen source .Blood Blood-Peripheral      RADIOLOGY & ADDITIONAL TESTS:    Imaging Personally Reviewed:    Consultant(s) Notes Reviewed:      Care Discussed with Consultants/Other Providers:

## 2018-04-12 NOTE — PROGRESS NOTE ADULT - SUBJECTIVE AND OBJECTIVE BOX
CC: f/u for fever    Patient reports s/p HD earlier, also required R permacath exchange by IR; febrile again, but no chills, no new Sxs    REVIEW OF SYSTEMS:  All other review of systems negative (Comprehensive ROS)    Antimicrobials off  Other Medications Reviewed    Vital Signs Last 24 Hrs  T(F): 98.7 (2018 17:31), Max: 101.3 (2018 13:45)  HR: 84 (2018 17:31) (71 - 102)  BP: 115/73 (2018 17:31) (110/70 - 141/73)  BP(mean): --  RR: 18 (2018 17:31) (17 - 18)  SpO2: 96% (2018 17:31) (95% - 100%)    PHYSICAL EXAM:  General: alert, no acute distress  Eyes:  anicteric, no conjunctival injection, no discharge  Oropharynx: no lesions or injection 	  Neck: supple, without adenopathy  R Permacath site clean  Lungs: clear to auscultation  Heart: regular rate and rhythm; no murmur, rubs or gallops  Abdomen: soft, nondistended, nontender, without mass or organomegaly  Skin: no lesions  Extremities: no clubbing, cyanosis, or edema  Neurologic: alert, oriented, moves all extremities    LAB RESULTS:                        11.1   13.77 )-----------( 231      ( 2018 11:24 )             32.6   04-12    139  |  94<L>  |  54<H>  ----------------------------<  82  3.8   |  28  |  14.70<H>    Ca    9.5      2018 09:13    Urinalysis Basic - ( 10 Apr 2018 02:15 )    Color: Yellow / Appearance: Slightly Turbid / S.014 / pH: x  Gluc: x / Ketone: Negative  / Bili: Negative / Urobili: Negative mg/dL   Blood: x / Protein: 300 mg/dL / Nitrite: Negative   Leuk Esterase: Negative / RBC: 20 /HPF / WBC 18 /HPF   Sq Epi: x / Non Sq Epi: 9 /HPF / Bacteria: Negative    MICROBIOLOGY:  RECENT CULTURES:  04-10 @ 05:22 .Blood Blood-Peripheral     No growth to date.    Strongyloides Antibodies: Negative: No detectable levels of IgG antibodies to Strongyloides.  (18 @ 00:07)    Cytomegalovirus By PCR (04.10.18 @ 09:48)    CMVPCR Log: NotDetected    RADIOLOGY REVIEWED:  Xray Chest 2 Views PA/Lat (18 @ 21:04) >  Clear lungs.

## 2018-04-12 NOTE — PROGRESS NOTE ADULT - SUBJECTIVE AND OBJECTIVE BOX
Interventional Radiology Brief- Operative Note    Procedure: Guidewire exchange of right IJ HD catheter    Operators: Ernesto    Anesthesia (type): 2% lidocaine local    Contrast: none    EBL: none    Findings/Follow up Plan of Care: Guidewire exchange of right IJ tunneled HD catheter performed. Tip of catheter in SVC. OK to use. Pt tolerated procedure without difficulty. Full report to follow.    Specimens Removed: removed catheter tip sent to microbiology    Implants: 14.5 Fr 19cm tunneled HD catether    Complications: none    Condition/Disposition: stable / room    Please call Interventional Radiology x [phone] with any questions, concerns, or issues.

## 2018-04-12 NOTE — PROVIDER CONTACT NOTE (OTHER) - BACKGROUND
pt had oral temp 101, repeated 99.5 this am at 0656hrs, as per report, give tylenol if temp above 100.1 at 8am

## 2018-04-12 NOTE — PROGRESS NOTE ADULT - SUBJECTIVE AND OBJECTIVE BOX
Patient with fevers this morning.   Denies anginal chest pain or shortness of breath  AT HD now   ROS otherwise negative       MEDICATIONS  (STANDING):  amLODIPine   Tablet 2.5 milliGRAM(s) Oral daily  heparin  Injectable 5000 Unit(s) SubCutaneous every 12 hours  metoprolol tartrate 12.5 milliGRAM(s) Oral two times a day  Nephro-kenneth 1 Tablet(s) Oral daily  predniSONE   Tablet 5 milliGRAM(s) Oral daily    MEDICATIONS  (PRN):  ondansetron Injectable 4 milliGRAM(s) IV Push every 6 hours PRN Nausea and/or Vomiting      LABS:                        11.1   13.77 )-----------( 231      ( 12 Apr 2018 11:24 )             32.6     Hemoglobin: 11.1 g/dL (04-12 @ 11:24)  Hemoglobin: 11.1 g/dL (04-10 @ 09:48)  Hemoglobin: 11.8 g/dL (04-09 @ 07:46)  Hemoglobin: 12.8 g/dL (04-07 @ 21:07)    04-12    139  |  94<L>  |  54<H>  ----------------------------<  82  3.8   |  28  |  14.70<H>    Ca    9.5      12 Apr 2018 09:13      Creatinine Trend: 14.70<--, 13.93<--, 21.52<--, 18.83<--, 17.50<--           PHYSICAL EXAM  Vital Signs Last 24 Hrs  T(C): 37.7 (12 Apr 2018 09:10), Max: 38.4 (12 Apr 2018 08:35)  T(F): 99.8 (12 Apr 2018 09:10), Max: 101.2 (12 Apr 2018 08:35)  HR: 80 (12 Apr 2018 09:10) (71 - 99)  BP: 141/73 (12 Apr 2018 09:10) (114/74 - 141/73)  BP(mean): --  RR: 18 (12 Apr 2018 08:35) (18 - 18)  SpO2: 97% (12 Apr 2018 08:35) (97% - 98%)    echo : < from: Transthoracic Echocardiogram (01.08.18 @ 09:10) >  ------------------------------------------------------------------------  Conclusions:  1. Normal mitral valve with redundant chordae. Minimal  mitral regurgitation.  2. Normal trileaflet aortic valve. No aortic valve  regurgitation seen.  3. Moderate concentric left ventricular hypertrophy.  4. Normal left ventricular systolic function. No segmental  wallmotion abnormalities.  5. Normal right ventricular size and function.  *** No previous Echo exam.    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 NL TSH. The patient with recurrent fevers.     -- ID follow up pending - blood cx thus far negative   -- orthostatic hypotension improved - cont low dose BB/ Norvasc if BP tolerates - can hold if necessary given fevers    -- no s/s of chf on exam  -- no further CV work up needed at present  -- outpt event monitor  -- will arrange outpt cardiac follow up with Dr Navarro upon DC  -- HD per renal   -- care per medicine    Archana Mark [facility]  O:  [phone]  P: [phone]

## 2018-04-12 NOTE — PROGRESS NOTE ADULT - SUBJECTIVE AND OBJECTIVE BOX
Interventional Radiology Pre-Procedure Note    This is a 32y Male with poor flow from a right Ij tunneled HD catheter    Procedure:    Diagnosis/Indication: Patient is a 32y old  Male who presents with a chief complaint of syncope during dialysis (08 Apr 2018 09:49)      PAST MEDICAL & SURGICAL HISTORY:  Dialysis complication  CKD (chronic kidney disease), stage 4 (severe)  Hypertension  Glomerulonephritis  S/P kidney transplant: Right kidney in 2004       Male    Allergies: No Known Allergies      LABS:  CBC Full  -  ( 12 Apr 2018 11:24 )  WBC Count : 13.77 K/uL  Hemoglobin : 11.1 g/dL  Hematocrit : 32.6 %  Platelet Count - Automated : 231 K/uL  Mean Cell Volume : 88.1 fl  Mean Cell Hemoglobin : 30.0 pg  Mean Cell Hemoglobin Concentration : 34.0 gm/dL  Auto Neutrophil # : x  Auto Lymphocyte # : x  Auto Monocyte # : x  Auto Eosinophil # : x  Auto Basophil # : x  Auto Neutrophil % : x  Auto Lymphocyte % : x  Auto Monocyte % : x  Auto Eosinophil % : x  Auto Basophil % : x    04-12    139  |  94<L>  |  54<H>  ----------------------------<  82  3.8   |  28  |  14.70<H>    Ca    9.5      12 Apr 2018 09:13        A/P Dialysis catheter evaluation and possible. Procedure/ risks/ benefits were explained, informed consent obtained from patient, verbalizes understanding.

## 2018-04-12 NOTE — CHART NOTE - NSCHARTNOTEFT_GEN_A_CORE
Pt went to IR today for Rt IJ Permacath change.   Guidewire exchange of right IJ tunneled HD catheter performed.   Implants: 14.5 Fr 19cm tunneled HD cathether  Tip of catheter in SVC. OK to use. Catheter tip sent to microbiology.  Pt was febrile with T 101 last night, 101.2 at 8:35 am and 101.3 at 1 pm.   Notified ID MD Dr Lester of pt with fever.  MD to see pt today.

## 2018-04-12 NOTE — PROGRESS NOTE ADULT - ATTENDING COMMENTS
Patient seen and examined, agree with above assessment and plan as transcribed above.    - No need for further inpatient cardiac work up.    Clark Multani MD, [facility]  [facility], [facility]  [address] [place] [address]  PHONE:  [phone]  BEEPER : [phone]

## 2018-04-12 NOTE — CHART NOTE - NSCHARTNOTEFT_GEN_A_CORE
Source: Patient [x ]    Family x ]    mother   other [ ]    Diet : Renal      Patient reports tolerating nepro 3x/day.[ ] nausea  [ ] vomiting [ ] diarrhea [ ] constipation  [ ]chewing problems [ ] swallowing issues  [ ] other:      PO intake:  < 50% [ ] 50-75% [x]   % [ ]  other :     Source for PO intake [x] Patient [x ] family [ ] chart [x ] staff [ ] other     Enteral /Parenteral Nutrition: NA      Current Weight: Weight (kg): 74.5 (04-08 @ 20:04)  % Weight Change    Pertinent Medications: MEDICATIONS  (STANDING):  amLODIPine   Tablet 2.5 milliGRAM(s) Oral daily  heparin  Injectable 5000 Unit(s) SubCutaneous every 12 hours  metoprolol tartrate 12.5 milliGRAM(s) Oral two times a day  Nephro-kenneth 1 Tablet(s) Oral daily  predniSONE   Tablet 5 milliGRAM(s) Oral daily    MEDICATIONS  (PRN):  ondansetron Injectable 4 milliGRAM(s) IV Push every 6 hours PRN Nausea and/or Vomiting    Pertinent Labs:  04-12 Na139 mmol/L Glu 82 mg/dL K+ 3.8 mmol/L Cr  14.70 mg/dL<H> BUN 54 mg/dL<H> 04-08 Phos 2.2 mg/dL<L> 04-07 Alb 4.0 g/dL      Skin:     Estimated Needs:   [ ] no change since previous assessment  [ ] recalculated:       Previous Nutrition Diagnosis:     [ ] Inadequate Energy Intake [ ]Inadequate Oral Intake [ ] Excessive Energy Intake     [ ] Underweight [ ] Increased Nutrient Needs [ ] Overweight/Obesity     [ ] Altered GI Function [ ] Unintended Weight Loss [ ] Food & Nutrition Related Knowledge Deficit [ ] Malnutrition          Nutrition Diagnosis is [x ]          New Nutrition Diagnosis: [ ] not applicable    [ ] Inadequate Protein Energy Intake [ ]Inadequate Oral Intake [ ] Excessive Energy Intake     [ ] Underweight [ ] Increased Nutrient Needs [ ] Overweight/Obesity     [ ] Altered GI Function [ ] Unintended Weight Loss [ ] Food & Nutrition Related Knowledge Deficit[ ] Limited Adherence to nutrition related recommendations [ ] Malnutrition  [ ] other: Free text       Related to:      As evidenced by:      Interventions:     Recommend    [ ] Change Diet To:    [ ] Nutrition Supplement    [ ] Nutrition Support    [ ] Other:        Monitoring and Evaluation:     [ ] PO intake [ ] Tolerance to diet prescription [ ] weights [ ] follow up per protocol    [ ] other: nutrition consult received for Pt cannot tolerate nepro. Upon visiting patient with mother present, pt now tolerating nepro provided eat drinks with solid food. 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 MANZANARES  Dx: Syncope        Source: Patient [x ]    Family x ]    mother   other [ ]    Diet : Renal      Patient reports tolerating nepro 3x/day.[ ] nausea  [ ] vomiting [ ] diarrhea [ ] constipation  [ ]chewing problems [ ] swallowing issues  [ ] other:      PO intake:  < 50% [ ] 50-75% [x]   % [ ]  other :     Source for PO intake [x] Patient [x ] family [ ] chart [x ] staff [ ] other     Enteral /Parenteral Nutrition: NA      Current Weight: Weight (kg): 74.5 (04-08 @ 20:04)  % Weight Change    Pertinent Medications: MEDICATIONS  (STANDING):  amLODIPine   Tablet 2.5 milliGRAM(s) Oral daily  heparin  Injectable 5000 Unit(s) SubCutaneous every 12 hours  metoprolol tartrate 12.5 milliGRAM(s) Oral two times a day  Nephro-kenneth 1 Tablet(s) Oral daily  predniSONE   Tablet 5 milliGRAM(s) Oral daily    MEDICATIONS  (PRN):  ondansetron Injectable 4 milliGRAM(s) IV Push every 6 hours PRN Nausea and/or Vomiting    Pertinent Labs:  04-12 Na139 mmol/L Glu 82 mg/dL K+ 3.8 mmol/L Cr  14.70 mg/dL<H> BUN 54 mg/dL<H> 04-08 Phos 2.2 mg/dL<L> 04-07 Alb 4.0 g/dL      Skin: intact    Estimated Needs:   [x ] no change since previous assessment  [ ] recalculated:       Previous Nutrition Diagnosis:    [x ] Malnutrition          Nutrition Diagnosis is [x ] severe  malnutrition related to inadequate protein energy intake coupled increased nutrient needes secondary to HD          New Nutrition Diagnosis: [x ] not applicable      Recommend    [ ] Change Diet To:    [x] Nutrition Supplement  increase nepro 4x/day    [ ] Nutrition Support    [ ] Other:        Monitoring and Evaluation:     [x ] PO intake [ x] Tolerance to diet prescription [x ] weights [x ] follow up per protocol    [ ] other:

## 2018-04-13 LAB
ANION GAP SERPL CALC-SCNC: 16 MMOL/L — SIGNIFICANT CHANGE UP (ref 5–17)
BASOPHILS # BLD AUTO: 0.1 K/UL — SIGNIFICANT CHANGE UP (ref 0–0.2)
BASOPHILS NFR BLD AUTO: 0.5 % — SIGNIFICANT CHANGE UP (ref 0–2)
BUN SERPL-MCNC: 48 MG/DL — HIGH (ref 7–23)
CALCIUM SERPL-MCNC: 9.7 MG/DL — SIGNIFICANT CHANGE UP (ref 8.4–10.5)
CHLORIDE SERPL-SCNC: 94 MMOL/L — LOW (ref 96–108)
CO2 SERPL-SCNC: 27 MMOL/L — SIGNIFICANT CHANGE UP (ref 22–31)
CREAT SERPL-MCNC: 12.57 MG/DL — HIGH (ref 0.5–1.3)
EOSINOPHIL # BLD AUTO: 2.4 K/UL — HIGH (ref 0–0.5)
EOSINOPHIL NFR BLD AUTO: 19.5 % — HIGH (ref 0–6)
GLUCOSE SERPL-MCNC: 94 MG/DL — SIGNIFICANT CHANGE UP (ref 70–99)
HCT VFR BLD CALC: 32.2 % — LOW (ref 39–50)
HGB BLD-MCNC: 11.3 G/DL — LOW (ref 13–17)
LYMPHOCYTES # BLD AUTO: 17.9 % — SIGNIFICANT CHANGE UP (ref 13–44)
LYMPHOCYTES # BLD AUTO: 2.2 K/UL — SIGNIFICANT CHANGE UP (ref 1–3.3)
MCHC RBC-ENTMCNC: 31.2 PG — SIGNIFICANT CHANGE UP (ref 27–34)
MCHC RBC-ENTMCNC: 35.2 GM/DL — SIGNIFICANT CHANGE UP (ref 32–36)
MCV RBC AUTO: 88.7 FL — SIGNIFICANT CHANGE UP (ref 80–100)
MONOCYTES # BLD AUTO: 1.1 K/UL — HIGH (ref 0–0.9)
MONOCYTES NFR BLD AUTO: 9.2 % — SIGNIFICANT CHANGE UP (ref 2–14)
NEUTROPHILS # BLD AUTO: 6.5 K/UL — SIGNIFICANT CHANGE UP (ref 1.8–7.4)
NEUTROPHILS NFR BLD AUTO: 53 % — SIGNIFICANT CHANGE UP (ref 43–77)
PLATELET # BLD AUTO: 212 K/UL — SIGNIFICANT CHANGE UP (ref 150–400)
POTASSIUM SERPL-MCNC: 4 MMOL/L — SIGNIFICANT CHANGE UP (ref 3.5–5.3)
POTASSIUM SERPL-SCNC: 4 MMOL/L — SIGNIFICANT CHANGE UP (ref 3.5–5.3)
RBC # BLD: 3.63 M/UL — LOW (ref 4.2–5.8)
RBC # FLD: 12 % — SIGNIFICANT CHANGE UP (ref 10.3–14.5)
SODIUM SERPL-SCNC: 137 MMOL/L — SIGNIFICANT CHANGE UP (ref 135–145)
WBC # BLD: 12.2 K/UL — HIGH (ref 3.8–10.5)
WBC # FLD AUTO: 12.2 K/UL — HIGH (ref 3.8–10.5)

## 2018-04-13 RX ADMIN — AMLODIPINE BESYLATE 2.5 MILLIGRAM(S): 2.5 TABLET ORAL at 05:39

## 2018-04-13 RX ADMIN — Medication 5 MILLIGRAM(S): at 05:39

## 2018-04-13 RX ADMIN — Medication 1 TABLET(S): at 11:03

## 2018-04-13 RX ADMIN — Medication 12.5 MILLIGRAM(S): at 05:39

## 2018-04-13 RX ADMIN — Medication 12.5 MILLIGRAM(S): at 18:27

## 2018-04-13 NOTE — PROGRESS NOTE ADULT - ASSESSMENT
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   Follow temps and CBC/diff  Stool O&P sent  Consider Endocrine eval to address any ? of adrenal insuff

## 2018-04-13 NOTE — PROGRESS NOTE ADULT - SUBJECTIVE AND OBJECTIVE BOX
CC: f/u for fever    Patient reports comfortable, no resp or GI Sxs; no dizziness. Afebrile past 24 hrs    REVIEW OF SYSTEMS:  All other review of systems negative (Comprehensive ROS)    Antimicrobials off  Other Medications Reviewed    Vital Signs Last 24 Hrs  T(F): 98.1 (13 Apr 2018 12:51), Max: 99.3 (13 Apr 2018 05:36)  HR: 95 (13 Apr 2018 12:51) (65 - 95)  BP: 132/86 (13 Apr 2018 12:51) (110/70 - 132/86)  BP(mean): --  RR: 18 (13 Apr 2018 12:51) (16 - 18)  SpO2: 96% (13 Apr 2018 12:51) (95% - 100%)    PHYSICAL EXAM:  General: alert, no acute distress  Eyes:  anicteric, no conjunctival injection, no discharge  Oropharynx: no lesions or injection 	  Neck: supple, without adenopathy  R Permacath site clean  Lungs: clear to auscultation  Heart: regular rate and rhythm; no murmur, rubs or gallops  Abdomen: soft, nondistended, nontender, without mass or organomegaly  Skin: no lesions  Extremities: no clubbing, cyanosis, or edema  Neurologic: alert, oriented, moves all extremities    LAB RESULTS:                        11.3   12.2  )-----------( 212      ( 13 Apr 2018 07:01 )             32.2   04-13    137  |  94<L>  |  48<H>  ----------------------------<  94  4.0   |  27  |  12.57<H>    Ca    9.7      13 Apr 2018 07:01      MICROBIOLOGY:  RECENT CULTURES:  Culture - Blood (04.12.18 @ 11:17)    Specimen Source: .Blood Blood-Peripheral    Culture Results:   No growth to date.    04-10 @ 05:22 .Blood Blood-Peripheral     No growth to date.    Strongyloides Antibodies: Negative: No detectable levels of IgG antibodies to Strongyloides.  (04.11.18 @ 00:07)    Cytomegalovirus By PCR (04.10.18 @ 09:48)    CMVPCR Log: NotDetected    Stool O&P sent    RADIOLOGY REVIEWED:  Xray Chest 2 Views PA/Lat (04.07.18 @ 21:04) >  Clear lungs.

## 2018-04-13 NOTE — PROGRESS NOTE ADULT - ASSESSMENT
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 Renal consulted for HD    ESRD on HD TTS   Hypertension - blood pressure acceptable today,   s/p failed kidney xplant- c/w prednisone 5mg daily. off Prograft  Anemia in CKD-Hb >goal. no indication for JORGE

## 2018-04-13 NOTE — PROGRESS NOTE ADULT - SUBJECTIVE AND OBJECTIVE BOX
Patient is a 32y old  Male who presents with a chief complaint of syncope during dialysis (08 Apr 2018 09:49)      SUBJECTIVE / OVERNIGHT EVENTS:   Feels better.  Denies CP/SOB/Palpitation/HA.    MEDICATIONS  (STANDING):  amLODIPine   Tablet 2.5 milliGRAM(s) Oral daily  heparin  Injectable 5000 Unit(s) SubCutaneous every 12 hours  metoprolol tartrate 12.5 milliGRAM(s) Oral two times a day  Nephro-kenneth 1 Tablet(s) Oral daily  predniSONE   Tablet 5 milliGRAM(s) Oral daily    MEDICATIONS  (PRN):  ondansetron Injectable 4 milliGRAM(s) IV Push every 6 hours PRN Nausea and/or Vomiting        CAPILLARY BLOOD GLUCOSE        I&O's Summary    12 Apr 2018 07:01  -  13 Apr 2018 07:00  --------------------------------------------------------  IN: 820 mL / OUT: 560 mL / NET: 260 mL    13 Apr 2018 07:01  -  13 Apr 2018 22:59  --------------------------------------------------------  IN: 597 mL / OUT: 0 mL / NET: 597 mL        PHYSICAL EXAM:  GENERAL: NAD, well-developed  HEAD:  Atraumatic, Normocephalic  NECK: Supple, No JVD  CHEST/LUNG: Clear to auscultation bilaterally; No wheezing.  HEART: Regular rate and rhythm; No murmurs, rubs, or gallops  ABDOMEN: Soft, Nontender, Nondistended; Bowel sounds present  EXTREMITIES:   No clubbing, cyanosis, or edema  NEUROLOGY: AAO X 3  SKIN: No rashes    LABS:                        11.3   12.2  )-----------( 212      ( 13 Apr 2018 07:01 )             32.2     04-13    137  |  94<L>  |  48<H>  ----------------------------<  94  4.0   |  27  |  12.57<H>    Ca    9.7      13 Apr 2018 07:01              CAPILLARY BLOOD GLUCOSE        04-13 @ 08:14  Culture-urine --  Culture results   Testing in progress  method type --  Organism --  Organism Identification --  Specimen source .Stool Feces  04-13 @ 00:45  Culture-urine --  Culture results   No growth to date.  method type --  Organism --  Organism Identification --  Specimen source .Catheter Catheter Tip Internal Jugular  04-12 @ 18:01  Culture-urine --  Culture results   No growth to date.  method type --  Organism --  Organism Identification --  Specimen source .Blood Blood-Peripheral  04-12 @ 11:17  Culture-urine --  Culture results   No growth to date.  method type --  Organism --  Organism Identification --  Specimen source .Blood Blood-Peripheral  04-10 @ 05:22  Culture-urine --  Culture results   No growth to date.  method type --  Organism --  Organism Identification --  Specimen source .Blood Blood-Peripheral      04-13 @ 08:14  Culture-CSF --  Culture results   Testing in [facility]  Gram stain --  Gram stain spinal fluid --  Method type --  Organism --  Organism identification --  Specimen source .Stool Feces  04-13 @ 00:45  Culture-CSF --  Culture results   No growth to date.  Gram stain --  Gram stain spinal fluid --  Method type --  Organism --  Organism identification --  Specimen source .Catheter Catheter Tip Internal Jugular       04-13 @ 08:14  Culture blood --  Culture results   Testing in [facility]  Gram stain --  Gram stain blood --  Method type --  Organism --  Organism identification --  Specimen source .Stool Feces   04-13 @ 00:45  Culture blood --  Culture results   No growth to date.  Gram stain --  Gram stain blood --  Method type --  Organism --  Organism identification --  Specimen source .Catheter Catheter Tip Internal Jugular   04-12 @ 18:01  Culture blood --  Culture results   No growth to date.  Gram stain --  Gram stain blood --  Method type --  Organism --  Organism identification --  Specimen source .Blood Blood-Peripheral   04-12 @ 11:17  Culture blood --  Culture results   No growth to date.  Gram stain --  Gram stain blood --  Method type --  Organism --  Organism identification --  Specimen source .Blood Blood-Peripheral   04-10 @ 05:22  Culture blood --  Culture results   No growth to date.  Gram stain --  Gram stain blood --  Method type --  Organism --  Organism identification --  Specimen source .Blood Blood-Peripheral      RADIOLOGY & ADDITIONAL TESTS:    Imaging Personally Reviewed:    Consultant(s) Notes Reviewed:      Care Discussed with Consultants/Other Providers:

## 2018-04-13 NOTE — PROGRESS NOTE ADULT - ASSESSMENT
· Assessment	  32 m FSGS s/p renal tx now failed on HD with progressive fatigue, anorexia, weakness and inability to tolerate HD 2nd syncope and low bp's - r/o infectious vs cardiac causes and consider ? MANZANARES    Fever:  ID f/up noted.  No abx.       Problem/Plan - 4:  ·  Problem: Hypertension, unspecified type.  Plan: Norvasc/Metoprolol    ESRD:  On HD  Nephro f/up noted.

## 2018-04-13 NOTE — PROGRESS NOTE ADULT - SUBJECTIVE AND OBJECTIVE BOX
Patient seen and examined  no complaints  pt with poor flows via PC- s/p exchange    No Known Allergies    Hospital Medications:   MEDICATIONS  (STANDING):  amLODIPine   Tablet 2.5 milliGRAM(s) Oral daily  heparin  Injectable 5000 Unit(s) SubCutaneous every 12 hours  metoprolol tartrate 12.5 milliGRAM(s) Oral two times a day  Nephro-kenneth 1 Tablet(s) Oral daily  predniSONE   Tablet 5 milliGRAM(s) Oral daily      VITALS:  T(F): 98.1 (18 @ 12:51), Max: 101.3 (18 @ 13:45)  HR: 95 (18 @ 12:51)  BP: 132/86 (18 @ 12:51)  RR: 18 (18 @ 12:51)  SpO2: 96% (18 @ 12:51)  Wt(kg): --     @ 07:01  -   @ 07:00  --------------------------------------------------------  IN: 820 mL / OUT: 560 mL / NET: 260 mL  PHYSICAL EXAM:  Constitutional: NAD  HEENT: anicteric sclera, oropharynx clear, MMM  Neck: No JVD  Respiratory: CTAB, no wheezes, rales or rhonchi  Cardiovascular: S1, S2, RRR  Gastrointestinal: BS+, soft, NT/ND  Extremities: No cyanosis or clubbing. No peripheral edema  Neurological: A/O x 3, no focal deficits  Psychiatric: Normal mood, normal affect  : No CVA tenderness. No fuller.   Skin: No rashes  Vascular Access: + PC; + left upper ext avf    LABS:      137  |  94<L>  |  48<H>  ----------------------------<  94  4.0   |  27  |  12.57<H>    Ca    9.7      2018 07:01      Creatinine Trend: 12.57 <--, 14.70 <--, 13.93 <--, 21.52 <--, 18.83 <--, 17.50 <--                        11.3   12.2  )-----------( 212      ( 2018 07:01 )             32.2     Urine Studies:  Urinalysis Basic - ( 10 Apr 2018 02:15 )    Color: Yellow / Appearance: Slightly Turbid / S.014 / pH:   Gluc:  / Ketone: Negative  / Bili: Negative / Urobili: Negative mg/dL   Blood:  / Protein: 300 mg/dL / Nitrite: Negative   Leuk Esterase: Negative / RBC: 20 /HPF / WBC 18 /HPF   Sq Epi:  / Non Sq Epi: 9 /HPF / Bacteria: Negative        RADIOLOGY & ADDITIONAL STUDIES:

## 2018-04-13 NOTE — PROGRESS NOTE ADULT - ATTENDING COMMENTS
Agree with above assessment and plan as outlined above.    - No need for further inpatient cardiac work up.  - f/u with Dr. Navarro upon D/C    Clark Multani MD, [facility], [facility]  [address] [place] [address]  PHONE:  [phone]  BEEPER : [phone]

## 2018-04-13 NOTE — PROGRESS NOTE ADULT - SUBJECTIVE AND OBJECTIVE BOX
No fevers this AM   Denies anginal chest pain or shortness of breath or dizziness  ROS otherwise negative       MEDICATIONS  (STANDING):  amLODIPine   Tablet 2.5 milliGRAM(s) Oral daily  heparin  Injectable 5000 Unit(s) SubCutaneous every 12 hours  metoprolol tartrate 12.5 milliGRAM(s) Oral two times a day  Nephro-kenneth 1 Tablet(s) Oral daily  predniSONE   Tablet 5 milliGRAM(s) Oral daily    MEDICATIONS  (PRN):  ondansetron Injectable 4 milliGRAM(s) IV Push every 6 hours PRN Nausea and/or Vomiting      LABS:                        11.3   12.2  )-----------( 212      ( 13 Apr 2018 07:01 )             32.2     Hemoglobin: 11.3 g/dL (04-13 @ 07:01)  Hemoglobin: 11.1 g/dL (04-12 @ 11:24)  Hemoglobin: 11.1 g/dL (04-10 @ 09:48)  Hemoglobin: 11.8 g/dL (04-09 @ 07:46)    04-13    137  |  94<L>  |  48<H>  ----------------------------<  94  4.0   |  27  |  12.57<H>    Ca    9.7      13 Apr 2018 07:01      Creatinine Trend: 12.57<--, 14.70<--, 13.93<--, 21.52<--, 18.83<--, 17.50<--           PHYSICAL EXAM  Vital Signs Last 24 Hrs  T(C): 37.2 (13 Apr 2018 08:15), Max: 38.5 (12 Apr 2018 13:45)  T(F): 99 (13 Apr 2018 08:15), Max: 101.3 (12 Apr 2018 13:45)  HR: 85 (13 Apr 2018 08:15) (65 - 102)  BP: 128/79 (13 Apr 2018 08:15) (110/70 - 128/87)  BP(mean): --  RR: 16 (13 Apr 2018 08:15) (16 - 18)  SpO2: 96% (13 Apr 2018 08:15) (95% - 100%)      HEENT:   Normal oral mucosa, PERRL, EOMI	  Lymphatic: No obvious lymphadenopathy , no edema  Cardiovascular: Normal S1 S2, No JVD, 1/6 KYARA murmur, Peripheral pulses palpable 2+ bilaterally  Respiratory: Lungs clear to auscultation, normal effort 	  Gastrointestinal:  Soft, Non-tender, + BS	  Neuro: alert and oriented x 3        ASSESSMENT/PLAN: 	32y Male MH ESRD due to FSGS on HD via a right sided permacath now admitted with syncope during HD in the setting of orthostatic hypotension with normal stress echo in 2016 who ruled out for ACS with repeat  EKG and echo unremarkable. NL TSH. The patient with recurrent fevers.  He is s/p permacath exchange 4/12:     -- ID follow up pending - blood cx thus far negative - off abx for now  -- orthostatic hypotension improved - cont low dose BB/ Norvasc if BP tolerates - can hold if necessary given fevers         - ? endo eval for adrenal insufficiency   -- no s/s of chf on exam  -- no further CV work up needed at present  -- outpt event monitor  -- outpt cardiac follow up with Dr Navarro upon DC 4/25 @ 12pm  -- HD per renal   -- care per medicine    [facility]  O:  [phone]  P: [phone]

## 2018-04-14 DIAGNOSIS — I10 ESSENTIAL (PRIMARY) HYPERTENSION: ICD-10-CM

## 2018-04-14 LAB
ACTH SER-ACNC: 10 PG/ML — SIGNIFICANT CHANGE UP (ref 0–46)
ANION GAP SERPL CALC-SCNC: 17 MMOL/L — SIGNIFICANT CHANGE UP (ref 5–17)
BUN SERPL-MCNC: 67 MG/DL — HIGH (ref 7–23)
CALCIUM SERPL-MCNC: 9.9 MG/DL — SIGNIFICANT CHANGE UP (ref 8.4–10.5)
CHLORIDE SERPL-SCNC: 95 MMOL/L — LOW (ref 96–108)
CO2 SERPL-SCNC: 25 MMOL/L — SIGNIFICANT CHANGE UP (ref 22–31)
CORTIS AM PEAK SERPL-MCNC: 2.3 UG/DL — LOW (ref 6–18.4)
CREAT SERPL-MCNC: 16.27 MG/DL — HIGH (ref 0.5–1.3)
CULTURE RESULTS: SIGNIFICANT CHANGE UP
CULTURE RESULTS: SIGNIFICANT CHANGE UP
GLUCOSE SERPL-MCNC: 96 MG/DL — SIGNIFICANT CHANGE UP (ref 70–99)
HCT VFR BLD CALC: 30.8 % — LOW (ref 39–50)
HGB BLD-MCNC: 10.7 G/DL — LOW (ref 13–17)
MCHC RBC-ENTMCNC: 29.6 PG — SIGNIFICANT CHANGE UP (ref 27–34)
MCHC RBC-ENTMCNC: 34.7 GM/DL — SIGNIFICANT CHANGE UP (ref 32–36)
MCV RBC AUTO: 85.1 FL — SIGNIFICANT CHANGE UP (ref 80–100)
PLATELET # BLD AUTO: 246 K/UL — SIGNIFICANT CHANGE UP (ref 150–400)
POTASSIUM SERPL-MCNC: 4.2 MMOL/L — SIGNIFICANT CHANGE UP (ref 3.5–5.3)
POTASSIUM SERPL-SCNC: 4.2 MMOL/L — SIGNIFICANT CHANGE UP (ref 3.5–5.3)
RBC # BLD: 3.62 M/UL — LOW (ref 4.2–5.8)
RBC # FLD: 12.9 % — SIGNIFICANT CHANGE UP (ref 10.3–14.5)
SODIUM SERPL-SCNC: 137 MMOL/L — SIGNIFICANT CHANGE UP (ref 135–145)
SPECIMEN SOURCE: SIGNIFICANT CHANGE UP
SPECIMEN SOURCE: SIGNIFICANT CHANGE UP
T4 FREE SERPL-MCNC: 1.1 NG/DL — SIGNIFICANT CHANGE UP (ref 0.9–1.8)
TSH SERPL-MCNC: 1.38 UIU/ML — SIGNIFICANT CHANGE UP (ref 0.27–4.2)
WBC # BLD: 13.1 K/UL — HIGH (ref 3.8–10.5)
WBC # FLD AUTO: 13.1 K/UL — HIGH (ref 3.8–10.5)

## 2018-04-14 RX ORDER — ACETAMINOPHEN 500 MG
650 TABLET ORAL ONCE
Qty: 0 | Refills: 0 | Status: COMPLETED | OUTPATIENT
Start: 2018-04-14 | End: 2018-04-14

## 2018-04-14 RX ORDER — ACETAMINOPHEN 500 MG
650 TABLET ORAL EVERY 6 HOURS
Qty: 0 | Refills: 0 | Status: DISCONTINUED | OUTPATIENT
Start: 2018-04-14 | End: 2018-04-19

## 2018-04-14 RX ORDER — HYDROCORTISONE 20 MG
50 TABLET ORAL EVERY 8 HOURS
Qty: 0 | Refills: 0 | Status: DISCONTINUED | OUTPATIENT
Start: 2018-04-14 | End: 2018-04-16

## 2018-04-14 RX ORDER — DIPHENHYDRAMINE HCL 50 MG
50 CAPSULE ORAL ONCE
Qty: 0 | Refills: 0 | Status: COMPLETED | OUTPATIENT
Start: 2018-04-14 | End: 2018-04-14

## 2018-04-14 RX ADMIN — Medication 50 MILLIGRAM(S): at 18:01

## 2018-04-14 RX ADMIN — Medication 50 MILLIGRAM(S): at 02:13

## 2018-04-14 RX ADMIN — Medication 650 MILLIGRAM(S): at 05:27

## 2018-04-14 RX ADMIN — Medication 1 TABLET(S): at 11:38

## 2018-04-14 RX ADMIN — Medication 12.5 MILLIGRAM(S): at 05:27

## 2018-04-14 RX ADMIN — Medication 650 MILLIGRAM(S): at 13:26

## 2018-04-14 RX ADMIN — Medication 5 MILLIGRAM(S): at 11:38

## 2018-04-14 RX ADMIN — AMLODIPINE BESYLATE 2.5 MILLIGRAM(S): 2.5 TABLET ORAL at 05:27

## 2018-04-14 RX ADMIN — Medication 12.5 MILLIGRAM(S): at 18:13

## 2018-04-14 NOTE — CONSULT NOTE ADULT - PROBLEM SELECTOR RECOMMENDATION 3
On meds primary team following up
likely 2/2 hypotension related to HD +/- medication induced  r/o cardiac causes. consider TTE.  neg orthostatics. clinically euvolemic

## 2018-04-14 NOTE — CHART NOTE - NSCHARTNOTEFT_GEN_A_CORE
Called by RN to see pt for hives. Pt seen and examined at bedside. Pt is A&Ox3 found sitting up in bedside chair. Per pt was awakened by pruritis and found small hives on bilateral wrists, right upper arm and bilateral upper thighs. Pt denies any SOB, tongue or lip swelling, difficulty swallowing or breathing. Pt states did not take any new medication or food. Denies any chest pain, palpitations.     Vital Signs Last 24 Hrs  T(C): 36.9 (14 Apr 2018 00:46), Max: 37.4 (13 Apr 2018 05:36)  T(F): 98.4 (14 Apr 2018 00:46), Max: 99.3 (13 Apr 2018 05:36)  HR: 78 (14 Apr 2018 00:46) (75 - 95)  BP: 133/75 (14 Apr 2018 00:46) (122/83 - 135/75)  BP(mean): --  RR: 18 (14 Apr 2018 00:46) (16 - 18)  SpO2: 97% (14 Apr 2018 00:46) (96% - 99%)                        11.3   12.2  )-----------( 212      ( 13 Apr 2018 07:01 )             32.2     04-13    137  |  94<L>  |  48<H>  ----------------------------<  94  4.0   |  27  |  12.57<H>    Ca    9.7      13 Apr 2018 07:01      .Stool Feces  04-13-18   No Protozoa seen by trichrome stain  No Helminths or Protozoa seen in formalin concentrate  performed by iodine stain  (routine O+P not evaluated for Microsporidia,  Cryptosporidia, Cyclospora, or Isospora.)  Note: One negative specimen does not rule  out the possibility of a parasitic infection.  --  --      .Catheter Catheter Tip Internal Jugular  04-13-18   No growth to date.  --  --      .Blood Blood-Peripheral  04-12-18   No growth to date.  --  --      .Blood Blood-Peripheral  04-12-18   No growth to date.  --  --      .Blood Blood-Peripheral  04-10-18   No growth to date.  --  --      PHYSICAL EXAM:     General: NAD, non-toxic appearance  Neuro: NC, AT, PERRLA, no focal deficits  CV: S1 S2 RRR  Resp: B/L Lungs CTA, nonlabored  Abd: soft, NT, ND, + BS X4 quadrants  Ext: no edema, +PP b/l LE, warm to touch   Skin: bilateral medial wrists with less than 10 erythematous hives 0.5cm round, also a couple on upper lateral thighs and right upper arm. None noted on torso front or back.       Assessment & Plan     32 m h/o FSGS s/p renal tx 14 yrs ago with progressive renal failure now on HD x 2 mos tu/th/sat via tunneled catheter presents with recurrent syncope and low bp's during HD - r/o infectious vs cardiac causes and consider ? MANZANARES    Pt now with mild allergic reaction.     No s/s of any distress.   Benadryl 50 IV x 1   Will continue to closely monitor pt.     F/U with primary team in am    Mandy Juarez, ANP-BC  [id number]

## 2018-04-14 NOTE — CHART NOTE - NSCHARTNOTEFT_GEN_A_CORE
Received telephone call from HD RN informing me that pt has temp of 100.7F    31 yo M pmh of FSGS s/p renal tx now failed, and now on HD with progressive fatigue, anorexia, weakness and inability to tolerate HD 2nd syncope and low bp's.    Pt has been followed by ID; no source of fever to date; monitoring off abx; wbc range 10.97-13.77 since admission; today's labs as follow:                          10.7   13.10 )-----------( 246      ( 14 Apr 2018 08:17 )             30.8      137  |  95<L>  |  67<H>  ----------------------------<  96  4.2   |  25  |  16.27<H>    Ca    9.9      14 Apr 2018 07:03      Blood cx x2 sets drawn this am and in lab    Will cont to monitor      RICHARD Mora [id number]

## 2018-04-14 NOTE — PROGRESS NOTE ADULT - ASSESSMENT
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 Renal consulted for HD    ESRD on HD TTS   Hypertension - blood pressure acceptable today,   s/p failed kidney xplant- c/w prednisone 5mg daily. off Prograft  Anemia in CKD-Hb >goal. no indication for JORGE

## 2018-04-14 NOTE — CONSULT NOTE ADULT - PROBLEM SELECTOR RECOMMENDATION 2
ESRD: On HD, continue as scheduled, renal team FU
agree w/dec Norvasc and lopressor doses  watch BP closely. stable since admission  holds meds AM of hd

## 2018-04-14 NOTE — PROGRESS NOTE ADULT - SUBJECTIVE AND OBJECTIVE BOX
Patient seen and examined  no complaints  still having fevers    No Known Allergies    Hospital Medications:   MEDICATIONS  (STANDING):  amLODIPine   Tablet 2.5 milliGRAM(s) Oral daily  heparin  Injectable 5000 Unit(s) SubCutaneous every 12 hours  metoprolol tartrate 12.5 milliGRAM(s) Oral two times a day  Nephro-kenneth 1 Tablet(s) Oral daily  predniSONE   Tablet 5 milliGRAM(s) Oral daily        VITALS:  T(F): 98 (18 @ 10:59), Max: 100.5 (18 @ 05:17)  HR: 76 (18 @ 10:59)  BP: 122/77 (18 @ 10:59)  RR: 18 (18 @ 10:59)  SpO2: 97% (18 @ 10:59)  Wt(kg): --     @ 07:01  -   @ 07:00  --------------------------------------------------------  IN: 837 mL / OUT: 0 mL / NET: 837 mL     @ 07:01  -   @ 13:25  --------------------------------------------------------  IN: 477 mL / OUT: 0 mL / NET: 477 mL        PHYSICAL EXAM:  Constitutional: NAD  HEENT: anicteric sclera, oropharynx clear, MMM  Neck: No JVD  Respiratory: CTAB, no wheezes, rales or rhonchi  Cardiovascular: S1, S2, RRR  Gastrointestinal: BS+, soft, NT/ND  Extremities: No cyanosis or clubbing. No peripheral edema  Neurological: A/O x 3, no focal deficits  Psychiatric: Normal mood, normal affect  : No CVA tenderness. No fuller.   Skin: No rashes  Vascular Access: + PC; + left upper ext avf    LABS:      137  |  95<L>  |  67<H>  ----------------------------<  96  4.2   |  25  |  16.27<H>    Ca    9.9      2018 07:03      Creatinine Trend: 16.27 <--, 12.57 <--, 14.70 <--, 13.93 <--, 21.52 <--, 18.83 <--, 17.50 <--                        10.7   13.10 )-----------( 246      ( 2018 08:17 )             30.8     Urine Studies:  Urinalysis Basic - ( 10 Apr 2018 02:15 )    Color: Yellow / Appearance: Slightly Turbid / S.014 / pH:   Gluc:  / Ketone: Negative  / Bili: Negative / Urobili: Negative mg/dL   Blood:  / Protein: 300 mg/dL / Nitrite: Negative   Leuk Esterase: Negative / RBC: 20 /HPF / WBC 18 /HPF   Sq Epi:  / Non Sq Epi: 9 /HPF / Bacteria: Negative        RADIOLOGY & ADDITIONAL STUDIES:

## 2018-04-14 NOTE — CONSULT NOTE ADULT - PROBLEM SELECTOR RECOMMENDATION 9
Will FU with cortisol levels, if low, will get Cosyntropin stim test, will FU Suggest to start him on stress dose steroids for now, while fever etiology is worked up, will monitor labs and FU

## 2018-04-14 NOTE — PROVIDER CONTACT NOTE (OTHER) - ACTION/TREATMENT ORDERED:
NP to evaluate patient
"ok for patient to receive meds; patient oliguric- tell day team to monitor voiding and collect urine and stool specimen per orders; tell day RN to monitor temp" per provider Kitty.
NP Frida Blanco notified
NP notified. Orders to follow.
PA notified. No new orders.
PA notified. Will continue to monitor.
Reassess oral temp @ 0800 and if febrile give Tylenol and draw Blood Cultures.

## 2018-04-14 NOTE — PROGRESS NOTE ADULT - SUBJECTIVE AND OBJECTIVE BOX
Patient is a 32y old  Male who presents with a chief complaint of syncope during dialysis (08 Apr 2018 09:49)      SUBJECTIVE / OVERNIGHT EVENTS:   Feels better.  Denies CP/SOB/Palpitation/HA.    MEDICATIONS  (STANDING):  amLODIPine   Tablet 2.5 milliGRAM(s) Oral daily  heparin  Injectable 5000 Unit(s) SubCutaneous every 12 hours  hydrocortisone sodium succinate Injectable 50 milliGRAM(s) IV Push every 8 hours  metoprolol tartrate 12.5 milliGRAM(s) Oral two times a day  Nephro-kenneth 1 Tablet(s) Oral daily    MEDICATIONS  (PRN):  acetaminophen   Tablet 650 milliGRAM(s) Oral every 6 hours PRN For Temp greater than 38 C (100.4 F)  ondansetron Injectable 4 milliGRAM(s) IV Push every 6 hours PRN Nausea and/or Vomiting        CAPILLARY BLOOD GLUCOSE        I&O's Summary    13 Apr 2018 07:01  -  14 Apr 2018 07:00  --------------------------------------------------------  IN: 837 mL / OUT: 0 mL / NET: 837 mL    14 Apr 2018 07:01  -  15 Apr 2018 00:56  --------------------------------------------------------  IN: 477 mL / OUT: 0 mL / NET: 477 mL        PHYSICAL EXAM:  GENERAL: NAD, well-developed  HEAD:  Atraumatic, Normocephalic  NECK: Supple, No JVD  CHEST/LUNG: Clear to auscultation bilaterally; No wheezing.  HEART: Regular rate and rhythm; No murmurs, rubs, or gallops  ABDOMEN: Soft, Nontender, Nondistended; Bowel sounds present  EXTREMITIES:   No clubbing, cyanosis, or edema  NEUROLOGY: AAO X 3  SKIN: No rashes    LABS:                        10.7   13.10 )-----------( 246      ( 14 Apr 2018 08:17 )             30.8     04-14    137  |  95<L>  |  67<H>  ----------------------------<  96  4.2   |  25  |  16.27<H>    Ca    9.9      14 Apr 2018 07:03              CAPILLARY BLOOD GLUCOSE        04-13 @ 08:14  Culture-urine --  Culture results   No Protozoa seen by trichrome stain  No Helminths or Protozoa seen in formalin concentrate  performed by iodine stain  (routine O+P not evaluated for Microsporidia,  Cryptosporidia, Cyclospora, or Isospora.)  Note: One negative specimen does not rule  out the possibility of a parasitic infection.  method type --  Organism --  Organism Identification --  Specimen source .Stool Feces  04-13 @ 00:45  Culture-urine --  Culture results   No growth at 48 hours  method type --  Organism --  Organism Identification --  Specimen source .Catheter Catheter Tip Internal Jugular  04-12 @ 18:01  Culture-urine --  Culture results   No growth to date.  method type --  Organism --  Organism Identification --  Specimen source .Blood Blood-Peripheral  04-12 @ 11:17  Culture-urine --  Culture results   No growth to date.  method type --  Organism --  Organism Identification --  Specimen source .Blood Blood-Peripheral  04-10 @ 05:22  Culture-urine --  Culture results   No growth to date.  method type --  Organism --  Organism Identification --  Specimen source .Blood Blood-Peripheral           04-13 @ 08:14  Culture blood --  Culture results   No Protozoa seen by trichrome stain  No Helminths or Protozoa seen in formalin concentrate  performed by iodine stain  (routine O+P not evaluated for Microsporidia,  Cryptosporidia, Cyclospora, or Isospora.)  Note: One negative specimen does not rule  out the possibility of a parasitic infection.  Gram stain --  Gram stain blood --  Method type --  Organism --  Organism identification --  Specimen source .Stool Feces   04-13 @ 00:45  Culture blood --  Culture results   No growth at 48 hours  Gram stain --  Gram stain blood --  Method type --  Organism --  Organism identification --  Specimen source .Catheter Catheter Tip Internal Jugular   04-12 @ 18:01  Culture blood --  Culture results   No growth to date.  Gram stain --  Gram stain blood --  Method type --  Organism --  Organism identification --  Specimen source .Blood Blood-Peripheral   04-12 @ 11:17  Culture blood --  Culture results   No growth to date.  Gram stain --  Gram stain blood --  Method type --  Organism --  Organism identification --  Specimen source .Blood Blood-Peripheral   04-10 @ 05:22  Culture blood --  Culture results   No growth to date.  Gram stain --  Gram stain blood --  Method type --  Organism --  Organism identification --  Specimen source .Blood Blood-Peripheral      RADIOLOGY & ADDITIONAL TESTS:    Imaging Personally Reviewed:    Consultant(s) Notes Reviewed:      Care Discussed with Consultants/Other Providers:

## 2018-04-14 NOTE — CONSULT NOTE ADULT - SUBJECTIVE AND OBJECTIVE BOX
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  Pt and mother give interview.  Pt reportedly had anasarca and hyperkalemia at onset of HD.  They state initially on HD pt felt well and had more energy though had episodes of chest pressure during HD attributed to hyperkalemia at the time.  Pt became euvolemic and states K returned to normal.  Pt also states that when he started HD he stopped taking prednisone and tacrolimus completely (prior was on x 14 yrs).  Two weeks later he told his PMD about this and was instructed to taper his prednisone and take it every 3 days which he has been doing (last Wednesday).  Pt states that he then began to have syncopal episodes with HD and was told 2nd instructions on fluid restriction - his fluid intake was liberalized and less fluid was removed during HD.  He then developed progressive weakness and generalized fatigue with severe cold intolerance - wraps in blankets with two heaters in the room.  Pt now with very poor appetite.  No dizziness on standing.    Pt went for HD last Tuesday.  Pt had HD thursday and reportedly had low bp 3 hours in and then had LOC, did not complete HD session.  pt also states he still has pressure like chest pain 3 hours into HD almost every session.  Since thursday pt has been in bed with minimal PO intake, increased chills.  Was afraid to had HD yesterday 2nd past syncope and came to ED for further evaluation.  Pt states he had htn but since starting HD has had meds titrated down 2nd lower bp's.  No sick contacts.  On disability from school.  had flu shot in January.    In ED HDS.  no fever.  Renal contacted from ED (08 Apr 2018 01:25)  Patient has no history of adrenal insufficiency, has been on steroids in past off steroids now, had weakness.   CKD (chronic kidney disease), stage 4 (severe)  Hypertension  Glomerulonephritis  S/P kidney transplant: Right kidney in 2004      FAMILY HISTORY:  Family history of glomerulonephritis (Uncle)      Social History:    Outpatient Medications:    MEDICATIONS  (STANDING):  amLODIPine   Tablet 2.5 milliGRAM(s) Oral daily  heparin  Injectable 5000 Unit(s) SubCutaneous every 12 hours  metoprolol tartrate 12.5 milliGRAM(s) Oral two times a day  Nephro-kenneth 1 Tablet(s) Oral daily  predniSONE   Tablet 5 milliGRAM(s) Oral daily    MEDICATIONS  (PRN):  acetaminophen   Tablet 650 milliGRAM(s) Oral every 6 hours PRN For Temp greater than 38 C (100.4 F)  ondansetron Injectable 4 milliGRAM(s) IV Push every 6 hours PRN Nausea and/or Vomiting      Allergies    No Known Allergies    Intolerances      Review of Systems:  Constitutional: No fever, no chills  Eyes: No blurry vision  Neuro: No tremors  HEENT: No pain, no neck swelling  Cardiovascular: No chest pain, no palpitations  Respiratory: Has SOB, no cough  GI: No nausea, vomiting, abdominal pain  : No dysuria  Skin: no rash  MSK: Has leg swelling.  Psych: no depression  Endocrine: no polyuria, polydipsia    ALL OTHER SYSTEMS REVIEWED AND NEGATIVE    UNABLE TO OBTAIN    PHYSICAL EXAM:  VITALS: T(C): 38.2 (04-14-18 @ 12:45)  T(F): 100.7 (04-14-18 @ 12:45), Max: 100.7 (04-14-18 @ 12:45)  HR: 94 (04-14-18 @ 12:45) (75 - 94)  BP: 148/88 (04-14-18 @ 12:45) (122/77 - 148/88)  RR:  (16 - 18)  SpO2:  (96% - 99%)  Wt(kg): --  GENERAL: NAD, well-groomed, well-developed  EYES: No proptosis, no lid lag  HEENT:  Atraumatic, Normocephalic  THYROID: Normal size, no palpable nodules  RESPIRATORY: Clear to auscultation bilaterally; No rales, rhonchi, wheezing  CARDIOVASCULAR: Si S2, No murmurs;  GI: Soft, non distended, normal bowel sounds  SKIN: Dry, intact, No rashes or lesions  MUSCULOSKELETAL: Has BL lower extremity edema.  NEURO:  no tremor, sensation decreased in feet BL,                              10.7   13.10 )-----------( 246      ( 14 Apr 2018 08:17 )             30.8       04-14    137  |  95<L>  |  67<H>  ----------------------------<  96  4.2   |  25  |  16.27<H>    EGFR if : 4<L>  EGFR if non : 3<L>    Ca    9.9      04-14        Thyroid Function Tests:  04-08 @ 07:50 TSH 1.61 FreeT4 -- T3 -- Anti TPO -- Anti Thyroglobulin Ab -- TSI --              Radiology: HPI:  32 m h/o FSGS s/p renal tx 14 yrs ago with progressive renal failure now on HD x 2 mos tu/th/sat via tunneled catheter presents with recurrent syncope during HD.  Pt and mother give interview.  Pt reportedly had anasarca and hyperkalemia at onset of HD.  They state initially on HD pt felt well and had more energy though had episodes of chest pressure during HD attributed to hyperkalemia at the time.  Pt became euvolemic and states K returned to normal.  Pt also states that when he started HD he stopped taking prednisone and tacrolimus completely (prior was on x 14 yrs).  Two weeks later he told his PMD about this and was instructed to taper his prednisone and take it every 3 days which he has been doing (last Wednesday).  Pt states that he then began to have syncopal episodes with HD and was told 2nd instructions on fluid restriction - his fluid intake was liberalized and less fluid was removed during HD.  He then developed progressive weakness and generalized fatigue with severe cold intolerance - wraps in blankets with two heaters in the room.  Pt now with very poor appetite.  No dizziness on standing.    Pt went for HD last Tuesday.  Pt had HD thursday and reportedly had low bp 3 hours in and then had LOC, did not complete HD session.  pt also states he still has pressure like chest pain 3 hours into HD almost every session.  Since thursday pt has been in bed with minimal PO intake, increased chills.  Was afraid to had HD yesterday 2nd past syncope and came to ED for further evaluation.  Pt states he had htn but since starting HD has had meds titrated down 2nd lower bp's.  No sick contacts.  On disability from school.  had flu shot in January.    In ED HDS.  no fever.  Renal contacted from ED (08 Apr 2018 01:25)  Patient has no history of adrenal insufficiency, has been on steroids in past on low dose prednisone now, had weakness.   CKD (chronic kidney disease), stage 4 (severe)  Hypertension  Glomerulonephritis  S/P kidney transplant: Right kidney in 2004      FAMILY HISTORY:  Family history of glomerulonephritis (Uncle)      Social History:    Outpatient Medications:    MEDICATIONS  (STANDING):  amLODIPine   Tablet 2.5 milliGRAM(s) Oral daily  heparin  Injectable 5000 Unit(s) SubCutaneous every 12 hours  metoprolol tartrate 12.5 milliGRAM(s) Oral two times a day  Nephro-kenneth 1 Tablet(s) Oral daily  predniSONE   Tablet 5 milliGRAM(s) Oral daily    MEDICATIONS  (PRN):  acetaminophen   Tablet 650 milliGRAM(s) Oral every 6 hours PRN For Temp greater than 38 C (100.4 F)  ondansetron Injectable 4 milliGRAM(s) IV Push every 6 hours PRN Nausea and/or Vomiting      Allergies    No Known Allergies    Intolerances      Review of Systems:  Constitutional: No fever, no chills  Eyes: No blurry vision  Neuro: No tremors  HEENT: No pain, no neck swelling  Cardiovascular: No chest pain, no palpitations  Respiratory: Has SOB, no cough  GI: No nausea, vomiting, abdominal pain  : No dysuria  Skin: no rash  MSK: Has leg swelling.  Psych: no depression  Endocrine: no polyuria, polydipsia    ALL OTHER SYSTEMS REVIEWED AND NEGATIVE    UNABLE TO OBTAIN    PHYSICAL EXAM:  VITALS: T(C): 38.2 (04-14-18 @ 12:45)  T(F): 100.7 (04-14-18 @ 12:45), Max: 100.7 (04-14-18 @ 12:45)  HR: 94 (04-14-18 @ 12:45) (75 - 94)  BP: 148/88 (04-14-18 @ 12:45) (122/77 - 148/88)  RR:  (16 - 18)  SpO2:  (96% - 99%)  Wt(kg): --  GENERAL: NAD, well-groomed, well-developed  EYES: No proptosis, no lid lag  HEENT:  Atraumatic, Normocephalic  THYROID: Normal size, no palpable nodules  RESPIRATORY: Clear to auscultation bilaterally; No rales, rhonchi, wheezing  CARDIOVASCULAR: Si S2, No murmurs;  GI: Soft, non distended, normal bowel sounds  SKIN: Dry, intact, No rashes or lesions  MUSCULOSKELETAL: Has BL lower extremity edema.  NEURO:  no tremor, sensation decreased in feet BL,                              10.7   13.10 )-----------( 246      ( 14 Apr 2018 08:17 )             30.8       04-14    137  |  95<L>  |  67<H>  ----------------------------<  96  4.2   |  25  |  16.27<H>    EGFR if : 4<L>  EGFR if non : 3<L>    Ca    9.9      04-14        Thyroid Function Tests:  04-08 @ 07:50 TSH 1.61 FreeT4 -- T3 -- Anti TPO -- Anti Thyroglobulin Ab -- TSI --              Radiology:

## 2018-04-14 NOTE — PROGRESS NOTE ADULT - ATTENDING COMMENTS
Agree with above assessment and plan as outlined above.    - No need for further inpatient cardiac work up.    Clark Multani MD, [facility], [facility]  [address] [place] [address]  PHONE:  [phone]  BEEPER : [phone] Patient seen and examined, agree with above assessment and plan as transcribed above.      - No need for further inpatient cardiac work up.    Clark Multani MD, [facility]  [facility], [facility]  [address] [place] [address]  PHONE:  [phone]  BEEPER : [phone]

## 2018-04-14 NOTE — PROGRESS NOTE ADULT - SUBJECTIVE AND OBJECTIVE BOX
CC: f/u for fever    Patient reports transient hives earlier, no rash now, feels weak in general    REVIEW OF SYSTEMS:  All other review of systems negative (Comprehensive ROS)    Antimicrobials off  Other Medications Reviewed    Vital Signs Last 24 Hrs  T(F): 100.7 (14 Apr 2018 12:45), Max: 100.7 (14 Apr 2018 12:45)  HR: 94 (14 Apr 2018 12:45) (75 - 94)  BP: 148/88 (14 Apr 2018 12:45) (122/77 - 148/88)  BP(mean): --  RR: 16 (14 Apr 2018 12:45) (16 - 18)  SpO2: 97% (14 Apr 2018 12:45) (96% - 99%)    PHYSICAL EXAM:  General: alert, no acute distress  Eyes:  anicteric, no conjunctival injection, no discharge  Oropharynx: no lesions or injection 	  Neck: supple, without adenopathy  R Permacath site clean  Lungs: clear to auscultation  Heart: regular rate and rhythm; no murmur, rubs or gallops  Abdomen: soft, nondistended, nontender, without mass or organomegaly  Skin: no lesions  Extremities: no clubbing, cyanosis, or edema  Neurologic: alert, oriented, moves all extremities    LAB RESULTS:                        10.7   13.10 )-----------( 246      ( 14 Apr 2018 08:17 )             30.8   04-14    137  |  95<L>  |  67<H>  ----------------------------<  96  4.2   |  25  |  16.27<H>    Ca    9.9      14 Apr 2018 07:03        MICROBIOLOGY:  RECENT CULTURES:  Culture - Catheter (04.13.18 @ 00:45)    Specimen Source: .Catheter Catheter Tip Internal Jugular    Culture Results:   No growth to date.    Culture - Blood (04.12.18 @ 11:17)    Specimen Source: .Blood Blood-Peripheral    Culture Results:   No growth to date.    04-10 @ 05:22 .Blood Blood-Peripheral     No growth to date.    Strongyloides Antibodies: Negative: No detectable levels of IgG antibodies to Strongyloides.  (04.11.18 @ 00:07)    Cytomegalovirus By PCR (04.10.18 @ 09:48)    CMVPCR Log: NotDetected    Ova and Parasites (04.13.18 @ 08:14)    Specimen Source: .Stool Feces    Culture Results:   No Protozoa seen   No Helminths or Protozoa seen      RADIOLOGY REVIEWED:  Xray Chest 2 Views PA/Lat (04.07.18 @ 21:04) >  Clear lungs.

## 2018-04-14 NOTE — CONSULT NOTE ADULT - ASSESSMENT
Assessment  Chronic steroid use/?adrenal insufficiency: 32y Male with no history of adrenal insufficiency has history of chronic steroids use, off prednisone for few weeks,  had weakness syncope, FUO, on HD, although he is stable with normal sodium and BP but still has possibility of adrenal insufficiency due to chronic steroid use, am cortisol ACTH was drawn, results pending.  HTN: Controlled, On med.  ESRD: On hemodialysis, Monitor labs/BMP Assessment  Chronic steroid use/?adrenal insufficiency: 32y Male with no history of adrenal insufficiency has history of chronic steroids use, on low dose prednisone for few weeks,  had weakness syncope, FUO, on HD, although he is stable with normal sodium and BP but still has possibility of adrenal insufficiency due to chronic steroid use, since on steroids can not do Cosyntropin stim test to R/O adrenal insufficiency  ESRD: On hemodialysis, Monitor labs/BMP

## 2018-04-14 NOTE — PROVIDER CONTACT NOTE (OTHER) - RECOMMENDATIONS
Continue plan of care
NP Frida Blanco notified
Notify LOUISE Blanco.
continue to monitor.
education provided on heparin risks/benefits; education provided on voiding and bm to collect specimen; will tell day RN to continue to monitor temp.
if pt febrile BC pending to be drawn
per provider.
per provider.

## 2018-04-14 NOTE — PROGRESS NOTE ADULT - ASSESSMENT
Young man with failed renal transplant, recent start of HD via permacath and rapid prednisone taper, admitted with syncope at dialysis  Weak, poor appetite, low grade fever, labs notable for eosinophilia, low glucose, question of adrenal insufficiency   Back on Prednisone daily  Tmx 100.5  Cxs all negative, CXR clear  Eosinophilia 20%- O&P negative; strongy serology negative; still might look toward adrenal insuff or allergy (hives)    Plan:  Continue approach as FUO   Observe off antibiotics.   Follow temps and CBC/diff  Consider Endocrine eval to address any ? of adrenal insuff  If fever continues, favor CT ABD to complete FUO work up  D/w pt and mother

## 2018-04-14 NOTE — PROGRESS NOTE ADULT - SUBJECTIVE AND OBJECTIVE BOX
pt seen and examined, no complaints on exam.   ROS neg   Hives noted - S/P benadryl    amLODIPine   Tablet 2.5 milliGRAM(s) Oral daily  heparin  Injectable 5000 Unit(s) SubCutaneous every 12 hours  metoprolol tartrate 12.5 milliGRAM(s) Oral two times a day  Nephro-kenneth 1 Tablet(s) Oral daily  ondansetron Injectable 4 milliGRAM(s) IV Push every 6 hours PRN  predniSONE   Tablet 5 milliGRAM(s) Oral daily                            11.3   12.2  )-----------( 212      ( 13 Apr 2018 07:01 )             32.2       Hemoglobin: 11.3 g/dL (04-13 @ 07:01)  Hemoglobin: 11.1 g/dL (04-12 @ 11:24)  Hemoglobin: 11.1 g/dL (04-10 @ 09:48)  Hemoglobin: 11.8 g/dL (04-09 @ 07:46)      04-13    137  |  94<L>  |  48<H>  ----------------------------<  94  4.0   |  27  |  12.57<H>    Ca    9.7      13 Apr 2018 07:01      Creatinine Trend: 12.57<--, 14.70<--, 13.93<--, 21.52<--, 18.83<--, 17.50<--    COAGS:           T(C): 36.9 (04-14-18 @ 00:46), Max: 37.4 (04-13-18 @ 05:36)  HR: 78 (04-14-18 @ 00:46) (75 - 95)  BP: 133/75 (04-14-18 @ 00:46) (122/83 - 135/75)  RR: 18 (04-14-18 @ 00:46) (16 - 18)  SpO2: 97% (04-14-18 @ 00:46) (96% - 99%)  Wt(kg): --    I&O's Summary    12 Apr 2018 07:01  -  13 Apr 2018 07:00  --------------------------------------------------------  IN: 820 mL / OUT: 560 mL / NET: 260 mL    13 Apr 2018 07:01  -  14 Apr 2018 03:39  --------------------------------------------------------  IN: 717 mL / OUT: 0 mL / NET: 717 mL        echo : < from: Transthoracic Echocardiogram (01.08.18 @ 09:10) >  ------------------------------------------------------------------------  Conclusions:  1. Normal mitral valve with redundant chordae. Minimal  mitral regurgitation.  2. Normal trileaflet aortic valve. No aortic valve  regurgitation seen.  3. Moderate concentric left ventricular hypertrophy.  4. Normal left ventricular systolic function. No segmental  wallmotion abnormalities.  5. Normal right ventricular size and function.  *** No previous Echo exam.    < end of copied text >      HEENT:   Normal oral mucosa, PERRL, EOMI	  Lymphatic: No obvious lymphadenopathy , no edema  Cardiovascular: Normal S1 S2, No JVD, 1/6 KYARA murmur, Peripheral pulses palpable 2+ bilaterally  Respiratory: Lungs clear to auscultation, normal effort 	  Gastrointestinal:  Soft, Non-tender, + BS	  Neuro: alert and oriented x 3        ASSESSMENT/PLAN: 	32y Male 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 EKG/echo unremarkable.    Follow up on Blood cultures, off abx at present .    GI / DVT prophylaxis.   keep K>4, mag >2.0   cont low dose BB/ Norvasc - bP stable  Enc supplements with Meals  no s/s of chf on exam  no further CV work up needed at present.  D/W Dr Multani

## 2018-04-15 LAB
ANION GAP SERPL CALC-SCNC: 15 MMOL/L — SIGNIFICANT CHANGE UP (ref 5–17)
BUN SERPL-MCNC: 41 MG/DL — HIGH (ref 7–23)
CALCIUM SERPL-MCNC: 9.7 MG/DL — SIGNIFICANT CHANGE UP (ref 8.4–10.5)
CHLORIDE SERPL-SCNC: 96 MMOL/L — SIGNIFICANT CHANGE UP (ref 96–108)
CO2 SERPL-SCNC: 26 MMOL/L — SIGNIFICANT CHANGE UP (ref 22–31)
CREAT SERPL-MCNC: 10.77 MG/DL — HIGH (ref 0.5–1.3)
CULTURE RESULTS: SIGNIFICANT CHANGE UP
CULTURE RESULTS: SIGNIFICANT CHANGE UP
GLUCOSE SERPL-MCNC: 118 MG/DL — HIGH (ref 70–99)
HCT VFR BLD CALC: 32 % — LOW (ref 39–50)
HGB BLD-MCNC: 11 G/DL — LOW (ref 13–17)
MCHC RBC-ENTMCNC: 29.2 PG — SIGNIFICANT CHANGE UP (ref 27–34)
MCHC RBC-ENTMCNC: 34.4 GM/DL — SIGNIFICANT CHANGE UP (ref 32–36)
MCV RBC AUTO: 84.9 FL — SIGNIFICANT CHANGE UP (ref 80–100)
PLATELET # BLD AUTO: 266 K/UL — SIGNIFICANT CHANGE UP (ref 150–400)
POTASSIUM SERPL-MCNC: 4.7 MMOL/L — SIGNIFICANT CHANGE UP (ref 3.5–5.3)
POTASSIUM SERPL-SCNC: 4.7 MMOL/L — SIGNIFICANT CHANGE UP (ref 3.5–5.3)
RBC # BLD: 3.77 M/UL — LOW (ref 4.2–5.8)
RBC # FLD: 13.2 % — SIGNIFICANT CHANGE UP (ref 10.3–14.5)
SODIUM SERPL-SCNC: 137 MMOL/L — SIGNIFICANT CHANGE UP (ref 135–145)
SPECIMEN SOURCE: SIGNIFICANT CHANGE UP
SPECIMEN SOURCE: SIGNIFICANT CHANGE UP
WBC # BLD: 10.5 K/UL — SIGNIFICANT CHANGE UP (ref 3.8–10.5)
WBC # FLD AUTO: 10.5 K/UL — SIGNIFICANT CHANGE UP (ref 3.8–10.5)

## 2018-04-15 RX ADMIN — Medication 50 MILLIGRAM(S): at 02:45

## 2018-04-15 RX ADMIN — Medication 1 TABLET(S): at 11:33

## 2018-04-15 RX ADMIN — Medication 12.5 MILLIGRAM(S): at 17:39

## 2018-04-15 RX ADMIN — Medication 50 MILLIGRAM(S): at 17:41

## 2018-04-15 RX ADMIN — AMLODIPINE BESYLATE 2.5 MILLIGRAM(S): 2.5 TABLET ORAL at 05:54

## 2018-04-15 RX ADMIN — Medication 50 MILLIGRAM(S): at 10:53

## 2018-04-15 RX ADMIN — Medication 12.5 MILLIGRAM(S): at 05:54

## 2018-04-15 NOTE — PROGRESS NOTE ADULT - ASSESSMENT
Assessment  Chronic steroid use/ adrenal insufficiency: 32y Male with likely  adrenal insufficiency started on stress dose Hydrocortisone 50mg IV q 8 hr, feeling much better, no dizziness.  ESRD: On hemodialysis, Monitor labs/BMP

## 2018-04-15 NOTE — PROGRESS NOTE ADULT - SUBJECTIVE AND OBJECTIVE BOX
Patient is a 32y old  Male who presents with a chief complaint of syncope during dialysis (08 Apr 2018 09:49)      SUBJECTIVE / OVERNIGHT EVENTS:   Feels better.  Denies CP/SOB/Palpitation/HA.    MEDICATIONS  (STANDING):  amLODIPine   Tablet 2.5 milliGRAM(s) Oral daily  heparin  Injectable 5000 Unit(s) SubCutaneous every 12 hours  hydrocortisone sodium succinate Injectable 50 milliGRAM(s) IV Push every 8 hours  metoprolol tartrate 12.5 milliGRAM(s) Oral two times a day  Nephro-kenneth 1 Tablet(s) Oral daily    MEDICATIONS  (PRN):  acetaminophen   Tablet 650 milliGRAM(s) Oral every 6 hours PRN For Temp greater than 38 C (100.4 F)  ondansetron Injectable 4 milliGRAM(s) IV Push every 6 hours PRN Nausea and/or Vomiting        CAPILLARY BLOOD GLUCOSE        I&O's Summary    14 Apr 2018 07:01  -  15 Apr 2018 07:00  --------------------------------------------------------  IN: 647 mL / OUT: 0 mL / NET: 647 mL    15 Apr 2018 07:01  -  15 Apr 2018 22:26  --------------------------------------------------------  IN: 780 mL / OUT: 0 mL / NET: 780 mL        PHYSICAL EXAM:  GENERAL: NAD, well-developed  HEAD:  Atraumatic, Normocephalic  NECK: Supple, No JVD  CHEST/LUNG: Clear to auscultation bilaterally; No wheezing.  HEART: Regular rate and rhythm; No murmurs, rubs, or gallops  ABDOMEN: Soft, Nontender, Nondistended; Bowel sounds present  EXTREMITIES:   No clubbing, cyanosis, or edema  NEUROLOGY: AAO X 3  SKIN: No rashes    LABS:                        11.0   10.50 )-----------( 266      ( 15 Apr 2018 08:24 )             32.0     04-15    137  |  96  |  41<H>  ----------------------------<  118<H>  4.7   |  26  |  10.77<H>    Ca    9.7      15 Apr 2018 06:51              CAPILLARY BLOOD GLUCOSE        04-14 @ 08:55  Culture-urine --  Culture results   No growth to date.  method type --  Organism --  Organism Identification --  Specimen source .Blood Blood-Peripheral  04-13 @ 08:14  Culture-urine --  Culture results   No Protozoa seen by trichrome stain  No Helminths or Protozoa seen in formalin concentrate  performed by iodine stain  (routine O+P not evaluated for Microsporidia,  Cryptosporidia, Cyclospora, or Isospora.)  Note: One negative specimen does not rule  out the possibility of a parasitic infection.  method type --  Organism --  Organism Identification --  Specimen source .Stool Feces  04-13 @ 00:45  Culture-urine --  Culture results   No growth at 48 hours  method type --  Organism --  Organism Identification --  Specimen source .Catheter Catheter Tip Internal Jugular  04-12 @ 18:01  Culture-urine --  Culture results   No growth to date.  method type --  Organism --  Organism Identification --  Specimen source .Blood Blood-Peripheral  04-12 @ 11:17  Culture-urine --  Culture results   No growth to date.  method type --  Organism --  Organism Identification --  Specimen source .Blood Blood-Peripheral  04-10 @ 05:22  Culture-urine --  Culture results   No growth at 5 days.  method type --  Organism --  Organism Identification --  Specimen source .Blood Blood-Peripheral           04-14 @ 08:55  Culture blood --  Culture results   No growth to date.  Gram stain --  Gram stain blood --  Method type --  Organism --  Organism identification --  Specimen source .Blood Blood-Peripheral   04-13 @ 08:14  Culture blood --  Culture results   No Protozoa seen by trichrome stain  No Helminths or Protozoa seen in formalin concentrate  performed by iodine stain  (routine O+P not evaluated for Microsporidia,  Cryptosporidia, Cyclospora, or Isospora.)  Note: One negative specimen does not rule  out the possibility of a parasitic infection.  Gram stain --  Gram stain blood --  Method type --  Organism --  Organism identification --  Specimen source .Stool Feces   04-13 @ 00:45  Culture blood --  Culture results   No growth at 48 hours  Gram stain --  Gram stain blood --  Method type --  Organism --  Organism identification --  Specimen source .Catheter Catheter Tip Internal Jugular   04-12 @ 18:01  Culture blood --  Culture results   No growth to date.  Gram stain --  Gram stain blood --  Method type --  Organism --  Organism identification --  Specimen source .Blood Blood-Peripheral   04-12 @ 11:17  Culture blood --  Culture results   No growth to date.  Gram stain --  Gram stain blood --  Method type --  Organism --  Organism identification --  Specimen source .Blood Blood-Peripheral   04-10 @ 05:22  Culture blood --  Culture results   No growth at 5 days.  Gram stain --  Gram stain blood --  Method type --  Organism --  Organism identification --  Specimen source .Blood Blood-Peripheral      RADIOLOGY & ADDITIONAL TESTS:    Imaging Personally Reviewed:    Consultant(s) Notes Reviewed:      Care Discussed with Consultants/Other Providers:

## 2018-04-15 NOTE — PROGRESS NOTE ADULT - ATTENDING COMMENTS
Agree with above assessment and plan as outlined above.    - ID f/u  - Monitoring off ABx    Clark Multani MD, FACC  [facility], [facility]  [address] [place] [address]  PHONE:  [phone]  BEEPER : [phone]

## 2018-04-15 NOTE — PROGRESS NOTE ADULT - SUBJECTIVE AND OBJECTIVE BOX
pt seen and examined, no complaints on exam.   ROS neg     acetaminophen   Tablet 650 milliGRAM(s) Oral every 6 hours PRN  amLODIPine   Tablet 2.5 milliGRAM(s) Oral daily  heparin  Injectable 5000 Unit(s) SubCutaneous every 12 hours  hydrocortisone sodium succinate Injectable 50 milliGRAM(s) IV Push every 8 hours  metoprolol tartrate 12.5 milliGRAM(s) Oral two times a day  Nephro-kenneth 1 Tablet(s) Oral daily  ondansetron Injectable 4 milliGRAM(s) IV Push every 6 hours PRN                            10.7   13.10 )-----------( 246      ( 14 Apr 2018 08:17 )             30.8       Hemoglobin: 10.7 g/dL (04-14 @ 08:17)  Hemoglobin: 11.3 g/dL (04-13 @ 07:01)  Hemoglobin: 11.1 g/dL (04-12 @ 11:24)  Hemoglobin: 11.1 g/dL (04-10 @ 09:48)      04-14    137  |  95<L>  |  67<H>  ----------------------------<  96  4.2   |  25  |  16.27<H>    Ca    9.9      14 Apr 2018 07:03      Creatinine Trend: 16.27<--, 12.57<--, 14.70<--, 13.93<--, 21.52<--, 18.83<--    COAGS:           T(C): 36.7 (04-15-18 @ 05:00), Max: 38.2 (04-14-18 @ 12:45)  HR: 72 (04-15-18 @ 05:00) (63 - 104)  BP: 136/77 (04-15-18 @ 05:00) (109/71 - 148/88)  RR: 17 (04-15-18 @ 05:00) (16 - 18)  SpO2: 99% (04-15-18 @ 05:00) (96% - 99%)  Wt(kg): --    I&O's Summary    13 Apr 2018 07:01  -  14 Apr 2018 07:00  --------------------------------------------------------  IN: 837 mL / OUT: 0 mL / NET: 837 mL    14 Apr 2018 07:01  -  15 Apr 2018 05:39  --------------------------------------------------------  IN: 477 mL / OUT: 0 mL / NET: 477 mL      HEENT:   Normal oral mucosa, PERRL, EOMI	  Lymphatic: No obvious lymphadenopathy , no edema  Cardiovascular: Normal S1 S2, No JVD, 1/6 KYARA murmur, Peripheral pulses palpable 2+ bilaterally  Respiratory: Lungs clear to auscultation, normal effort 	  Gastrointestinal:  Soft, Non-tender, + BS	  Neuro: alert and oriented x 3        ASSESSMENT/PLAN: 	32y Male 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 EKG/echo unremarkable.    Follow up on Blood cultures, off abx at present . last temp last night    GI / DVT prophylaxis.   keep K>4, mag >2.0   cont low dose BB/ Norvasc - bP stable  Enc supplements with Meals  no s/s of chf on exam  no further CV work up needed at present.  D/W Dr Multani

## 2018-04-15 NOTE — PROGRESS NOTE ADULT - ASSESSMENT
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   Follow temps and CBC/diff  Stress steroids as per Endocrine   Holding on CT ABD for now  D/w Dr Segovia

## 2018-04-15 NOTE — PROGRESS NOTE ADULT - SUBJECTIVE AND OBJECTIVE BOX
Chief complaint  Patient is a 32y old  Male who presents with a chief complaint of syncope during dialysis (08 Apr 2018 09:49)   Review of systems  Patient in bed, looks comfortable, no fever,  feeling better today, no hypoglycemia.    Labs and Fingersticks  CAPILLARY BLOOD GLUCOSE          Anion Gap, Serum: 15 (04-15 @ 06:51)  Anion Gap, Serum: 17 (04-14 @ 07:03)      Calcium, Total Serum: 9.7 (04-15 @ 06:51)  Calcium, Total Serum: 9.9 (04-14 @ 07:03)          04-15    137  |  96  |  41<H>  ----------------------------<  118<H>  4.7   |  26  |  10.77<H>    Ca    9.7      15 Apr 2018 06:51                          11.0   10.50 )-----------( 266      ( 15 Apr 2018 08:24 )             32.0     Medications  MEDICATIONS  (STANDING):  amLODIPine   Tablet 2.5 milliGRAM(s) Oral daily  heparin  Injectable 5000 Unit(s) SubCutaneous every 12 hours  hydrocortisone sodium succinate Injectable 50 milliGRAM(s) IV Push every 8 hours  metoprolol tartrate 12.5 milliGRAM(s) Oral two times a day  Nephro-kenneth 1 Tablet(s) Oral daily      Physical Exam  General: Patient comfortable in bed  Vital Signs Last 12 Hrs  T(F): 97.7 (04-15-18 @ 17:26), Max: 97.9 (04-15-18 @ 13:50)  HR: 68 (04-15-18 @ 17:26) (68 - 73)  BP: 125/77 (04-15-18 @ 17:26) (125/77 - 126/73)  BP(mean): --  RR: 18 (04-15-18 @ 17:26) (18 - 18)  SpO2: 96% (04-15-18 @ 17:26) (95% - 96%)  Neck: No palpable thyroid nodules.  CVS: S1S2, No murmurs  Respiratory: No wheezing, no crepitations  GI: Abdomen soft, bowel sounds positive  Musculoskeletal:  edema lower extremities.   Skin: No skin rashes, no ecchymosis    Diagnostics    Cortisol AM, Serum: AM Sched. Collection: 14-Apr-2018 06:00 (04-13 @ 20:21)  Adrenocorticotropic Hormone, Serum: AM Sched. Collection: 14-Apr-2018 06:00 (04-13 @ 20:21)  Thyroid Stimulating Hormone, Serum: AM Sched. Collection: 14-Apr-2018 06:00 (04-13 @ 20:21)  Free Thyroxine, Serum: AM Sched. Collection: 14-Apr-2018 06:00 (04-13 @ 20:21)

## 2018-04-15 NOTE — PROGRESS NOTE ADULT - SUBJECTIVE AND OBJECTIVE BOX
CC: f/u for fever    Patient reports no further hives or rash; now on stress steroids as per Endocrine    REVIEW OF SYSTEMS:  All other review of systems negative (Comprehensive ROS)    Antimicrobials off  Other Medications Reviewed    Vital Signs Last 24 Hrs  T(F): 97.9 (15 Apr 2018 13:50), Max: 99.9 (14 Apr 2018 16:55)  HR: 73 (15 Apr 2018 13:50) (63 - 104)  BP: 126/73 (15 Apr 2018 13:50) (109/71 - 136/77)  BP(mean): --  RR: 18 (15 Apr 2018 13:50) (17 - 18)  SpO2: 95% (15 Apr 2018 13:50) (95% - 99%)    PHYSICAL EXAM:  General: alert, no acute distress  Eyes:  anicteric, no conjunctival injection, no discharge  Oropharynx: no lesions or injection 	  Neck: supple, without adenopathy  R Permacath site clean  Lungs: clear to auscultation  Heart: regular rate and rhythm; no murmur, rubs or gallops  Abdomen: soft, nondistended, nontender, without mass or organomegaly  Skin: no lesions  Extremities: no clubbing, cyanosis, or edema  Neurologic: alert, oriented, moves all extremities    LAB RESULTS:                        11.0   10.50 )-----------( 266      ( 15 Apr 2018 08:24 )             32.0   04-15    137  |  96  |  41<H>  ----------------------------<  118<H>  4.7   |  26  |  10.77<H>    Ca    9.7      15 Apr 2018 06:51      MICROBIOLOGY:  RECENT CULTURES:  Culture - Catheter (04.13.18 @ 00:45)    Specimen Source: .Catheter Catheter Tip Internal Jugular    Culture Results:   No growth to date.    Culture - Blood (04.12.18 @ 11:17)    Specimen Source: .Blood Blood-Peripheral    Culture Results:   No growth to date.    04-10 @ 05:22 .Blood Blood-Peripheral     No growth to date.    Strongyloides Antibodies: Negative: No detectable levels of IgG antibodies to Strongyloides.  (04.11.18 @ 00:07)    Cytomegalovirus By PCR (04.10.18 @ 09:48)    CMVPCR Log: NotDetected    Ova and Parasites (04.13.18 @ 08:14)    Specimen Source: .Stool Feces    Culture Results:   No Protozoa seen   No Helminths or Protozoa seen      RADIOLOGY REVIEWED:  Xray Chest 2 Views PA/Lat (04.07.18 @ 21:04) >  Clear lungs.

## 2018-04-16 ENCOUNTER — APPOINTMENT (OUTPATIENT)
Dept: OPHTHALMOLOGY | Facility: CLINIC | Age: 33
End: 2018-04-16

## 2018-04-16 LAB
ANION GAP SERPL CALC-SCNC: 19 MMOL/L — HIGH (ref 5–17)
BUN SERPL-MCNC: 70 MG/DL — HIGH (ref 7–23)
CALCIUM SERPL-MCNC: 9.8 MG/DL — SIGNIFICANT CHANGE UP (ref 8.4–10.5)
CHLORIDE SERPL-SCNC: 92 MMOL/L — LOW (ref 96–108)
CO2 SERPL-SCNC: 25 MMOL/L — SIGNIFICANT CHANGE UP (ref 22–31)
CREAT SERPL-MCNC: 13.76 MG/DL — HIGH (ref 0.5–1.3)
GLUCOSE SERPL-MCNC: 113 MG/DL — HIGH (ref 70–99)
HCT VFR BLD CALC: 29.3 % — LOW (ref 39–50)
HGB BLD-MCNC: 10.5 G/DL — LOW (ref 13–17)
MAGNESIUM SERPL-MCNC: 2.6 MG/DL — SIGNIFICANT CHANGE UP (ref 1.6–2.6)
MCHC RBC-ENTMCNC: 30 PG — SIGNIFICANT CHANGE UP (ref 27–34)
MCHC RBC-ENTMCNC: 35.8 GM/DL — SIGNIFICANT CHANGE UP (ref 32–36)
MCV RBC AUTO: 83.7 FL — SIGNIFICANT CHANGE UP (ref 80–100)
PHOSPHATE SERPL-MCNC: 2.8 MG/DL — SIGNIFICANT CHANGE UP (ref 2.5–4.5)
PLATELET # BLD AUTO: 290 K/UL — SIGNIFICANT CHANGE UP (ref 150–400)
POTASSIUM SERPL-MCNC: 4.7 MMOL/L — SIGNIFICANT CHANGE UP (ref 3.5–5.3)
POTASSIUM SERPL-SCNC: 4.7 MMOL/L — SIGNIFICANT CHANGE UP (ref 3.5–5.3)
RBC # BLD: 3.5 M/UL — LOW (ref 4.2–5.8)
RBC # FLD: 13.2 % — SIGNIFICANT CHANGE UP (ref 10.3–14.5)
SODIUM SERPL-SCNC: 136 MMOL/L — SIGNIFICANT CHANGE UP (ref 135–145)
TACROLIMUS SERPL-MCNC: <2 NG/ML — SIGNIFICANT CHANGE UP
WBC # BLD: 15.11 K/UL — HIGH (ref 3.8–10.5)
WBC # FLD AUTO: 15.11 K/UL — HIGH (ref 3.8–10.5)

## 2018-04-16 RX ORDER — BACLOFEN 100 %
10 POWDER (GRAM) MISCELLANEOUS ONCE
Qty: 0 | Refills: 0 | Status: COMPLETED | OUTPATIENT
Start: 2018-04-16 | End: 2018-04-16

## 2018-04-16 RX ORDER — HYDROCORTISONE 20 MG
25 TABLET ORAL EVERY 8 HOURS
Qty: 0 | Refills: 0 | Status: DISCONTINUED | OUTPATIENT
Start: 2018-04-16 | End: 2018-04-18

## 2018-04-16 RX ADMIN — Medication 10 MILLIGRAM(S): at 13:35

## 2018-04-16 RX ADMIN — AMLODIPINE BESYLATE 2.5 MILLIGRAM(S): 2.5 TABLET ORAL at 06:02

## 2018-04-16 RX ADMIN — Medication 12.5 MILLIGRAM(S): at 18:48

## 2018-04-16 RX ADMIN — Medication 50 MILLIGRAM(S): at 02:15

## 2018-04-16 RX ADMIN — Medication 1 TABLET(S): at 11:40

## 2018-04-16 RX ADMIN — Medication 12.5 MILLIGRAM(S): at 06:02

## 2018-04-16 RX ADMIN — Medication 25 MILLIGRAM(S): at 22:10

## 2018-04-16 RX ADMIN — Medication 50 MILLIGRAM(S): at 10:30

## 2018-04-16 NOTE — PROGRESS NOTE ADULT - ATTENDING COMMENTS
Patient seen and examined, agree with above assessment and plan as transcribed above.    - monitor off Abx per ID  - No need for further inpatient cardiac work up.  - cont supportive cafe per med    Clark Multani MD, [facility], [facility]  [address] [place] [address]  PHONE:  [phone]  BEEPER : [phone]

## 2018-04-16 NOTE — PROGRESS NOTE ADULT - SUBJECTIVE AND OBJECTIVE BOX
Chief complaint  Patient is a 32y old  Male who presents with a chief complaint of syncope during dialysis (08 Apr 2018 09:49)   Review of systems  Patient in bed, looks comfortable, no fever, no hypoglycemia.    Labs and Fingersticks  CAPILLARY BLOOD GLUCOSE          Anion Gap, Serum: 19 <H> (04-16 @ 07:18)  Anion Gap, Serum: 15 (04-15 @ 06:51)      Calcium, Total Serum: 9.8 (04-16 @ 07:18)  Calcium, Total Serum: 9.7 (04-15 @ 06:51)          04-16    136  |  92<L>  |  70<H>  ----------------------------<  113<H>  4.7   |  25  |  13.76<H>    Ca    9.8      16 Apr 2018 07:18  Phos  2.8     04-16  Mg     2.6     04-16                          10.5   15.11 )-----------( 290      ( 16 Apr 2018 09:27 )             29.3     Medications  MEDICATIONS  (STANDING):  amLODIPine   Tablet 2.5 milliGRAM(s) Oral daily  heparin  Injectable 5000 Unit(s) SubCutaneous every 12 hours  hydrocortisone sodium succinate Injectable 25 milliGRAM(s) IV Push every 8 hours  metoprolol tartrate 12.5 milliGRAM(s) Oral two times a day  Nephro-kenneth 1 Tablet(s) Oral daily      Physical Exam  General: Patient comfortable in bed  Vital Signs Last 12 Hrs  T(F): 97.8 (04-16-18 @ 12:09), Max: 97.9 (04-16-18 @ 05:59)  HR: 88 (04-16-18 @ 12:09) (77 - 88)  BP: 142/94 (04-16-18 @ 12:09) (132/86 - 142/94)  BP(mean): --  RR: 18 (04-16-18 @ 12:09) (18 - 18)  SpO2: 95% (04-16-18 @ 12:09) (95% - 98%)  Neck: No palpable thyroid nodules.  CVS: S1S2, No murmurs  Respiratory: No wheezing, no crepitations  GI: Abdomen soft, bowel sounds positive  Musculoskeletal:  edema lower extremities.   Skin: No skin rashes, no ecchymosis    Diagnostics    Cortisol AM, Serum: AM Sched. Collection: 14-Apr-2018 06:00 (04-13 @ 20:21)  Adrenocorticotropic Hormone, Serum: AM Sched. Collection: 14-Apr-2018 06:00 (04-13 @ 20:21)  Thyroid Stimulating Hormone, Serum: AM Sched. Collection: 14-Apr-2018 06:00 (04-13 @ 20:21)  Free Thyroxine, Serum: AM Sched. Collection: 14-Apr-2018 06:00 (04-13 @ 20:21)

## 2018-04-16 NOTE — PROGRESS NOTE ADULT - ASSESSMENT
Assessment  Chronic steroid use/ adrenal insufficiency: 32y Male with likely  adrenal insufficiency,  on stress dose Hydrocortisone 50mg IV q 8 hr, feeling much better, no dizziness.  ESRD: On hemodialysis, Monitor labs/BMP

## 2018-04-16 NOTE — PROGRESS NOTE ADULT - SUBJECTIVE AND OBJECTIVE BOX
Patient seen and examined  no complaints    No Known Allergies    Hospital Medications:   MEDICATIONS  (STANDING):  amLODIPine   Tablet 2.5 milliGRAM(s) Oral daily  baclofen 10 milliGRAM(s) Oral once  heparin  Injectable 5000 Unit(s) SubCutaneous every 12 hours  hydrocortisone sodium succinate Injectable 25 milliGRAM(s) IV Push every 8 hours  metoprolol tartrate 12.5 milliGRAM(s) Oral two times a day  Nephro-kenneth 1 Tablet(s) Oral daily    VITALS:  T(F): 97.8 (18 @ 12:09), Max: 97.9 (04-15-18 @ 13:50)  HR: 88 (18 @ 12:09)  BP: 142/94 (18 @ 12:09)  RR: 18 (18 @ 12:09)  SpO2: 95% (18 @ 12:09)  Wt(kg): --    04-15 @ 07:01  -   @ 07:00  --------------------------------------------------------  IN: 1020 mL / OUT: 0 mL / NET: 1020 mL     @ 07:01  -   @ 13:10  --------------------------------------------------------  IN: 300 mL / OUT: 0 mL / NET: 300 mL      PHYSICAL EXAM:  Constitutional: NAD  HEENT: anicteric sclera, oropharynx clear, MMM  Neck: No JVD  Respiratory: CTAB, no wheezes, rales or rhonchi  Cardiovascular: S1, S2, RRR  Gastrointestinal: BS+, soft, NT/ND  Extremities: No cyanosis or clubbing. No peripheral edema  Neurological: A/O x 3, no focal deficits  Psychiatric: Normal mood, normal affect  : No CVA tenderness. No fuller.   Skin: No rashes  Vascular Access: + PC; + left upper ext avf  LABS:      136  |  92<L>  |  70<H>  ----------------------------<  113<H>  4.7   |  25  |  13.76<H>    Ca    9.8      2018 07:18  Phos  2.8     04-16  Mg     2.6     04-16      Creatinine Trend: 13.76 <--, 10.77 <--, 16.27 <--, 12.57 <--, 14.70 <--, 13.93 <--                        10.5   15.11 )-----------( 290      ( 2018 09:27 )             29.3     Urine Studies:  Urinalysis Basic - ( 10 Apr 2018 02:15 )    Color: Yellow / Appearance: Slightly Turbid / S.014 / pH:   Gluc:  / Ketone: Negative  / Bili: Negative / Urobili: Negative mg/dL   Blood:  / Protein: 300 mg/dL / Nitrite: Negative   Leuk Esterase: Negative / RBC: 20 /HPF / WBC 18 /HPF   Sq Epi:  / Non Sq Epi: 9 /HPF / Bacteria: Negative        RADIOLOGY & ADDITIONAL STUDIES:

## 2018-04-16 NOTE — PROGRESS NOTE ADULT - ASSESSMENT
· Assessment	  32 m FSGS s/p renal tx now failed on HD with progressive fatigue, anorexia, weakness and inability to tolerate HD 2nd syncope and low bp's - r/o infectious vs cardiac causes and consider ? MANZANARES    Suspected Adrenal insufficiency:  IV steroids    Fever:  ID f/up noted.  No abx.       Problem/Plan - 4:  ·  Problem: Hypertension, unspecified type.  Plan: Norvasc/Metoprolol    ESRD:  On HD  Nephro f/up noted.

## 2018-04-16 NOTE — PROGRESS NOTE ADULT - SUBJECTIVE AND OBJECTIVE BOX
pt seen and examined, no complaints on exam.     no chest pain , palpitation on exam      acetaminophen   Tablet 650 milliGRAM(s) Oral every 6 hours PRN  amLODIPine   Tablet 2.5 milliGRAM(s) Oral daily  heparin  Injectable 5000 Unit(s) SubCutaneous every 12 hours  hydrocortisone sodium succinate Injectable 50 milliGRAM(s) IV Push every 8 hours  metoprolol tartrate 12.5 milliGRAM(s) Oral two times a day  Nephro-kenneth 1 Tablet(s) Oral daily  ondansetron Injectable 4 milliGRAM(s) IV Push every 6 hours PRN                            11.0   10.50 )-----------( 266      ( 15 Apr 2018 08:24 )             32.0       Hemoglobin: 11.0 g/dL (04-15 @ 08:24)  Hemoglobin: 10.7 g/dL (04-14 @ 08:17)  Hemoglobin: 11.3 g/dL (04-13 @ 07:01)  Hemoglobin: 11.1 g/dL (04-12 @ 11:24)      04-15    137  |  96  |  41<H>  ----------------------------<  118<H>  4.7   |  26  |  10.77<H>    Ca    9.7      15 Apr 2018 06:51      Creatinine Trend: 10.77<--, 16.27<--, 12.57<--, 14.70<--, 13.93<--, 21.52<--    COAGS:           T(C): 36.6 (04-15-18 @ 23:20), Max: 36.7 (04-15-18 @ 05:00)  HR: 68 (04-15-18 @ 23:20) (68 - 74)  BP: 130/79 (04-15-18 @ 23:20) (119/76 - 136/77)  RR: 18 (04-15-18 @ 23:20) (17 - 18)  SpO2: 96% (04-15-18 @ 23:20) (95% - 99%)  Wt(kg): --    I&O's Summary    14 Apr 2018 07:01  -  15 Apr 2018 07:00  --------------------------------------------------------  IN: 647 mL / OUT: 0 mL / NET: 647 mL    15 Apr 2018 07:01  -  16 Apr 2018 04:02  --------------------------------------------------------  IN: 900 mL / OUT: 0 mL / NET: 900 mL      HEENT:   Normal oral mucosa, PERRL, EOMI	  Lymphatic: No obvious lymphadenopathy , no edema  Cardiovascular: Normal S1 S2, No JVD, 1/6 KYARA murmur, Peripheral pulses palpable 2+ bilaterally  Respiratory: Lungs clear to auscultation, normal effort 	  Gastrointestinal:  Soft, Non-tender, + BS	  Neuro: alert and oriented x 3        ASSESSMENT/PLAN: 	32y Male 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 EKG/echo unremarkable.    Off Abx , monitor  per ID.   GI / DVT prophylaxis.   keep K>4, mag >2.0   Steroids per Endo   cont low dose BB/ Norvasc - bP stable  Enc supplements with Meals  no s/s of chf on exam  no further CV work up needed at present.  D/W Dr Multani

## 2018-04-16 NOTE — PROGRESS NOTE ADULT - SUBJECTIVE AND OBJECTIVE BOX
Patient is a 32y old  Male who presents with a chief complaint of syncope during dialysis (08 Apr 2018 09:49)      SUBJECTIVE / OVERNIGHT EVENTS:   Feels better.  Denies CP/SOB/Palpitation/HA.    MEDICATIONS  (STANDING):  amLODIPine   Tablet 2.5 milliGRAM(s) Oral daily  heparin  Injectable 5000 Unit(s) SubCutaneous every 12 hours  hydrocortisone sodium succinate Injectable 25 milliGRAM(s) IV Push every 8 hours  metoprolol tartrate 12.5 milliGRAM(s) Oral two times a day  Nephro-kenneth 1 Tablet(s) Oral daily    MEDICATIONS  (PRN):  acetaminophen   Tablet 650 milliGRAM(s) Oral every 6 hours PRN For Temp greater than 38 C (100.4 F)  ondansetron Injectable 4 milliGRAM(s) IV Push every 6 hours PRN Nausea and/or Vomiting        CAPILLARY BLOOD GLUCOSE        I&O's Summary    15 Apr 2018 07:01  -  16 Apr 2018 07:00  --------------------------------------------------------  IN: 1020 mL / OUT: 0 mL / NET: 1020 mL    16 Apr 2018 07:01  -  16 Apr 2018 16:06  --------------------------------------------------------  IN: 600 mL / OUT: 0 mL / NET: 600 mL        PHYSICAL EXAM:  GENERAL: NAD, well-developed  HEAD:  Atraumatic, Normocephalic  NECK: Supple, No JVD  CHEST/LUNG: Clear to auscultation bilaterally; No wheezing.  HEART: Regular rate and rhythm; No murmurs, rubs, or gallops  ABDOMEN: Soft, Nontender, Nondistended; Bowel sounds present  EXTREMITIES:   No clubbing, cyanosis, or edema  NEUROLOGY: AAO X 3  SKIN: No rashes    LABS:                        10.5   15.11 )-----------( 290      ( 16 Apr 2018 09:27 )             29.3     04-16    136  |  92<L>  |  70<H>  ----------------------------<  113<H>  4.7   |  25  |  13.76<H>    Ca    9.8      16 Apr 2018 07:18  Phos  2.8     04-16  Mg     2.6     04-16              CAPILLARY BLOOD GLUCOSE        04-14 @ 08:55  Culture-urine --  Culture results   No growth to date.  method type --  Organism --  Organism Identification --  Specimen source .Blood Blood-Peripheral  04-13 @ 08:14  Culture-urine --  Culture results   No Protozoa seen by trichrome stain  No Helminths or Protozoa seen in formalin concentrate  performed by iodine stain  (routine O+P not evaluated for Microsporidia,  Cryptosporidia, Cyclospora, or Isospora.)  Note: One negative specimen does not rule  out the possibility of a parasitic infection.  method type --  Organism --  Organism Identification --  Specimen source .Stool Feces  04-13 @ 00:45  Culture-urine --  Culture results   No growth at 48 hours  method type --  Organism --  Organism Identification --  Specimen source .Catheter Catheter Tip Internal Jugular  04-12 @ 18:01  Culture-urine --  Culture results   No growth to date.  method type --  Organism --  Organism Identification --  Specimen source .Blood Blood-Peripheral  04-12 @ 11:17  Culture-urine --  Culture results   No growth to date.  method type --  Organism --  Organism Identification --  Specimen source .Blood Blood-Peripheral  04-10 @ 05:22  Culture-urine --  Culture results   No growth at 5 days.  method type --  Organism --  Organism Identification --  Specimen source .Blood Blood-Peripheral           04-14 @ 08:55  Culture blood --  Culture results   No growth to date.  Gram stain --  Gram stain blood --  Method type --  Organism --  Organism identification --  Specimen source .Blood Blood-Peripheral   04-13 @ 08:14  Culture blood --  Culture results   No Protozoa seen by trichrome stain  No Helminths or Protozoa seen in formalin concentrate  performed by iodine stain  (routine O+P not evaluated for Microsporidia,  Cryptosporidia, Cyclospora, or Isospora.)  Note: One negative specimen does not rule  out the possibility of a parasitic infection.  Gram stain --  Gram stain blood --  Method type --  Organism --  Organism identification --  Specimen source .Stool Feces   04-13 @ 00:45  Culture blood --  Culture results   No growth at 48 hours  Gram stain --  Gram stain blood --  Method type --  Organism --  Organism identification --  Specimen source .Catheter Catheter Tip Internal Jugular   04-12 @ 18:01  Culture blood --  Culture results   No growth to date.  Gram stain --  Gram stain blood --  Method type --  Organism --  Organism identification --  Specimen source .Blood Blood-Peripheral   04-12 @ 11:17  Culture blood --  Culture results   No growth to date.  Gram stain --  Gram stain blood --  Method type --  Organism --  Organism identification --  Specimen source .Blood Blood-Peripheral   04-10 @ 05:22  Culture blood --  Culture results   No growth at 5 days.  Gram stain --  Gram stain blood --  Method type --  Organism --  Organism identification --  Specimen source .Blood Blood-Peripheral      RADIOLOGY & ADDITIONAL TESTS:    Imaging Personally Reviewed:    Consultant(s) Notes Reviewed:      Care Discussed with Consultants/Other Providers:

## 2018-04-16 NOTE — PROGRESS NOTE ADULT - ASSESSMENT
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 Renal consulted for HD    ESRD on HD TTS   Hypertension -  s/p failed kidney xplant- c/w prednisone 5mg daily. off Prograft  Anemia in CKD-Hb >goal. no indication for JORGE

## 2018-04-17 LAB
ANION GAP SERPL CALC-SCNC: 20 MMOL/L — HIGH (ref 5–17)
BUN SERPL-MCNC: 102 MG/DL — HIGH (ref 7–23)
CALCIUM SERPL-MCNC: 10 MG/DL — SIGNIFICANT CHANGE UP (ref 8.4–10.5)
CHLORIDE SERPL-SCNC: 92 MMOL/L — LOW (ref 96–108)
CO2 SERPL-SCNC: 25 MMOL/L — SIGNIFICANT CHANGE UP (ref 22–31)
CREAT SERPL-MCNC: 16.63 MG/DL — HIGH (ref 0.5–1.3)
CULTURE RESULTS: SIGNIFICANT CHANGE UP
CULTURE RESULTS: SIGNIFICANT CHANGE UP
GLUCOSE SERPL-MCNC: 104 MG/DL — HIGH (ref 70–99)
POTASSIUM SERPL-MCNC: 4.2 MMOL/L — SIGNIFICANT CHANGE UP (ref 3.5–5.3)
POTASSIUM SERPL-SCNC: 4.2 MMOL/L — SIGNIFICANT CHANGE UP (ref 3.5–5.3)
SODIUM SERPL-SCNC: 137 MMOL/L — SIGNIFICANT CHANGE UP (ref 135–145)
SPECIMEN SOURCE: SIGNIFICANT CHANGE UP
SPECIMEN SOURCE: SIGNIFICANT CHANGE UP

## 2018-04-17 RX ORDER — BACLOFEN 100 %
10 POWDER (GRAM) MISCELLANEOUS ONCE
Qty: 0 | Refills: 0 | Status: COMPLETED | OUTPATIENT
Start: 2018-04-17 | End: 2018-04-17

## 2018-04-17 RX ORDER — ACETAMINOPHEN 500 MG
650 TABLET ORAL EVERY 6 HOURS
Qty: 0 | Refills: 0 | Status: DISCONTINUED | OUTPATIENT
Start: 2018-04-17 | End: 2018-04-19

## 2018-04-17 RX ADMIN — Medication 1 TABLET(S): at 14:03

## 2018-04-17 RX ADMIN — HEPARIN SODIUM 5000 UNIT(S): 5000 INJECTION INTRAVENOUS; SUBCUTANEOUS at 19:06

## 2018-04-17 RX ADMIN — Medication 25 MILLIGRAM(S): at 22:25

## 2018-04-17 RX ADMIN — Medication 650 MILLIGRAM(S): at 19:10

## 2018-04-17 RX ADMIN — Medication 12.5 MILLIGRAM(S): at 05:28

## 2018-04-17 RX ADMIN — Medication 12.5 MILLIGRAM(S): at 19:11

## 2018-04-17 RX ADMIN — Medication 25 MILLIGRAM(S): at 05:28

## 2018-04-17 RX ADMIN — Medication 10 MILLIGRAM(S): at 15:00

## 2018-04-17 RX ADMIN — Medication 25 MILLIGRAM(S): at 14:03

## 2018-04-17 RX ADMIN — AMLODIPINE BESYLATE 2.5 MILLIGRAM(S): 2.5 TABLET ORAL at 05:28

## 2018-04-17 NOTE — PROGRESS NOTE ADULT - SUBJECTIVE AND OBJECTIVE BOX
CC: f/u for fever    Patient reports no new c/o    REVIEW OF SYSTEMS:  All other review of systems negative (Comprehensive ROS)    Antimicrobials off      Other Medications Reviewed    Vital Signs Last 24 Hrs  T(C): 36.6 (17 Apr 2018 12:14), Max: 36.9 (17 Apr 2018 00:15)  T(F): 97.9 (17 Apr 2018 12:14), Max: 98.4 (17 Apr 2018 00:15)  HR: 69 (17 Apr 2018 12:14) (64 - 77)  BP: 136/86 (17 Apr 2018 12:14) (118/76 - 136/86)  BP(mean): --  RR: 18 (17 Apr 2018 12:14) (18 - 18)  SpO2: 96% (17 Apr 2018 12:14) (95% - 99%)    PHYSICAL EXAM:  General: alert, no acute distress  Eyes:  anicteric, no conjunctival injection, no discharge  Oropharynx: no lesions or injection 	  Neck: supple, without adenopathy  R Permacath site clean  Lungs: clear to auscultation  Heart: regular rate and rhythm; no murmur, rubs or gallops  Abdomen: soft, nondistended, nontender, without mass or organomegaly  Skin: no lesions  Extremities: no clubbing, cyanosis, or edema  Neurologic: alert, oriented, moves all extremities    LAB RESULTS:                                         10.5   15.11 )-----------( 290      ( 16 Apr 2018 09:27 )             29.3   04-17    137  |  92<L>  |  102<H>  ----------------------------<  104<H>  4.2   |  25  |  16.63<H>    Ca    10.0      17 Apr 2018 06:37  Phos  2.8     04-16  Mg     2.6     04-16          MICROBIOLOGY:  RECENT CULTURES REVIEWED      RADIOLOGY REVIEWED CC: f/u for fever    Patient reports no new c/o, is seen in HD, no new c/o    REVIEW OF SYSTEMS:  All other review of systems negative (Comprehensive ROS)    Antimicrobials off      Other Medications Reviewed    Vital Signs Last 24 Hrs  T(C): 36.6 (17 Apr 2018 12:14), Max: 36.9 (17 Apr 2018 00:15)  T(F): 97.9 (17 Apr 2018 12:14), Max: 98.4 (17 Apr 2018 00:15)  HR: 69 (17 Apr 2018 12:14) (64 - 77)  BP: 136/86 (17 Apr 2018 12:14) (118/76 - 136/86)  BP(mean): --  RR: 18 (17 Apr 2018 12:14) (18 - 18)  SpO2: 96% (17 Apr 2018 12:14) (95% - 99%)    PHYSICAL EXAM:  General: alert, no acute distress  Eyes:  anicteric, no conjunctival injection, no discharge  Oropharynx: no lesions or injection 	  Neck: supple, without adenopathy  R Permacath site clean  Lungs: clear to auscultation  Heart: regular rate and rhythm; no murmur, rubs or gallops  Abdomen: soft, nondistended, nontender, without mass or organomegaly  Skin: no lesions  Extremities: no clubbing, cyanosis, or edema  Neurologic: alert, oriented, moves all extremities    LAB RESULTS:                                         10.5   15.11 )-----------( 290      ( 16 Apr 2018 09:27 )             29.3   04-17    137  |  92<L>  |  102<H>  ----------------------------<  104<H>  4.2   |  25  |  16.63<H>    Ca    10.0      17 Apr 2018 06:37  Phos  2.8     04-16  Mg     2.6     04-16          MICROBIOLOGY:  RECENT CULTURES REVIEWED      RADIOLOGY REVIEWED

## 2018-04-17 NOTE — PROGRESS NOTE ADULT - ASSESSMENT
32 m h/o ESRD 2/2 FSGS s/p renal tx 2004, s/p failure, now on HD x 2 mos, tu/th/sat at [facility], well known to me, presented to ER last night with c/o hypotension and syncope  now with adrenal insufficiency on IV steroids    ESRD on HD TTS   Hypertension -  s/p failed kidney xplant- off prednisone 5mg daily on IV hydrocortisone. off Prograft  Anemia in CKD-Hb >goal. no indication for JORGE

## 2018-04-17 NOTE — PROGRESS NOTE ADULT - SUBJECTIVE AND OBJECTIVE BOX
Chief complaint  Patient is a 32y old  Male who presents with a chief complaint of syncope during dialysis (08 Apr 2018 09:49)   Review of systems  Patient in bed, looks comfortable, no fever, no hypoglycemia.    Labs and Fingersticks  CAPILLARY BLOOD GLUCOSE          Anion Gap, Serum: 20 <H> (04-17 @ 06:37)  Anion Gap, Serum: 19 <H> (04-16 @ 07:18)      Calcium, Total Serum: 10.0 (04-17 @ 06:37)  Calcium, Total Serum: 9.8 (04-16 @ 07:18)          04-17    137  |  92<L>  |  102<H>  ----------------------------<  104<H>  4.2   |  25  |  16.63<H>    Ca    10.0      17 Apr 2018 06:37  Phos  2.8     04-16  Mg     2.6     04-16                          10.5   15.11 )-----------( 290      ( 16 Apr 2018 09:27 )             29.3     Medications  MEDICATIONS  (STANDING):  amLODIPine   Tablet 2.5 milliGRAM(s) Oral daily  baclofen 10 milliGRAM(s) Oral once  heparin  Injectable 5000 Unit(s) SubCutaneous every 12 hours  hydrocortisone sodium succinate Injectable 25 milliGRAM(s) IV Push every 8 hours  metoprolol tartrate 12.5 milliGRAM(s) Oral two times a day  Nephro-kenneth 1 Tablet(s) Oral daily      Physical Exam  General: Patient comfortable in bed  Vital Signs Last 12 Hrs  T(F): 97.8 (04-17-18 @ 18:10), Max: 98.1 (04-17-18 @ 14:25)  HR: 81 (04-17-18 @ 19:11) (69 - 81)  BP: 141/100 (04-17-18 @ 19:11) (136/86 - 167/84)  BP(mean): --  RR: 18 (04-17-18 @ 18:10) (17 - 18)  SpO2: 95% (04-17-18 @ 18:10) (95% - 97%)  Neck: No palpable thyroid nodules.  CVS: S1S2, No murmurs  Respiratory: No wheezing, no crepitations  GI: Abdomen soft, bowel sounds positive  Musculoskeletal:  edema lower extremities.   Skin: No skin rashes, no ecchymosis    Diagnostics    Cortisol AM, Serum: AM Sched. Collection: 14-Apr-2018 06:00 (04-13 @ 20:21)  Adrenocorticotropic Hormone, Serum: AM Sched. Collection: 14-Apr-2018 06:00 (04-13 @ 20:21)  Thyroid Stimulating Hormone, Serum: AM Sched. Collection: 14-Apr-2018 06:00 (04-13 @ 20:21)  Free Thyroxine, Serum: AM Sched. Collection: 14-Apr-2018 06:00 (04-13 @ 20:21)

## 2018-04-17 NOTE — PROGRESS NOTE ADULT - ASSESSMENT
Assessment  Chronic steroid use/ adrenal insufficiency: 32y Male with likely  adrenal insufficiency,  on stress dose Hydrocortisone being tapered down, stable, no dizziness.  ESRD: On hemodialysis, Monitor labs/BMP

## 2018-04-17 NOTE — PROGRESS NOTE ADULT - ATTENDING COMMENTS
Agree with above assessment and plan as outlined above.    - ID f/u  - Monitoring off Abx    Clark Multani MD, FACC  [facility], [facility]  [address] [place] [address]  PHONE:  [phone]  BEEPER : [phone]

## 2018-04-17 NOTE — PROGRESS NOTE ADULT - ASSESSMENT
31 yo M with failed renal transplant, recent start of HD via permacath and rapid prednisone taper, admitted with syncope at dialysis  Weak, poor appetite, low grade fever, labs notable for eosinophilia, low glucose, question of adrenal insufficiency   Cxs all negative, CXR clear  Eosinophilia 20%- O&P negative; strongyloides serology negative  Afebrile  leukocytosis --?steroids effect    Plan:  Observe off antibiotics.   Follow temps and CBC/diff  Steroids as per Endocrine 33 yo M with failed renal transplant, recent start of HD via permacath and rapid prednisone taper, admitted with syncope at dialysis  Weak, poor appetite, low grade fever, labs notable for eosinophilia, low glucose, question of adrenal insufficiency   Cxs all negative, CXR clear  Eosinophilia 20%- O&P negative; strongyloides serology negative  Afebrile  leukocytosis --?steroids effect    Plan:  Observe off antibiotics.   Follow temps and CBC/diff  Steroids taper as per Endocrine

## 2018-04-17 NOTE — PROGRESS NOTE ADULT - ASSESSMENT
· Assessment	  32 m FSGS s/p renal tx now failed on HD with progressive fatigue, anorexia, weakness and inability to tolerate HD 2nd syncope and low bp's - r/o infectious vs cardiac causes and consider ? MANZANARES    Suspected Adrenal insufficiency:  IV steroids    Fever:  ID f/up noted.  No abx.       Problem/Plan - 4:  ·  Problem: Hypertension, unspecified type.  Plan: Norvasc/Metoprolol    ESRD:  On HD  Nephro f/up noted. · Assessment	  32 m FSGS s/p renal tx now failed on HD with progressive fatigue, anorexia, weakness and inability to tolerate HD 2nd syncope and low bp's - r/o infectious vs cardiac causes and consider ? MANZANARES    Suspected Adrenal insufficiency:  IV steroids    Fever:  ID f/up noted.  No abx.    Hiccups:  Baclofen     Problem/Plan - 4:  ·  Problem: Hypertension, unspecified type.  Plan: Norvasc/Metoprolol    ESRD:  On HD  Nephro f/up noted.

## 2018-04-17 NOTE — PROGRESS NOTE ADULT - SUBJECTIVE AND OBJECTIVE BOX
pt seen and examined, no complaints on exam.     acetaminophen   Tablet 650 milliGRAM(s) Oral every 6 hours PRN  amLODIPine   Tablet 2.5 milliGRAM(s) Oral daily  heparin  Injectable 5000 Unit(s) SubCutaneous every 12 hours  hydrocortisone sodium succinate Injectable 25 milliGRAM(s) IV Push every 8 hours  metoprolol tartrate 12.5 milliGRAM(s) Oral two times a day  Nephro-kenneth 1 Tablet(s) Oral daily  ondansetron Injectable 4 milliGRAM(s) IV Push every 6 hours PRN                            10.5   15.11 )-----------( 290      ( 16 Apr 2018 09:27 )             29.3       Hemoglobin: 10.5 g/dL (04-16 @ 09:27)  Hemoglobin: 11.0 g/dL (04-15 @ 08:24)  Hemoglobin: 10.7 g/dL (04-14 @ 08:17)  Hemoglobin: 11.3 g/dL (04-13 @ 07:01)  Hemoglobin: 11.1 g/dL (04-12 @ 11:24)      04-16    136  |  92<L>  |  70<H>  ----------------------------<  113<H>  4.7   |  25  |  13.76<H>    Ca    9.8      16 Apr 2018 07:18  Phos  2.8     04-16  Mg     2.6     04-16      Creatinine Trend: 13.76<--, 10.77<--, 16.27<--, 12.57<--, 14.70<--, 13.93<--    COAGS:     T(C): 36.9 (04-17-18 @ 00:15), Max: 36.9 (04-17-18 @ 00:15)  HR: 70 (04-17-18 @ 00:15) (66 - 88)  BP: 118/76 (04-17-18 @ 00:15) (118/76 - 142/94)  RR: 18 (04-17-18 @ 00:15) (18 - 18)  SpO2: 99% (04-17-18 @ 00:15) (95% - 99%)  Wt(kg): --    I&O's Summary    15 Apr 2018 07:01  -  16 Apr 2018 07:00  --------------------------------------------------------  IN: 1020 mL / OUT: 0 mL / NET: 1020 mL    16 Apr 2018 07:01  -  17 Apr 2018 04:20  --------------------------------------------------------  IN: 600 mL / OUT: 0 mL / NET: 600 mL      HEENT:   Normal oral mucosa, PERRL, EOMI	  Lymphatic: No obvious lymphadenopathy , no edema  Cardiovascular: Normal S1 S2, No JVD, 1/6 KYARA murmur, Peripheral pulses palpable 2+ bilaterally  Respiratory: Lungs clear to auscultation, normal effort 	  Gastrointestinal:  Soft, Non-tender, + BS	  Neuro: alert and oriented x 3        ASSESSMENT/PLAN: 	32y Male 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 EKG/echo unremarkable.    Off Abx   HD per renal   steroids per Endo   cont low dose BB/ Norvasc - bP stable  no s/s of chf on exam  supportive care per medicine   no further CV work up needed at present.  D/W Dr Multani

## 2018-04-17 NOTE — PROGRESS NOTE ADULT - SUBJECTIVE AND OBJECTIVE BOX
Patient seen and examined  no complaints  feels better  eatign well now    No Known Allergies    Hospital Medications:   MEDICATIONS  (STANDING):  amLODIPine   Tablet 2.5 milliGRAM(s) Oral daily  baclofen 10 milliGRAM(s) Oral once  heparin  Injectable 5000 Unit(s) SubCutaneous every 12 hours  hydrocortisone sodium succinate Injectable 25 milliGRAM(s) IV Push every 8 hours  metoprolol tartrate 12.5 milliGRAM(s) Oral two times a day  Nephro-kenneth 1 Tablet(s) Oral daily      VITALS:  T(F): 97.9 (04-17-18 @ 12:14), Max: 98.4 (04-17-18 @ 00:15)  HR: 69 (04-17-18 @ 12:14)  BP: 136/86 (04-17-18 @ 12:14)  RR: 18 (04-17-18 @ 12:14)  SpO2: 96% (04-17-18 @ 12:14)  Wt(kg): --    04-16 @ 07:01  -  04-17 @ 07:00  --------------------------------------------------------  IN: 780 mL / OUT: 0 mL / NET: 780 mL    04-17 @ 07:01  -  04-17 @ 14:53  --------------------------------------------------------  IN: 480 mL / OUT: 0 mL / NET: 480 mL        PHYSICAL EXAM:  Constitutional: NAD  HEENT: anicteric sclera, oropharynx clear, MMM  Neck: No JVD  Respiratory: CTAB, no wheezes, rales or rhonchi  Cardiovascular: S1, S2, RRR  Gastrointestinal: BS+, soft, NT/ND  Extremities: No cyanosis or clubbing. No peripheral edema  Neurological: A/O x 3, no focal deficits  Psychiatric: Normal mood, normal affect  : No CVA tenderness. No fuller.   Skin: No rashes  Vascular Access: + PC; + left upper ext avf    LABS:  04-17    137  |  92<L>  |  102<H>  ----------------------------<  104<H>  4.2   |  25  |  16.63<H>    Ca    10.0      17 Apr 2018 06:37  Phos  2.8     04-16  Mg     2.6     04-16      Creatinine Trend: 16.63 <--, 13.76 <--, 10.77 <--, 16.27 <--, 12.57 <--, 14.70 <--                        10.5   15.11 )-----------( 290      ( 16 Apr 2018 09:27 )             29.3     Urine Studies:      RADIOLOGY & ADDITIONAL STUDIES:

## 2018-04-17 NOTE — CHART NOTE - NSCHARTNOTEFT_GEN_A_CORE
Pt with c/o Hiccups yesterday, 4/16. D/w Dr Segovia. Baclofen 10 mg given with relief. Pt required 2nd dose   in the evening. Pt today with c/o hiccups. Baclofen given at approx 2 pm.  Pt returned from hemodialysis   with c/o worsening hiccups. Discussed with Dr. Segovia that hiccups are becoming more frequent and more severe.  Dr Segovia recommended that pt  be given 1 dose of Baclofen tonight and another dose in the morning if necessary.   He recommended that GI MD, Dr Bowles's group  be called in the morning  to evaluate pt for hiccups.

## 2018-04-18 LAB
ANION GAP SERPL CALC-SCNC: 16 MMOL/L — SIGNIFICANT CHANGE UP (ref 5–17)
BUN SERPL-MCNC: 53 MG/DL — HIGH (ref 7–23)
CALCIUM SERPL-MCNC: 9.1 MG/DL — SIGNIFICANT CHANGE UP (ref 8.4–10.5)
CHLORIDE SERPL-SCNC: 95 MMOL/L — LOW (ref 96–108)
CO2 SERPL-SCNC: 27 MMOL/L — SIGNIFICANT CHANGE UP (ref 22–31)
CREAT SERPL-MCNC: 10.89 MG/DL — HIGH (ref 0.5–1.3)
GLUCOSE SERPL-MCNC: 107 MG/DL — HIGH (ref 70–99)
HCT VFR BLD CALC: 30.5 % — LOW (ref 39–50)
HGB BLD-MCNC: 10.6 G/DL — LOW (ref 13–17)
MCHC RBC-ENTMCNC: 29.3 PG — SIGNIFICANT CHANGE UP (ref 27–34)
MCHC RBC-ENTMCNC: 34.8 GM/DL — SIGNIFICANT CHANGE UP (ref 32–36)
MCV RBC AUTO: 84.3 FL — SIGNIFICANT CHANGE UP (ref 80–100)
PLATELET # BLD AUTO: 312 K/UL — SIGNIFICANT CHANGE UP (ref 150–400)
POTASSIUM SERPL-MCNC: 3.5 MMOL/L — SIGNIFICANT CHANGE UP (ref 3.5–5.3)
POTASSIUM SERPL-SCNC: 3.5 MMOL/L — SIGNIFICANT CHANGE UP (ref 3.5–5.3)
RBC # BLD: 3.62 M/UL — LOW (ref 4.2–5.8)
RBC # FLD: 13.4 % — SIGNIFICANT CHANGE UP (ref 10.3–14.5)
SODIUM SERPL-SCNC: 138 MMOL/L — SIGNIFICANT CHANGE UP (ref 135–145)
WBC # BLD: 10.82 K/UL — HIGH (ref 3.8–10.5)
WBC # FLD AUTO: 10.82 K/UL — HIGH (ref 3.8–10.5)

## 2018-04-18 RX ORDER — FLUDROCORTISONE ACETATE 0.1 MG/1
0.1 TABLET ORAL DAILY
Qty: 0 | Refills: 0 | Status: DISCONTINUED | OUTPATIENT
Start: 2018-04-18 | End: 2018-04-19

## 2018-04-18 RX ADMIN — Medication 25 MILLIGRAM(S): at 05:47

## 2018-04-18 RX ADMIN — Medication 12.5 MILLIGRAM(S): at 05:47

## 2018-04-18 RX ADMIN — Medication 10 MILLIGRAM(S): at 00:17

## 2018-04-18 RX ADMIN — FLUDROCORTISONE ACETATE 0.1 MILLIGRAM(S): 0.1 TABLET ORAL at 12:39

## 2018-04-18 RX ADMIN — Medication 12.5 MILLIGRAM(S): at 18:02

## 2018-04-18 RX ADMIN — AMLODIPINE BESYLATE 2.5 MILLIGRAM(S): 2.5 TABLET ORAL at 05:47

## 2018-04-18 RX ADMIN — Medication 20 MILLIGRAM(S): at 12:39

## 2018-04-18 RX ADMIN — HEPARIN SODIUM 5000 UNIT(S): 5000 INJECTION INTRAVENOUS; SUBCUTANEOUS at 05:47

## 2018-04-18 RX ADMIN — Medication 1 TABLET(S): at 12:39

## 2018-04-18 NOTE — PROGRESS NOTE ADULT - ATTENDING COMMENTS
CARDIOLOGY ATTENDING    Patient seen and examined. Agree with above. No further inpatient cardiac workup needed.

## 2018-04-18 NOTE — PROGRESS NOTE ADULT - SUBJECTIVE AND OBJECTIVE BOX
Chief complaint  Patient is a 32y old  Male who presents with a chief complaint of syncope during dialysis (08 Apr 2018 09:49)   Review of systems  Patient in bed, looks comfortable, no fever, eating and ambulating, no hypoglycemia.    Labs and Fingersticks  CAPILLARY BLOOD GLUCOSE          Anion Gap, Serum: 16 (04-18 @ 07:12)  Anion Gap, Serum: 20 <H> (04-17 @ 06:37)      Calcium, Total Serum: 9.1 (04-18 @ 07:12)  Calcium, Total Serum: 10.0 (04-17 @ 06:37)          04-18    138  |  95<L>  |  53<H>  ----------------------------<  107<H>  3.5   |  27  |  10.89<H>    Ca    9.1      18 Apr 2018 07:12      Medications  MEDICATIONS  (STANDING):  amLODIPine   Tablet 2.5 milliGRAM(s) Oral daily  fludroCORTISONE 0.1 milliGRAM(s) Oral daily  heparin  Injectable 5000 Unit(s) SubCutaneous every 12 hours  metoprolol tartrate 12.5 milliGRAM(s) Oral two times a day  Nephro-kenneth 1 Tablet(s) Oral daily  predniSONE   Tablet 20 milliGRAM(s) Oral daily      Physical Exam  General: Patient comfortable in bed  Vital Signs Last 12 Hrs  T(F): 98 (04-18-18 @ 04:10), Max: 98 (04-18-18 @ 04:10)  HR: 74 (04-18-18 @ 04:10) (74 - 74)  BP: 123/72 (04-18-18 @ 04:10) (123/72 - 123/72)  BP(mean): --  RR: 18 (04-18-18 @ 04:10) (18 - 18)  SpO2: 98% (04-18-18 @ 04:10) (98% - 98%)  Neck: No palpable thyroid nodules.  CVS: S1S2, No murmurs  Respiratory: No wheezing, no crepitations  GI: Abdomen soft, bowel sounds positive  Musculoskeletal:  edema lower extremities.   Skin: No skin rashes, no ecchymosis    Diagnostics    Cortisol AM, Serum: AM Sched. Collection: 14-Apr-2018 06:00 (04-13 @ 20:21)  Adrenocorticotropic Hormone, Serum: AM Sched. Collection: 14-Apr-2018 06:00 (04-13 @ 20:21)  Thyroid Stimulating Hormone, Serum: AM Sched. Collection: 14-Apr-2018 06:00 (04-13 @ 20:21)  Free Thyroxine, Serum: AM Sched. Collection: 14-Apr-2018 06:00 (04-13 @ 20:21)

## 2018-04-18 NOTE — PROGRESS NOTE ADULT - ASSESSMENT
Assessment  Chronic steroid use/ likely adrenal insufficiency: 32y Male with likely adrenal insufficiency,  on stress dose Hydrocortisone being tapered down, stable, no dizziness.  ESRD: On hemodialysis, Monitor labs/BMP

## 2018-04-18 NOTE — PROGRESS NOTE ADULT - SUBJECTIVE AND OBJECTIVE BOX
CC: f/u for fever    Patient reports feeling better in general, no hive, no GI Sxs    REVIEW OF SYSTEMS:  All other review of systems negative (Comprehensive ROS)    Antimicrobials off  Other Medications Reviewed    Vital Signs Last 24 Hrs  T(F): 98 (18 Apr 2018 04:10), Max: 98.3 (17 Apr 2018 20:32)  HR: 74 (18 Apr 2018 04:10) (69 - 81)  BP: 123/72 (18 Apr 2018 04:10) (123/72 - 167/84)  RR: 18 (18 Apr 2018 04:10) (17 - 18)  SpO2: 98% (18 Apr 2018 04:10) (95% - 98%)    PHYSICAL EXAM:  General: alert, no acute distress  Eyes:  anicteric, no conjunctival injection, no discharge  Oropharynx: no lesions or injection 	  Neck: supple, without adenopathy  R Permacath site clean  Lungs: clear to auscultation  Heart: regular rate and rhythm; no murmur, rubs or gallops  Abdomen: soft, nondistended, nontender, without mass or organomegaly  Skin: no lesions  Extremities: no clubbing, cyanosis, or edema  Neurologic: alert, oriented, moves all extremities    LAB RESULTS:  No CBC today  04-18    138  |  95<L>  |  53<H>  ----------------------------<  107<H>  3.5   |  27  |  10.89<H>    Ca    9.1      18 Apr 2018 07:12    MICROBIOLOGY:  RECENT CULTURES REVIEWED      RADIOLOGY REVIEWED

## 2018-04-18 NOTE — PROGRESS NOTE ADULT - SUBJECTIVE AND OBJECTIVE BOX
Patient seen and examined  no complaints  feels better      No Known Allergies    Hospital Medications:   MEDICATIONS  (STANDING):  amLODIPine   Tablet 2.5 milliGRAM(s) Oral daily  fludroCORTISONE 0.1 milliGRAM(s) Oral daily  heparin  Injectable 5000 Unit(s) SubCutaneous every 12 hours  metoprolol tartrate 12.5 milliGRAM(s) Oral two times a day  Nephro-kenneth 1 Tablet(s) Oral daily  predniSONE   Tablet 20 milliGRAM(s) Oral daily        VITALS:  T(F): 97.4 (04-18-18 @ 12:07), Max: 98.3 (04-17-18 @ 20:32)  HR: 60 (04-18-18 @ 12:07)  BP: 130/89 (04-18-18 @ 12:07)  RR: 18 (04-18-18 @ 12:07)  SpO2: 98% (04-18-18 @ 12:07)  Wt(kg): --    04-17 @ 07:01  -  04-18 @ 07:00  --------------------------------------------------------  IN: 960 mL / OUT: 1000 mL / NET: -40 mL      PHYSICAL EXAM:  Constitutional: NAD  HEENT: anicteric sclera, oropharynx clear, MMM  Neck: No JVD  Respiratory: CTAB, no wheezes, rales or rhonchi  Cardiovascular: S1, S2, RRR  Gastrointestinal: BS+, soft, NT/ND  Extremities: No cyanosis or clubbing. No peripheral edema  Neurological: A/O x 3, no focal deficits  Psychiatric: Normal mood, normal affect  : No CVA tenderness. No fuller.   Skin: No rashes  Vascular Access: + PC; + left upper ext avf    LABS:  04-18    138  |  95<L>  |  53<H>  ----------------------------<  107<H>  3.5   |  27  |  10.89<H>    Ca    9.1      18 Apr 2018 07:12      Creatinine Trend: 10.89 <--, 16.63 <--, 13.76 <--, 10.77 <--, 16.27 <--, 12.57 <--, 14.70 <--                        10.6   10.82 )-----------( 312      ( 18 Apr 2018 09:32 )             30.5     Urine Studies:      RADIOLOGY & ADDITIONAL STUDIES:

## 2018-04-18 NOTE — PROGRESS NOTE ADULT - ASSESSMENT
· Assessment	  32 m FSGS s/p renal tx now failed on HD with progressive fatigue, anorexia, weakness and inability to tolerate HD 2nd syncope and low bp's - r/o infectious vs cardiac causes and consider ? MANZANARES    Suspected Adrenal insufficiency:  Prednisone  Endo f/up noted.    Fever:  ID f/up noted.  No abx.       Problem/Plan - 4:  ·  Problem: Hypertension, unspecified type.  Plan: Norvasc/Metoprolol    ESRD:  On HD  Nephro f/up noted.

## 2018-04-18 NOTE — PROGRESS NOTE ADULT - ASSESSMENT
32 m h/o ESRD 2/2 FSGS s/p renal tx 2004, s/p failure, now on HD x 2 mos, tu/th/sat at [facility], well known to me, presented to ER last night with c/o hypotension and syncope  now with adrenal insufficiency on IV steroids    ESRD on HD TTS   Hypertension -  s/p failed kidney xplant-   Anemia in CKD-Hb >goal. no indication for JORGE

## 2018-04-18 NOTE — PROGRESS NOTE ADULT - SUBJECTIVE AND OBJECTIVE BOX
pt seen and examined, no complaints on exam.       MEDICATIONS  (STANDING):  amLODIPine   Tablet 2.5 milliGRAM(s) Oral daily  fludroCORTISONE 0.1 milliGRAM(s) Oral daily  heparin  Injectable 5000 Unit(s) SubCutaneous every 12 hours  metoprolol tartrate 12.5 milliGRAM(s) Oral two times a day  Nephro-kenneth 1 Tablet(s) Oral daily  predniSONE   Tablet 20 milliGRAM(s) Oral daily    MEDICATIONS  (PRN):  acetaminophen   Tablet 650 milliGRAM(s) Oral every 6 hours PRN For Temp greater than 38 C (100.4 F)  acetaminophen   Tablet. 650 milliGRAM(s) Oral every 6 hours PRN Moderate Pain (4 - 6)  ondansetron Injectable 4 milliGRAM(s) IV Push every 6 hours PRN Nausea and/or Vomiting      LABS:    Hemoglobin: 10.5 g/dL (04-16 @ 09:27)  Hemoglobin: 11.0 g/dL (04-15 @ 08:24)  Hemoglobin: 10.7 g/dL (04-14 @ 08:17)    04-18    138  |  95<L>  |  53<H>  ----------------------------<  107<H>  3.5   |  27  |  10.89<H>    Ca    9.1      18 Apr 2018 07:12      Creatinine Trend: 10.89<--, 16.63<--, 13.76<--, 10.77<--, 16.27<--, 12.57<--           PHYSICAL EXAM  Vital Signs Last 24 Hrs  T(C): 36.7 (18 Apr 2018 04:10), Max: 36.8 (17 Apr 2018 20:32)  T(F): 98 (18 Apr 2018 04:10), Max: 98.3 (17 Apr 2018 20:32)  HR: 74 (18 Apr 2018 04:10) (69 - 81)  BP: 123/72 (18 Apr 2018 04:10) (123/72 - 167/84)  BP(mean): --  RR: 18 (18 Apr 2018 04:10) (17 - 18)  SpO2: 98% (18 Apr 2018 04:10) (95% - 98%)      HEENT:   Normal oral mucosa, PERRL, EOMI	  Lymphatic: No obvious lymphadenopathy , no edema  Cardiovascular: Normal S1 S2, No JVD, 1/6 KYARA murmur, Peripheral pulses palpable 2+ bilaterally  Respiratory: Lungs clear to auscultation, normal effort 	  Gastrointestinal:  Soft, Non-tender, + BS	  Neuro: alert and oriented x 3        ASSESSMENT/PLAN: 	32y Male 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 EKG/echo unremarkable.      -steroids per Endo   -cont low dose BB/ Norvasc - bP stable  -no s/s of chf on exam  -supportive care per medicine   -no further CV work up needed at present    Archana Mark [facility]  O:  [phone]  P: [phone] pt seen and examined, no complaints on exam.       MEDICATIONS  (STANDING):  amLODIPine   Tablet 2.5 milliGRAM(s) Oral daily  fludroCORTISONE 0.1 milliGRAM(s) Oral daily  heparin  Injectable 5000 Unit(s) SubCutaneous every 12 hours  metoprolol tartrate 12.5 milliGRAM(s) Oral two times a day  Nephro-kenneth 1 Tablet(s) Oral daily  predniSONE   Tablet 20 milliGRAM(s) Oral daily    MEDICATIONS  (PRN):  acetaminophen   Tablet 650 milliGRAM(s) Oral every 6 hours PRN For Temp greater than 38 C (100.4 F)  acetaminophen   Tablet. 650 milliGRAM(s) Oral every 6 hours PRN Moderate Pain (4 - 6)  ondansetron Injectable 4 milliGRAM(s) IV Push every 6 hours PRN Nausea and/or Vomiting      LABS:    Hemoglobin: 10.5 g/dL (04-16 @ 09:27)  Hemoglobin: 11.0 g/dL (04-15 @ 08:24)  Hemoglobin: 10.7 g/dL (04-14 @ 08:17)    04-18    138  |  95<L>  |  53<H>  ----------------------------<  107<H>  3.5   |  27  |  10.89<H>    Ca    9.1      18 Apr 2018 07:12      Creatinine Trend: 10.89<--, 16.63<--, 13.76<--, 10.77<--, 16.27<--, 12.57<--           PHYSICAL EXAM  Vital Signs Last 24 Hrs  T(C): 36.7 (18 Apr 2018 04:10), Max: 36.8 (17 Apr 2018 20:32)  T(F): 98 (18 Apr 2018 04:10), Max: 98.3 (17 Apr 2018 20:32)  HR: 74 (18 Apr 2018 04:10) (69 - 81)  BP: 123/72 (18 Apr 2018 04:10) (123/72 - 167/84)  BP(mean): --  RR: 18 (18 Apr 2018 04:10) (17 - 18)  SpO2: 98% (18 Apr 2018 04:10) (95% - 98%)      HEENT:   Normal oral mucosa, PERRL, EOMI	  Lymphatic: No obvious lymphadenopathy , no edema  Cardiovascular: Normal S1 S2, No JVD, 1/6 KYARA murmur, Peripheral pulses palpable 2+ bilaterally  Respiratory: Lungs clear to auscultation, normal effort 	  Gastrointestinal:  Soft, Non-tender, + BS	  Neuro: alert and oriented x 3        ASSESSMENT/PLAN: 	32y Male 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 EKG/echo unremarkable.      -steroids per Endo   -no s/s of chf on exam  -supportive care per medicine   -no further CV work up needed at present    Archana Mark [facility]  O:  [phone]  P: [phone]

## 2018-04-18 NOTE — PROGRESS NOTE ADULT - ASSESSMENT
33 yo M with failed renal transplant (after 14 yrs), recent start of HD via permacath and rapid prednisone taper, admitted with syncope at dialysis  Weak, poor appetite, low grade fever, labs notable for eosinophilia, low glucose, question of adrenal insufficiency   Cxs all negative, CXR clear  Eosinophilia 20%- O&P negative; strongyloides serology negative  Remains afebrile on stress dose steroids  Leukocytosis on latest CBC likely steroid effect  No support for occult infection    Plan:  Observe off antibiotics.   Follow temps and CBC/diff- would track eosinophilia  Steroids taper as per Endocrine  No objection to d/c home from our perspective

## 2018-04-18 NOTE — PROGRESS NOTE ADULT - SUBJECTIVE AND OBJECTIVE BOX
Patient is a 32y old  Male who presents with a chief complaint of syncope during dialysis (08 Apr 2018 09:49)      SUBJECTIVE / OVERNIGHT EVENTS:   Feels better.  Denies CP/SOB/Palpitation/HA.    MEDICATIONS  (STANDING):  amLODIPine   Tablet 2.5 milliGRAM(s) Oral daily  fludroCORTISONE 0.1 milliGRAM(s) Oral daily  heparin  Injectable 5000 Unit(s) SubCutaneous every 12 hours  metoprolol tartrate 12.5 milliGRAM(s) Oral two times a day  Nephro-kenneth 1 Tablet(s) Oral daily  predniSONE   Tablet 20 milliGRAM(s) Oral daily    MEDICATIONS  (PRN):  acetaminophen   Tablet 650 milliGRAM(s) Oral every 6 hours PRN For Temp greater than 38 C (100.4 F)  acetaminophen   Tablet. 650 milliGRAM(s) Oral every 6 hours PRN Moderate Pain (4 - 6)  ondansetron Injectable 4 milliGRAM(s) IV Push every 6 hours PRN Nausea and/or Vomiting        CAPILLARY BLOOD GLUCOSE        I&O's Summary    17 Apr 2018 07:01  -  18 Apr 2018 07:00  --------------------------------------------------------  IN: 960 mL / OUT: 1000 mL / NET: -40 mL    18 Apr 2018 07:01  -  18 Apr 2018 23:06  --------------------------------------------------------  IN: 420 mL / OUT: 0 mL / NET: 420 mL        PHYSICAL EXAM:  GENERAL: NAD, well-developed  HEAD:  Atraumatic, Normocephalic  NECK: Supple, No JVD  CHEST/LUNG: Clear to auscultation bilaterally; No wheezing.  HEART: Regular rate and rhythm; No murmurs, rubs, or gallops  ABDOMEN: Soft, Nontender, Nondistended; Bowel sounds present  EXTREMITIES:   No clubbing, cyanosis, or edema  NEUROLOGY: AAO X 3  SKIN: No rashes    LABS:                        10.6   10.82 )-----------( 312      ( 18 Apr 2018 09:32 )             30.5     04-18    138  |  95<L>  |  53<H>  ----------------------------<  107<H>  3.5   |  27  |  10.89<H>    Ca    9.1      18 Apr 2018 07:12              CAPILLARY BLOOD GLUCOSE        04-14 @ 08:55  Culture-urine --  Culture results   No growth to date.  method type --  Organism --  Organism Identification --  Specimen source .Blood Blood-Peripheral  04-13 @ 08:14  Culture-urine --  Culture results   No Protozoa seen by trichrome stain  No Helminths or Protozoa seen in formalin concentrate  performed by iodine stain  (routine O+P not evaluated for Microsporidia,  Cryptosporidia, Cyclospora, or Isospora.)  Note: One negative specimen does not rule  out the possibility of a parasitic infection.  method type --  Organism --  Organism Identification --  Specimen source .Stool Feces  04-13 @ 00:45  Culture-urine --  Culture results   No growth at 48 hours  method type --  Organism --  Organism Identification --  Specimen source .Catheter Catheter Tip Internal Jugular  04-12 @ 18:01  Culture-urine --  Culture results   No growth at 5 days.  method type --  Organism --  Organism Identification --  Specimen source .Blood Blood-Peripheral  04-12 @ 11:17  Culture-urine --  Culture results   No growth at 5 days.  method type --  Organism --  Organism Identification --  Specimen source .Blood Blood-Peripheral           04-14 @ 08:55  Culture blood --  Culture results   No growth to date.  Gram stain --  Gram stain blood --  Method type --  Organism --  Organism identification --  Specimen source .Blood Blood-Peripheral   04-13 @ 08:14  Culture blood --  Culture results   No Protozoa seen by trichrome stain  No Helminths or Protozoa seen in formalin concentrate  performed by iodine stain  (routine O+P not evaluated for Microsporidia,  Cryptosporidia, Cyclospora, or Isospora.)  Note: One negative specimen does not rule  out the possibility of a parasitic infection.  Gram stain --  Gram stain blood --  Method type --  Organism --  Organism identification --  Specimen source .Stool Feces   04-13 @ 00:45  Culture blood --  Culture results   No growth at 48 hours  Gram stain --  Gram stain blood --  Method type --  Organism --  Organism identification --  Specimen source .Catheter Catheter Tip Internal Jugular   04-12 @ 18:01  Culture blood --  Culture results   No growth at 5 days.  Gram stain --  Gram stain blood --  Method type --  Organism --  Organism identification --  Specimen source .Blood Blood-Peripheral   04-12 @ 11:17  Culture blood --  Culture results   No growth at 5 days.  Gram stain --  Gram stain blood --  Method type --  Organism --  Organism identification --  Specimen source .Blood Blood-Peripheral      RADIOLOGY & ADDITIONAL TESTS:    Imaging Personally Reviewed:    Consultant(s) Notes Reviewed:      Care Discussed with Consultants/Other Providers:

## 2018-04-19 ENCOUNTER — TRANSCRIPTION ENCOUNTER (OUTPATIENT)
Age: 33
End: 2018-04-19

## 2018-04-19 VITALS
TEMPERATURE: 98 F | HEART RATE: 95 BPM | SYSTOLIC BLOOD PRESSURE: 148 MMHG | DIASTOLIC BLOOD PRESSURE: 88 MMHG | OXYGEN SATURATION: 96 % | RESPIRATION RATE: 18 BRPM

## 2018-04-19 LAB
ANION GAP SERPL CALC-SCNC: 19 MMOL/L — HIGH (ref 5–17)
BUN SERPL-MCNC: 81 MG/DL — HIGH (ref 7–23)
CALCIUM SERPL-MCNC: 9.9 MG/DL — SIGNIFICANT CHANGE UP (ref 8.4–10.5)
CHLORIDE SERPL-SCNC: 96 MMOL/L — SIGNIFICANT CHANGE UP (ref 96–108)
CO2 SERPL-SCNC: 24 MMOL/L — SIGNIFICANT CHANGE UP (ref 22–31)
CREAT SERPL-MCNC: 14.11 MG/DL — HIGH (ref 0.5–1.3)
CULTURE RESULTS: SIGNIFICANT CHANGE UP
CULTURE RESULTS: SIGNIFICANT CHANGE UP
GLUCOSE SERPL-MCNC: 97 MG/DL — SIGNIFICANT CHANGE UP (ref 70–99)
HCT VFR BLD CALC: 31.4 % — LOW (ref 39–50)
HGB BLD-MCNC: 10.7 G/DL — LOW (ref 13–17)
MCHC RBC-ENTMCNC: 29.6 PG — SIGNIFICANT CHANGE UP (ref 27–34)
MCHC RBC-ENTMCNC: 34.1 GM/DL — SIGNIFICANT CHANGE UP (ref 32–36)
MCV RBC AUTO: 87 FL — SIGNIFICANT CHANGE UP (ref 80–100)
PLATELET # BLD AUTO: 328 K/UL — SIGNIFICANT CHANGE UP (ref 150–400)
POTASSIUM SERPL-MCNC: 4.2 MMOL/L — SIGNIFICANT CHANGE UP (ref 3.5–5.3)
POTASSIUM SERPL-SCNC: 4.2 MMOL/L — SIGNIFICANT CHANGE UP (ref 3.5–5.3)
RBC # BLD: 3.61 M/UL — LOW (ref 4.2–5.8)
RBC # FLD: 13 % — SIGNIFICANT CHANGE UP (ref 10.3–14.5)
SODIUM SERPL-SCNC: 139 MMOL/L — SIGNIFICANT CHANGE UP (ref 135–145)
SPECIMEN SOURCE: SIGNIFICANT CHANGE UP
SPECIMEN SOURCE: SIGNIFICANT CHANGE UP
WBC # BLD: 14.19 K/UL — HIGH (ref 3.8–10.5)
WBC # FLD AUTO: 14.19 K/UL — HIGH (ref 3.8–10.5)

## 2018-04-19 PROCEDURE — 77001 FLUOROGUIDE FOR VEIN DEVICE: CPT

## 2018-04-19 PROCEDURE — 85014 HEMATOCRIT: CPT

## 2018-04-19 PROCEDURE — 84484 ASSAY OF TROPONIN QUANT: CPT

## 2018-04-19 PROCEDURE — 83735 ASSAY OF MAGNESIUM: CPT

## 2018-04-19 PROCEDURE — 82533 TOTAL CORTISOL: CPT

## 2018-04-19 PROCEDURE — 87177 OVA AND PARASITES SMEARS: CPT

## 2018-04-19 PROCEDURE — 85610 PROTHROMBIN TIME: CPT

## 2018-04-19 PROCEDURE — 85027 COMPLETE CBC AUTOMATED: CPT

## 2018-04-19 PROCEDURE — 87070 CULTURE OTHR SPECIMN AEROBIC: CPT

## 2018-04-19 PROCEDURE — 99261: CPT

## 2018-04-19 PROCEDURE — 82803 BLOOD GASES ANY COMBINATION: CPT

## 2018-04-19 PROCEDURE — 86682 HELMINTH ANTIBODY: CPT

## 2018-04-19 PROCEDURE — 82435 ASSAY OF BLOOD CHLORIDE: CPT

## 2018-04-19 PROCEDURE — 93005 ELECTROCARDIOGRAM TRACING: CPT

## 2018-04-19 PROCEDURE — C1750: CPT

## 2018-04-19 PROCEDURE — 71046 X-RAY EXAM CHEST 2 VIEWS: CPT

## 2018-04-19 PROCEDURE — 82330 ASSAY OF CALCIUM: CPT

## 2018-04-19 PROCEDURE — 82962 GLUCOSE BLOOD TEST: CPT

## 2018-04-19 PROCEDURE — 80048 BASIC METABOLIC PNL TOTAL CA: CPT

## 2018-04-19 PROCEDURE — 82553 CREATINE MB FRACTION: CPT

## 2018-04-19 PROCEDURE — 87799 DETECT AGENT NOS DNA QUANT: CPT

## 2018-04-19 PROCEDURE — 83605 ASSAY OF LACTIC ACID: CPT

## 2018-04-19 PROCEDURE — 80053 COMPREHEN METABOLIC PANEL: CPT

## 2018-04-19 PROCEDURE — 82947 ASSAY GLUCOSE BLOOD QUANT: CPT

## 2018-04-19 PROCEDURE — C1769: CPT

## 2018-04-19 PROCEDURE — 85730 THROMBOPLASTIN TIME PARTIAL: CPT

## 2018-04-19 PROCEDURE — 81001 URINALYSIS AUTO W/SCOPE: CPT

## 2018-04-19 PROCEDURE — 84100 ASSAY OF PHOSPHORUS: CPT

## 2018-04-19 PROCEDURE — 80197 ASSAY OF TACROLIMUS: CPT

## 2018-04-19 PROCEDURE — 84295 ASSAY OF SERUM SODIUM: CPT

## 2018-04-19 PROCEDURE — 84439 ASSAY OF FREE THYROXINE: CPT

## 2018-04-19 PROCEDURE — 84132 ASSAY OF SERUM POTASSIUM: CPT

## 2018-04-19 PROCEDURE — 36581 REPLACE TUNNELED CV CATH: CPT

## 2018-04-19 PROCEDURE — 99285 EMERGENCY DEPT VISIT HI MDM: CPT

## 2018-04-19 PROCEDURE — 82550 ASSAY OF CK (CPK): CPT

## 2018-04-19 PROCEDURE — 87040 BLOOD CULTURE FOR BACTERIA: CPT

## 2018-04-19 PROCEDURE — 82024 ASSAY OF ACTH: CPT

## 2018-04-19 PROCEDURE — 84443 ASSAY THYROID STIM HORMONE: CPT

## 2018-04-19 RX ORDER — BACLOFEN 100 %
1 POWDER (GRAM) MISCELLANEOUS
Qty: 28 | Refills: 0
Start: 2018-04-19 | End: 2018-05-02

## 2018-04-19 RX ORDER — METOPROLOL TARTRATE 50 MG
12.5 TABLET ORAL
Qty: 0 | Refills: 0 | COMMUNITY

## 2018-04-19 RX ORDER — FLUDROCORTISONE ACETATE 0.1 MG/1
1 TABLET ORAL
Qty: 0 | Refills: 0 | COMMUNITY
Start: 2018-04-19

## 2018-04-19 RX ORDER — AMLODIPINE BESYLATE 2.5 MG/1
1 TABLET ORAL
Qty: 30 | Refills: 0
Start: 2018-04-19 | End: 2018-05-18

## 2018-04-19 RX ORDER — METOPROLOL TARTRATE 50 MG
12.5 TABLET ORAL
Qty: 750 | Refills: 0
Start: 2018-04-19 | End: 2018-05-18

## 2018-04-19 RX ORDER — AMLODIPINE BESYLATE 2.5 MG/1
1 TABLET ORAL
Qty: 0 | Refills: 0 | COMMUNITY
Start: 2018-04-19

## 2018-04-19 RX ORDER — FLUDROCORTISONE ACETATE 0.1 MG/1
1 TABLET ORAL
Qty: 30 | Refills: 0
Start: 2018-04-19 | End: 2018-05-18

## 2018-04-19 RX ADMIN — FLUDROCORTISONE ACETATE 0.1 MILLIGRAM(S): 0.1 TABLET ORAL at 05:38

## 2018-04-19 RX ADMIN — Medication 1 TABLET(S): at 13:31

## 2018-04-19 RX ADMIN — Medication 20 MILLIGRAM(S): at 05:37

## 2018-04-19 NOTE — PROGRESS NOTE ADULT - PROBLEM SELECTOR PROBLEM 2
ESRD (end stage renal disease)
Hypertension, unspecified type

## 2018-04-19 NOTE — PROGRESS NOTE ADULT - SUBJECTIVE AND OBJECTIVE BOX
Chief complaint  Patient is a 32y old  Male who presents with a chief complaint of syncope during dialysis (08 Apr 2018 09:49)   Review of systems  Patient in bed, looks comfortable, no fever, no hypoglycemia.    Labs and Fingersticks  CAPILLARY BLOOD GLUCOSE          Anion Gap, Serum: 19 <H> (04-19 @ 09:33)  Anion Gap, Serum: 16 (04-18 @ 07:12)      Calcium, Total Serum: 9.9 (04-19 @ 09:33)  Calcium, Total Serum: 9.1 (04-18 @ 07:12)          04-19    139  |  96  |  81<H>  ----------------------------<  97  4.2   |  24  |  14.11<H>    Ca    9.9      19 Apr 2018 09:33                          10.7   14.19 )-----------( 328      ( 19 Apr 2018 10:31 )             31.4     Medications  MEDICATIONS  (STANDING):  amLODIPine   Tablet 2.5 milliGRAM(s) Oral daily  fludroCORTISONE 0.1 milliGRAM(s) Oral daily  heparin  Injectable 5000 Unit(s) SubCutaneous every 12 hours  metoprolol tartrate 12.5 milliGRAM(s) Oral two times a day  Nephro-kenneth 1 Tablet(s) Oral daily  predniSONE   Tablet 20 milliGRAM(s) Oral daily      Physical Exam  General: Patient comfortable in bed  Vital Signs Last 12 Hrs  T(F): 98.6 (04-19-18 @ 08:20), Max: 99 (04-19-18 @ 05:33)  HR: 78 (04-19-18 @ 08:20) (63 - 78)  BP: 153/86 (04-19-18 @ 08:20) (127/76 - 153/86)  BP(mean): --  RR: 19 (04-19-18 @ 08:20) (18 - 19)  SpO2: 97% (04-19-18 @ 08:20) (96% - 97%)  Neck: No palpable thyroid nodules.  CVS: S1S2, No murmurs  Respiratory: No wheezing, no crepitations  GI: Abdomen soft, bowel sounds positive  Musculoskeletal:  edema lower extremities.   Skin: No skin rashes, no ecchymosis    Diagnostics    Cortisol AM, Serum: AM Sched. Collection: 14-Apr-2018 06:00 (04-13 @ 20:21)  Adrenocorticotropic Hormone, Serum: AM Sched. Collection: 14-Apr-2018 06:00 (04-13 @ 20:21)  Thyroid Stimulating Hormone, Serum: AM Sched. Collection: 14-Apr-2018 06:00 (04-13 @ 20:21)  Free Thyroxine, Serum: AM Sched. Collection: 14-Apr-2018 06:00 (04-13 @ 20:21)

## 2018-04-19 NOTE — DISCHARGE NOTE ADULT - SECONDARY DIAGNOSIS.
ESRD (end stage renal disease) Essential hypertension Adrenal insufficiency due to steroid withdrawal Hiccups

## 2018-04-19 NOTE — PROGRESS NOTE ADULT - PROVIDER SPECIALTY LIST ADULT
Cardiology
Endocrinology
Infectious Disease
Internal Medicine
Intervent Radiology
Intervent Radiology
Nephrology
Cardiology
Infectious Disease
Internal Medicine
Endocrinology
Nephrology

## 2018-04-19 NOTE — DISCHARGE NOTE ADULT - ADDITIONAL INSTRUCTIONS
Follow up with Nephrology, Endocrinology, and your primary healthcare provider(s) as instructed above.

## 2018-04-19 NOTE — PROGRESS NOTE ADULT - PROBLEM SELECTOR PROBLEM 3
Syncope, unspecified syncope type
Hypertension
Syncope, unspecified syncope type

## 2018-04-19 NOTE — DISCHARGE NOTE ADULT - COMMUNITY RESOURCES
You can resume your outpatient hemodialysis treatment at [facility]: [phone] on Saturday.

## 2018-04-19 NOTE — DISCHARGE NOTE ADULT - MEDICATION SUMMARY - MEDICATIONS TO TAKE
I will START or STAY ON the medications listed below when I get home from the hospital:    fludrocortisone 0.1 mg oral tablet  -- 1 tab(s) by mouth once a day  -- Indication: For Adrenal insufficiency due to steroid withdrawal    predniSONE 20 mg oral tablet  -- 1 tab(s) by mouth once a day  -- Indication: For Adrenal insufficiency    metoprolol tartrate 25 mg oral tablet  -- 12.5 milligram(s) by mouth 2 times a day  -- Indication: For Essential hypertension    amLODIPine 2.5 mg oral tablet  -- 1 tab(s) by mouth once a day  -- Indication: For Essential hypertension    baclofen 10 mg oral tablet  -- 1 tab(s) by mouth 2 times a day, As Needed for hiccups  -- Indication: For Hiccups    Nephro-Home oral tablet  -- 1 tab(s) by mouth once a day  -- Indication: For vitamin supplement

## 2018-04-19 NOTE — PROGRESS NOTE ADULT - ASSESSMENT
Assessment  Chronic steroid use/ likely adrenal insufficiency: 32y Male with likely adrenal insufficiency, was on stress dose Hydrocortisone tapered down to orals, stable.  ESRD: On hemodialysis, Monitor labs/BMP

## 2018-04-19 NOTE — PROGRESS NOTE ADULT - PROBLEM SELECTOR PLAN 2
blood pressure controlled  trend bp
ESRD: On HD, continue as scheduled, renal team FU
blood pressure above goal  uf with hd  trend bp
blood pressure controlled
blood pressure controlled  trend bp
blood pressure okay for now  uf with hd  trend bp

## 2018-04-19 NOTE — PROVIDER CONTACT NOTE (MEDICATION) - ACTION/TREATMENT ORDERED:
Patient encouraged to get OOB and walk around
Pt encouraged to ambulate hallways
Per NP [facility], OK to hold B/P meds predialysis
NP notified and made aware, as per NP Tylenol and blood cultures to be drawn. will continue to monitor.
NP notified. No new orders.

## 2018-04-19 NOTE — PROGRESS NOTE ADULT - PROBLEM SELECTOR PLAN 3
follow up with cardiology,
On meds primary team following up
follow up with cardiology,
follow up with cardiology/ endocrinology  adrenal insufficiency- s/p IV hydrocorotisone- on prednisone 20mg po qd
follow up with cardiology/ endocrinology  adrenal insufficiency- s/p IV hydrocorotisone- on prednisone 20mg po qd
follow up with cardiology/ endocrinology  on IV hydrocortisone for adrenal insufficiency

## 2018-04-19 NOTE — DISCHARGE NOTE ADULT - CARE PROVIDER_API CALL
Richi Boyle), EndocrinologyMetabDiabetes; Internal Medicine  [address] [place] [address]  Phone: [phone]  Fax: [phone]    Meghna Boyle), Internal Medicine  [address] [place] [address]  Phone: [phone]  Fax: [phone]

## 2018-04-19 NOTE — PROGRESS NOTE ADULT - PROBLEM SELECTOR PLAN 1
Continue on Prednisone 20mg po daily  and Florinef 0.1mg po daily.   DC on above steroid regimen to FU in office for tapering.  Discussed with patient and primary team.
Plan for next HD tomm  2k bath and uf 1 to 2kg as tolerated  trend bmp  f/u ID for high wbc/fevers
Will DC IV Hydrocortisone today, Will start him on Prednisone 20mg po daily  and Florinef 0.1mg po daily.  If patient remains asymptomatic for 24 hrs may get DC on above steroid regimen to FU in office for tapering.  Discussed with patient and primary team.
Will continue current steroid dose for now, will start taper in 24 hrs, will switch to oral steroids before DC FU
Will decrease Hydrocortisone to 25mg iv Q12Hr, will monitor labs will FU  Will switch to oral steroids in 24 hrs, will DC home on oral steroids
Will decrease Hydrocortisone to 25mg iv Q8Hr, will monitor labs will FU
plan for hd today  2k bath and uf 1 to 2kg as tolerated  trend bmp
plan for next hd today  2k bath and uf 1 to 2 kg as tolerated  trend bmp
s/p HD yesterday  plan for next HD tomm  trend bmp
s/p HD yesterday-  plan for next hd tomm  2k bath and uf 1 to 2 kg as tolerated  trend bmp
s/p HD yesterday- terminated early 2/2 poor flows via PC  s/p New PC placement  plan for next hd tomm  trend bmp
s/p hemodialysis today,,  next hemodialysis wed
seen on HD- catheter with poor flows s/p tpa with some improvement then again with high pressures  HD terminated after 2hours and 45 min  trend bmp  IR eval for PC  ID f/u for fevers
seen on hd today via right IJ PC- good flows and pressurs; bp okay ; above goal; inc uf goal; 2k bath   trend bmp  fevers- f/u ID
s/p HD yesterday  plan for HD today to place back on routine schedule  trend bmp

## 2018-04-19 NOTE — DISCHARGE NOTE ADULT - HOSPITAL COURSE
32 m FSGS s/p renal tx now failed on HD with progressive fatigue, anorexia, weakness and inability to tolerate HD 2nd syncope and low bp's - r/o infectious vs cardiac causes and consider ? MANZANARES    Cardiology followed and work-up negative; Hemodialysis with Nephrology following; Endocrine followed 2/2 acquired adrenal insufficiency 2/2 withdrawal of steroids due to failed kidney transplant; condition improved with steroids and now on florinef; ID followed 2/2 FUO, all cx's neg; monitored off abx and condition resolved.  Medically cleared for discharge by all disciplines involved.

## 2018-04-19 NOTE — PROVIDER CONTACT NOTE (MEDICATION) - ASSESSMENT
B/P 127/76 and Hr 63, pt scheduled to get 2.5 Amlodipine Daily & 12.5mg Metoprolol q12
patient is a&ox4, walks occasionally, drinks adequate amount of fluids
pt AOX4, V/S WNL no SOB or distress noted

## 2018-04-19 NOTE — DISCHARGE NOTE ADULT - PATIENT PORTAL LINK FT
You can access the [facility] Patient Portal, offered by [facility], by registering with the following website: [email or web address]

## 2018-04-19 NOTE — PROGRESS NOTE ADULT - PROBLEM SELECTOR PROBLEM 1
ESRD (end stage renal disease)
Syncope, unspecified syncope type
ESRD (end stage renal disease)
Syncope, unspecified syncope type

## 2018-04-19 NOTE — DISCHARGE NOTE ADULT - PLAN OF CARE
Maintain/Restore adequate cerebral perfusion Cardiology work-up negative for heart related or circulation problems. Continue with hemodialysis per Nephrology. Low salt diet  Activity as tolerated.  Take all medication as prescribed.  Follow up with your medical doctor for routine blood pressure monitoring at your next visit.  Notify your doctor if you have any of the following symptoms:   Dizziness, Lightheadedness, Blurry vision, Headache, Chest pain, Shortness of breath You were followed by Endocrine due to this condition.  This condition is related to steroid use with transplanted kidney which was stopped due to failure of transplanted kidney.  You have been started on a low dose corticosteroid which will help with this condition.  Follow up with Endocrine (Dr Boyle) and/or your primary healthcare provider in 1-2 weeks. Call for appointment. This condition developed during hospitalization and responded well to muscle relaxant (baclofen).  Use baclofen as needed and as prescribed.  Please discuss with your primary healthcare provider at your next office visit. You were followed by Endocrine due to this condition.  Condition most likely related to steroid use with transplanted kidney which was stopped due to failure of transplanted kidney.  You have been started on a low dose corticosteroid which will help with this condition and continued on prednisone.  Follow up with Endocrine (Dr Boyle) 1-2 weeks for tapering of medication. Call for appointment.

## 2018-04-19 NOTE — PROGRESS NOTE ADULT - ASSESSMENT
32 m h/o ESRD 2/2 FSGS s/p renal tx 2004, s/p failure, now on HD x 2 mos, tu/th/sat at [facility], well known to me, presented to ER last night with c/o hypotension and syncope  now with adrenal insufficiency on IV steroids    ESRD on HD TTS   Hypertension -  s/p failed kidney xplant-   Anemia in CKD-Hb >goal. no indication for JORGE

## 2018-04-19 NOTE — PROGRESS NOTE ADULT - ATTENDING COMMENTS
Agree with above assessment and plan as outlined above.    - D/C plan per joanna Multani MD, [facility]  [facility], [facility]  [address] [place] [address]  PHONE:  [phone]  BEEPER : [phone]

## 2018-04-19 NOTE — PROGRESS NOTE ADULT - SUBJECTIVE AND OBJECTIVE BOX
Patient seen and examined  no complaints    No Known Allergies    Hospital Medications:   MEDICATIONS  (STANDING):  amLODIPine   Tablet 2.5 milliGRAM(s) Oral daily  fludroCORTISONE 0.1 milliGRAM(s) Oral daily  heparin  Injectable 5000 Unit(s) SubCutaneous every 12 hours  metoprolol tartrate 12.5 milliGRAM(s) Oral two times a day  Nephro-kenneth 1 Tablet(s) Oral daily  predniSONE   Tablet 20 milliGRAM(s) Oral daily    VITALS:  T(F): 98.6 (04-19-18 @ 12:00), Max: 99 (04-19-18 @ 05:33)  HR: 79 (04-19-18 @ 12:00)  BP: 152/82 (04-19-18 @ 12:00)  RR: 18 (04-19-18 @ 12:00)  SpO2: 97% (04-19-18 @ 12:00)  Wt(kg): --    04-18 @ 07:01  -  04-19 @ 07:00  --------------------------------------------------------  IN: 660 mL / OUT: 0 mL / NET: 660 mL    04-19 @ 07:01  -  04-19 @ 12:56  --------------------------------------------------------  IN: 0 mL / OUT: 1000 mL / NET: -1000 mL        PHYSICAL EXAM:  Constitutional: NAD  HEENT: anicteric sclera, oropharynx clear, MMM  Neck: No JVD  Respiratory: CTAB, no wheezes, rales or rhonchi  Cardiovascular: S1, S2, RRR  Gastrointestinal: BS+, soft, NT/ND  Extremities: No cyanosis or clubbing. No peripheral edema  Neurological: A/O x 3, no focal deficits  Psychiatric: Normal mood, normal affect  : No CVA tenderness. No fuller.   Skin: No rashes  Vascular Access: + PC; + left upper ext avf      LABS:  04-19    139  |  96  |  81<H>  ----------------------------<  97  4.2   |  24  |  14.11<H>    Ca    9.9      19 Apr 2018 09:33      Creatinine Trend: 14.11 <--, 10.89 <--, 16.63 <--, 13.76 <--, 10.77 <--, 16.27 <--, 12.57 <--                        10.7   14.19 )-----------( 328      ( 19 Apr 2018 10:31 )             31.4     Urine Studies:      RADIOLOGY & ADDITIONAL STUDIES:

## 2018-04-19 NOTE — DISCHARGE NOTE ADULT - MEDICATION SUMMARY - MEDICATIONS TO CHANGE
I will SWITCH the dose or number of times a day I take the medications listed below when I get home from the hospital:    metoprolol tartrate 25 mg oral tablet  -- 1 tab(s) by mouth every 12 hours   -- It is very important that you take or use this exactly as directed.  Do not skip doses or discontinue unless directed by your doctor.  May cause drowsiness.  Alcohol may intensify this effect.  Use care when operating dangerous machinery.  Some non-prescription drugs may aggravate your condition.  Read all labels carefully.  If a warning appears, check with your doctor before taking.  Take with food or milk.  This drug may impair the ability to drive or operate machinery.  Use care until you become familiar with its effects.    amLODIPine 10 mg oral tablet  -- 1 tab(s) by mouth once a day    predniSONE 5 mg oral tablet  -- 1 tab(s) by mouth 2 times a week

## 2018-04-19 NOTE — DISCHARGE NOTE ADULT - CARE PLAN
Principal Discharge DX:	Syncope  Goal:	Maintain/Restore adequate cerebral perfusion  Assessment and plan of treatment:	Cardiology work-up negative for heart related or circulation problems.  Secondary Diagnosis:	ESRD (end stage renal disease)  Assessment and plan of treatment:	Continue with hemodialysis per Nephrology.  Secondary Diagnosis:	Essential hypertension  Assessment and plan of treatment:	Low salt diet  Activity as tolerated.  Take all medication as prescribed.  Follow up with your medical doctor for routine blood pressure monitoring at your next visit.  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  This condition is related to steroid use with transplanted kidney which was stopped due to failure of transplanted kidney.  You have been started on a low dose corticosteroid which will help with this condition.  Follow up with Endocrine (Dr Boyle) and/or your primary healthcare provider in 1-2 weeks. Call for appointment.  Secondary Diagnosis:	Hiccups  Assessment and plan of treatment:	This condition developed during hospitalization and responded well to muscle relaxant (baclofen).  Use baclofen as needed and as prescribed.  Please discuss with your primary healthcare provider at your next office visit. Principal Discharge DX:	Syncope  Goal:	Maintain/Restore adequate cerebral perfusion  Assessment and plan of treatment:	Cardiology work-up negative for heart related or circulation problems.  Secondary Diagnosis:	ESRD (end stage renal disease)  Assessment and plan of treatment:	Continue with hemodialysis per Nephrology.  Secondary Diagnosis:	Essential hypertension  Assessment and plan of treatment:	Low salt diet  Activity as tolerated.  Take all medication as prescribed.  Follow up with your medical doctor for routine blood pressure monitoring at your next visit.  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  Condition most likely related to steroid use with transplanted kidney which was stopped due to failure of transplanted kidney.  You have been started on a low dose corticosteroid which will help with this condition and continued on prednisone.  Follow up with Endocrine (Dr Boyle) 1-2 weeks for tapering of medication. Call for appointment.  Secondary Diagnosis:	Hiccups  Assessment and plan of treatment:	This condition developed during hospitalization and responded well to muscle relaxant (baclofen).  Use baclofen as needed and as prescribed.  Please discuss with your primary healthcare provider at your next office visit.

## 2018-04-19 NOTE — PROGRESS NOTE ADULT - SUBJECTIVE AND OBJECTIVE BOX
pt seen and examined, denies chest pain or shortness of breath       MEDICATIONS  (STANDING):  amLODIPine   Tablet 2.5 milliGRAM(s) Oral daily  fludroCORTISONE 0.1 milliGRAM(s) Oral daily  heparin  Injectable 5000 Unit(s) SubCutaneous every 12 hours  metoprolol tartrate 12.5 milliGRAM(s) Oral two times a day  Nephro-kenneth 1 Tablet(s) Oral daily  predniSONE   Tablet 20 milliGRAM(s) Oral daily    MEDICATIONS  (PRN):  acetaminophen   Tablet 650 milliGRAM(s) Oral every 6 hours PRN For Temp greater than 38 C (100.4 F)  acetaminophen   Tablet. 650 milliGRAM(s) Oral every 6 hours PRN Moderate Pain (4 - 6)  ondansetron Injectable 4 milliGRAM(s) IV Push every 6 hours PRN Nausea and/or Vomiting      LABS:                        10.7   14.19 )-----------( 328      ( 19 Apr 2018 10:31 )             31.4     Hemoglobin: 10.7 g/dL (04-19 @ 10:31)  Hemoglobin: 10.6 g/dL (04-18 @ 09:32)  Hemoglobin: 10.5 g/dL (04-16 @ 09:27)  Hemoglobin: 11.0 g/dL (04-15 @ 08:24)    04-19    139  |  96  |  81<H>  ----------------------------<  97  4.2   |  24  |  14.11<H>    Ca    9.9      19 Apr 2018 09:33      Creatinine Trend: 14.11<--, 10.89<--, 16.63<--, 13.76<--, 10.77<--, 16.27<--           PHYSICAL EXAM  Vital Signs Last 24 Hrs  T(C): 36.8 (19 Apr 2018 13:22), Max: 37.2 (19 Apr 2018 05:33)  T(F): 98.3 (19 Apr 2018 13:22), Max: 99 (19 Apr 2018 05:33)  HR: 95 (19 Apr 2018 13:22) (63 - 95)  BP: 148/88 (19 Apr 2018 13:22) (127/76 - 153/86)  BP(mean): --  RR: 18 (19 Apr 2018 13:22) (18 - 19)  SpO2: 96% (19 Apr 2018 13:22) (95% - 97%)       Cardiovascular: Normal S1 S2, No JVD, 1/6 KYARA murmur,   Respiratory: Lungs clear to auscultation, normal effort 	  Gastrointestinal:  Soft, Non-tender, + BS	  Extremities no c/c/e B/L LE's   Peripheral pulses palpable 2+ bilaterally      ASSESSMENT/PLAN: 	32y Male 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 EKG/echo unremarkable.      -steroids per Endo   -no s/s of chf on exam  -supportive care per medicine   -no further CV work up needed at present

## 2018-07-09 ENCOUNTER — APPOINTMENT (OUTPATIENT)
Dept: NEPHROLOGY | Facility: CLINIC | Age: 33
End: 2018-07-09
Payer: MEDICARE

## 2018-07-09 VITALS
HEART RATE: 68 BPM | WEIGHT: 162.48 LBS | DIASTOLIC BLOOD PRESSURE: 88 MMHG | HEIGHT: 72 IN | OXYGEN SATURATION: 99 % | BODY MASS INDEX: 22.01 KG/M2 | SYSTOLIC BLOOD PRESSURE: 134 MMHG

## 2018-07-09 DIAGNOSIS — K21.9 GASTRO-ESOPHAGEAL REFLUX DISEASE W/OUT ESOPHAGITIS: ICD-10-CM

## 2018-07-09 PROCEDURE — 99214 OFFICE O/P EST MOD 30 MIN: CPT

## 2018-07-09 RX ORDER — METOPROLOL SUCCINATE 50 MG/1
50 TABLET, EXTENDED RELEASE ORAL DAILY
Qty: 30 | Refills: 5 | Status: ACTIVE | COMMUNITY
Start: 2018-07-09

## 2018-07-09 RX ORDER — SEVELAMER CARBONATE 800 MG/1
800 TABLET, FILM COATED ORAL
Qty: 270 | Refills: 0 | Status: DISCONTINUED | COMMUNITY
Start: 2018-02-01 | End: 2018-07-09

## 2018-08-02 PROBLEM — T82.9XXA UNSPECIFIED COMPLICATION OF CARDIAC AND VASCULAR PROSTHETIC DEVICE, IMPLANT AND GRAFT, INITIAL ENCOUNTER: Chronic | Status: ACTIVE | Noted: 2018-01-12

## 2018-08-27 ENCOUNTER — APPOINTMENT (OUTPATIENT)
Dept: NEPHROLOGY | Facility: CLINIC | Age: 33
End: 2018-08-27
Payer: MEDICARE

## 2018-08-27 VITALS
SYSTOLIC BLOOD PRESSURE: 123 MMHG | DIASTOLIC BLOOD PRESSURE: 81 MMHG | HEIGHT: 72 IN | OXYGEN SATURATION: 95 % | WEIGHT: 164.24 LBS | HEART RATE: 82 BPM | BODY MASS INDEX: 22.25 KG/M2

## 2018-08-27 DIAGNOSIS — T86.12 KIDNEY TRANSPLANT FAILURE: ICD-10-CM

## 2018-08-27 DIAGNOSIS — Z79.899 OTHER LONG TERM (CURRENT) DRUG THERAPY: ICD-10-CM

## 2018-08-27 DIAGNOSIS — Z94.0 KIDNEY TRANSPLANT STATUS: ICD-10-CM

## 2018-08-27 PROCEDURE — 99214 OFFICE O/P EST MOD 30 MIN: CPT

## 2018-08-27 RX ORDER — FLUDROCORTISONE ACETATE 0.1 MG/1
0.1 TABLET ORAL
Qty: 30 | Refills: 2 | Status: DISCONTINUED | COMMUNITY
Start: 2018-07-09 | End: 2018-08-27

## 2018-08-27 RX ORDER — CINACALCET HYDROCHLORIDE 60 MG/1
60 TABLET, COATED ORAL
Qty: 30 | Refills: 11 | Status: DISCONTINUED | COMMUNITY
Start: 2018-07-09 | End: 2018-08-27

## 2018-08-27 RX ORDER — AMLODIPINE BESYLATE 10 MG/1
10 TABLET ORAL DAILY
Qty: 30 | Refills: 11 | Status: ACTIVE | COMMUNITY
Start: 2018-01-10

## 2018-10-03 ENCOUNTER — APPOINTMENT (OUTPATIENT)
Dept: TRANSPLANT | Facility: CLINIC | Age: 33
End: 2018-10-03

## 2018-10-03 ENCOUNTER — APPOINTMENT (OUTPATIENT)
Dept: TRANSPLANT | Facility: CLINIC | Age: 33
End: 2018-10-03
Payer: COMMERCIAL

## 2018-10-03 VITALS
RESPIRATION RATE: 14 BRPM | HEIGHT: 72 IN | WEIGHT: 176 LBS | DIASTOLIC BLOOD PRESSURE: 81 MMHG | HEART RATE: 74 BPM | SYSTOLIC BLOOD PRESSURE: 135 MMHG | OXYGEN SATURATION: 95 % | TEMPERATURE: 98 F | BODY MASS INDEX: 23.84 KG/M2

## 2018-10-03 PROCEDURE — 99215 OFFICE O/P EST HI 40 MIN: CPT

## 2018-10-05 LAB
ALBUMIN SERPL ELPH-MCNC: 4.1 G/DL
ALP BLD-CCNC: 215 U/L
ALT SERPL-CCNC: 23 U/L
ANION GAP SERPL CALC-SCNC: 17 MMOL/L
AST SERPL-CCNC: 21 U/L
BASOPHILS # BLD AUTO: 0.01 K/UL
BASOPHILS NFR BLD AUTO: 0.1 %
BILIRUB SERPL-MCNC: 0.6 MG/DL
BUN SERPL-MCNC: 31 MG/DL
C PEPTIDE SERPL-MCNC: 19 NG/ML
CALCIUM SERPL-MCNC: 9.4 MG/DL
CHLORIDE SERPL-SCNC: 99 MMOL/L
CHOLEST SERPL-MCNC: 174 MG/DL
CHOLEST/HDLC SERPL: 3.6 RATIO
CK SERPL-CCNC: 380 U/L
CMV DNA SPEC QL NAA+PROBE: NOT DETECTED IU/ML
CMV IGG SERPL QL: 6.7 U/ML
CMV IGG SERPL-IMP: POSITIVE
CMV IGM SERPL QL: <8 AU/ML
CMV IGM SERPL QL: NEGATIVE
CMVPCR LOG: NOT DETECTED LOGIU/ML
CO2 SERPL-SCNC: 23 MMOL/L
CREAT SERPL-MCNC: 10.76 MG/DL
CRP SERPL-MCNC: 0.56 MG/DL
EBV EA AB SER IA-ACNC: <5 U/ML
EBV EA AB TITR SER IF: POSITIVE
EBV EA IGG SER QL IA: 330 U/ML
EBV EA IGG SER-ACNC: NEGATIVE
EBV EA IGM SER IA-ACNC: NEGATIVE
EBV PATRN SPEC IB-IMP: NORMAL
EBV VCA IGG SER IA-ACNC: >750 U/ML
EBV VCA IGM SER QL IA: <10 U/ML
EOSINOPHIL # BLD AUTO: 0.03 K/UL
EOSINOPHIL NFR BLD AUTO: 0.4 %
EPSTEIN-BARR VIRUS CAPSID ANTIGEN IGG: POSITIVE
ERYTHROCYTE [SEDIMENTATION RATE] IN BLOOD BY WESTERGREN METHOD: 36 MM/HR
GLUCOSE SERPL-MCNC: 95 MG/DL
HBA1C MFR BLD HPLC: 4.8 %
HBV CORE IGG+IGM SER QL: NONREACTIVE
HBV SURFACE AB SER QL: REACTIVE
HBV SURFACE AB SERPL IA-ACNC: 655.4 MIU/ML
HBV SURFACE AG SER QL: NONREACTIVE
HCT VFR BLD CALC: 35.5 %
HCV AB SER QL: NONREACTIVE
HCV S/CO RATIO: 0.42 S/CO
HDLC SERPL-MCNC: 49 MG/DL
HGB BLD-MCNC: 11.6 G/DL
HIV1+2 AB SPEC QL IA.RAPID: NONREACTIVE
HSV 1+2 IGG SER IA-IMP: NEGATIVE
HSV 1+2 IGG SER IA-IMP: POSITIVE
HSV 1+2 IGG SER IA-IMP: POSITIVE
HSV1 IGG SER QL: 59.9 INDEX
HSV1 IGG SER QL: 59.9 INDEX
HSV2 IGG SER QL: 0.06 INDEX
IMM GRANULOCYTES NFR BLD AUTO: 0.2 %
LDLC SERPL CALC-MCNC: 94 MG/DL
LYMPHOCYTES # BLD AUTO: 1.33 K/UL
LYMPHOCYTES NFR BLD AUTO: 16.2 %
MAGNESIUM SERPL-MCNC: 2 MG/DL
MAN DIFF?: NORMAL
MCHC RBC-ENTMCNC: 31.6 PG
MCHC RBC-ENTMCNC: 32.7 GM/DL
MCV RBC AUTO: 96.7 FL
MONOCYTES # BLD AUTO: 0.72 K/UL
MONOCYTES NFR BLD AUTO: 8.8 %
NEUTROPHILS # BLD AUTO: 6.11 K/UL
NEUTROPHILS NFR BLD AUTO: 74.3 %
PHOSPHATE SERPL-MCNC: 2.2 MG/DL
PLATELET # BLD AUTO: 212 K/UL
POTASSIUM SERPL-SCNC: 6 MMOL/L
PROT SERPL-MCNC: 7.6 G/DL
PSA SERPL-MCNC: 1.34 NG/ML
RBC # BLD: 3.67 M/UL
RBC # FLD: 14.7 %
RUBV IGG FLD-ACNC: 9 INDEX
RUBV IGG SER-IMP: POSITIVE
SODIUM SERPL-SCNC: 139 MMOL/L
T GONDII AB SER-IMP: NEGATIVE
T GONDII IGG SER QL: <3 IU/ML
T PALLIDUM AB SER QL IA: NEGATIVE
T3 SERPL-MCNC: 101 NG/DL
T4 FREE SERPL-MCNC: 1 NG/DL
TRIGL SERPL-MCNC: 154 MG/DL
TSH SERPL-ACNC: 1.1 UIU/ML
URATE SERPL-MCNC: 4.8 MG/DL
VZV AB TITR SER: POSITIVE
VZV IGG SER IF-ACNC: >4000 INDEX
WBC # FLD AUTO: 8.22 K/UL

## 2018-10-10 LAB
HSV1 IGM SER QL: NORMAL TITER
HSV2 AB FLD-ACNC: NORMAL TITER

## 2018-11-07 ENCOUNTER — OUTPATIENT (OUTPATIENT)
Dept: OUTPATIENT SERVICES | Facility: HOSPITAL | Age: 33
LOS: 1 days | End: 2018-11-07
Payer: MEDICARE

## 2018-11-07 ENCOUNTER — NON-APPOINTMENT (OUTPATIENT)
Age: 33
End: 2018-11-07

## 2018-11-07 ENCOUNTER — APPOINTMENT (OUTPATIENT)
Dept: CARDIOLOGY | Facility: CLINIC | Age: 33
End: 2018-11-07
Payer: COMMERCIAL

## 2018-11-07 ENCOUNTER — APPOINTMENT (OUTPATIENT)
Dept: RADIOLOGY | Facility: IMAGING CENTER | Age: 33
End: 2018-11-07

## 2018-11-07 ENCOUNTER — APPOINTMENT (OUTPATIENT)
Dept: ULTRASOUND IMAGING | Facility: IMAGING CENTER | Age: 33
End: 2018-11-07

## 2018-11-07 VITALS
BODY MASS INDEX: 23.3 KG/M2 | SYSTOLIC BLOOD PRESSURE: 145 MMHG | OXYGEN SATURATION: 98 % | HEIGHT: 72 IN | HEART RATE: 84 BPM | WEIGHT: 172 LBS | DIASTOLIC BLOOD PRESSURE: 90 MMHG

## 2018-11-07 DIAGNOSIS — Z01.818 ENCOUNTER FOR OTHER PREPROCEDURAL EXAMINATION: ICD-10-CM

## 2018-11-07 PROCEDURE — 93979 VASCULAR STUDY: CPT

## 2018-11-07 PROCEDURE — 71046 X-RAY EXAM CHEST 2 VIEWS: CPT | Mod: 26

## 2018-11-07 PROCEDURE — 76700 US EXAM ABDOM COMPLETE: CPT | Mod: 26

## 2018-11-07 PROCEDURE — 93979 VASCULAR STUDY: CPT | Mod: 26

## 2018-11-07 PROCEDURE — 76700 US EXAM ABDOM COMPLETE: CPT

## 2018-11-07 PROCEDURE — 71046 X-RAY EXAM CHEST 2 VIEWS: CPT

## 2018-11-07 PROCEDURE — 99212 OFFICE O/P EST SF 10 MIN: CPT

## 2018-11-07 PROCEDURE — 93000 ELECTROCARDIOGRAM COMPLETE: CPT

## 2018-11-08 NOTE — PHYSICAL EXAM
[General Appearance - Well Developed] : well developed [Normal Appearance] : normal appearance [Well Groomed] : well groomed [General Appearance - Well Nourished] : well nourished [No Deformities] : no deformities [General Appearance - In No Acute Distress] : no acute distress [Normal Conjunctiva] : the conjunctiva exhibited no abnormalities [Eyelids - No Xanthelasma] : the eyelids demonstrated no xanthelasmas [Normal Oral Mucosa] : normal oral mucosa [No Oral Pallor] : no oral pallor [No Oral Cyanosis] : no oral cyanosis [Respiration, Rhythm And Depth] : normal respiratory rhythm and effort [Exaggerated Use Of Accessory Muscles For Inspiration] : no accessory muscle use [Auscultation Breath Sounds / Voice Sounds] : lungs were clear to auscultation bilaterally [Heart Rate And Rhythm] : heart rate and rhythm were normal [Heart Sounds] : normal S1 and S2 [Murmurs] : no murmurs present [Edema] : no peripheral edema present [Abdomen Soft] : soft [Abdomen Tenderness] : non-tender [Abnormal Walk] : normal gait [Gait - Sufficient For Exercise Testing] : the gait was sufficient for exercise testing [Nail Clubbing] : no clubbing of the fingernails [Cyanosis, Localized] : no localized cyanosis [Petechial Hemorrhages (___cm)] : no petechial hemorrhages [Skin Color & Pigmentation] : normal skin color and pigmentation [] : no rash [No Venous Stasis] : no venous stasis [Skin Lesions] : no skin lesions [No Skin Ulcers] : no skin ulcer [No Xanthoma] : no  xanthoma was observed [Oriented To Time, Place, And Person] : oriented to person, place, and time [Affect] : the affect was normal [Mood] : the mood was normal [No Anxiety] : not feeling anxious [FreeTextEntry1] : LUE fistula

## 2019-02-01 ENCOUNTER — OUTPATIENT (OUTPATIENT)
Dept: OUTPATIENT SERVICES | Facility: HOSPITAL | Age: 34
LOS: 1 days | End: 2019-02-01
Payer: MEDICARE

## 2019-02-01 DIAGNOSIS — Z71.89 OTHER SPECIFIED COUNSELING: ICD-10-CM

## 2019-02-01 PROCEDURE — G9001: CPT

## 2019-03-06 ENCOUNTER — APPOINTMENT (OUTPATIENT)
Dept: CARDIOLOGY | Facility: CLINIC | Age: 34
End: 2019-03-06
Payer: MEDICARE

## 2019-03-06 ENCOUNTER — NON-APPOINTMENT (OUTPATIENT)
Age: 34
End: 2019-03-06

## 2019-03-06 VITALS
SYSTOLIC BLOOD PRESSURE: 144 MMHG | DIASTOLIC BLOOD PRESSURE: 92 MMHG | WEIGHT: 168 LBS | BODY MASS INDEX: 22.75 KG/M2 | OXYGEN SATURATION: 98 % | HEART RATE: 68 BPM | HEIGHT: 72 IN

## 2019-03-06 DIAGNOSIS — Z99.2 END STAGE RENAL DISEASE: ICD-10-CM

## 2019-03-06 DIAGNOSIS — N18.6 END STAGE RENAL DISEASE: ICD-10-CM

## 2019-03-06 PROCEDURE — 93000 ELECTROCARDIOGRAM COMPLETE: CPT

## 2019-03-06 PROCEDURE — 93306 TTE W/DOPPLER COMPLETE: CPT

## 2019-03-06 PROCEDURE — 99204 OFFICE O/P NEW MOD 45 MIN: CPT

## 2019-03-06 NOTE — DISCUSSION/SUMMARY
[FreeTextEntry1] : In a summary Chavez Sultana is a young male with chest pains does not seem cardiac.  May be musculoskeletal. Stress test in 2018 negative for ischemia.Hypertension, BP high. Usually takes medications at 2 pm which he did not take yet. Explained him to take medications at the same time. Cut down on salt intake. Echo done showed mild LVH and normal LV systolic function. Healthy life style. Follow up in 3 months.

## 2019-03-06 NOTE — HISTORY OF PRESENT ILLNESS
[FreeTextEntry1] : Chavez Sultana is a 33 year old male with history of hypertension, ESRD since age 11 years, s/p renal transplant at age 19 ( from mother) which worked until age 32 and currently on Peritoneal dialysis and is on Renal transplant list comes for cardiac evaluation. Complaining of random onset of sharp chest pains, 4/10 in intensity when he moves around lasts for minutes to hours and relieved by itself of chronic duration. No exertional chest pains or exertional worsening. No shortness of breath or palpitations. Stress echo from 5/2018 is negative for stress induced ischemia. Complaint to medications.

## 2019-03-06 NOTE — REVIEW OF SYSTEMS
[Chest Pain] : chest pain [Negative] : Endocrine [Shortness Of Breath] : no shortness of breath [Lower Ext Edema] : no extremity edema [Palpitations] : no palpitations [Easy Bleeding] : no tendency for easy bleeding [Easy Bruising] : no tendency for easy bruising

## 2019-05-15 ENCOUNTER — INPATIENT (INPATIENT)
Facility: HOSPITAL | Age: 34
LOS: 5 days | Discharge: HOME | DRG: 391 | End: 2019-05-21
Attending: INTERNAL MEDICINE | Admitting: INTERNAL MEDICINE
Payer: MEDICARE

## 2019-05-15 VITALS
WEIGHT: 169.98 LBS | OXYGEN SATURATION: 99 % | RESPIRATION RATE: 16 BRPM | TEMPERATURE: 98 F | HEART RATE: 87 BPM | DIASTOLIC BLOOD PRESSURE: 106 MMHG | SYSTOLIC BLOOD PRESSURE: 167 MMHG

## 2019-05-15 DIAGNOSIS — E27.3 DRUG-INDUCED ADRENOCORTICAL INSUFFICIENCY: ICD-10-CM

## 2019-05-15 DIAGNOSIS — N18.9 CHRONIC KIDNEY DISEASE, UNSPECIFIED: ICD-10-CM

## 2019-05-15 DIAGNOSIS — N18.6 END STAGE RENAL DISEASE: ICD-10-CM

## 2019-05-15 DIAGNOSIS — N25.81 SECONDARY HYPERPARATHYROIDISM OF RENAL ORIGIN: ICD-10-CM

## 2019-05-15 DIAGNOSIS — R10.9 UNSPECIFIED ABDOMINAL PAIN: ICD-10-CM

## 2019-05-15 DIAGNOSIS — E87.5 HYPERKALEMIA: ICD-10-CM

## 2019-05-15 DIAGNOSIS — I10 ESSENTIAL (PRIMARY) HYPERTENSION: ICD-10-CM

## 2019-05-15 DIAGNOSIS — Z29.9 ENCOUNTER FOR PROPHYLACTIC MEASURES, UNSPECIFIED: ICD-10-CM

## 2019-05-15 DIAGNOSIS — R10.84 GENERALIZED ABDOMINAL PAIN: ICD-10-CM

## 2019-05-15 LAB
ALBUMIN SERPL ELPH-MCNC: 3.4 G/DL — SIGNIFICANT CHANGE UP (ref 3.3–5)
ALP SERPL-CCNC: 141 U/L — HIGH (ref 40–120)
ALT FLD-CCNC: 16 U/L — SIGNIFICANT CHANGE UP (ref 10–45)
ANION GAP SERPL CALC-SCNC: 18 MMOL/L — HIGH (ref 5–17)
AST SERPL-CCNC: 19 U/L — SIGNIFICANT CHANGE UP (ref 10–40)
BASOPHILS # BLD AUTO: 0.1 K/UL — SIGNIFICANT CHANGE UP (ref 0–0.2)
BASOPHILS NFR BLD AUTO: 0.9 % — SIGNIFICANT CHANGE UP (ref 0–2)
BILIRUB SERPL-MCNC: 0.4 MG/DL — SIGNIFICANT CHANGE UP (ref 0.2–1.2)
BUN SERPL-MCNC: 70 MG/DL — HIGH (ref 7–23)
CALCIUM SERPL-MCNC: 8.6 MG/DL — SIGNIFICANT CHANGE UP (ref 8.4–10.5)
CHLORIDE SERPL-SCNC: 97 MMOL/L — SIGNIFICANT CHANGE UP (ref 96–108)
CO2 SERPL-SCNC: 21 MMOL/L — LOW (ref 22–31)
CREAT SERPL-MCNC: 25.86 MG/DL — HIGH (ref 0.5–1.3)
EOSINOPHIL # BLD AUTO: 0.2 K/UL — SIGNIFICANT CHANGE UP (ref 0–0.5)
EOSINOPHIL NFR BLD AUTO: 2.6 % — SIGNIFICANT CHANGE UP (ref 0–6)
GAS PNL BLDV: SIGNIFICANT CHANGE UP
GLUCOSE SERPL-MCNC: 77 MG/DL — SIGNIFICANT CHANGE UP (ref 70–99)
HCT VFR BLD CALC: 32.5 % — LOW (ref 39–50)
HGB BLD-MCNC: 11 G/DL — LOW (ref 13–17)
LIDOCAIN IGE QN: 102 U/L — HIGH (ref 7–60)
LYMPHOCYTES # BLD AUTO: 2 K/UL — SIGNIFICANT CHANGE UP (ref 1–3.3)
LYMPHOCYTES # BLD AUTO: 23.4 % — SIGNIFICANT CHANGE UP (ref 13–44)
MCHC RBC-ENTMCNC: 30.7 PG — SIGNIFICANT CHANGE UP (ref 27–34)
MCHC RBC-ENTMCNC: 34 GM/DL — SIGNIFICANT CHANGE UP (ref 32–36)
MCV RBC AUTO: 90.3 FL — SIGNIFICANT CHANGE UP (ref 80–100)
MONOCYTES # BLD AUTO: 0.8 K/UL — SIGNIFICANT CHANGE UP (ref 0–0.9)
MONOCYTES NFR BLD AUTO: 9.4 % — SIGNIFICANT CHANGE UP (ref 2–14)
NEUTROPHILS # BLD AUTO: 5.4 K/UL — SIGNIFICANT CHANGE UP (ref 1.8–7.4)
NEUTROPHILS NFR BLD AUTO: 63.8 % — SIGNIFICANT CHANGE UP (ref 43–77)
PLATELET # BLD AUTO: 158 K/UL — SIGNIFICANT CHANGE UP (ref 150–400)
POTASSIUM SERPL-MCNC: 5.8 MMOL/L — HIGH (ref 3.5–5.3)
POTASSIUM SERPL-SCNC: 5.8 MMOL/L — HIGH (ref 3.5–5.3)
PROT SERPL-MCNC: 6.5 G/DL — SIGNIFICANT CHANGE UP (ref 6–8.3)
RBC # BLD: 3.6 M/UL — LOW (ref 4.2–5.8)
RBC # FLD: 13.3 % — SIGNIFICANT CHANGE UP (ref 10.3–14.5)
SODIUM SERPL-SCNC: 136 MMOL/L — SIGNIFICANT CHANGE UP (ref 135–145)
WBC # BLD: 8.5 K/UL — SIGNIFICANT CHANGE UP (ref 3.8–10.5)
WBC # FLD AUTO: 8.5 K/UL — SIGNIFICANT CHANGE UP (ref 3.8–10.5)

## 2019-05-15 PROCEDURE — 99285 EMERGENCY DEPT VISIT HI MDM: CPT | Mod: 25

## 2019-05-15 PROCEDURE — 93010 ELECTROCARDIOGRAM REPORT: CPT

## 2019-05-15 PROCEDURE — 99223 1ST HOSP IP/OBS HIGH 75: CPT

## 2019-05-15 RX ORDER — ALBUTEROL 90 UG/1
2.5 AEROSOL, METERED ORAL ONCE
Refills: 0 | Status: COMPLETED | OUTPATIENT
Start: 2019-05-15 | End: 2019-05-15

## 2019-05-15 RX ORDER — ACETAMINOPHEN 500 MG
975 TABLET ORAL ONCE
Refills: 0 | Status: COMPLETED | OUTPATIENT
Start: 2019-05-15 | End: 2019-05-15

## 2019-05-15 RX ORDER — METOPROLOL TARTRATE 50 MG
50 TABLET ORAL ONCE
Refills: 0 | Status: COMPLETED | OUTPATIENT
Start: 2019-05-15 | End: 2019-05-15

## 2019-05-15 RX ORDER — SODIUM POLYSTYRENE SULFONATE 4.1 MEQ/G
15 POWDER, FOR SUSPENSION ORAL ONCE
Refills: 0 | Status: COMPLETED | OUTPATIENT
Start: 2019-05-15 | End: 2019-05-15

## 2019-05-15 RX ORDER — METOPROLOL TARTRATE 50 MG
50 TABLET ORAL DAILY
Refills: 0 | Status: DISCONTINUED | OUTPATIENT
Start: 2019-05-15 | End: 2019-05-15

## 2019-05-15 RX ORDER — METOPROLOL TARTRATE 50 MG
50 TABLET ORAL
Refills: 0 | Status: DISCONTINUED | OUTPATIENT
Start: 2019-05-15 | End: 2019-05-16

## 2019-05-15 RX ORDER — OXYCODONE HYDROCHLORIDE 5 MG/1
5 TABLET ORAL EVERY 6 HOURS
Refills: 0 | Status: DISCONTINUED | OUTPATIENT
Start: 2019-05-15 | End: 2019-05-21

## 2019-05-15 RX ORDER — MORPHINE SULFATE 50 MG/1
4 CAPSULE, EXTENDED RELEASE ORAL ONCE
Refills: 0 | Status: DISCONTINUED | OUTPATIENT
Start: 2019-05-15 | End: 2019-05-15

## 2019-05-15 RX ORDER — CINACALCET 30 MG/1
30 TABLET, FILM COATED ORAL DAILY
Refills: 0 | Status: DISCONTINUED | OUTPATIENT
Start: 2019-05-15 | End: 2019-05-18

## 2019-05-15 RX ORDER — AMLODIPINE BESYLATE 2.5 MG/1
10 TABLET ORAL DAILY
Refills: 0 | Status: DISCONTINUED | OUTPATIENT
Start: 2019-05-15 | End: 2019-05-21

## 2019-05-15 RX ORDER — CALCIUM GLUCONATE 100 MG/ML
2 VIAL (ML) INTRAVENOUS ONCE
Refills: 0 | Status: COMPLETED | OUTPATIENT
Start: 2019-05-15 | End: 2019-05-15

## 2019-05-15 RX ORDER — CEFEPIME 1 G/1
1000 INJECTION, POWDER, FOR SOLUTION INTRAMUSCULAR; INTRAVENOUS ONCE
Refills: 0 | Status: DISCONTINUED | OUTPATIENT
Start: 2019-05-15 | End: 2019-05-16

## 2019-05-15 RX ADMIN — Medication 50 MILLIGRAM(S): at 22:52

## 2019-05-15 RX ADMIN — Medication 200 GRAM(S): at 19:52

## 2019-05-15 RX ADMIN — ALBUTEROL 2.5 MILLIGRAM(S): 90 AEROSOL, METERED ORAL at 19:51

## 2019-05-15 RX ADMIN — MORPHINE SULFATE 4 MILLIGRAM(S): 50 CAPSULE, EXTENDED RELEASE ORAL at 17:22

## 2019-05-15 RX ADMIN — OXYCODONE HYDROCHLORIDE 5 MILLIGRAM(S): 5 TABLET ORAL at 23:12

## 2019-05-15 RX ADMIN — AMLODIPINE BESYLATE 10 MILLIGRAM(S): 2.5 TABLET ORAL at 22:52

## 2019-05-15 RX ADMIN — OXYCODONE HYDROCHLORIDE 5 MILLIGRAM(S): 5 TABLET ORAL at 22:42

## 2019-05-15 RX ADMIN — CINACALCET 30 MILLIGRAM(S): 30 TABLET, FILM COATED ORAL at 23:53

## 2019-05-15 RX ADMIN — Medication 50 MILLIGRAM(S): at 17:22

## 2019-05-15 RX ADMIN — Medication 975 MILLIGRAM(S): at 16:11

## 2019-05-15 RX ADMIN — Medication 5 MILLIGRAM(S): at 22:52

## 2019-05-15 RX ADMIN — SODIUM POLYSTYRENE SULFONATE 15 GRAM(S): 4.1 POWDER, FOR SUSPENSION ORAL at 19:51

## 2019-05-15 NOTE — ED PROVIDER NOTE - ATTENDING CONTRIBUTION TO CARE
Dr. Richardson : I have personally seen and examined this patient at the bedside. I have fully participated in the care of this patient. I have reviewed all pertinent clinical information, including history, physical exam, plan and the PA's note and agree except as noted.     32 yo M PMhx HTN, CKD s/p FSGS s/p failed renal transplant (lasted from 2004 to 2018) currently on daily PD pw abd pain x 2 days. notes pain is periumbilical; feels the same when had peritonitis few months ago was admitted to [facility] for IV abx at that time. did not have any other symptoms but pain at that time. + loose stools (chronic)  Denies f/c/n/v/cp/sob/palpitations/cough/c/dysuria/hematuria. no sick contacts/recent travel.    PE:  head; atraumatic normocephalic  eyes: perrla eomi  Heart: rrr s1s2  lungs: ctab  abd: soft, mild periumbilical discomfort nd + bs no rebound/guarding no cva ttp; peritoneal dialyssi port c/d/i  le: no swelling no calf ttp  back: no midline cervical/thoracic/lumbar ttp    -->possible peritonitis will fu labs blood cultures reach out to nephrology to access dialysis fluid to send to lab; abd very mildly ttp will do serial exams no ct at this time unlikely intraabdominal pathology like appy or kris neg florian fitch--reassess

## 2019-05-15 NOTE — H&P ADULT - PROBLEM SELECTOR PLAN 4
Start home dose of prednisone 5mg PO qd.  Unclear why the patient was not switched to oral hydrocortisone- clarify with nephrology in AM.

## 2019-05-15 NOTE — H&P ADULT - NSHPPHYSICALEXAM_GEN_ALL_CORE
Vital Signs Last 24 Hrs  T(C): 36.8 (15 May 2019 20:22), Max: 37.2 (15 May 2019 17:33)  T(F): 98.3 (15 May 2019 20:22), Max: 98.9 (15 May 2019 17:33)  HR: 89 (15 May 2019 20:22) (79 - 97)  BP: 167/104 (15 May 2019 20:22) (167/103 - 172/109)  BP(mean): --  RR: 20 (15 May 2019 20:22) (16 - 20)  SpO2: 97% (15 May 2019 20:22) (97% - 99%)    PHYSICAL EXAM:  GENERAL: NAD, well-groomed, well-developed  HEAD:  Atraumatic, Normocephalic  EYES: EOMI, PERRLA, conjunctiva and sclera clear  ENMT: No oropharyngeal exudates, erythema or lesions,  Moist mucous membranes, good dentition  NECK: Supple, no cervical lymphadenopathy, no JVD  NERVOUS SYSTEM:  Alert & Oriented X3, CN II-XII intact, 5/5 BUE and BLE motor strength, full sensation to light touch   CHEST/LUNG: Clear to auscultation bilaterally; No rales, no  rhonchi, no wheezing  HEART: Regular rate and rhythm; No murmurs, rubs, or gallops  ABDOMEN: Soft, Nontender, Nondistended  EXTREMITIES:  2+ Peripheral Pulses, No clubbing, cyanosis, or edema  LYMPH: No lymphadenopathy noted  SKIN: No rashes or lesions Vital Signs Last 24 Hrs  T(C): 36.8 (15 May 2019 20:22), Max: 37.2 (15 May 2019 17:33)  T(F): 98.3 (15 May 2019 20:22), Max: 98.9 (15 May 2019 17:33)  HR: 89 (15 May 2019 20:22) (79 - 97)  BP: 167/104 (15 May 2019 20:22) (167/103 - 172/109)  BP(mean): --  RR: 20 (15 May 2019 20:22) (16 - 20)  SpO2: 97% (15 May 2019 20:22) (97% - 99%)    PHYSICAL EXAM:  GENERAL: NAD, well-groomed, well-developed  HEAD:  Atraumatic, Normocephalic  EYES: EOMI, PERRLA, conjunctiva and sclera clear  ENMT: No oropharyngeal exudates, erythema or lesions,  Moist mucous membranes, good dentition  NECK: Supple, no cervical lymphadenopathy, no JVD  NERVOUS SYSTEM:  Alert & Oriented X3, CN II-XII intact, 5/5 BUE and BLE motor strength, full sensation to light touch   CHEST/LUNG: Clear to auscultation bilaterally; No rales, no  rhonchi, no wheezing  HEART: Regular rate and rhythm; No murmurs, rubs, or gallops  ABDOMEN: Soft, mild epigastric tenderness to palpation, no fluid wave, Peritoneal dialysis catheter in place with clean overlying dressing with clear dialysis fluid in tubing and no surrounding erythema or exudates.   EXTREMITIES:  2+ radial and DP pulses, No clubbing, cyanosis, or edema  LYMPH: No cervical lymphadenopathy noted  SKIN: No rashes or lesions  MSK: No joint pains, no muscle pains

## 2019-05-15 NOTE — H&P ADULT - PROBLEM SELECTOR PLAN 1
Differential for patient's abdominal pain includes but is not limited to spontaneous bacterial peritonitis, outflow failure, pericatheter leak, gastritis, PUD.   Outflow failure seems less likely since patient reports he has been able to perform PD sessions at home. Pericatheter leak also seems less likely since   Patient states that the location of his current abdominal pain is similar to that of his previous episode of SBP. Check dialysate fluid cell counts and culture, blood culture.   If counts are indicative of SBP, will start antibiotic coverage with cefepime and vancomycin.  If counts are not suggestive of SBP, will obtain further imaging Differential for patient's abdominal pain includes but is not limited to spontaneous bacterial peritonitis, outflow failure, pericatheter leak, gastritis, PUD.   Outflow failure seems less likely since patient reports he has been able to perform PD sessions at home. Pericatheter leak also seems less likely since   Patient states that the location of his current abdominal pain is similar to that of his previous episode of SBP. Check dialysate fluid cell counts and culture, blood culture.   If counts are indicative of SBP, will start antibiotic coverage with cefepime and vancomycin.  If counts are not suggestive of SBP, will obtain further imaging  Start oxycodone 5mg PO for severe pain.     ISTOP Reference #: [id number]  Rx dispensed 12/7/2018,   oxycodone hcl 5mg PO tablet, quantity 10, 2 day supply

## 2019-05-15 NOTE — ED PROVIDER NOTE - PROGRESS NOTE DETAILS
Page sent out to nephrologist Dr. Prieto to discuss case. Awaiting return page. - Mo Bowden PA-C Spoke w/ Dr. Prieto, he states he only deals with pt from transplant side. States pt's PD is managed by Dr. Greco. Page sent out to Dr. Greco, awaiting return page. - Mo Bowden PA-C Spoke w/ Dr. Vickers who works with Dr. Greco. informed him of pt presentation, labs etc. He agreed w/ calling PD nurse to come take sample and starting abx after sample taking and they will follow from nephro standpoint. Will tx hyperkalemia and give abx once PD sample taken (he will call nurse himself). Requested Dr. Segovia for admission. - CIRO RamachandranC Pt endorsed to Dr. Segovia, informed him of workup and tx thus far and conversation w/ Dr. Vickers as above and that holding off on CT at the moment given reassuring exam. Will start abx after PD sample drawn. Accepted pt under his service to telemetry given HyperK+. Pt endorsed to Dr. Segovia, informed him of workup and tx thus far and conversation w/ Dr. Vickers as above and that holding off on CT at the moment given reassuring exam. Will start abx after PD sample drawn. Accepted pt under his service to telemetry given HyperK+. - Mo Bowden PA-C Awaiting PD team to take sample prior to abx. Cefotaxime not available so d/w attending, will give Cefepime renally dosed AFTER sample taken. RN and attending aware. - Mo Bowden PA-C

## 2019-05-15 NOTE — H&P ADULT - NSICDXPASTMEDICALHX_GEN_ALL_CORE_FT
PAST MEDICAL HISTORY:  CKD (chronic kidney disease), stage 4 (severe)     Dialysis complication     Glomerulonephritis     Hypertension

## 2019-05-15 NOTE — H&P ADULT - PROBLEM SELECTOR PLAN 2
Patient was treated for hyperkalemia, serum K of 5.8, in ED with duo-neb x2, kayexalate.   Repeat BMP.

## 2019-05-15 NOTE — H&P ADULT - PROBLEM SELECTOR PLAN 6
The patient is unable to provide dosage for his home dose of sensipar. Start sensipar- clarify dosing with nephrologist and pt's [facility] pharmacy in AM.  Check PTH and vitamin D levels

## 2019-05-15 NOTE — H&P ADULT - PROBLEM SELECTOR PLAN 7
VTE ppx: heparin subQ  Activity: ambulate with assistance  Diet: renal, DASH Pt found to be anemic, likely 2/2 CKD  Check iron studies, ferritin

## 2019-05-15 NOTE — H&P ADULT - NSHPLABSRESULTS_GEN_ALL_CORE
Labs, imaging and EKG personally reviewed by me.     LABS:                        11.0   8.5   )-----------( 158      ( 15 May 2019 17:00 )             32.5     Hgb Trend: 11.0<--  05-15    136  |  97  |  70<H>  ----------------------------<  77  5.8<H>   |  21<L>  |  25.86<H>    Ca    8.6      15 May 2019 17:00    TPro  6.5  /  Alb  3.4  /  TBili  0.4  /  DBili  x   /  AST  19  /  ALT  16  /  AlkPhos  141<H>  05-15    Creatinine Trend: 25.86<--    Venous Blood Gas:  05-15 @ 17:00  7.33/45/61/23/89  VBG Lactate: 1.4    CXR- my read- no focal consolidation, no pleural effusions Labs, imaging and EKG personally reviewed by me.   CBC found normocytic anemia with Hgb of 11, MCV 90.3.   CMP found hypokalemia with K 5.8, elevated BUN 70, elevated SCr of 25.86 and elevated anion gap of 18.   Lipase 102  Lactate 1.4 WNL.     LABS:                        11.0   8.5   )-----------( 158      ( 15 May 2019 17:00 )             32.5     Hgb Trend: 11.0<--  05-15    136  |  97  |  70<H>  ----------------------------<  77  5.8<H>   |  21<L>  |  25.86<H>    Ca    8.6      15 May 2019 17:00    TPro  6.5  /  Alb  3.4  /  TBili  0.4  /  DBili  x   /  AST  19  /  ALT  16  /  AlkPhos  141<H>  05-15    Creatinine Trend: 25.86<--    Venous Blood Gas:  05-15 @ 17:00  7.33/45/61/23/89  VBG Lactate: 1.4    CXR- my read- no focal consolidation, no pleural effusions

## 2019-05-15 NOTE — ED PROVIDER NOTE - OBJECTIVE STATEMENT
32 yo male PMhx HTN, CKD s/p FSGS s/p failed renal transplant currently on daily PD presents to the ED c/o abdominal pain x 2 days. 34 yo male PMhx HTN, CKD s/p FSGS s/p failed renal transplant currently on daily PD, recent admission to [facility] 3 mo ago for peritonitis presents to the ED c/o abdominal pain x 2 days. Pt states pain is felt "in my abs", cannot localize particular side. Pain is dull, constant, worsened with movement. Has been taking tylenol at home with improvement of pain but came in today because started to feel similar to the pain he had 3 mo ago when he was dx'ed with peritonitis. Reports chronic diarrhea, unchanged from baseline. Denies fever/chills, n/v, constipation, recent travel, recent sick contacts, cp, sob, 34 yo male PMhx HTN, CKD s/p FSGS s/p failed renal transplant currently on daily PD, recent admission to [place] 3 mo ago for peritonitis presents to the ED c/o abdominal pain x 2 days. Pt states pain is felt "in my abs", cannot localize particular side. Pain is dull, constant, worsened with movement. Has been taking tylenol at home with improvement of pain but came in today because started to feel similar to the pain he had 3 mo ago when he was dx'ed with peritonitis. Reports chronic diarrhea, unchanged from baseline. Denies fever/chills, n/v, constipation, recent travel, recent sick contacts, cp, sob, urinary urgency, frequency, dysuria.

## 2019-05-15 NOTE — H&P ADULT - PROBLEM SELECTOR PLAN 3
Nephrologist Dr. Greco was consulted by ED.   Start peritoneal dialysis as per nephrology recommendations.  Avoid nephrotoxic agents.

## 2019-05-15 NOTE — H&P ADULT - ASSESSMENT
This patient is a 32yo M with PMH of ESRD 2/2 FSGS s/p renal transplant in 2004, s/p failure, HD x2 months, now on PD who presents to the ED with complaint of abdominal pain. The patient states that he started peritoneal dialysis in January 2019. He was hospitalized at [facility] in April 2019 for SBP after a piece from his peritoneal catheter came loose. He was treated with a 21 day course of antibiotics. He had recurrence of his abdominal pain 2 days prior to admission, with diffuse intermittent localized abdominal pain, described as achy in quality. This pain is similar to that he experienced in April. He denies any fever, chills, lightheadedness, dizziness, nausea, vomiting. He reports chronic diarrhea with loose, nonbloody stools. He denies any changes in lower extremity swelling. He denies any muscle or joint pain. He has had sick contacts at school, with colds, but no recent travel.   The patient reports he has eating and drinking normally. Patient does PD at home, overnight. He reports using sterile technique. Dialysate fluid has been clear, without fibrin or pus. His dialysis catheter site has had no surrounding redness or swelling.     In the ED, T 98.5F, HR 87, /106, RR 16, SpO2 99%RA   Patient was given cefepime 1g,   Pt given tylenol 975mg, albuterol x2, calcium gluconate 2g, metoprolol tartrate 50mg PO x1, kayexelate 15g, morphine 4mg IVP This patient is a 34yo M with PMH of ESRD 2/2 FSGS s/p renal transplant in 2004, s/p failure, HD x2 months, now on PD who presents to the ED with complaint of abdominal pain, admitted for evaluation of suspected sbp.

## 2019-05-15 NOTE — ED ADULT NURSE NOTE - NSIMPLEMENTINTERV_GEN_ALL_ED
Implemented All Universal Safety Interventions:  [place] to call system. Call bell, personal items and telephone within reach. Instruct patient to call for assistance. Room bathroom lighting operational. Non-slip footwear when patient is off stretcher. Physically safe environment: no spills, clutter or unnecessary equipment. Stretcher in lowest position, wheels locked, appropriate side rails in place.

## 2019-05-15 NOTE — ED PROVIDER NOTE - ABDOMINAL TENDER
+mild ttp periumbilical region, no rebound, guarding or rigidity. PD catheter in place in RLQ, no surrounding erythema, swelling or tenderness noted./periumbilical

## 2019-05-15 NOTE — H&P ADULT - NSICDXFAMILYHX_GEN_ALL_CORE_FT
FAMILY HISTORY:  Uncle  Still living? Unknown  Family history of glomerulonephritis, Age at diagnosis: Age Unknown

## 2019-05-15 NOTE — H&P ADULT - NSHPREVIEWOFSYSTEMS_GEN_ALL_CORE
REVIEW OF SYSTEMS  CONSTITUTIONAL: No fever, no chills, no fatigue  EYES: No eye pain, no vision changes  ENMT:  No difficulty hearing, no throat pain  RESPIRATORY: No cough, no wheezing, no sputum production; No shortness of breath  CARDIOVASCULAR: No chest pain, no palpitations, no TINEO, no leg swelling  GASTROINTESTINAL: No abdominal pain, no nausea, no vomiting, no hematemesis, no diarrhea, no constipation, no melena, no hematochezia.  GENITOURINARY: No dysuria, no hematuria  NEUROLOGICAL: No headaches, no loss of strength, no numbness  SKIN: No itching, no rashes, no lesions   MUSCULOSKELETAL: No joint pain, no joint swelling; No muscle pain  HEME/LYMPH: No easy bruising, bleeding REVIEW OF SYSTEMS  CONSTITUTIONAL: No fever, no chills, + poor appetite  EYES: No eye pain, no vision changes  ENMT:  No difficulty hearing, no throat pain  RESPIRATORY: No cough, no wheezing, no sputum production; No shortness of breath  CARDIOVASCULAR: No chest pain, no palpitations, no TINEO, mild chronic leg swelling  GASTROINTESTINAL: + abdominal pain, no nausea, no vomiting, + chronic diarrhea, no constipation, no melena, no hematochezia.  NEUROLOGICAL: No headaches, no loss of strength, no numbness  SKIN: No itching, no rashes, no lesions   MUSCULOSKELETAL: No joint pain, no joint swelling; No muscle pain  HEME/LYMPH: No easy bruising, bleeding

## 2019-05-15 NOTE — ED ADULT NURSE NOTE - OBJECTIVE STATEMENT
Patient presents to Ed with c/o abd pain. Patient with hx of renal failure on peritoneal dialysis at home reports having  peritonitis 2 months ago with aggressive antibiotic treatment for one month here c/o abd pain and nausea for two days.  Patient A&Ox4 denies chest pain or sob, no nausea at present, no fever slight chills no headache or dizziness. RAC 20g iv  lock placed labs drawn and sent.

## 2019-05-16 DIAGNOSIS — R10.84 GENERALIZED ABDOMINAL PAIN: ICD-10-CM

## 2019-05-16 LAB
24R-OH-CALCIDIOL SERPL-MCNC: 10.7 NG/ML — LOW (ref 30–80)
ANION GAP SERPL CALC-SCNC: 17 MMOL/L — SIGNIFICANT CHANGE UP (ref 5–17)
B PERT IGG+IGM PNL SER: CLEAR — SIGNIFICANT CHANGE UP
BASOPHILS # BLD AUTO: 0 K/UL — SIGNIFICANT CHANGE UP (ref 0–0.2)
BASOPHILS NFR BLD AUTO: 0.7 % — SIGNIFICANT CHANGE UP (ref 0–2)
BUN SERPL-MCNC: 73 MG/DL — HIGH (ref 7–23)
CALCIUM SERPL-MCNC: 8.4 MG/DL — SIGNIFICANT CHANGE UP (ref 8.4–10.5)
CALCIUM SERPL-MCNC: 8.9 MG/DL — SIGNIFICANT CHANGE UP (ref 8.4–10.5)
CHLORIDE SERPL-SCNC: 100 MMOL/L — SIGNIFICANT CHANGE UP (ref 96–108)
CO2 SERPL-SCNC: 21 MMOL/L — LOW (ref 22–31)
COLOR FLD: SIGNIFICANT CHANGE UP
CREAT SERPL-MCNC: 26.54 MG/DL — HIGH (ref 0.5–1.3)
EOSINOPHIL # BLD AUTO: 0.2 K/UL — SIGNIFICANT CHANGE UP (ref 0–0.5)
EOSINOPHIL NFR BLD AUTO: 2.9 % — SIGNIFICANT CHANGE UP (ref 0–6)
FERRITIN SERPL-MCNC: 1898 NG/ML — HIGH (ref 30–400)
FLUID INTAKE SUBSTANCE CLASS: SIGNIFICANT CHANGE UP
FLUID SEGMENTED GRANULOCYTES: 4 % — SIGNIFICANT CHANGE UP
GLUCOSE SERPL-MCNC: 102 MG/DL — HIGH (ref 70–99)
HCT VFR BLD CALC: 31.3 % — LOW (ref 39–50)
HGB BLD-MCNC: 10.6 G/DL — LOW (ref 13–17)
LYMPHOCYTES # BLD AUTO: 1.9 K/UL — SIGNIFICANT CHANGE UP (ref 1–3.3)
LYMPHOCYTES # BLD AUTO: 28.3 % — SIGNIFICANT CHANGE UP (ref 13–44)
LYMPHOCYTES # FLD: 42 % — SIGNIFICANT CHANGE UP
MCHC RBC-ENTMCNC: 30.4 PG — SIGNIFICANT CHANGE UP (ref 27–34)
MCHC RBC-ENTMCNC: 33.7 GM/DL — SIGNIFICANT CHANGE UP (ref 32–36)
MCV RBC AUTO: 90.1 FL — SIGNIFICANT CHANGE UP (ref 80–100)
MESOTHL CELL # FLD: 2 % — SIGNIFICANT CHANGE UP
MONOCYTES # BLD AUTO: 0.7 K/UL — SIGNIFICANT CHANGE UP (ref 0–0.9)
MONOCYTES NFR BLD AUTO: 9.6 % — SIGNIFICANT CHANGE UP (ref 2–14)
MONOS+MACROS # FLD: 52 % — SIGNIFICANT CHANGE UP
NEUTROPHILS # BLD AUTO: 4 K/UL — SIGNIFICANT CHANGE UP (ref 1.8–7.4)
NEUTROPHILS NFR BLD AUTO: 58.6 % — SIGNIFICANT CHANGE UP (ref 43–77)
PHOSPHATE SERPL-MCNC: 6.6 MG/DL — HIGH (ref 2.5–4.5)
PLATELET # BLD AUTO: 132 K/UL — LOW (ref 150–400)
POTASSIUM SERPL-MCNC: 5.2 MMOL/L — SIGNIFICANT CHANGE UP (ref 3.5–5.3)
POTASSIUM SERPL-SCNC: 5.2 MMOL/L — SIGNIFICANT CHANGE UP (ref 3.5–5.3)
PTH-INTACT FLD-MCNC: 911 PG/ML — HIGH (ref 15–65)
RBC # BLD: 3.48 M/UL — LOW (ref 4.2–5.8)
RBC # FLD: 13.2 % — SIGNIFICANT CHANGE UP (ref 10.3–14.5)
RCV VOL RI: 203 /UL — HIGH (ref 0–5)
SODIUM SERPL-SCNC: 138 MMOL/L — SIGNIFICANT CHANGE UP (ref 135–145)
TOTAL NUCLEATED CELL COUNT, BODY FLUID: 21 /UL — HIGH (ref 0–5)
TUBE TYPE: SIGNIFICANT CHANGE UP
WBC # BLD: 6.8 K/UL — SIGNIFICANT CHANGE UP (ref 3.8–10.5)
WBC # FLD AUTO: 6.8 K/UL — SIGNIFICANT CHANGE UP (ref 3.8–10.5)

## 2019-05-16 PROCEDURE — 99222 1ST HOSP IP/OBS MODERATE 55: CPT

## 2019-05-16 PROCEDURE — 74018 RADEX ABDOMEN 1 VIEW: CPT | Mod: 26

## 2019-05-16 RX ORDER — ONDANSETRON 8 MG/1
4 TABLET, FILM COATED ORAL ONCE
Refills: 0 | Status: COMPLETED | OUTPATIENT
Start: 2019-05-16 | End: 2019-05-16

## 2019-05-16 RX ORDER — CEFEPIME 1 G/1
2000 INJECTION, POWDER, FOR SOLUTION INTRAMUSCULAR; INTRAVENOUS ONCE
Refills: 0 | Status: COMPLETED | OUTPATIENT
Start: 2019-05-16 | End: 2019-05-18

## 2019-05-16 RX ORDER — VANCOMYCIN HCL 1 G
1000 VIAL (EA) INTRAVENOUS ONCE
Refills: 0 | Status: COMPLETED | OUTPATIENT
Start: 2019-05-16 | End: 2019-05-16

## 2019-05-16 RX ORDER — LABETALOL HCL 100 MG
200 TABLET ORAL EVERY 12 HOURS
Refills: 0 | Status: DISCONTINUED | OUTPATIENT
Start: 2019-05-16 | End: 2019-05-21

## 2019-05-16 RX ORDER — HEPARIN SODIUM 5000 [USP'U]/ML
5000 INJECTION INTRAVENOUS; SUBCUTANEOUS EVERY 8 HOURS
Refills: 0 | Status: DISCONTINUED | OUTPATIENT
Start: 2019-05-16 | End: 2019-05-21

## 2019-05-16 RX ORDER — SEVELAMER CARBONATE 2400 MG/1
1600 POWDER, FOR SUSPENSION ORAL
Refills: 0 | Status: DISCONTINUED | OUTPATIENT
Start: 2019-05-16 | End: 2019-05-21

## 2019-05-16 RX ADMIN — CEFEPIME 100 MILLIGRAM(S): 1 INJECTION, POWDER, FOR SOLUTION INTRAMUSCULAR; INTRAVENOUS at 06:29

## 2019-05-16 RX ADMIN — SEVELAMER CARBONATE 1600 MILLIGRAM(S): 2400 POWDER, FOR SUSPENSION ORAL at 17:33

## 2019-05-16 RX ADMIN — Medication 200 MILLIGRAM(S): at 17:32

## 2019-05-16 RX ADMIN — HEPARIN SODIUM 5000 UNIT(S): 5000 INJECTION INTRAVENOUS; SUBCUTANEOUS at 11:29

## 2019-05-16 RX ADMIN — HEPARIN SODIUM 5000 UNIT(S): 5000 INJECTION INTRAVENOUS; SUBCUTANEOUS at 21:05

## 2019-05-16 RX ADMIN — OXYCODONE HYDROCHLORIDE 5 MILLIGRAM(S): 5 TABLET ORAL at 14:27

## 2019-05-16 RX ADMIN — Medication 5 MILLIGRAM(S): at 05:37

## 2019-05-16 RX ADMIN — OXYCODONE HYDROCHLORIDE 5 MILLIGRAM(S): 5 TABLET ORAL at 15:15

## 2019-05-16 RX ADMIN — CINACALCET 30 MILLIGRAM(S): 30 TABLET, FILM COATED ORAL at 11:28

## 2019-05-16 RX ADMIN — Medication 50 MILLIGRAM(S): at 05:38

## 2019-05-16 RX ADMIN — Medication 250 MILLIGRAM(S): at 07:26

## 2019-05-16 RX ADMIN — ONDANSETRON 4 MILLIGRAM(S): 8 TABLET, FILM COATED ORAL at 00:50

## 2019-05-16 NOTE — CONSULT NOTE ADULT - PROBLEM SELECTOR RECOMMENDATION 2
Abdominal discomfort, but no signs of active infection on exam and on current labs, pending cell count and culture results  continue Abx per primary team  consider management for GERD/gastroenteritis also

## 2019-05-16 NOTE — CONSULT NOTE ADULT - SUBJECTIVE AND OBJECTIVE BOX
GABE JOSEPH 33y Male  MRN-[id number]    Patient is a 33y old  Male who presents with a chief complaint of abdominal pain (16 May 2019 13:12)      HPI:  This patient is a 32yo M with PMH of ESRD 2/2 FSGS s/p renal transplant in 2004, s/p failure, HD x2 months, now on PD who presents to the ED with complaint of abdominal pain. The patient states that he started peritoneal dialysis in January 2019. He was hospitalized at [facility] in April 2019 for SBP after a piece from his peritoneal catheter came loose. He was treated with a 21 day course of antibiotics. He had recurrence of his abdominal pain 2 days prior to admission, with diffuse intermittent localized abdominal pain, described as achy in quality. This pain is similar to that he experienced in April. He denies any fever, chills, lightheadedness, dizziness, nausea, vomiting. He reports chronic diarrhea with loose, nonbloody stools. He denies any changes in lower extremity swelling. He denies any muscle or joint pain. He has had sick contacts at school, with colds, but no recent travel.   The patient reports he has eating and drinking normally. Patient does PD at home, overnight. He reports using sterile technique. Dialysate fluid has been clear, without fibrin or pus. His dialysis catheter site has had no surrounding redness or swelling. He had no additional antibx use besides that which he had for his last episode of SBP.     In the ED, T 98.5F, HR 87, /106, RR 16, SpO2 99%RA   Patient was given cefepime 1g,   Pt given tylenol 975mg, albuterol x2, calcium gluconate 2g, metoprolol tartrate 50mg PO x1, kayexelate 15g, morphine 4mg IVP (15 May 2019 21:12)    No fever.     PAST MEDICAL & SURGICAL HISTORY:  Dialysis complication  CKD (chronic kidney disease), stage 4 (severe)  Hypertension  Glomerulonephritis  S/P kidney transplant: Right kidney in 2004      Allergies    No Known Allergies    Intolerances        ANTIMICROBIALS:  cefepime   IVPB 2000 once      MEDICATIONS  (STANDING):  amLODIPine   Tablet 10 milliGRAM(s) Oral daily  cefepime   IVPB 2000 milliGRAM(s) IV Intermittent once  cinacalcet 30 milliGRAM(s) Oral daily  heparin  Injectable 5000 Unit(s) SubCutaneous every 8 hours  labetalol 200 milliGRAM(s) Oral every 12 hours  predniSONE   Tablet 5 milliGRAM(s) Oral daily  sevelamer carbonate 1600 milliGRAM(s) Oral three times a day with meals      Social History  Smoking: no  Etoh: no  Drug use: no      FAMILY HISTORY:  Family history of glomerulonephritis (Uncle)      Vital Signs Last 24 Hrs  T(C): 37.1 (16 May 2019 14:01), Max: 37.2 (15 May 2019 17:33)  T(F): 98.8 (16 May 2019 14:01), Max: 98.9 (15 May 2019 17:33)  HR: 79 (16 May 2019 14:01) (76 - 97)  BP: 163/90 (16 May 2019 14:01) (145/92 - 183/107)  BP(mean): --  RR: 18 (16 May 2019 14:01) (18 - 20)  SpO2: 95% (16 May 2019 14:01) (94% - 98%)    CBC Full  -  ( 16 May 2019 00:03 )  WBC Count : 6.8 K/uL  RBC Count : 3.48 M/uL  Hemoglobin : 10.6 g/dL  Hematocrit : 31.3 %  Platelet Count - Automated : 132 K/uL  Mean Cell Volume : 90.1 fl  Mean Cell Hemoglobin : 30.4 pg  Mean Cell Hemoglobin Concentration : 33.7 gm/dL  Auto Neutrophil # : 4.0 K/uL  Auto Lymphocyte # : 1.9 K/uL  Auto Monocyte # : 0.7 K/uL  Auto Eosinophil # : 0.2 K/uL  Auto Basophil # : 0.0 K/uL  Auto Neutrophil % : 58.6 %  Auto Lymphocyte % : 28.3 %  Auto Monocyte % : 9.6 %  Auto Eosinophil % : 2.9 %  Auto Basophil % : 0.7 %    05-16    138  |  100  |  73<H>  ----------------------------<  102<H>  5.2   |  21<L>  |  26.54<H>    Ca    8.9      16 May 2019 00:03  Phos  6.6     05-16    TPro  6.5  /  Alb  3.4  /  TBili  0.4  /  DBili  x   /  AST  19  /  ALT  16  /  AlkPhos  141<H>  05-15    LIVER FUNCTIONS - ( 15 May 2019 17:00 )  Alb: 3.4 g/dL / Pro: 6.5 g/dL / ALK PHOS: 141 U/L / ALT: 16 U/L / AST: 19 U/L / GGT: x               MICROBIOLOGY:        RADIOLOGY  < from: Xray Abdomen 1 View PORTABLE -Urgent (05.16.19 @ 00:48) >  Nonobstructive bowel gas pattern.    < end of copied text >

## 2019-05-16 NOTE — CONSULT NOTE ADULT - ASSESSMENT
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

## 2019-05-16 NOTE — CONSULT NOTE ADULT - SUBJECTIVE AND OBJECTIVE BOX
[facility] - KEAGAN Henriquez / KEAGAN Ugarte / GARY Greco/ KEAGAN Boyle/ KEAGAN Kunz/ FLAVIO Lucio / DIANA Banks / TIMMY Vickers  -------------------------------------------------------------------------------------------------------  The patient seen and examined today.  HPI:  This patient is a 32yo M with PMH of ESRD 2/2 FSGS s/p renal transplant in 2004, s/p failure, HD x1 year, now on PD for the last 4 months, who presents to the ED with complaint of abdominal pain. The patient states that he started peritoneal dialysis in January 2019. He was hospitalized at [facility] in April 2019 for SBP after a piece from his peritoneal catheter came loose. He was treated with a 21 day course of antibiotics. He had recurrence of his abdominal pain 2 days prior to admission, with diffuse intermittent localized abdominal pain, described as achy in quality. This pain is similar to that he experienced in April. He denies any fever, chills, lightheadedness, dizziness, nausea, vomiting. He reports chronic diarrhea with loose, nonbloody stools. He denies any changes in lower extremity swelling. He denies any muscle or joint pain. He has had sick contacts at school, with colds, but no recent travel.   The patient reports he has eating and drinking normally. Patient does PD at home, overnight. He reports using sterile technique. Dialysate fluid has been clear, without fibrin or pus. His dialysis catheter site has had no surrounding redness or swelling. He had no additional antibx use besides that which he had for his last episode of SBP.   Patient has 4-5 exchanges a day with 2.5%    In the ED, T 98.5F, HR 87, /106, RR 16, SpO2 99%RA   Patient was given cefepime 1g,   Pt given tylenol 975mg, albuterol x2, calcium gluconate 2g, metoprolol tartrate 50mg PO x1, kayexelate 15g, morphine 4mg IVP (15 May 2019 21:12)      PAST MEDICAL & SURGICAL HISTORY:  Dialysis complication  CKD (chronic kidney disease), stage 4 (severe)  Hypertension  Glomerulonephritis  S/P kidney transplant: Right kidney in 2004    Allergies :- No Known Allergies    Home Medications Reviewed  Hospital Medications:   MEDICATIONS  (STANDING):  amLODIPine   Tablet 10 milliGRAM(s) Oral daily  cefepime   IVPB 2000 milliGRAM(s) IV Intermittent once  cinacalcet 30 milliGRAM(s) Oral daily  heparin  Injectable 5000 Unit(s) SubCutaneous every 8 hours  labetalol 200 milliGRAM(s) Oral every 12 hours  predniSONE   Tablet 5 milliGRAM(s) Oral daily    SOCIAL HISTORY:  Denies ETOh,Smoking,   FAMILY HISTORY:  Family history of glomerulonephritis (Uncle)      REVIEW OF SYSTEMS:  CONSTITUTIONAL: No weakness, fevers or chills  EYES/ENT: No visual changes;  No vertigo or throat pain   NECK: No pain or stiffness  RESPIRATORY: No cough, wheezing, hemoptysis; No shortness of breath  CARDIOVASCULAR: No chest pain or palpitations.  GASTROINTESTINAL: Nausea, vomiting, abdominal discomfort  GENITOURINARY: No dysuria, frequency, foamy urine, urinary urgency, incontinence or hematuria  NEUROLOGICAL: No numbness or weakness  SKIN: No itching, burning, rashes, or lesions   VASCULAR: No bilateral lower extremity edema.   All other review of systems is negative unless indicated above.    VITALS:  T(F): 98.3 (05-16-19 @ 10:13), Max: 98.9 (05-15-19 @ 17:33)  HR: 83 (05-16-19 @ 10:13)  BP: 157/94 (05-16-19 @ 10:13)  RR: 18 (05-16-19 @ 10:13)  SpO2: 94% (05-16-19 @ 10:13)  Wt(kg): --    05-15 @ 07:01  -  05-16 @ 07:00  --------------------------------------------------------  IN: 60 mL / OUT: 0 mL / NET: 60 mL        Weight (kg): 77.1 (05-15 @ 15:00)    PHYSICAL EXAM:  Constitutional: NAD  HEENT: anicteric sclera, oropharynx clear, MMM  Neck: supple.   Respiratory: Bilateral equal breath sounds , no wheezes, no crackles  Cardiovascular: S1, S2, Regular, Murmur present.  Gastrointestinal: Bowel Sound present, soft, NT/ND, exit site clean, no pus expressed  Extremities: No cyanosis or clubbing. No peripheral edema  Neurological: Alert and oriented x 3, no focal deficits  Psychiatric: Normal mood, normal affect  : No CVA tenderness. No fuller.   Skin: No rashes    Data:  05-16    138  |  100  |  73<H>  ----------------------------<  102<H>  5.2   |  21<L>  |  26.54<H>    Ca    8.9      16 May 2019 00:03  Phos  6.6     05-16    TPro  6.5  /  Alb  3.4  /  TBili  0.4  /  DBili      /  AST  19  /  ALT  16  /  AlkPhos  141<H>  05-15    Creatinine Trend: 26.54 <--, 25.86 <--                        10.6   6.8   )-----------( 132      ( 16 May 2019 00:03 )             31.3     Urine Studies: [facility] - KEAGAN Henriquez / KEAGAN Ugarte / GARY Greco/ KEAGAN Boyle/ KEAGAN Kunz/ FLAVIO Lucio / DIANA Banks / TIMMY Vickers  -------------------------------------------------------------------------------------------------------  The patient seen and examined today.  HPI:  This patient is a 34yo M with PMH of ESRD 2/2 FSGS s/p renal transplant in 2004, s/p failure, HD x1 year, now on PD for the last 4 months, who presents to the ED with complaint of abdominal pain. The patient states that he started peritoneal dialysis in January 2019. He was hospitalized at [facility] in April 2019 for SBP after a piece from his peritoneal catheter came loose. He was treated with a 21 day course of antibiotics. He had recurrence of his abdominal pain 2 days prior to admission, with diffuse intermittent localized abdominal pain, described as achy in quality. This pain is similar to that he experienced in April. He denies any fever, chills, lightheadedness, dizziness, nausea, vomiting. He reports chronic diarrhea with loose, nonbloody stools. He denies any changes in lower extremity swelling. He denies any muscle or joint pain. He has had sick contacts at school, with colds, but no recent travel.   The patient reports he has eating and drinking normally. Patient does PD at home, overnight. He reports using sterile technique. Dialysate fluid has been clear, without fibrin or pus. His dialysis catheter site has had no surrounding redness or swelling. He had no additional antibx use besides that which he had for his last episode of SBP.   Patient has 4-5 exchanges a day with 2.5%    In the ED, T 98.5F, HR 87, /106, RR 16, SpO2 99%RA   Patient was given cefepime 1g,   Pt given tylenol 975mg, albuterol x2, calcium gluconate 2g, metoprolol tartrate 50mg PO x1, kayexelate 15g, morphine 4mg IVP (15 May 2019 21:12)      PAST MEDICAL & SURGICAL HISTORY:  Dialysis complication  CKD (chronic kidney disease), stage 4 (severe)  Hypertension  Glomerulonephritis  S/P kidney transplant: Right kidney in 2004    Allergies :- No Known Allergies    Home Medications Reviewed  Hospital Medications:   MEDICATIONS  (STANDING):  amLODIPine   Tablet 10 milliGRAM(s) Oral daily  cefepime   IVPB 2000 milliGRAM(s) IV Intermittent once  cinacalcet 30 milliGRAM(s) Oral daily  heparin  Injectable 5000 Unit(s) SubCutaneous every 8 hours  labetalol 200 milliGRAM(s) Oral every 12 hours  predniSONE   Tablet 5 milliGRAM(s) Oral daily    SOCIAL HISTORY:  Denies ETOh,Smoking,   FAMILY HISTORY:  Family history of glomerulonephritis (Uncle)      REVIEW OF SYSTEMS:  CONSTITUTIONAL: No weakness, fevers or chills  EYES/ENT: No visual changes;  No vertigo or throat pain   NECK: No pain or stiffness  RESPIRATORY: No cough, wheezing, hemoptysis; No shortness of breath  CARDIOVASCULAR: No chest pain or palpitations.  GASTROINTESTINAL: Nausea, vomiting, abdominal discomfort +  GENITOURINARY: No dysuria, frequency, foamy urine, urinary urgency, incontinence or hematuria  NEUROLOGICAL: No numbness or weakness  SKIN: No itching, burning, rashes, or lesions   VASCULAR: No bilateral lower extremity edema.   All other review of systems is negative unless indicated above.    VITALS:  T(F): 98.3 (05-16-19 @ 10:13), Max: 98.9 (05-15-19 @ 17:33)  HR: 83 (05-16-19 @ 10:13)  BP: 157/94 (05-16-19 @ 10:13)  RR: 18 (05-16-19 @ 10:13)  SpO2: 94% (05-16-19 @ 10:13)  Wt(kg): --    05-15 @ 07:01  -  05-16 @ 07:00  --------------------------------------------------------  IN: 60 mL / OUT: 0 mL / NET: 60 mL        Weight (kg): 77.1 (05-15 @ 15:00)    PHYSICAL EXAM:  Constitutional: NAD  HEENT: anicteric sclera, oropharynx clear, MMM  Neck: supple.   Respiratory: Bilateral equal breath sounds , no wheezes, no crackles  Cardiovascular: S1, S2, Regular, Murmur present.  Gastrointestinal: Bowel Sound present, soft, NT/ND, exit site clean, no pus expressed  Extremities: No cyanosis or clubbing. No peripheral edema  Neurological: Alert and oriented x 3, no focal deficits  Psychiatric: Normal mood, normal affect  : No CVA tenderness. No fuller.   Skin: No rashes    Data:  05-16    138  |  100  |  73<H>  ----------------------------<  102<H>  5.2   |  21<L>  |  26.54<H>    Ca    8.9      16 May 2019 00:03  Phos  6.6     05-16    TPro  6.5  /  Alb  3.4  /  TBili  0.4  /  DBili      /  AST  19  /  ALT  16  /  AlkPhos  141<H>  05-15    Creatinine Trend: 26.54 <--, 25.86 <--                        10.6   6.8   )-----------( 132      ( 16 May 2019 00:03 )             31.3     Urine Studies:

## 2019-05-16 NOTE — CONSULT NOTE ADULT - PROBLEM SELECTOR RECOMMENDATION 9
Patient receives 4-5 exchanges of 2.5%, will reinstate regular PD tomorrow  Patient would like to transition to HD if he has peritonitis again
-no s/s of infection   -no fever and normal wbc   -check PD fluid cell count and cultures  -blood cx testing   -got 1 dose vanco today  -check random vanco level in AM   -cefepime 2 gm iv x 1 dose only  -consider CT abd/pelvis if PD fluid not suggestive of infection or if abd pain persists

## 2019-05-16 NOTE — CONSULT NOTE ADULT - ATTENDING COMMENTS
Patient seen and examined, agree with above assessment and plan as transcribed above.    - no clinical CHF  - Recent echo in the office with presererved LV function  - No need for repeat echo at present he is not in clinical CHF    Clark Multani MD, [facility]  BEEPER [phone]
Thank you for this consult    For any question, call:  Cell # [phone]  Pager # [phone]  Callback # [phone]
Ryan Rodgers  Attending Physician   Division of Infectious Disease  Pager #[phone]  After 5pm/weekend or no response, call #[phone]

## 2019-05-16 NOTE — CONSULT NOTE ADULT - SUBJECTIVE AND OBJECTIVE BOX
HISTORY OF PRESENT ILLNESS:  This patient is a 34yo M with PMH of ESRD 2/2 FSGS s/p renal transplant in 2004, s/p failure, HD x2 months, now on PD who presents to the ED with complaint of abdominal pain. The patient states that he started peritoneal dialysis in January 2019. He was hospitalized at [facility] in April 2019 for SBP after a piece from his peritoneal catheter came loose. He was treated with a 21 day course of antibiotics. He had recurrence of his abdominal pain 2 days prior to admission, with diffuse intermittent localized abdominal pain, described as achy in quality.    PAST MEDICAL & SURGICAL HISTORY:  Dialysis complication  CKD (chronic kidney disease), stage 4 (severe)  Hypertension  Glomerulonephritis  S/P kidney transplant: Right kidney in 2004      [ ] Diabetes   [x] Hypertension  [x] Hyperlipidemia  [ ] CAD  [ ] PCI  [ ] CABG    PREVIOUS DIAGNOSTIC TESTING:    [x ] Echocardiogram: < from: Transthoracic Echocardiogram (01.08.18 @ 09:10) >  Conclusions:  1. Normal mitral valve with redundant chordae. Minimal  mitral regurgitation.  2. Normal trileaflet aortic valve. No aortic valve  regurgitation seen.  3. Moderate concentric left ventricular hypertrophy.  4. Normal left ventricular systolic function. No segmental  wallmotion abnormalities.  5. Normal right ventricular size and function.  *** No previous Echo exam.  ------------------------------------------------------------------------  Confirmed on  1/8/2018 - 11:42:57 by Corby Yepez M.D.  ------------------------------------------------------------------------    < end of copied text >    [ ]  Catheterization:  [x ] Stress Test:  	< from: Stress Echocardiogram-Exercise (05.05.16 @ 09:49) >  Conclusions:  1. Exercise capacity is 9 METS, which is poor for age and  gender.  2. Normal hemodynamic response.  3. Normal electrocardiographic response.  4. Normal augmentation in left ventricular systolic  function.  5. Heart rate failed to increase appropriately,  likely due  to medications.  6. Appropriate blood pressure response.  7. Slightly submaximal stress test, asonly 79% of the  patient's maximum predicted heart rate was achieved.  Normal stress echocardiogram with no evidence of inducible  ischemia, to 79% of the maximum predicted heart rate.  ------------------------------------------------------------------------  Confirmed on  5/5/2016 - 11:41:29 by Jose Rod M.D.  ------------------------------------------------------------------------    < end of copied text >      MEDICATIONS:  amLODIPine   Tablet 10 milliGRAM(s) Oral daily  metoprolol tartrate 50 milliGRAM(s) Oral two times a day    cefepime   IVPB 2000 milliGRAM(s) IV Intermittent once  vancomycin  IVPB 1000 milliGRAM(s) IV Intermittent once      oxyCODONE    IR 5 milliGRAM(s) Oral every 6 hours PRN      cinacalcet 30 milliGRAM(s) Oral daily  predniSONE   Tablet 5 milliGRAM(s) Oral daily        Allergies    No Known Allergies    Intolerances        FAMILY HISTORY:  Family history of glomerulonephritis (Uncle)      SOCIAL HISTORY:    [x ] Non-smoker  [ ] Smoker  [ ] Alcohol      REVIEW OF SYSTEMS:  [x ]chest pain  [  ]shortness of breath  [  ]palpitations  [  ]syncope  [ ]near syncope [  ]diplopia  [  ]altered mental status   [  ]fevers  [ ]chills [ ]nausea  [ ]vomitting  [x ]abdominal pain  [ ]melena  [ ]BRBPR  [  ]epistaxis  [  ]rash  [  ]lower extremity edema      CONSTITUTIONAL: No fever, weight loss, or fatigue  EYES: No eye pain, visual disturbances, or discharge  ENMT:  No difficulty hearing, tinnitus, vertigo; No sinus or throat pain  NECK: No pain or stiffness  RESPIRATORY: No cough, wheezing, chills or hemoptysis; No Shortness of Breath  CARDIOVASCULAR: No chest pain, palpitations, passing out, dizziness, or leg swelling  GASTROINTESTINAL: No abdominal or epigastric pain. No nausea, vomiting, or hematemesis; No diarrhea or constipation. No melena or hematochezia.  GENITOURINARY: No dysuria, frequency, hematuria, or incontinence  NEUROLOGICAL: No headaches, memory loss, loss of strength, numbness, or tremors  SKIN: No itching, burning, rashes, or lesions   LYMPH Nodes: No enlarged glands  ENDOCRINE: No heat or cold intolerance; No hair loss  MUSCULOSKELETAL: No joint pain or swelling; No muscle, back, or extremity pain  PSYCHIATRIC: No depression, anxiety, mood swings, or difficulty sleeping  HEME/LYMPH: No easy bruising, or bleeding gums  ALLERY AND IMMUNOLOGIC: No hives or eczema	    [ ] All others negative	  [ ] Unable to obtain    PHYSICAL EXAM:  T(C): 36.6 (05-16-19 @ 05:31), Max: 37.2 (05-15-19 @ 17:33)  HR: 76 (05-16-19 @ 05:31) (76 - 97)  BP: 145/92 (05-16-19 @ 05:31) (145/92 - 183/107)  RR: 18 (05-16-19 @ 05:31) (16 - 20)  SpO2: 94% (05-16-19 @ 05:31) (94% - 99%)  Wt(kg): --  I&O's Summary    15 May 2019 07:01  -  16 May 2019 07:00  --------------------------------------------------------  IN: 60 mL / OUT: 0 mL / NET: 60 mL        Appearance: Normal	  HEENT:   Normal oral mucosa, PERRL, EOMI	  Lymphatic: No lymphadenopathy  Cardiovascular: Normal S1 S2, No JVD, No murmurs, No edema  Respiratory: Lungs clear to auscultation	  Psychiatry: A & O x 3, Mood & affect appropriate  Gastrointestinal:  Soft, Non-tender, + BS	  Skin: No rashes, No ecchymoses, No cyanosis	  Neurologic: Non-focal  Extremities: Normal range of motion, No clubbing, cyanosis or edema  Vascular: Peripheral pulses palpable 2+ bilaterally    TELEMETRY: 	  none   ECG:  	nsr 72 , NAD, no acute ischemia   RADIOLOGY: < from: Xray Chest 2 Views PA/Lat (11.07.18 @ 13:17) >    IMPRESSION:   Clear lungs.    < end of copied text >    OTHER: 	  	  LABS:	 	    CARDIAC MARKERS:                        10.6   6.8   )-----------( 132      ( 16 May 2019 00:03 )             31.3     05-16    138  |  100  |  73<H>  ----------------------------<  102<H>  5.2   |  21<L>  |  26.54<H>    Ca    8.9      16 May 2019 00:03  Phos  6.6     05-16    TPro  6.5  /  Alb  3.4  /  TBili  0.4  /  DBili  x   /  AST  19  /  ALT  16  /  AlkPhos  141<H>  05-15    proBNP:   Lipid Profile:   HgA1c:   TSH:     ASSESSMENT/PLAN: 	This patient is a 34yo M with PMH of ESRD 2/2 FSGS s/p renal transplant in 2004, s/p failure, HD x2 months, now on PD who presents to the ED with complaint of abdominal pain, admitted for evaluation of suspected sbp.     ECG with ischemia   ABX per medicine   follow up on culture   PD per renal    Would change Lopressor to labetalol 200 q 12 hr for BP control    GI / DVT prophylaxis.   keep K>4, mag >2.0   Pain management   would repeat echo to evaluate LV fx .   D/W Dr Multani HISTORY OF PRESENT ILLNESS:  This patient is a 34yo M with PMH of ESRD 2/2 FSGS s/p renal transplant in 2004, s/p failure, HD x2 months, now on PD who presents to the ED with complaint of abdominal pain. The patient states that he started peritoneal dialysis in January 2019. He was hospitalized at [facility] in April 2019 for SBP after a piece from his peritoneal catheter came loose. He was treated with a 21 day course of antibiotics. He had recurrence of his abdominal pain 2 days prior to admission, with diffuse intermittent localized abdominal pain, described as achy in quality.    PAST MEDICAL & SURGICAL HISTORY:  Dialysis complication  CKD (chronic kidney disease), stage 4 (severe)  Hypertension  Glomerulonephritis  S/P kidney transplant: Right kidney in 2004      [ ] Diabetes   [x] Hypertension  [x] Hyperlipidemia  [ ] CAD  [ ] PCI  [ ] CABG    PREVIOUS DIAGNOSTIC TESTING:    [x ] Echocardiogram: < from: Transthoracic Echocardiogram (01.08.18 @ 09:10) >  Conclusions:  1. Normal mitral valve with redundant chordae. Minimal  mitral regurgitation.  2. Normal trileaflet aortic valve. No aortic valve  regurgitation seen.  3. Moderate concentric left ventricular hypertrophy.  4. Normal left ventricular systolic function. No segmental  wallmotion abnormalities.  5. Normal right ventricular size and function.  *** No previous Echo exam.  ------------------------------------------------------------------------  Confirmed on  1/8/2018 - 11:42:57 by Corby Yepez M.D.  ------------------------------------------------------------------------    < end of copied text >    [ ]  Catheterization:  [x ] Stress Test:  	< from: Stress Echocardiogram-Exercise (05.05.16 @ 09:49) >  Conclusions:  1. Exercise capacity is 9 METS, which is poor for age and  gender.  2. Normal hemodynamic response.  3. Normal electrocardiographic response.  4. Normal augmentation in left ventricular systolic  function.  5. Heart rate failed to increase appropriately,  likely due  to medications.  6. Appropriate blood pressure response.  7. Slightly submaximal stress test, asonly 79% of the  patient's maximum predicted heart rate was achieved.  Normal stress echocardiogram with no evidence of inducible  ischemia, to 79% of the maximum predicted heart rate.  ------------------------------------------------------------------------  Confirmed on  5/5/2016 - 11:41:29 by Jose Rod M.D.  ------------------------------------------------------------------------    < end of copied text >      MEDICATIONS:  amLODIPine   Tablet 10 milliGRAM(s) Oral daily  metoprolol tartrate 50 milliGRAM(s) Oral two times a day    cefepime   IVPB 2000 milliGRAM(s) IV Intermittent once  vancomycin  IVPB 1000 milliGRAM(s) IV Intermittent once      oxyCODONE    IR 5 milliGRAM(s) Oral every 6 hours PRN      cinacalcet 30 milliGRAM(s) Oral daily  predniSONE   Tablet 5 milliGRAM(s) Oral daily        Allergies    No Known Allergies    Intolerances        FAMILY HISTORY:  Family history of glomerulonephritis (Uncle)      SOCIAL HISTORY:    [x ] Non-smoker  [ ] Smoker  [ ] Alcohol      REVIEW OF SYSTEMS:  [x ]chest pain  [  ]shortness of breath  [  ]palpitations  [  ]syncope  [ ]near syncope [  ]diplopia  [  ]altered mental status   [  ]fevers  [ ]chills [ ]nausea  [ ]vomitting  [x ]abdominal pain  [ ]melena  [ ]BRBPR  [  ]epistaxis  [  ]rash  [  ]lower extremity edema      CONSTITUTIONAL: No fever, weight loss, or fatigue  EYES: No eye pain, visual disturbances, or discharge  ENMT:  No difficulty hearing, tinnitus, vertigo; No sinus or throat pain  NECK: No pain or stiffness  RESPIRATORY: No cough, wheezing, chills or hemoptysis; No Shortness of Breath  CARDIOVASCULAR: No chest pain, palpitations, passing out, dizziness, or leg swelling  GASTROINTESTINAL: No abdominal or epigastric pain. No nausea, vomiting, or hematemesis; No diarrhea or constipation. No melena or hematochezia.  GENITOURINARY: No dysuria, frequency, hematuria, or incontinence  NEUROLOGICAL: No headaches, memory loss, loss of strength, numbness, or tremors  SKIN: No itching, burning, rashes, or lesions   LYMPH Nodes: No enlarged glands  ENDOCRINE: No heat or cold intolerance; No hair loss  MUSCULOSKELETAL: No joint pain or swelling; No muscle, back, or extremity pain  PSYCHIATRIC: No depression, anxiety, mood swings, or difficulty sleeping  HEME/LYMPH: No easy bruising, or bleeding gums  ALLERY AND IMMUNOLOGIC: No hives or eczema	    [ ] All others negative	  [ ] Unable to obtain    PHYSICAL EXAM:  T(C): 36.6 (05-16-19 @ 05:31), Max: 37.2 (05-15-19 @ 17:33)  HR: 76 (05-16-19 @ 05:31) (76 - 97)  BP: 145/92 (05-16-19 @ 05:31) (145/92 - 183/107)  RR: 18 (05-16-19 @ 05:31) (16 - 20)  SpO2: 94% (05-16-19 @ 05:31) (94% - 99%)  Wt(kg): --  I&O's Summary    15 May 2019 07:01  -  16 May 2019 07:00  --------------------------------------------------------  IN: 60 mL / OUT: 0 mL / NET: 60 mL        Appearance: Normal	  HEENT:   Normal oral mucosa, PERRL, EOMI	  Lymphatic: No lymphadenopathy  Cardiovascular: Normal S1 S2, No JVD, No murmurs, No edema  Respiratory: Lungs clear to auscultation	  Psychiatry: A & O x 3, Mood & affect appropriate  Gastrointestinal:  Soft, Non-tender, + BS	  Skin: No rashes, No ecchymoses, No cyanosis	  Neurologic: Non-focal  Extremities: Normal range of motion, No clubbing, cyanosis or edema  Vascular: Peripheral pulses palpable 2+ bilaterally    TELEMETRY: 	  none   ECG:  	nsr 72 , NAD, no acute ischemia   RADIOLOGY: < from: Xray Chest 2 Views PA/Lat (11.07.18 @ 13:17) >    IMPRESSION:   Clear lungs.    < end of copied text >    OTHER: 	  	  LABS:	 	    CARDIAC MARKERS:                        10.6   6.8   )-----------( 132      ( 16 May 2019 00:03 )             31.3     05-16    138  |  100  |  73<H>  ----------------------------<  102<H>  5.2   |  21<L>  |  26.54<H>    Ca    8.9      16 May 2019 00:03  Phos  6.6     05-16    TPro  6.5  /  Alb  3.4  /  TBili  0.4  /  DBili  x   /  AST  19  /  ALT  16  /  AlkPhos  141<H>  05-15      ASSESSMENT/PLAN: 	This patient is a 34yo M with PMH of ESRD 2/2 FSGS s/p renal transplant in 2004, s/p failure, HD x2 months, now on PD who presents to the ED with complaint of abdominal pain, admitted for evaluation of suspected sbp.       ABX per medicine   follow up on culture   PD per renal    Would change Lopressor to labetalol 200 q 12 hr for BP control    GI / DVT prophylaxis.   keep K>4, mag >2.0   Pain management   D/W Dr Multani

## 2019-05-16 NOTE — PROVIDER CONTACT NOTE (OTHER) - SITUATION
ED Nurse stated to give IVPB Vanco upon sending culture of peritoneal fluid from pt. pt was dry during drain, no culture sent on my shift.

## 2019-05-17 LAB
ANION GAP SERPL CALC-SCNC: 18 MMOL/L — HIGH (ref 5–17)
BUN SERPL-MCNC: 71 MG/DL — HIGH (ref 7–23)
CALCIUM SERPL-MCNC: 7.9 MG/DL — LOW (ref 8.4–10.5)
CHLORIDE SERPL-SCNC: 96 MMOL/L — SIGNIFICANT CHANGE UP (ref 96–108)
CO2 SERPL-SCNC: 23 MMOL/L — SIGNIFICANT CHANGE UP (ref 22–31)
CREAT SERPL-MCNC: 26.32 MG/DL — HIGH (ref 0.5–1.3)
GLUCOSE SERPL-MCNC: 83 MG/DL — SIGNIFICANT CHANGE UP (ref 70–99)
HCT VFR BLD CALC: 27 % — LOW (ref 39–50)
HGB BLD-MCNC: 9.2 G/DL — LOW (ref 13–17)
MCHC RBC-ENTMCNC: 29.8 PG — SIGNIFICANT CHANGE UP (ref 27–34)
MCHC RBC-ENTMCNC: 34.1 GM/DL — SIGNIFICANT CHANGE UP (ref 32–36)
MCV RBC AUTO: 87.4 FL — SIGNIFICANT CHANGE UP (ref 80–100)
PLATELET # BLD AUTO: 131 K/UL — LOW (ref 150–400)
POTASSIUM SERPL-MCNC: 4.6 MMOL/L — SIGNIFICANT CHANGE UP (ref 3.5–5.3)
POTASSIUM SERPL-SCNC: 4.6 MMOL/L — SIGNIFICANT CHANGE UP (ref 3.5–5.3)
RBC # BLD: 3.09 M/UL — LOW (ref 4.2–5.8)
RBC # FLD: 13 % — SIGNIFICANT CHANGE UP (ref 10.3–14.5)
SODIUM SERPL-SCNC: 137 MMOL/L — SIGNIFICANT CHANGE UP (ref 135–145)
WBC # BLD: 5.97 K/UL — SIGNIFICANT CHANGE UP (ref 3.8–10.5)
WBC # FLD AUTO: 5.97 K/UL — SIGNIFICANT CHANGE UP (ref 3.8–10.5)

## 2019-05-17 PROCEDURE — 74177 CT ABD & PELVIS W/CONTRAST: CPT | Mod: 26

## 2019-05-17 PROCEDURE — 99232 SBSQ HOSP IP/OBS MODERATE 35: CPT

## 2019-05-17 RX ORDER — GENTAMICIN SULFATE 0.1 %
1 OINTMENT (GRAM) TOPICAL DAILY
Refills: 0 | Status: DISCONTINUED | OUTPATIENT
Start: 2019-05-17 | End: 2019-05-21

## 2019-05-17 RX ADMIN — OXYCODONE HYDROCHLORIDE 5 MILLIGRAM(S): 5 TABLET ORAL at 21:02

## 2019-05-17 RX ADMIN — Medication 5 MILLIGRAM(S): at 05:38

## 2019-05-17 RX ADMIN — Medication 1 APPLICATION(S): at 17:39

## 2019-05-17 RX ADMIN — Medication 200 MILLIGRAM(S): at 17:40

## 2019-05-17 RX ADMIN — HEPARIN SODIUM 5000 UNIT(S): 5000 INJECTION INTRAVENOUS; SUBCUTANEOUS at 22:01

## 2019-05-17 RX ADMIN — AMLODIPINE BESYLATE 10 MILLIGRAM(S): 2.5 TABLET ORAL at 05:38

## 2019-05-17 RX ADMIN — OXYCODONE HYDROCHLORIDE 5 MILLIGRAM(S): 5 TABLET ORAL at 20:01

## 2019-05-17 RX ADMIN — CINACALCET 30 MILLIGRAM(S): 30 TABLET, FILM COATED ORAL at 13:11

## 2019-05-17 RX ADMIN — HEPARIN SODIUM 5000 UNIT(S): 5000 INJECTION INTRAVENOUS; SUBCUTANEOUS at 05:38

## 2019-05-17 RX ADMIN — Medication 200 MILLIGRAM(S): at 05:38

## 2019-05-18 LAB
ANION GAP SERPL CALC-SCNC: 21 MMOL/L — HIGH (ref 5–17)
BUN SERPL-MCNC: 66 MG/DL — HIGH (ref 7–23)
CALCIUM SERPL-MCNC: 8.3 MG/DL — LOW (ref 8.4–10.5)
CHLORIDE SERPL-SCNC: 93 MMOL/L — LOW (ref 96–108)
CO2 SERPL-SCNC: 21 MMOL/L — LOW (ref 22–31)
CREAT SERPL-MCNC: 25.35 MG/DL — HIGH (ref 0.5–1.3)
GLUCOSE SERPL-MCNC: 87 MG/DL — SIGNIFICANT CHANGE UP (ref 70–99)
HCT VFR BLD CALC: 31.2 % — LOW (ref 39–50)
HGB BLD-MCNC: 10.2 G/DL — LOW (ref 13–17)
MCHC RBC-ENTMCNC: 28.8 PG — SIGNIFICANT CHANGE UP (ref 27–34)
MCHC RBC-ENTMCNC: 32.7 GM/DL — SIGNIFICANT CHANGE UP (ref 32–36)
MCV RBC AUTO: 88.1 FL — SIGNIFICANT CHANGE UP (ref 80–100)
PLATELET # BLD AUTO: 168 K/UL — SIGNIFICANT CHANGE UP (ref 150–400)
POTASSIUM SERPL-MCNC: 4.5 MMOL/L — SIGNIFICANT CHANGE UP (ref 3.5–5.3)
POTASSIUM SERPL-SCNC: 4.5 MMOL/L — SIGNIFICANT CHANGE UP (ref 3.5–5.3)
RBC # BLD: 3.54 M/UL — LOW (ref 4.2–5.8)
RBC # FLD: 13.2 % — SIGNIFICANT CHANGE UP (ref 10.3–14.5)
SODIUM SERPL-SCNC: 135 MMOL/L — SIGNIFICANT CHANGE UP (ref 135–145)
WBC # BLD: 6.99 K/UL — SIGNIFICANT CHANGE UP (ref 3.8–10.5)
WBC # FLD AUTO: 6.99 K/UL — SIGNIFICANT CHANGE UP (ref 3.8–10.5)

## 2019-05-18 PROCEDURE — 99232 SBSQ HOSP IP/OBS MODERATE 35: CPT

## 2019-05-18 RX ORDER — ONDANSETRON 8 MG/1
4 TABLET, FILM COATED ORAL ONCE
Refills: 0 | Status: COMPLETED | OUTPATIENT
Start: 2019-05-18 | End: 2019-05-18

## 2019-05-18 RX ORDER — MORPHINE SULFATE 50 MG/1
2 CAPSULE, EXTENDED RELEASE ORAL ONCE
Refills: 0 | Status: DISCONTINUED | OUTPATIENT
Start: 2019-05-18 | End: 2019-05-18

## 2019-05-18 RX ORDER — ONDANSETRON 8 MG/1
4 TABLET, FILM COATED ORAL ONCE
Refills: 0 | Status: COMPLETED | OUTPATIENT
Start: 2019-05-18 | End: 2019-05-20

## 2019-05-18 RX ORDER — CEFEPIME 1 G/1
2000 INJECTION, POWDER, FOR SOLUTION INTRAMUSCULAR; INTRAVENOUS
Refills: 0 | Status: DISCONTINUED | OUTPATIENT
Start: 2019-05-18 | End: 2019-05-18

## 2019-05-18 RX ADMIN — AMLODIPINE BESYLATE 10 MILLIGRAM(S): 2.5 TABLET ORAL at 05:53

## 2019-05-18 RX ADMIN — Medication 5 MILLIGRAM(S): at 05:53

## 2019-05-18 RX ADMIN — MORPHINE SULFATE 2 MILLIGRAM(S): 50 CAPSULE, EXTENDED RELEASE ORAL at 11:23

## 2019-05-18 RX ADMIN — Medication 200 MILLIGRAM(S): at 05:53

## 2019-05-18 RX ADMIN — HEPARIN SODIUM 5000 UNIT(S): 5000 INJECTION INTRAVENOUS; SUBCUTANEOUS at 05:54

## 2019-05-18 RX ADMIN — OXYCODONE HYDROCHLORIDE 5 MILLIGRAM(S): 5 TABLET ORAL at 11:41

## 2019-05-18 RX ADMIN — OXYCODONE HYDROCHLORIDE 5 MILLIGRAM(S): 5 TABLET ORAL at 11:11

## 2019-05-18 RX ADMIN — CEFEPIME 100 MILLIGRAM(S): 1 INJECTION, POWDER, FOR SOLUTION INTRAMUSCULAR; INTRAVENOUS at 06:04

## 2019-05-18 RX ADMIN — ONDANSETRON 4 MILLIGRAM(S): 8 TABLET, FILM COATED ORAL at 23:31

## 2019-05-18 RX ADMIN — MORPHINE SULFATE 2 MILLIGRAM(S): 50 CAPSULE, EXTENDED RELEASE ORAL at 11:53

## 2019-05-18 RX ADMIN — Medication 200 MILLIGRAM(S): at 19:39

## 2019-05-18 RX ADMIN — CINACALCET 30 MILLIGRAM(S): 30 TABLET, FILM COATED ORAL at 11:13

## 2019-05-18 RX ADMIN — Medication 1 APPLICATION(S): at 11:13

## 2019-05-19 LAB
ANION GAP SERPL CALC-SCNC: 20 MMOL/L — HIGH (ref 5–17)
BUN SERPL-MCNC: 60 MG/DL — HIGH (ref 7–23)
CALCIUM SERPL-MCNC: 8.6 MG/DL — SIGNIFICANT CHANGE UP (ref 8.4–10.5)
CHLORIDE SERPL-SCNC: 94 MMOL/L — LOW (ref 96–108)
CO2 SERPL-SCNC: 23 MMOL/L — SIGNIFICANT CHANGE UP (ref 22–31)
CREAT SERPL-MCNC: 25.01 MG/DL — HIGH (ref 0.5–1.3)
GLUCOSE SERPL-MCNC: 82 MG/DL — SIGNIFICANT CHANGE UP (ref 70–99)
HCT VFR BLD CALC: 27.9 % — LOW (ref 39–50)
HGB BLD-MCNC: 9.6 G/DL — LOW (ref 13–17)
MCHC RBC-ENTMCNC: 30.1 PG — SIGNIFICANT CHANGE UP (ref 27–34)
MCHC RBC-ENTMCNC: 34.4 GM/DL — SIGNIFICANT CHANGE UP (ref 32–36)
MCV RBC AUTO: 87.5 FL — SIGNIFICANT CHANGE UP (ref 80–100)
PLATELET # BLD AUTO: 161 K/UL — SIGNIFICANT CHANGE UP (ref 150–400)
POTASSIUM SERPL-MCNC: 4.7 MMOL/L — SIGNIFICANT CHANGE UP (ref 3.5–5.3)
POTASSIUM SERPL-SCNC: 4.7 MMOL/L — SIGNIFICANT CHANGE UP (ref 3.5–5.3)
RBC # BLD: 3.19 M/UL — LOW (ref 4.2–5.8)
RBC # FLD: 13.4 % — SIGNIFICANT CHANGE UP (ref 10.3–14.5)
SODIUM SERPL-SCNC: 137 MMOL/L — SIGNIFICANT CHANGE UP (ref 135–145)
WBC # BLD: 6.31 K/UL — SIGNIFICANT CHANGE UP (ref 3.8–10.5)
WBC # FLD AUTO: 6.31 K/UL — SIGNIFICANT CHANGE UP (ref 3.8–10.5)

## 2019-05-19 RX ORDER — ACETAMINOPHEN 500 MG
650 TABLET ORAL ONCE
Refills: 0 | Status: COMPLETED | OUTPATIENT
Start: 2019-05-19 | End: 2019-05-19

## 2019-05-19 RX ADMIN — Medication 1 APPLICATION(S): at 12:22

## 2019-05-19 RX ADMIN — SEVELAMER CARBONATE 1600 MILLIGRAM(S): 2400 POWDER, FOR SUSPENSION ORAL at 17:12

## 2019-05-19 RX ADMIN — SEVELAMER CARBONATE 1600 MILLIGRAM(S): 2400 POWDER, FOR SUSPENSION ORAL at 08:41

## 2019-05-19 RX ADMIN — HEPARIN SODIUM 5000 UNIT(S): 5000 INJECTION INTRAVENOUS; SUBCUTANEOUS at 21:33

## 2019-05-19 RX ADMIN — Medication 200 MILLIGRAM(S): at 06:08

## 2019-05-19 RX ADMIN — Medication 650 MILLIGRAM(S): at 23:26

## 2019-05-19 RX ADMIN — AMLODIPINE BESYLATE 10 MILLIGRAM(S): 2.5 TABLET ORAL at 06:08

## 2019-05-19 RX ADMIN — Medication 5 MILLIGRAM(S): at 06:08

## 2019-05-19 RX ADMIN — Medication 200 MILLIGRAM(S): at 17:12

## 2019-05-19 RX ADMIN — SEVELAMER CARBONATE 1600 MILLIGRAM(S): 2400 POWDER, FOR SUSPENSION ORAL at 12:22

## 2019-05-19 RX ADMIN — Medication 650 MILLIGRAM(S): at 22:26

## 2019-05-20 ENCOUNTER — TRANSCRIPTION ENCOUNTER (OUTPATIENT)
Age: 34
End: 2019-05-20

## 2019-05-20 LAB
ANION GAP SERPL CALC-SCNC: 19 MMOL/L — HIGH (ref 5–17)
BUN SERPL-MCNC: 56 MG/DL — HIGH (ref 7–23)
CALCIUM SERPL-MCNC: 8.8 MG/DL — SIGNIFICANT CHANGE UP (ref 8.4–10.5)
CHLORIDE SERPL-SCNC: 93 MMOL/L — LOW (ref 96–108)
CO2 SERPL-SCNC: 24 MMOL/L — SIGNIFICANT CHANGE UP (ref 22–31)
CREAT SERPL-MCNC: 24.51 MG/DL — HIGH (ref 0.5–1.3)
CULTURE RESULTS: SIGNIFICANT CHANGE UP
CULTURE RESULTS: SIGNIFICANT CHANGE UP
GLUCOSE SERPL-MCNC: 84 MG/DL — SIGNIFICANT CHANGE UP (ref 70–99)
HCT VFR BLD CALC: 30 % — LOW (ref 39–50)
HGB BLD-MCNC: 10.5 G/DL — LOW (ref 13–17)
MCHC RBC-ENTMCNC: 31.2 PG — SIGNIFICANT CHANGE UP (ref 27–34)
MCHC RBC-ENTMCNC: 35.1 GM/DL — SIGNIFICANT CHANGE UP (ref 32–36)
MCV RBC AUTO: 88.8 FL — SIGNIFICANT CHANGE UP (ref 80–100)
PLATELET # BLD AUTO: 163 K/UL — SIGNIFICANT CHANGE UP (ref 150–400)
POTASSIUM SERPL-MCNC: 4.6 MMOL/L — SIGNIFICANT CHANGE UP (ref 3.5–5.3)
POTASSIUM SERPL-SCNC: 4.6 MMOL/L — SIGNIFICANT CHANGE UP (ref 3.5–5.3)
RBC # BLD: 3.37 M/UL — LOW (ref 4.2–5.8)
RBC # FLD: 13.3 % — SIGNIFICANT CHANGE UP (ref 10.3–14.5)
SODIUM SERPL-SCNC: 136 MMOL/L — SIGNIFICANT CHANGE UP (ref 135–145)
SPECIMEN SOURCE: SIGNIFICANT CHANGE UP
SPECIMEN SOURCE: SIGNIFICANT CHANGE UP
WBC # BLD: 7 K/UL — SIGNIFICANT CHANGE UP (ref 3.8–10.5)
WBC # FLD AUTO: 7 K/UL — SIGNIFICANT CHANGE UP (ref 3.8–10.5)

## 2019-05-20 PROCEDURE — 99232 SBSQ HOSP IP/OBS MODERATE 35: CPT

## 2019-05-20 RX ORDER — ONDANSETRON 8 MG/1
4 TABLET, FILM COATED ORAL ONCE
Refills: 0 | Status: DISCONTINUED | OUTPATIENT
Start: 2019-05-20 | End: 2019-05-21

## 2019-05-20 RX ADMIN — SEVELAMER CARBONATE 1600 MILLIGRAM(S): 2400 POWDER, FOR SUSPENSION ORAL at 09:42

## 2019-05-20 RX ADMIN — SEVELAMER CARBONATE 1600 MILLIGRAM(S): 2400 POWDER, FOR SUSPENSION ORAL at 11:49

## 2019-05-20 RX ADMIN — Medication 200 MILLIGRAM(S): at 17:25

## 2019-05-20 RX ADMIN — SEVELAMER CARBONATE 1600 MILLIGRAM(S): 2400 POWDER, FOR SUSPENSION ORAL at 17:25

## 2019-05-20 RX ADMIN — OXYCODONE HYDROCHLORIDE 5 MILLIGRAM(S): 5 TABLET ORAL at 23:55

## 2019-05-20 RX ADMIN — AMLODIPINE BESYLATE 10 MILLIGRAM(S): 2.5 TABLET ORAL at 06:30

## 2019-05-20 RX ADMIN — Medication 5 MILLIGRAM(S): at 06:30

## 2019-05-20 RX ADMIN — OXYCODONE HYDROCHLORIDE 5 MILLIGRAM(S): 5 TABLET ORAL at 23:26

## 2019-05-20 RX ADMIN — Medication 200 MILLIGRAM(S): at 06:30

## 2019-05-20 RX ADMIN — ONDANSETRON 4 MILLIGRAM(S): 8 TABLET, FILM COATED ORAL at 22:56

## 2019-05-20 NOTE — DISCHARGE NOTE NURSING/CASE MANAGEMENT/SOCIAL WORK - NSDCDPATPORTLINK_GEN_ALL_CORE
You can access the [facility] Portal, offered by [facility], by registering with the following website: [email or web address]

## 2019-05-20 NOTE — PROGRESS NOTE ADULT - PROBLEM SELECTOR PLAN 2
H/O retroperitoneal bleed   CT A/P  unreamrakable  abdominal pain and diarhoea likely from sensipar. stop and monitor symptoms
H/O retroperitoneal bleed   CT A/P  unremarakable  abdominal pain and diarrhea likely from sensipar- now stopped   monitor symptoms
H/O retroperitoneal bleed  Consider CT A/P IV and PO contrast  Cleared by us for CT with IV contrast
H/O retroperitoneal bleed   CT A/P  unremarakable  abdominal pain and diarhea likely from sensipar- now stopped   monitor symptoms

## 2019-05-20 NOTE — PROGRESS NOTE ADULT - PROBLEM SELECTOR PROBLEM 2
Generalized abdominal pain

## 2019-05-20 NOTE — PROGRESS NOTE ADULT - NEGATIVE GENERAL GENITOURINARY SYMPTOMS
no flank pain R/no hematuria/no flank pain L/no dysuria
no flank pain L/no hematuria/no flank pain R/no dysuria

## 2019-05-20 NOTE — PROGRESS NOTE ADULT - PROBLEM SELECTOR PLAN 1
-fluid counts do not look c/w peritonitis  -f/u fluid cx  -think pt should get CT abd/pelvis given abd pain   -cont abx  -if cx negative, can prob dc abx over weekend
Continue with PD,   genta ointment to site  WBC count WNL, mild increased RBCs on PD fluid  No signs of peritonitis
Continue with PD, placed new orders to increase UF  genta ointment to site  WBC count WNL, mild increased RBCs on PD fluid  No signs of peritonitis
Continue with current CAPD,   genta ointment to site  WBC count WNL, mild increased RBCs on PD fluid  No signs of peritonitis
Continue with current CAPD,   genta ointment to site  WBC count WNL, mild increased RBCs on PD fluid  No signs of peritonitis
-fluid counts do not look c/w peritonitis  -f/u fluid cx  -think pt should get CT abd/pelvis given abd pain   -cont abx  -if cx negative, can prob dc abx over weekend
-fluid counts do not look c/w peritonitis  -cx negative  -CT nonlocalizing for infection  -observe off abx  -off Sensipar per renal - maybe causing abd pain issues

## 2019-05-20 NOTE — PROGRESS NOTE ADULT - PROBLEM SELECTOR PLAN 4
c/w Sevelamer PO w/food  abdominal pain orion from sensipar -now off
Sevelalawrence JEAN  abdominal pain orion from sensipar. stop and monitor symptoms
Sevelamer PO
c/w Sevelamer PO w/food  abdominal pain orion from sensipar -now off

## 2019-05-20 NOTE — PROGRESS NOTE ADULT - PROBLEM SELECTOR PROBLEM 1
ESRD on peritoneal dialysis
Generalized abdominal pain
ESRD on peritoneal dialysis
Generalized abdominal pain
Generalized abdominal pain

## 2019-05-20 NOTE — DISCHARGE NOTE PROVIDER - CARE PROVIDERS DIRECT ADDRESSES
,DirectAddress_Unknown,[email or web address]

## 2019-05-20 NOTE — PROGRESS NOTE ADULT - PROBLEM SELECTOR PLAN 3
BP better today   c/w Labetalol, Norvasc. low Na in diet  UF on PD
BP better today   c/w Labetalol, Norvasc. low Na in diet  UF on PD
UF on PD
UF on PD

## 2019-05-20 NOTE — PROGRESS NOTE ADULT - GASTROINTESTINAL DETAILS
no rebound tenderness/no rigidity/no distention/soft/nontender/no guarding
nontender/no rebound tenderness/no distention/soft

## 2019-05-20 NOTE — PROGRESS NOTE ADULT - PROBLEM SELECTOR PROBLEM 4
Secondary hyperparathyroidism

## 2019-05-20 NOTE — DISCHARGE NOTE PROVIDER - HOSPITAL COURSE
34yo M with PMH of ESRD 2/2 FSGS s/p renal transplant in 2004, s/p failure, HD x2 months, now on PD who presents to the ED with complaint of abdominal pain. In the ED, patient was given cefepime 1g, pt given tylenol 975mg, albuterol x2, calcium gluconate 2g, metoprolol tartrate 50mg PO x1, kayexelate 15g, morphine 4mg IVP This patient is a 34yo M with PMH of ESRD 2/2 FSGS s/p renal transplant in 2004, s/p failure, HD x2 months, now on PD who presents to the ED with complaint of abdominal pain. The patient states that he started peritoneal dialysis in January 2019. He was hospitalized at [facility] in April 2019 for SBP after a piece from his peritoneal catheter came loose. He was treated with a 21 day course of antibiotics. He had recurrence of his abdominal pain 2 days prior to admission, with diffuse intermittent localized abdominal pain, described as achy in quality. This pain is similar to that he experienced in April. He denies any fever, chills, lightheadedness, dizziness, nausea, vomiting. He reports chronic diarrhea with loose, nonbloody stools. He denies any changes in lower extremity swelling. He denies any muscle or joint pain. He has had sick contacts at school, with colds, but no recent travel.     The patient reports he has eating and drinking normally. Patient does PD at home, overnight. He reports using sterile technique. Dialysate fluid has been clear, without fibrin or pus. His dialysis catheter site has had no surrounding redness or swelling. He had no additional antibx use besides that which he had for his last episode of SBP.         In the ED, T 98.5F, HR 87, /106, RR 16, SpO2 99%RA     Patient was given cefepime 1g,     Pt given tylenol 975mg, albuterol x2, calcium gluconate 2g, metoprolol tartrate 50mg PO x1, kayexelate 15g, morphine 4mg IVP This patient is a 32yo M with PMH of ESRD 2/2 FSGS s/p renal transplant in 2004, s/p failure, HD x2 months, now on PD who presents to the ED with complaint of abdominal pain. The patient states that he started peritoneal dialysis in January 2019. He was hospitalized at [facility] in April 2019 for SBP after a piece from his peritoneal catheter came loose. He was treated with a 21 day course of antibiotics. He had recurrence of his abdominal pain 2 days prior to admission, with diffuse intermittent localized abdominal pain, described as achy in quality. This pain is similar to that he experienced in April. He denies any fever, chills, lightheadedness, dizziness, nausea, vomiting. He reports chronic diarrhea with loose, nonbloody stools. He denies any changes in lower extremity swelling. He denies any muscle or joint pain. He has had sick contacts at school, with colds, but no recent travel.     The patient reports he has eating and drinking normally. Patient does PD at home, overnight. He reports using sterile technique. Dialysate fluid has been clear, without fibrin or pus. His dialysis catheter site has had no surrounding redness or swelling. He had no additional antibx use besides that which he had for his last episode of SBP.         In the ED, T 98.5F, HR 87, /106, RR 16, SpO2 99%RA     Patient was given cefepime 1g,     Pt given tylenol 975mg, albuterol x2, calcium gluconate 2g, metoprolol tartrate 50mg PO x1, kayexelate 15g, morphine 4mg IVP    Pt. was admitted and had a ct scan which show     Peritoneal dialysis catheter with tip in the right hemiabdomen. Moderate     volume ascites and small pneumoperitoneum. Atrophic transplant kidney in the right lower quadrant.     Left seminal vesicle cyst. Fluid was taken from the catheter for cultures but as per Infectious disease there were no signs of peritonitis. Pt was palced on antibiotics intially but discontinued after infectious workup was negative. Nephrology was also contacted and felt abdominal pain and diarrhea likely from sensipar; now stopped , Pt is stable for discharge and will follow up with their medical attending and nephrology.

## 2019-05-20 NOTE — DISCHARGE NOTE PROVIDER - PROVIDER TOKENS
PROVIDER:[TOKEN:[[id number]],FOLLOWUP:[1 week]],PROVIDER:[TOKEN:[[id number]],FOLLOWUP:[1 week]],PROVIDER:[TOKEN:[[id number]],FOLLOWUP:[Routine]]

## 2019-05-20 NOTE — DISCHARGE NOTE PROVIDER - CARE PROVIDER_API CALL
Bhavik Greco; MBBS)  Internal Medicine; Nephrology  [facility] [place] [id number]  Phone: [phone]  Fax: [phone]  Follow Up Time: 1 week    Jasmin Greenfield)  Cardiovascular Disease  [address] [place] [address]  Phone: [phone]  Fax: [phone]  Follow Up Time: 1 week    [place])  Surgery  [facility], [address] [place] [address]  Phone: [phone]  Fax: [phone]  Follow Up Time: Routine

## 2019-05-20 NOTE — PROGRESS NOTE ADULT - NEUROLOGICAL DETAILS
normal strength/alert and oriented x 3/responds to verbal commands
normal strength/responds to verbal commands/alert and oriented x 3

## 2019-05-20 NOTE — PROGRESS NOTE ADULT - ASSESSMENT
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 Renal following for ESRD Mx.    labs, chart reviewed
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  No fever or leucocytosis   Abdominal pain improved.   CT with no obvious infectious source in abdomen.   Peritoneal fluid cell count not compatible with peritonitis.     Plan:   -f/u final fluid cx, NTD.   - blood cx ntd.   -can stop abx at observe off, No obvious source of infection at this time.
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      Problem/Plan - 1:  ·  Problem: Abdominal pain.  Plan: Resolving/ On PD/ ID f/up noted. F/up with CT Abd/P       Problem/Plan - 2:  ·  Problem: Hyperkalemia.  Plan: Patient was treated for hyperkalemia,  kayexalate.         Problem/Plan - 3:  ·  Problem: ESRD on peritoneal dialysis.  Plan:   Nephro f/up.     Problem/Plan - 4:  ·  Problem: Adrenal insufficiency due to steroid withdrawal.  Plan:  prednisone 5mg PO qd.       Problem/Plan - 5:  ·  Problem: Hypertension.  Plan: On amlodipine and metoprolol       Problem/Plan - 6:  Problem: Secondary hyperparathyroidism. Plan:  Sensipar     Problem/Plan - 7:  ·  Problem: Anemia due to chronic kidney disease.  Plan: Panemic, likely 2/2 CKD
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      Problem/Plan - 1:  ·  Problem: Abdominal pain.  Plan: Resolving/ On PD/ ID f/up noted. F/up with CT Abd/P reviewed.         Problem/Plan - 3:  ·  Problem: ESRD on peritoneal dialysis.  Plan:   Nephro f/up.     Problem/Plan - 4:  ·  Problem: Adrenal insufficiency due to steroid withdrawal.  Plan:  prednisone 5mg PO qd.       Problem/Plan - 5:  ·  Problem: Hypertension.  Plan: On amlodipine and metoprolol       Problem/Plan - 6:  Problem: Secondary hyperparathyroidism. Plan:  Sensipar     Problem/Plan - 7:  ·  Problem: Anemia due to chronic kidney disease.  Plan: Panemic, likely 2/2 CKD
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      Problem/Plan - 1:  ·  Problem: Abdominal pain.  Plan: Differential for patient's abdominal pain includes but is not limited to spontaneous bacterial peritonitis, outflow failure, pericatheter leak, gastritis, PUD.   Outflow failure seems less likely since patient reports he has been able to perform PD sessions at home. Pericatheter leak also seems less likely since   Patient states that the location of his current abdominal pain is similar to that of his previous episode of SBP.    antibiotic coverage with cefepime and vancomycin.   oxycodone 5mg PO for severe pain.        Problem/Plan - 2:  ·  Problem: Hyperkalemia.  Plan: Patient was treated for hyperkalemia,  kayexalate.   Repeat BMP.      Problem/Plan - 3:  ·  Problem: ESRD on peritoneal dialysis.  Plan: Nephrologist Dr. Greco was consulted by ED.   Start peritoneal dialysis as per nephrology recommendations.  Avoid nephrotoxic agents.      Problem/Plan - 4:  ·  Problem: Adrenal insufficiency due to steroid withdrawal.  Plan:  prednisone 5mg PO qd.       Problem/Plan - 5:  ·  Problem: Hypertension.  Plan: Start home dose of amlodipine and metoprolol, with hold parameters       Problem/Plan - 6:  Problem: Secondary hyperparathyroidism. Plan:  Sensipar     Problem/Plan - 7:  ·  Problem: Anemia due to chronic kidney disease.  Plan: Pt found to be anemic, likely 2/2 CKD
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      Problem/Plan - 1:  ·  Problem: Abdominal pain.  Plan: Differential for patient's abdominal pain includes but is not limited to spontaneous bacterial peritonitis, outflow failure, pericatheter leak, gastritis, PUD.   Outflow failure seems less likely since patient reports he has been able to perform PD sessions at home. Pericatheter leak also seems less likely since   Patient states that the location of his current abdominal pain is similar to that of his previous episode of SBP.    antibiotic coverage with cefepime and vancomycin.   oxycodone 5mg PO for severe pain.        Problem/Plan - 2:  ·  Problem: Hyperkalemia.  Plan: Patient was treated for hyperkalemia,  kayexalate.   Repeat BMP.      Problem/Plan - 3:  ·  Problem: ESRD on peritoneal dialysis.  Plan: Nephrologist Dr. Greco was consulted by ED.   Start peritoneal dialysis as per nephrology recommendations.  Avoid nephrotoxic agents.      Problem/Plan - 4:  ·  Problem: Adrenal insufficiency due to steroid withdrawal.  Plan:  prednisone 5mg PO qd.       Problem/Plan - 5:  ·  Problem: Hypertension.  Plan: Start home dose of amlodipine and metoprolol, with hold parameters       Problem/Plan - 6:  Problem: Secondary hyperparathyroidism. Plan:  Sensipar     Problem/Plan - 7:  ·  Problem: Anemia due to chronic kidney disease.  Plan: Pt found to be anemic, likely 2/2 CKD
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      Problem/Plan - 1:  ·  Problem: Abdominal pain.  Plan: Resolving/ On PD/ ID f/up noted.  CT Abd/P reviewed.       Problem/Plan - 3:  ·  Problem: ESRD on peritoneal dialysis.  Plan:   Nephro f/up.     Problem/Plan - 4:  ·  Problem: Adrenal insufficiency due to steroid withdrawal.  Plan:  prednisone 5mg PO qd.       Problem/Plan - 5:  ·  Problem: Hypertension.  Plan: On amlodipine and metoprolol       Problem/Plan - 6:  Problem: Secondary hyperparathyroidism. Plan:  Sensipar     Problem/Plan - 7:  ·  Problem: Anemia due to chronic kidney disease.  Plan: Panemic, likely 2/2 CKD    Dc planning.
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 Renal following for ESRD Mx.    labs, chart reviewed
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

## 2019-05-20 NOTE — PROGRESS NOTE ADULT - RS GEN PE MLT RESP DETAILS PC
clear to auscultation bilaterally/respirations non-labored/good air movement
breath sounds equal/respirations non-labored/good air movement/clear to auscultation bilaterally

## 2019-05-21 VITALS
RESPIRATION RATE: 16 BRPM | HEART RATE: 81 BPM | TEMPERATURE: 98 F | OXYGEN SATURATION: 96 % | SYSTOLIC BLOOD PRESSURE: 154 MMHG | DIASTOLIC BLOOD PRESSURE: 97 MMHG

## 2019-05-21 LAB
CULTURE RESULTS: SIGNIFICANT CHANGE UP
SPECIMEN SOURCE: SIGNIFICANT CHANGE UP

## 2019-05-21 PROCEDURE — 83690 ASSAY OF LIPASE: CPT

## 2019-05-21 PROCEDURE — 83970 ASSAY OF PARATHORMONE: CPT

## 2019-05-21 PROCEDURE — 87075 CULTR BACTERIA EXCEPT BLOOD: CPT

## 2019-05-21 PROCEDURE — 80048 BASIC METABOLIC PNL TOTAL CA: CPT

## 2019-05-21 PROCEDURE — 82306 VITAMIN D 25 HYDROXY: CPT

## 2019-05-21 PROCEDURE — 84100 ASSAY OF PHOSPHORUS: CPT

## 2019-05-21 PROCEDURE — 87040 BLOOD CULTURE FOR BACTERIA: CPT

## 2019-05-21 PROCEDURE — 84295 ASSAY OF SERUM SODIUM: CPT

## 2019-05-21 PROCEDURE — 85027 COMPLETE CBC AUTOMATED: CPT

## 2019-05-21 PROCEDURE — 89051 BODY FLUID CELL COUNT: CPT

## 2019-05-21 PROCEDURE — 82728 ASSAY OF FERRITIN: CPT

## 2019-05-21 PROCEDURE — 94640 AIRWAY INHALATION TREATMENT: CPT

## 2019-05-21 PROCEDURE — 87070 CULTURE OTHR SPECIMN AEROBIC: CPT

## 2019-05-21 PROCEDURE — 84132 ASSAY OF SERUM POTASSIUM: CPT

## 2019-05-21 PROCEDURE — 93005 ELECTROCARDIOGRAM TRACING: CPT

## 2019-05-21 PROCEDURE — 82330 ASSAY OF CALCIUM: CPT

## 2019-05-21 PROCEDURE — 99285 EMERGENCY DEPT VISIT HI MDM: CPT | Mod: 25

## 2019-05-21 PROCEDURE — 74177 CT ABD & PELVIS W/CONTRAST: CPT

## 2019-05-21 PROCEDURE — 82947 ASSAY GLUCOSE BLOOD QUANT: CPT

## 2019-05-21 PROCEDURE — 96374 THER/PROPH/DIAG INJ IV PUSH: CPT

## 2019-05-21 PROCEDURE — 85014 HEMATOCRIT: CPT

## 2019-05-21 PROCEDURE — 82310 ASSAY OF CALCIUM: CPT

## 2019-05-21 PROCEDURE — 82435 ASSAY OF BLOOD CHLORIDE: CPT

## 2019-05-21 PROCEDURE — 82803 BLOOD GASES ANY COMBINATION: CPT

## 2019-05-21 PROCEDURE — 74018 RADEX ABDOMEN 1 VIEW: CPT

## 2019-05-21 PROCEDURE — 83605 ASSAY OF LACTIC ACID: CPT

## 2019-05-21 PROCEDURE — 80053 COMPREHEN METABOLIC PANEL: CPT

## 2019-05-21 RX ORDER — LABETALOL HCL 100 MG
1 TABLET ORAL
Qty: 60 | Refills: 0
Start: 2019-05-21 | End: 2019-06-19

## 2019-05-21 RX ORDER — GENTAMICIN SULFATE 0.1 %
1 OINTMENT (GRAM) TOPICAL
Qty: 1 | Refills: 0
Start: 2019-05-21 | End: 2019-05-30

## 2019-05-21 RX ORDER — SEVELAMER CARBONATE 2400 MG/1
2 POWDER, FOR SUSPENSION ORAL
Qty: 180 | Refills: 0
Start: 2019-05-21 | End: 2019-06-19

## 2019-05-21 RX ORDER — METOPROLOL TARTRATE 50 MG
1 TABLET ORAL
Qty: 0 | Refills: 0 | DISCHARGE

## 2019-05-21 RX ORDER — SEVELAMER CARBONATE 2400 MG/1
3 POWDER, FOR SUSPENSION ORAL
Qty: 0 | Refills: 0 | DISCHARGE
Start: 2019-05-21 | End: 2019-06-19

## 2019-05-21 RX ORDER — CINACALCET 30 MG/1
1 TABLET, FILM COATED ORAL
Qty: 0 | Refills: 0 | DISCHARGE

## 2019-05-21 RX ORDER — SEVELAMER CARBONATE 2400 MG/1
1600 POWDER, FOR SUSPENSION ORAL
Refills: 0 | Status: DISCONTINUED | OUTPATIENT
Start: 2019-05-21 | End: 2019-05-21

## 2019-05-21 RX ORDER — BENZOCAINE AND MENTHOL 5; 1 G/100ML; G/100ML
1 LIQUID ORAL
Refills: 0 | Status: DISCONTINUED | OUTPATIENT
Start: 2019-05-21 | End: 2019-05-21

## 2019-05-21 RX ADMIN — Medication 5 MILLIGRAM(S): at 06:22

## 2019-05-21 RX ADMIN — Medication 200 MILLIGRAM(S): at 06:22

## 2019-05-21 RX ADMIN — SEVELAMER CARBONATE 1600 MILLIGRAM(S): 2400 POWDER, FOR SUSPENSION ORAL at 12:07

## 2019-05-21 RX ADMIN — BENZOCAINE AND MENTHOL 1 LOZENGE: 5; 1 LIQUID ORAL at 06:42

## 2019-05-21 RX ADMIN — AMLODIPINE BESYLATE 10 MILLIGRAM(S): 2.5 TABLET ORAL at 06:22

## 2019-05-21 RX ADMIN — SEVELAMER CARBONATE 1600 MILLIGRAM(S): 2400 POWDER, FOR SUSPENSION ORAL at 08:09

## 2019-05-21 NOTE — PROGRESS NOTE ADULT - ATTENDING COMMENTS
Agree with above assessment and plan as outlined above.    - outpt cardiac f/u [phone]    Clark Multani MD, FACC  BEEPER [phone]
Agree with above.   No further inpatient cardiac workup needed at this time.     Wild Aldana MD
Patient seen and examined, agree with above assessment and plan as transcribed above.    - BP slowly improving  - No need for further inpatient cardiac work up.    Clark Multani MD, [facility]  BEEPER [phone]
Patient seen and examined.  Agree with above.   -recent TTE with normal LV function  -no further inpatient cardiac workup needed at this time.     Wild Aldana MD
Patient seen and examined.  Agree with above.   No further inpatient cardiac workup needed at this time.     Wild Aldana MD
Molly Alvarez  Pager: [phone]. If no response or past 5 pm call [phone].
For any question, call:  Cell # [phone]  Pager # [phone]  Callback # [phone]
Ryan Rodgers  Attending Physician   Division of Infectious Disease  Pager #[phone]  After 5pm/weekend or no response, call #[phone]
Ryan Rodgers  Attending Physician   Division of Infectious Disease  Pager #[phone]  After 5pm/weekend or no response, call #[phone]    Will sign off, recall ID if needed #[phone].

## 2019-05-21 NOTE — PROGRESS NOTE ADULT - REASON FOR ADMISSION
abdominal pain

## 2019-05-21 NOTE — PROGRESS NOTE ADULT - SUBJECTIVE AND OBJECTIVE BOX
33y old  Male who presents with a chief complaint of abdominal pain (18 May 2019 07:47)      Interval history:  Afebrile, was nauseous and threw up this morning after he received morphine per RN. Still has abdominal pain but improved.     No Known Allergies    Antimicrobials:      REVIEW OF SYSTEMS:  No chest pain  No SOB  No rash.     Vital Signs Last 24 Hrs  T(C): 36.8 (05-18-19 @ 13:25), Max: 37.1 (05-18-19 @ 05:39)  T(F): 98.2 (05-18-19 @ 13:25), Max: 98.7 (05-18-19 @ 05:39)  HR: 84 (05-18-19 @ 13:25) (84 - 92)  BP: 143/87 (05-18-19 @ 13:25) (143/87 - 167/95)  RR: 18 (05-18-19 @ 13:25) (16 - 18)  SpO2: 94% (05-18-19 @ 13:25) (94% - 100%)    PHYSICAL EXAM:  Patient in no acute distress. AAOX3.  No icterus, no oral ulcers.  Cardiovascular: S1S2 normal.  Lungs: +  air entry B/L lung fields.  Gastrointestinal: soft, nontender, nondistended, + PD catheter site with dressing. 	  Extremities: no edema.  IV sites not inflamed.   Lt arm av fistula.                           10.2   6.99  )-----------( 168      ( 18 May 2019 09:49 )             31.2   05-18    135  |  93<L>  |  66<H>  ----------------------------<  87  4.5   |  21<L>  |  25.35<H>    Ca    8.3<L>      18 May 2019 07:12      Culture - Dialysis Fluid (collected 16 May 2019 21:09)  Source: .Peritoneal Dialysis Fluid Specimen Source:  Dialysis (P.D.) Fluid,  Preliminary Report (17 May 2019 16:19):    No growth to date.    Culture - Blood (collected 15 May 2019 19:26)  Source: .Blood  Preliminary Report (16 May 2019 20:01):    No growth to date.    Culture - Blood (collected 15 May 2019 19:26)  Source: .Blood  Preliminary Report (16 May 2019 20:01):    No growth to date.        Radiology:  < from: CT Abdomen and Pelvis w/ Oral Cont and w/ IV Cont (05.17.19 @ 19:03) >  IMPRESSION:     Peritoneal dialysis catheter with tip in the right hemiabdomen. Moderate   volume ascites and small pneumoperitoneum.    Atrophic transplant kidney in the right lower quadrant.    Left seminal vesicle cyst.
[facility] : Progress Note :: [phone], (office [phone]),   Dr Banks / Dr Lucio / Dr Kunz / Dr Ugarte / Dr Montana RENTERIA / Dr Greco / Dr Henriquez / Dr Jeevan hay  _____________________________________________________________________________________________    abdominal pain improved. CT scan reviewed- unremarkable.    No Known Allergies    Hospital Medications:   MEDICATIONS  (STANDING):  amLODIPine   Tablet 10 milliGRAM(s) Oral daily  cinacalcet 30 milliGRAM(s) Oral daily  gentamicin 0.1% Ointment 1 Application(s) Topical daily  heparin  Injectable 5000 Unit(s) SubCutaneous every 8 hours  labetalol 200 milliGRAM(s) Oral every 12 hours  ondansetron Injectable 4 milliGRAM(s) IV Push once  predniSONE   Tablet 5 milliGRAM(s) Oral daily  sevelamer carbonate 1600 milliGRAM(s) Oral three times a day with meals        VITALS:  T(F): 98.2 (05-18-19 @ 13:25), Max: 98.7 (05-18-19 @ 05:39)  HR: 84 (05-18-19 @ 13:25)  BP: 143/87 (05-18-19 @ 13:25)  RR: 18 (05-18-19 @ 13:25)  SpO2: 94% (05-18-19 @ 13:25)  Wt(kg): --    05-17 @ 07:01  -  05-18 @ 07:00  --------------------------------------------------------  IN: 2040 mL / OUT: 1700 mL / NET: 340 mL    05-18 @ 07:01  -  05-18 @ 16:12  --------------------------------------------------------  IN: 1800 mL / OUT: 1800 mL / NET: 0 mL        PHYSICAL EXAM:  Constitutional: NAD  HEENT: anicteric sclera, oropharynx clear.  Neck: No JVD  Respiratory: CTAB, no wheezes, rales or rhonchi  Cardiovascular: S1, S2, RRR  Gastrointestinal: BS+, soft, NT/ND. PD catheter.  Extremities: No cyanosis or clubbing. No peripheral edema  Neurological: A/O x 3, no focal deficits  Vascular Access: LUEAVF with thril and bruit    LABS:  05-18    135  |  93<L>  |  66<H>  ----------------------------<  87  4.5   |  21<L>  |  25.35<H>    Ca    8.3<L>      18 May 2019 07:12      Creatinine Trend: 25.35 <--, 26.32 <--, 26.54 <--, 25.86 <--                        10.2   6.99  )-----------( 168      ( 18 May 2019 09:49 )             31.2     Urine Studies:      RADIOLOGY & ADDITIONAL STUDIES:
[facility] - KEAGAN Henriquez / KEAGAN Ugarte / GARY Greco/ KEAGAN Boyle/ KEAGAN Kunz/ FLAVIO Lucio / DIANA Banks / TIMMY Vickers  ---------------------------------------------------------------------------------------------------------------  seen and examined today for ESRD on PD  Interval : no signs of peritonitis  VITALS:  T(F): 98.2 (05-17-19 @ 10:12), Max: 98.8 (05-16-19 @ 14:01)  HR: 93 (05-17-19 @ 10:12)  BP: 156/101 (05-17-19 @ 10:12)  RR: 18 (05-17-19 @ 10:12)  SpO2: 98% (05-17-19 @ 10:12)  Wt(kg): --    05-16 @ 07:01  -  05-17 @ 07:00  --------------------------------------------------------  IN: 390 mL / OUT: 0 mL / NET: 390 mL      Physical Exam :-  Constitutional: NAD  Neck: Supple.  Respiratory: Bilateral equal breath sounds,  Cardiovascular: S1, S2 normal,  Gastrointestinal: Bowel Sounds present, soft, non tender.  Extremities: No edema  Neurological: Alert and Oriented x 3, no focal deficits  Psychiatric: Normal mood, normal affect  Data:-  Allergies :   No Known Allergies    Hospital Medications:   MEDICATIONS  (STANDING):  amLODIPine   Tablet 10 milliGRAM(s) Oral daily  cefepime   IVPB 2000 milliGRAM(s) IV Intermittent once  cinacalcet 30 milliGRAM(s) Oral daily  gentamicin 0.1% Ointment 1 Application(s) Topical daily  heparin  Injectable 5000 Unit(s) SubCutaneous every 8 hours  labetalol 200 milliGRAM(s) Oral every 12 hours  predniSONE   Tablet 5 milliGRAM(s) Oral daily  sevelamer carbonate 1600 milliGRAM(s) Oral three times a day with meals    05-17    137  |  96  |  71<H>  ----------------------------<  83  4.6   |  23  |  26.32<H>    Ca    7.9<L>      17 May 2019 07:12  Phos  6.6     05-16    TPro  6.5  /  Alb  3.4  /  TBili  0.4  /  DBili      /  AST  19  /  ALT  16  /  AlkPhos  141<H>  05-15    Creatinine Trend: 26.32 <--, 26.54 <--, 25.86 <--                        9.2    5.97  )-----------( 131      ( 17 May 2019 08:43 )             27.0
OPAL GABE 33y [id number]    Patient is a 33y old  Male who presents with a chief complaint of abdominal pain (17 May 2019 08:01)      Follow Up/CC:  ID following for abd pain    Interval History/ROS: no fever, abd pain better    Allergies    No Known Allergies    Intolerances        ANTIMICROBIALS:  cefepime   IVPB 2000 once      MEDICATIONS  (STANDING):  amLODIPine   Tablet 10 milliGRAM(s) Oral daily  cefepime   IVPB 2000 milliGRAM(s) IV Intermittent once  cinacalcet 30 milliGRAM(s) Oral daily  gentamicin 0.1% Ointment 1 Application(s) Topical daily  heparin  Injectable 5000 Unit(s) SubCutaneous every 8 hours  labetalol 200 milliGRAM(s) Oral every 12 hours  predniSONE   Tablet 5 milliGRAM(s) Oral daily  sevelamer carbonate 1600 milliGRAM(s) Oral three times a day with meals    MEDICATIONS  (PRN):  oxyCODONE    IR 5 milliGRAM(s) Oral every 6 hours PRN Severe Pain (7 - 10)        Vital Signs Last 24 Hrs  T(C): 36.8 (17 May 2019 10:12), Max: 37.1 (16 May 2019 14:01)  T(F): 98.2 (17 May 2019 10:12), Max: 98.8 (16 May 2019 14:01)  HR: 93 (17 May 2019 10:12) (79 - 97)  BP: 156/101 (17 May 2019 10:12) (156/101 - 170/101)  BP(mean): --  RR: 18 (17 May 2019 10:12) (18 - 19)  SpO2: 98% (17 May 2019 10:12) (94% - 99%)    CBC Full  -  ( 17 May 2019 08:43 )  WBC Count : 5.97 K/uL  RBC Count : 3.09 M/uL  Hemoglobin : 9.2 g/dL  Hematocrit : 27.0 %  Platelet Count - Automated : 131 K/uL  Mean Cell Volume : 87.4 fl  Mean Cell Hemoglobin : 29.8 pg  Mean Cell Hemoglobin Concentration : 34.1 gm/dL  Auto Neutrophil # : x  Auto Lymphocyte # : x  Auto Monocyte # : x  Auto Eosinophil # : x  Auto Basophil # : x  Auto Neutrophil % : x  Auto Lymphocyte % : x  Auto Monocyte % : x  Auto Eosinophil % : x  Auto Basophil % : x    05-17    137  |  96  |  71<H>  ----------------------------<  83  4.6   |  23  |  26.32<H>    Ca    7.9<L>      17 May 2019 07:12  Phos  6.6     05-16    TPro  6.5  /  Alb  3.4  /  TBili  0.4  /  DBili  x   /  AST  19  /  ALT  16  /  AlkPhos  141<H>  05-15    LIVER FUNCTIONS - ( 15 May 2019 17:00 )  Alb: 3.4 g/dL / Pro: 6.5 g/dL / ALK PHOS: 141 U/L / ALT: 16 U/L / AST: 19 U/L / GGT: x           Cell Count, Body Fluid (05.16.19 @ 17:25)    Mesothelial Cells - Body Fluid: 2 %    Monocyte/Macrophage Count - Body Fluid: 52 %    Fluid Segmented Granulocytes: 4 %    Fluid Type: Dialysate    Body Fluid Appearance: Clear    BF Lymphocytes: 42 %    Tube Type: Lavender    Color - Body Fluid: Straw    Total Nucleated Cell Count, Body Fluid: 21: Includes WBC and other nucleated cells if present. /uL    Total RBC Count: 203 /uL        MICROBIOLOGY:  .Blood  05-15-19   No growth to date.  --  --          RADIOLOGY    < from: Xray Abdomen 1 View PORTABLE -Urgent (05.16.19 @ 00:48) >  Nonobstructive bowel gas pattern.    < end of copied text >
Patient is a 33y old  Male who presents with a chief complaint of abdominal pain (16 May 2019 14:57)      SUBJECTIVE / OVERNIGHT EVENTS:    Events noted.  CONSTITUTIONAL: No fever,  or fatigue  RESPIRATORY: No cough, wheezing,   CARDIOVASCULAR: No chest pain, palpitations,   GASTROINTESTINAL: No abdominal or epigastric pain.   NEUROLOGICAL: No headaches,     MEDICATIONS  (STANDING):  amLODIPine   Tablet 10 milliGRAM(s) Oral daily  cefepime   IVPB 2000 milliGRAM(s) IV Intermittent once  cinacalcet 30 milliGRAM(s) Oral daily  heparin  Injectable 5000 Unit(s) SubCutaneous every 8 hours  labetalol 200 milliGRAM(s) Oral every 12 hours  predniSONE   Tablet 5 milliGRAM(s) Oral daily  sevelamer carbonate 1600 milliGRAM(s) Oral three times a day with meals    MEDICATIONS  (PRN):  oxyCODONE    IR 5 milliGRAM(s) Oral every 6 hours PRN Severe Pain (7 - 10)        CAPILLARY BLOOD GLUCOSE        I&O's Summary    15 May 2019 07:01  -  16 May 2019 07:00  --------------------------------------------------------  IN: 60 mL / OUT: 0 mL / NET: 60 mL    16 May 2019 07:01  -  16 May 2019 22:41  --------------------------------------------------------  IN: 290 mL / OUT: 0 mL / NET: 290 mL        PHYSICAL EXAM:  GENERAL: NAD  NECK: Supple, No JVD  CHEST/LUNG: Clear to auscultation bilaterally; No wheezing.  HEART: Regular rate and rhythm; No murmurs, rubs, or gallops  ABDOMEN: Soft, Nontender, Nondistended; Bowel sounds present  EXTREMITIES:   No edema  NEUROLOGY: AAO      LABS:                        10.6   6.8   )-----------( 132      ( 16 May 2019 00:03 )             31.3     05-16    138  |  100  |  73<H>  ----------------------------<  102<H>  5.2   |  21<L>  |  26.54<H>    Ca    8.9      16 May 2019 00:03  Phos  6.6     05-16    TPro  6.5  /  Alb  3.4  /  TBili  0.4  /  DBili  x   /  AST  19  /  ALT  16  /  AlkPhos  141<H>  05-15            CAPILLARY BLOOD GLUCOSE        05-15 @ 19:26  Culture-urine --  Culture results   No growth to date.  method type --  Organism --  Organism Identification --  Specimen source .Blood           05-15 @ 19:26  Culture blood --  Culture results   No growth to date.  Gram stain --  Gram stain blood --  Method type --  Organism --  Organism identification --  Specimen source .Blood      RADIOLOGY & ADDITIONAL TESTS:    Imaging Personally Reviewed:    Consultant(s) Notes Reviewed:      Care Discussed with Consultants/Other Providers:
Patient is a 33y old  Male who presents with a chief complaint of abdominal pain (17 May 2019 12:16)      SUBJECTIVE / OVERNIGHT EVENTS:    Events noted.  CONSTITUTIONAL: No fever,  or fatigue  RESPIRATORY: No cough, wheezing,  No shortness of breath  CARDIOVASCULAR: No chest pain, palpitations,   GASTROINTESTINAL: No abdominal or epigastric pain.   NEUROLOGICAL: No headaches,     MEDICATIONS  (STANDING):  amLODIPine   Tablet 10 milliGRAM(s) Oral daily  cefepime   IVPB 2000 milliGRAM(s) IV Intermittent once  cinacalcet 30 milliGRAM(s) Oral daily  gentamicin 0.1% Ointment 1 Application(s) Topical daily  heparin  Injectable 5000 Unit(s) SubCutaneous every 8 hours  labetalol 200 milliGRAM(s) Oral every 12 hours  predniSONE   Tablet 5 milliGRAM(s) Oral daily  sevelamer carbonate 1600 milliGRAM(s) Oral three times a day with meals    MEDICATIONS  (PRN):  oxyCODONE    IR 5 milliGRAM(s) Oral every 6 hours PRN Severe Pain (7 - 10)        CAPILLARY BLOOD GLUCOSE        I&O's Summary    16 May 2019 07:01  -  17 May 2019 07:00  --------------------------------------------------------  IN: 390 mL / OUT: 0 mL / NET: 390 mL    17 May 2019 07:01  -  17 May 2019 18:50  --------------------------------------------------------  IN: 1800 mL / OUT: 1700 mL / NET: 100 mL        PHYSICAL EXAM:    NECK: Supple, No JVD  CHEST/LUNG: Clear to auscultation bilaterally; No wheezing.  HEART: Regular rate and rhythm; No murmurs, rubs, or gallops  ABDOMEN: Soft, Nontender, Nondistended; Bowel sounds present  EXTREMITIES:   No edema  NEUROLOGY: AAO       LABS:                        9.2    5.97  )-----------( 131      ( 17 May 2019 08:43 )             27.0     05-17    137  |  96  |  71<H>  ----------------------------<  83  4.6   |  23  |  26.32<H>    Ca    7.9<L>      17 May 2019 07:12  Phos  6.6     05-16              CAPILLARY BLOOD GLUCOSE        05-16 @ 21:09  Culture-urine --  Culture results   No growth to date.  method type --  Organism --  Organism Identification --  Specimen source .Peritoneal Dialysis Fluid Specimen Source:  Dialysis (P.D.) Fluid,  05-15 @ 19:26  Culture-urine --  Culture results   No growth to date.  method type --  Organism --  Organism Identification --  Specimen source .Blood      05-16 @ 21:09  Culture-CSF --  Culture results   No growth to date.  Gram stain --  Gram stain spinal fluid --  Method type --  Organism --  Organism identification --  Specimen source .Peritoneal Dialysis Fluid Specimen Source:  Dialysis (P.D.) Fluid,       05-16 @ 21:09  Culture blood --  Culture results   No growth to date.  Gram stain --  Gram stain blood --  Method type --  Organism --  Organism identification --  Specimen source .Peritoneal Dialysis Fluid Specimen Source:  Dialysis (P.D.) Fluid,   05-15 @ 19:26  Culture blood --  Culture results   No growth to date.  Gram stain --  Gram stain blood --  Method type --  Organism --  Organism identification --  Specimen source .Blood      RADIOLOGY & ADDITIONAL TESTS:    Imaging Personally Reviewed:    Consultant(s) Notes Reviewed:      Care Discussed with Consultants/Other Providers:
Patient is a 33y old  Male who presents with a chief complaint of abdominal pain (17 May 2019 12:16)      SUBJECTIVE / OVERNIGHT EVENTS:    Events noted.  CONSTITUTIONAL: No fever,  or fatigue  RESPIRATORY: No cough, wheezing,  No shortness of breath  CARDIOVASCULAR: No chest pain, palpitations,   GASTROINTESTINAL: No abdominal or epigastric pain.   NEUROLOGICAL: No headaches,     MEDICATIONS  (STANDING):  amLODIPine   Tablet 10 milliGRAM(s) Oral daily  cefepime   IVPB 2000 milliGRAM(s) IV Intermittent once  cinacalcet 30 milliGRAM(s) Oral daily  gentamicin 0.1% Ointment 1 Application(s) Topical daily  heparin  Injectable 5000 Unit(s) SubCutaneous every 8 hours  labetalol 200 milliGRAM(s) Oral every 12 hours  predniSONE   Tablet 5 milliGRAM(s) Oral daily  sevelamer carbonate 1600 milliGRAM(s) Oral three times a day with meals    MEDICATIONS  (PRN):  oxyCODONE    IR 5 milliGRAM(s) Oral every 6 hours PRN Severe Pain (7 - 10)        CAPILLARY BLOOD GLUCOSE        I&O's Summary    16 May 2019 07:01  -  17 May 2019 07:00  --------------------------------------------------------  IN: 390 mL / OUT: 0 mL / NET: 390 mL    17 May 2019 07:01  -  17 May 2019 18:50  --------------------------------------------------------  IN: 1800 mL / OUT: 1700 mL / NET: 100 mL        PHYSICAL EXAM:    NECK: Supple, No JVD  CHEST/LUNG: Clear to auscultation bilaterally; No wheezing.  HEART: Regular rate and rhythm; No murmurs, rubs, or gallops  ABDOMEN: Soft, Nontender, Nondistended; Bowel sounds present  EXTREMITIES:   No edema  NEUROLOGY: AAO       LABS:                        9.2    5.97  )-----------( 131      ( 17 May 2019 08:43 )             27.0     05-17    137  |  96  |  71<H>  ----------------------------<  83  4.6   |  23  |  26.32<H>    Ca    7.9<L>      17 May 2019 07:12  Phos  6.6     05-16              CAPILLARY BLOOD GLUCOSE        05-16 @ 21:09  Culture-urine --  Culture results   No growth to date.  method type --  Organism --  Organism Identification --  Specimen source .Peritoneal Dialysis Fluid Specimen Source:  Dialysis (P.D.) Fluid,  05-15 @ 19:26  Culture-urine --  Culture results   No growth to date.  method type --  Organism --  Organism Identification --  Specimen source .Blood      05-16 @ 21:09  Culture-CSF --  Culture results   No growth to date.  Gram stain --  Gram stain spinal fluid --  Method type --  Organism --  Organism identification --  Specimen source .Peritoneal Dialysis Fluid Specimen Source:  Dialysis (P.D.) Fluid,       05-16 @ 21:09  Culture blood --  Culture results   No growth to date.  Gram stain --  Gram stain blood --  Method type --  Organism --  Organism identification --  Specimen source .Peritoneal Dialysis Fluid Specimen Source:  Dialysis (P.D.) Fluid,   05-15 @ 19:26  Culture blood --  Culture results   No growth to date.  Gram stain --  Gram stain blood --  Method type --  Organism --  Organism identification --  Specimen source .Blood      RADIOLOGY & ADDITIONAL TESTS:    Imaging Personally Reviewed:    Consultant(s) Notes Reviewed:      Care Discussed with Consultants/Other Providers:
Patient is a 33y old  Male who presents with a chief complaint of abdominal pain (19 May 2019 13:03)      SUBJECTIVE / OVERNIGHT EVENTS:    Events noted.  CONSTITUTIONAL: No fever,  or fatigue  RESPIRATORY: No cough, wheezing,  No shortness of breath  CARDIOVASCULAR: No chest pain, palpitations, dizziness, or leg swelling  GASTROINTESTINAL: No abdominal or epigastric pain. No nausea, vomiting.  NEUROLOGICAL: No headaches,     MEDICATIONS  (STANDING):  amLODIPine   Tablet 10 milliGRAM(s) Oral daily  gentamicin 0.1% Ointment 1 Application(s) Topical daily  heparin  Injectable 5000 Unit(s) SubCutaneous every 8 hours  labetalol 200 milliGRAM(s) Oral every 12 hours  ondansetron Injectable 4 milliGRAM(s) IV Push once  predniSONE   Tablet 5 milliGRAM(s) Oral daily  sevelamer carbonate 1600 milliGRAM(s) Oral three times a day with meals    MEDICATIONS  (PRN):  oxyCODONE    IR 5 milliGRAM(s) Oral every 6 hours PRN Severe Pain (7 - 10)        CAPILLARY BLOOD GLUCOSE        I&O's Summary    18 May 2019 07:01  -  19 May 2019 07:00  --------------------------------------------------------  IN: 2040 mL / OUT: 1800 mL / NET: 240 mL    19 May 2019 07:01  -  20 May 2019 00:08  --------------------------------------------------------  IN: 3020 mL / OUT: 2200 mL / NET: 820 mL        PHYSICAL EXAM:  GENERAL: NAD  NECK: Supple, No JVD  CHEST/LUNG: Clear to auscultation bilaterally; No wheezing.  HEART: Regular rate and rhythm; No murmurs, rubs, or gallops  ABDOMEN: Soft, Nontender, Nondistended; Bowel sounds present  EXTREMITIES:   No edema  NEUROLOGY: AAO X 3      LABS:                        9.6    6.31  )-----------( 161      ( 19 May 2019 09:05 )             27.9     05-19    137  |  94<L>  |  60<H>  ----------------------------<  82  4.7   |  23  |  25.01<H>    Ca    8.6      19 May 2019 06:33              CAPILLARY BLOOD GLUCOSE        05-16 @ 21:09  Culture-urine --  Culture results   No growth to date.  method type --  Organism --  Organism Identification --  Specimen source .Peritoneal Dialysis Fluid Specimen Source:  Dialysis (P.D.) Fluid,  05-15 @ 19:26  Culture-urine --  Culture results   No growth to date.  method type --  Organism --  Organism Identification --  Specimen source .Blood           05-16 @ 21:09  Culture blood --  Culture results   No growth to date.  Gram stain --  Gram stain blood --  Method type --  Organism --  Organism identification --  Specimen source .Peritoneal Dialysis Fluid Specimen Source:  Dialysis (P.D.) Fluid,   05-15 @ 19:26  Culture blood --  Culture results   No growth to date.  Gram stain --  Gram stain blood --  Method type --  Organism --  Organism identification --  Specimen source .Blood      RADIOLOGY & ADDITIONAL TESTS:    Imaging Personally Reviewed:    Consultant(s) Notes Reviewed:      Care Discussed with Consultants/Other Providers:
Patient is a 33y old  Male who presents with a chief complaint of abdominal pain (20 May 2019 15:01)      SUBJECTIVE / OVERNIGHT EVENTS:    Events noted.  CONSTITUTIONAL: No fever,  or fatigue  RESPIRATORY: No cough, wheezing,  No shortness of breath  CARDIOVASCULAR: No chest pain, palpitations, dizziness, or leg swelling  GASTROINTESTINAL: No abdominal or epigastric pain. No nausea, vomiting.  NEUROLOGICAL: No headaches,     MEDICATIONS  (STANDING):  amLODIPine   Tablet 10 milliGRAM(s) Oral daily  gentamicin 0.1% Ointment 1 Application(s) Topical daily  heparin  Injectable 5000 Unit(s) SubCutaneous every 8 hours  labetalol 200 milliGRAM(s) Oral every 12 hours  ondansetron Injectable 4 milliGRAM(s) IV Push once  predniSONE   Tablet 5 milliGRAM(s) Oral daily  sevelamer carbonate 1600 milliGRAM(s) Oral three times a day with meals    MEDICATIONS  (PRN):  oxyCODONE    IR 5 milliGRAM(s) Oral every 6 hours PRN Severe Pain (7 - 10)        CAPILLARY BLOOD GLUCOSE        I&O's Summary    19 May 2019 07:01  -  20 May 2019 07:00  --------------------------------------------------------  IN: 3560 mL / OUT: 2200 mL / NET: 1360 mL    20 May 2019 07:01  -  20 May 2019 23:52  --------------------------------------------------------  IN: 594 mL / OUT: 0 mL / NET: 594 mL        PHYSICAL EXAM:  GENERAL: NAD  NECK: Supple, No JVD  CHEST/LUNG: Clear to auscultation bilaterally; No wheezing.  HEART: Regular rate and rhythm; No murmurs, rubs, or gallops  ABDOMEN: Soft, Nontender, Nondistended; Bowel sounds present  EXTREMITIES:   No edema  NEUROLOGY: AAO X 3      LABS:                        10.5   7.0   )-----------( 163      ( 20 May 2019 06:34 )             30.0     05-20    136  |  93<L>  |  56<H>  ----------------------------<  84  4.6   |  24  |  24.51<H>    Ca    8.8      20 May 2019 06:34              CAPILLARY BLOOD GLUCOSE        05-16 @ 21:09  Culture-urine --  Culture results   No growth to date.  method type --  Organism --  Organism Identification --  Specimen source .Peritoneal Dialysis Fluid Specimen Source:  Dialysis (P.D.) Fluid,  05-15 @ 19:26  Culture-urine --  Culture results   No growth at 5 days.  method type --  Organism --  Organism Identification --  Specimen source .Blood           05-16 @ 21:09  Culture blood --  Culture results   No growth to date.  Gram stain --  Gram stain blood --  Method type --  Organism --  Organism identification --  Specimen source .Peritoneal Dialysis Fluid Specimen Source:  Dialysis (P.D.) Fluid,   05-15 @ 19:26  Culture blood --  Culture results   No growth at 5 days.  Gram stain --  Gram stain blood --  Method type --  Organism --  Organism identification --  Specimen source .Blood      RADIOLOGY & ADDITIONAL TESTS:    Imaging Personally Reviewed:    Consultant(s) Notes Reviewed:      Care Discussed with Consultants/Other Providers:
Patient seen and examined  no complaints      No Known Allergies    Hospital Medications:   MEDICATIONS  (STANDING):  amLODIPine   Tablet 10 milliGRAM(s) Oral daily  gentamicin 0.1% Ointment 1 Application(s) Topical daily  heparin  Injectable 5000 Unit(s) SubCutaneous every 8 hours  labetalol 200 milliGRAM(s) Oral every 12 hours  ondansetron Injectable 4 milliGRAM(s) IV Push once  predniSONE   Tablet 5 milliGRAM(s) Oral daily  sevelamer carbonate 1600 milliGRAM(s) Oral three times a day with meals    VITALS:  T(F): 98.3 (05-20-19 @ 08:55), Max: 98.7 (05-19-19 @ 16:51)  HR: 89 (05-20-19 @ 08:55)  BP: 134/86 (05-20-19 @ 08:55)  RR: 18 (05-20-19 @ 08:55)  SpO2: 97% (05-20-19 @ 08:55)  Wt(kg): --    05-19 @ 07:01  -  05-20 @ 07:00  --------------------------------------------------------  IN: 3560 mL / OUT: 2200 mL / NET: 1360 mL    PHYSICAL EXAM:  Constitutional: NAD  HEENT: anicteric sclera, oropharynx clear  Neck: No JVD  Respiratory: CTAB, no wheezes, rales or rhonchi  Cardiovascular: S1, S2, RRR  Gastrointestinal: BS+, soft, NT, +Tenchoff catheter  Extremities: No cyanosis or clubbing. No peripheral edema  Neurological: A/O x 3, no focal deficits  Psychiatric: Normal mood, normal affect  : No CVA tenderness. No fuller.   Skin: No rashes    LABS:  05-20    136  |  93<L>  |  56<H>  ----------------------------<  84  4.6   |  24  |  24.51<H>    Ca    8.8      20 May 2019 06:34      Creatinine Trend: 24.51 <--, 25.01 <--, 25.35 <--, 26.32 <--, 26.54 <--, 25.86 <--                        10.5   7.0   )-----------( 163      ( 20 May 2019 06:34 )             30.0     Urine Studies:      RADIOLOGY & ADDITIONAL STUDIES:
Subjective: pt seen and examined,  denies chest pain or palpitation     amLODIPine   Tablet 10 milliGRAM(s) Oral daily  gentamicin 0.1% Ointment 1 Application(s) Topical daily  heparin  Injectable 5000 Unit(s) SubCutaneous every 8 hours  labetalol 200 milliGRAM(s) Oral every 12 hours  ondansetron Injectable 4 milliGRAM(s) IV Push once  oxyCODONE    IR 5 milliGRAM(s) Oral every 6 hours PRN  predniSONE   Tablet 5 milliGRAM(s) Oral daily  sevelamer carbonate 1600 milliGRAM(s) Oral three times a day with meals                            9.6    6.31  )-----------( 161      ( 19 May 2019 09:05 )             27.9       Hemoglobin: 9.6 g/dL (05-19 @ 09:05)  Hemoglobin: 10.2 g/dL (05-18 @ 09:49)  Hemoglobin: 9.2 g/dL (05-17 @ 08:43)  Hemoglobin: 10.6 g/dL (05-16 @ 00:03)  Hemoglobin: 11.0 g/dL (05-15 @ 17:00)      05-19    137  |  94<L>  |  60<H>  ----------------------------<  82  4.7   |  23  |  25.01<H>    Ca    8.6      19 May 2019 06:33      Creatinine Trend: 25.01<--, 25.35<--, 26.32<--, 26.54<--, 25.86<--    COAGS:           T(C): 36.9 (05-20-19 @ 00:43), Max: 37.1 (05-19-19 @ 16:51)  HR: 83 (05-20-19 @ 00:43) (83 - 91)  BP: 153/88 (05-20-19 @ 00:43) (128/78 - 157/89)  RR: 18 (05-20-19 @ 00:43) (16 - 18)  SpO2: 95% (05-20-19 @ 00:43) (93% - 97%)  Wt(kg): --    I&O's Summary    18 May 2019 07:01  -  19 May 2019 07:00  --------------------------------------------------------  IN: 2040 mL / OUT: 1800 mL / NET: 240 mL    19 May 2019 07:01  -  20 May 2019 04:35  --------------------------------------------------------  IN: 3020 mL / OUT: 2200 mL / NET: 820 mL            [x ] Echocardiogram: < from: Transthoracic Echocardiogram (01.08.18 @ 09:10) >  Conclusions:  1. Normal mitral valve with redundant chordae. Minimal  mitral regurgitation.  2. Normal trileaflet aortic valve. No aortic valve  regurgitation seen.  3. Moderate concentric left ventricular hypertrophy.  4. Normal left ventricular systolic function. No segmental  wallmotion abnormalities.  5. Normal right ventricular size and function.  *** No previous Echo exam.  ------------------------------------------------------------------------  Confirmed on  1/8/2018 - 11:42:57 by Corby Yepez M.D.  ------------------------------------------------------------------------    < end of copied text >    [ ]  Catheterization:  [x ] Stress Test:  	< from: Stress Echocardiogram-Exercise (05.05.16 @ 09:49) >  Conclusions:  1. Exercise capacity is 9 METS, which is poor for age and  gender.  2. Normal hemodynamic response.  3. Normal electrocardiographic response.  4. Normal augmentation in left ventricular systolic  function.  5. Heart rate failed to increase appropriately,  likely due  to medications.  6. Appropriate blood pressure response.  7. Slightly submaximal stress test, asonly 79% of the  patient's maximum predicted heart rate was achieved.  Normal stress echocardiogram with no evidence of inducible  ischemia, to 79% of the maximum predicted heart rate.  ------------------------------------------------------------------------  Confirmed on  5/5/2016 - 11:41:29 by Jose Rod M.D.  ------------------------------------------------------------------------    < end of copied text >    Appearance: Normal	  HEENT:   Normal oral mucosa, PERRL, EOMI	  Lymphatic: No lymphadenopathy  Cardiovascular: Normal S1 S2, No JVD, No murmurs, No edema  Respiratory: Lungs clear to auscultation	  Psychiatry: A & O x 3, Mood & affect appropriate  Gastrointestinal:  Soft, Non-tender, + BS	  Skin: No rashes, No ecchymoses, No cyanosis	  Neurologic: Non-focal  Extremities: Normal range of motion, No clubbing, cyanosis or edema  Vascular: Peripheral pulses palpable 2+ bilaterally    TELEMETRY: 	  none   ECG:  	nsr 72 , NAD, no acute ischemia   RADIOLOGY: < from: Xray Chest 2 Views PA/Lat (11.07.18 @ 13:17) >    IMPRESSION:   Clear lungs.    < end of copied text >    CT abd: < from: CT Abdomen and Pelvis w/ Oral Cont and w/ IV Cont (05.17.19 @ 19:03) >    IMPRESSION:     Peritoneal dialysis catheter with tip in the right hemiabdomen. Moderate   volume ascites and small pneumoperitoneum.    Atrophic transplant kidney in the right lower quadrant.    Left seminal vesicle cyst.    CARISSA AKINS M.D., RADIOLOGY RESIDENT  This document has been electronically signed.  KEYLA MARTINEZ M.D., ATTENDING RADIOLOGIST  This document has been electronically signed. May 18 2019 11:54AM    < end of copied text >      ASSESSMENT/PLAN: 	This patient is a 32yo M with PMH of ESRD 2/2 FSGS s/p renal transplant in 2004, s/p failure, HD x2 months, now on PD who presents to the ED with complaint of abdominal pain, admitted for evaluation of suspected sbp.        PD per renal   steroid taper   cont norvasc , labetalol, - BP stable    GI / DVT prophylaxis., keep K>4, mag >2.0   no further CV work up needed at present   D/W Dr Aldana
Subjective: pt seen and examined,  no events overnight     amLODIPine   Tablet 10 milliGRAM(s) Oral daily  benzocaine 15 mG/menthol 3.6 mG Lozenge 1 Lozenge Oral two times a day PRN  gentamicin 0.1% Ointment 1 Application(s) Topical daily  heparin  Injectable 5000 Unit(s) SubCutaneous every 8 hours  labetalol 200 milliGRAM(s) Oral every 12 hours  ondansetron Injectable 4 milliGRAM(s) IV Push once  oxyCODONE    IR 5 milliGRAM(s) Oral every 6 hours PRN  predniSONE   Tablet 5 milliGRAM(s) Oral daily  sevelamer carbonate 1600 milliGRAM(s) Oral three times a day with meals                            10.5   7.0   )-----------( 163      ( 20 May 2019 06:34 )             30.0       Hemoglobin: 10.5 g/dL (05-20 @ 06:34)  Hemoglobin: 9.6 g/dL (05-19 @ 09:05)  Hemoglobin: 10.2 g/dL (05-18 @ 09:49)  Hemoglobin: 9.2 g/dL (05-17 @ 08:43)      05-20    136  |  93<L>  |  56<H>  ----------------------------<  84  4.6   |  24  |  24.51<H>    Ca    8.8      20 May 2019 06:34      Creatinine Trend: 24.51<--, 25.01<--, 25.35<--, 26.32<--, 26.54<--, 25.86<--    COAGS:           T(C): 36.7 (05-21-19 @ 06:21), Max: 37.1 (05-21-19 @ 02:17)  HR: 80 (05-21-19 @ 06:21) (80 - 91)  BP: 156/90 (05-21-19 @ 06:21) (134/86 - 156/90)  RR: 18 (05-21-19 @ 06:21) (18 - 18)  SpO2: 95% (05-21-19 @ 06:21) (95% - 98%)  Wt(kg): --    I&O's Summary    20 May 2019 07:01  -  21 May 2019 07:00  --------------------------------------------------------  IN: 4534 mL / OUT: 3900 mL / NET: 634 mL        [x ] Echocardiogram: < from: Transthoracic Echocardiogram (01.08.18 @ 09:10) >  Conclusions:  1. Normal mitral valve with redundant chordae. Minimal  mitral regurgitation.  2. Normal trileaflet aortic valve. No aortic valve  regurgitation seen.  3. Moderate concentric left ventricular hypertrophy.  4. Normal left ventricular systolic function. No segmental  wallmotion abnormalities.  5. Normal right ventricular size and function.  *** No previous Echo exam.  ------------------------------------------------------------------------  Confirmed on  1/8/2018 - 11:42:57 by Corby Yepez M.D.  ------------------------------------------------------------------------    < end of copied text >    [ ]  Catheterization:  [x ] Stress Test:  	< from: Stress Echocardiogram-Exercise (05.05.16 @ 09:49) >  Conclusions:  1. Exercise capacity is 9 METS, which is poor for age and  gender.  2. Normal hemodynamic response.  3. Normal electrocardiographic response.  4. Normal augmentation in left ventricular systolic  function.  5. Heart rate failed to increase appropriately,  likely due  to medications.  6. Appropriate blood pressure response.  7. Slightly submaximal stress test, asonly 79% of the  patient's maximum predicted heart rate was achieved.  Normal stress echocardiogram with no evidence of inducible  ischemia, to 79% of the maximum predicted heart rate.  ------------------------------------------------------------------------  Confirmed on  5/5/2016 - 11:41:29 by Jose Rod M.D.  ------------------------------------------------------------------------    < end of copied text >    Appearance: Normal	  HEENT:   Normal oral mucosa, PERRL, EOMI	  Lymphatic: No lymphadenopathy  Cardiovascular: Normal S1 S2, No JVD, No murmurs, No edema  Respiratory: Lungs clear to auscultation	  Psychiatry: A & O x 3, Mood & affect appropriate  Gastrointestinal:  Soft, Non-tender, + BS	  Skin: No rashes, No ecchymoses, No cyanosis	  Neurologic: Non-focal  Extremities: Normal range of motion, No clubbing, cyanosis or edema  Vascular: Peripheral pulses palpable 2+ bilaterally    TELEMETRY: 	  none   ECG:  	nsr 72 , NAD, no acute ischemia   RADIOLOGY: < from: Xray Chest 2 Views PA/Lat (11.07.18 @ 13:17) >    IMPRESSION:   Clear lungs.    < end of copied text >    CT abd: < from: CT Abdomen and Pelvis w/ Oral Cont and w/ IV Cont (05.17.19 @ 19:03) >    IMPRESSION:     Peritoneal dialysis catheter with tip in the right hemiabdomen. Moderate   volume ascites and small pneumoperitoneum.    Atrophic transplant kidney in the right lower quadrant.    Left seminal vesicle cyst.    CARISSA AKINS M.D., RADIOLOGY RESIDENT  This document has been electronically signed.  KEYLA MARTINEZ M.D., ATTENDING RADIOLOGIST  This document has been electronically signed. May 18 2019 11:54AM    < end of copied text >      ASSESSMENT/PLAN: 	This patient is a 32yo M with PMH of ESRD 2/2 FSGS s/p renal transplant in 2004, s/p failure, HD x2 months, now on PD who presents to the ED with complaint of abdominal pain, admitted for evaluation of suspected sbp.        PD per renal   steroid taper   cont norvasc , labetalol, - BP stable    GI / DVT prophylaxis., keep K>4, mag >2.0   no further CV work up needed at present   D/W Dr Multani
Subjective: pt seen and examined, no complaints, ROS - .     amLODIPine   Tablet 10 milliGRAM(s) Oral daily  cefepime   IVPB 2000 milliGRAM(s) IV Intermittent once  cinacalcet 30 milliGRAM(s) Oral daily  gentamicin 0.1% Ointment 1 Application(s) Topical daily  heparin  Injectable 5000 Unit(s) SubCutaneous every 8 hours  labetalol 200 milliGRAM(s) Oral every 12 hours  oxyCODONE    IR 5 milliGRAM(s) Oral every 6 hours PRN  predniSONE   Tablet 5 milliGRAM(s) Oral daily  sevelamer carbonate 1600 milliGRAM(s) Oral three times a day with meals                            9.2    5.97  )-----------( 131      ( 17 May 2019 08:43 )             27.0       Hemoglobin: 9.2 g/dL (05-17 @ 08:43)  Hemoglobin: 10.6 g/dL (05-16 @ 00:03)  Hemoglobin: 11.0 g/dL (05-15 @ 17:00)      05-17    137  |  96  |  71<H>  ----------------------------<  83  4.6   |  23  |  26.32<H>    Ca    7.9<L>      17 May 2019 07:12      Creatinine Trend: 26.32<--, 26.54<--, 25.86<--    COAGS:           T(C): 36.8 (05-18-19 @ 00:29), Max: 36.9 (05-17-19 @ 20:33)  HR: 90 (05-18-19 @ 00:29) (80 - 93)  BP: 167/95 (05-18-19 @ 00:29) (155/95 - 167/95)  RR: 16 (05-18-19 @ 00:29) (16 - 18)  SpO2: 95% (05-18-19 @ 00:29) (95% - 98%)  Wt(kg): --    I&O's Summary    16 May 2019 07:01  -  17 May 2019 07:00  --------------------------------------------------------  IN: 390 mL / OUT: 0 mL / NET: 390 mL    17 May 2019 07:01  -  18 May 2019 05:11  --------------------------------------------------------  IN: 1800 mL / OUT: 1700 mL / NET: 100 mL        [x ] Echocardiogram: < from: Transthoracic Echocardiogram (01.08.18 @ 09:10) >  Conclusions:  1. Normal mitral valve with redundant chordae. Minimal  mitral regurgitation.  2. Normal trileaflet aortic valve. No aortic valve  regurgitation seen.  3. Moderate concentric left ventricular hypertrophy.  4. Normal left ventricular systolic function. No segmental  wallmotion abnormalities.  5. Normal right ventricular size and function.  *** No previous Echo exam.  ------------------------------------------------------------------------  Confirmed on  1/8/2018 - 11:42:57 by Corby Yepez M.D.  ------------------------------------------------------------------------    < end of copied text >    [ ]  Catheterization:  [x ] Stress Test:  	< from: Stress Echocardiogram-Exercise (05.05.16 @ 09:49) >  Conclusions:  1. Exercise capacity is 9 METS, which is poor for age and  gender.  2. Normal hemodynamic response.  3. Normal electrocardiographic response.  4. Normal augmentation in left ventricular systolic  function.  5. Heart rate failed to increase appropriately,  likely due  to medications.  6. Appropriate blood pressure response.  7. Slightly submaximal stress test, asonly 79% of the  patient's maximum predicted heart rate was achieved.  Normal stress echocardiogram with no evidence of inducible  ischemia, to 79% of the maximum predicted heart rate.  ------------------------------------------------------------------------  Confirmed on  5/5/2016 - 11:41:29 by Jose Rod M.D.  ------------------------------------------------------------------------    < end of copied text >    Appearance: Normal	  HEENT:   Normal oral mucosa, PERRL, EOMI	  Lymphatic: No lymphadenopathy  Cardiovascular: Normal S1 S2, No JVD, No murmurs, No edema  Respiratory: Lungs clear to auscultation	  Psychiatry: A & O x 3, Mood & affect appropriate  Gastrointestinal:  Soft, Non-tender, + BS	  Skin: No rashes, No ecchymoses, No cyanosis	  Neurologic: Non-focal  Extremities: Normal range of motion, No clubbing, cyanosis or edema  Vascular: Peripheral pulses palpable 2+ bilaterally    TELEMETRY: 	  none   ECG:  	nsr 72 , NAD, no acute ischemia   RADIOLOGY: < from: Xray Chest 2 Views PA/Lat (11.07.18 @ 13:17) >    IMPRESSION:   Clear lungs.    < end of copied text >      ASSESSMENT/PLAN: 	This patient is a 32yo M with PMH of ESRD 2/2 FSGS s/p renal transplant in 2004, s/p failure, HD x2 months, now on PD who presents to the ED with complaint of abdominal pain, admitted for evaluation of suspected sbp.       ABX per medicine   follow up on culture   PD per renal   steroid taper   cont norvasc , labetalol, - BP stable    GI / DVT prophylaxis., keep K>4, mag >2.0   D/W Dr Aldana
Subjective: pt seen and examined, no complaints, ROS - .     amLODIPine   Tablet 10 milliGRAM(s) Oral daily  cefepime   IVPB 2000 milliGRAM(s) IV Intermittent once  cinacalcet 30 milliGRAM(s) Oral daily  heparin  Injectable 5000 Unit(s) SubCutaneous every 8 hours  labetalol 200 milliGRAM(s) Oral every 12 hours  oxyCODONE    IR 5 milliGRAM(s) Oral every 6 hours PRN  predniSONE   Tablet 5 milliGRAM(s) Oral daily  sevelamer carbonate 1600 milliGRAM(s) Oral three times a day with meals                            10.6   6.8   )-----------( 132      ( 16 May 2019 00:03 )             31.3       Hemoglobin: 10.6 g/dL (05-16 @ 00:03)  Hemoglobin: 11.0 g/dL (05-15 @ 17:00)      05-17    137  |  96  |  71<H>  ----------------------------<  83  4.6   |  23  |  26.32<H>    Ca    7.9<L>      17 May 2019 07:12  Phos  6.6     05-16    TPro  6.5  /  Alb  3.4  /  TBili  0.4  /  DBili  x   /  AST  19  /  ALT  16  /  AlkPhos  141<H>  05-15    Creatinine Trend: 26.32<--, 26.54<--, 25.86<--    COAGS:           T(C): 36.7 (05-17-19 @ 06:27), Max: 37.1 (05-16-19 @ 14:01)  HR: 85 (05-17-19 @ 06:27) (79 - 97)  BP: 166/97 (05-17-19 @ 06:27) (157/94 - 170/101)  RR: 18 (05-17-19 @ 06:27) (18 - 19)  SpO2: 95% (05-17-19 @ 06:27) (94% - 99%)  Wt(kg): --    I&O's Summary    16 May 2019 07:01  -  17 May 2019 07:00  --------------------------------------------------------  IN: 390 mL / OUT: 0 mL / NET: 390 mL      [x ] Echocardiogram: < from: Transthoracic Echocardiogram (01.08.18 @ 09:10) >  Conclusions:  1. Normal mitral valve with redundant chordae. Minimal  mitral regurgitation.  2. Normal trileaflet aortic valve. No aortic valve  regurgitation seen.  3. Moderate concentric left ventricular hypertrophy.  4. Normal left ventricular systolic function. No segmental  wallmotion abnormalities.  5. Normal right ventricular size and function.  *** No previous Echo exam.  ------------------------------------------------------------------------  Confirmed on  1/8/2018 - 11:42:57 by Corby Yepez M.D.  ------------------------------------------------------------------------    < end of copied text >    [ ]  Catheterization:  [x ] Stress Test:  	< from: Stress Echocardiogram-Exercise (05.05.16 @ 09:49) >  Conclusions:  1. Exercise capacity is 9 METS, which is poor for age and  gender.  2. Normal hemodynamic response.  3. Normal electrocardiographic response.  4. Normal augmentation in left ventricular systolic  function.  5. Heart rate failed to increase appropriately,  likely due  to medications.  6. Appropriate blood pressure response.  7. Slightly submaximal stress test, asonly 79% of the  patient's maximum predicted heart rate was achieved.  Normal stress echocardiogram with no evidence of inducible  ischemia, to 79% of the maximum predicted heart rate.  ------------------------------------------------------------------------  Confirmed on  5/5/2016 - 11:41:29 by Jose Rod M.D.  ------------------------------------------------------------------------    < end of copied text >    Appearance: Normal	  HEENT:   Normal oral mucosa, PERRL, EOMI	  Lymphatic: No lymphadenopathy  Cardiovascular: Normal S1 S2, No JVD, No murmurs, No edema  Respiratory: Lungs clear to auscultation	  Psychiatry: A & O x 3, Mood & affect appropriate  Gastrointestinal:  Soft, Non-tender, + BS	  Skin: No rashes, No ecchymoses, No cyanosis	  Neurologic: Non-focal  Extremities: Normal range of motion, No clubbing, cyanosis or edema  Vascular: Peripheral pulses palpable 2+ bilaterally    TELEMETRY: 	  none   ECG:  	nsr 72 , NAD, no acute ischemia   RADIOLOGY: < from: Xray Chest 2 Views PA/Lat (11.07.18 @ 13:17) >    IMPRESSION:   Clear lungs.    < end of copied text >      ASSESSMENT/PLAN: 	This patient is a 32yo M with PMH of ESRD 2/2 FSGS s/p renal transplant in 2004, s/p failure, HD x2 months, now on PD who presents to the ED with complaint of abdominal pain, admitted for evaluation of suspected sbp.       ABX per medicine   follow up on culture   PD per renal   steroid taper   cont norvasc , labetalol, increase Labetalol as VS can tolerate    GI / DVT prophylaxis.   keep K>4, mag >2.0   Pain management   D/W Dr Multani
Subjective: pt seen and examined, no complaints, ROS - .     amLODIPine   Tablet 10 milliGRAM(s) Oral daily  gentamicin 0.1% Ointment 1 Application(s) Topical daily  heparin  Injectable 5000 Unit(s) SubCutaneous every 8 hours  labetalol 200 milliGRAM(s) Oral every 12 hours  ondansetron Injectable 4 milliGRAM(s) IV Push once  oxyCODONE    IR 5 milliGRAM(s) Oral every 6 hours PRN  predniSONE   Tablet 5 milliGRAM(s) Oral daily  sevelamer carbonate 1600 milliGRAM(s) Oral three times a day with meals                            10.2   6.99  )-----------( 168      ( 18 May 2019 09:49 )             31.2       Hemoglobin: 10.2 g/dL (05-18 @ 09:49)  Hemoglobin: 9.2 g/dL (05-17 @ 08:43)  Hemoglobin: 10.6 g/dL (05-16 @ 00:03)  Hemoglobin: 11.0 g/dL (05-15 @ 17:00)      05-19    137  |  94<L>  |  60<H>  ----------------------------<  82  4.7   |  23  |  25.01<H>    Ca    8.6      19 May 2019 06:33      Creatinine Trend: 25.01<--, 25.35<--, 26.32<--, 26.54<--, 25.86<--    COAGS:           T(C): 36.9 (05-19-19 @ 05:30), Max: 37.1 (05-18-19 @ 20:40)  HR: 91 (05-19-19 @ 05:30) (84 - 93)  BP: 157/89 (05-19-19 @ 05:30) (143/87 - 162/91)  RR: 16 (05-19-19 @ 05:30) (16 - 18)  SpO2: 95% (05-19-19 @ 05:30) (94% - 100%)  Wt(kg): --    I&O's Summary    18 May 2019 07:01  -  19 May 2019 07:00  --------------------------------------------------------  IN: 2040 mL / OUT: 1800 mL / NET: 240 mL          [x ] Echocardiogram: < from: Transthoracic Echocardiogram (01.08.18 @ 09:10) >  Conclusions:  1. Normal mitral valve with redundant chordae. Minimal  mitral regurgitation.  2. Normal trileaflet aortic valve. No aortic valve  regurgitation seen.  3. Moderate concentric left ventricular hypertrophy.  4. Normal left ventricular systolic function. No segmental  wallmotion abnormalities.  5. Normal right ventricular size and function.  *** No previous Echo exam.  ------------------------------------------------------------------------  Confirmed on  1/8/2018 - 11:42:57 by Corby Yepez M.D.  ------------------------------------------------------------------------    < end of copied text >    [ ]  Catheterization:  [x ] Stress Test:  	< from: Stress Echocardiogram-Exercise (05.05.16 @ 09:49) >  Conclusions:  1. Exercise capacity is 9 METS, which is poor for age and  gender.  2. Normal hemodynamic response.  3. Normal electrocardiographic response.  4. Normal augmentation in left ventricular systolic  function.  5. Heart rate failed to increase appropriately,  likely due  to medications.  6. Appropriate blood pressure response.  7. Slightly submaximal stress test, asonly 79% of the  patient's maximum predicted heart rate was achieved.  Normal stress echocardiogram with no evidence of inducible  ischemia, to 79% of the maximum predicted heart rate.  ------------------------------------------------------------------------  Confirmed on  5/5/2016 - 11:41:29 by Jose Rod M.D.  ------------------------------------------------------------------------    < end of copied text >    Appearance: Normal	  HEENT:   Normal oral mucosa, PERRL, EOMI	  Lymphatic: No lymphadenopathy  Cardiovascular: Normal S1 S2, No JVD, No murmurs, No edema  Respiratory: Lungs clear to auscultation	  Psychiatry: A & O x 3, Mood & affect appropriate  Gastrointestinal:  Soft, Non-tender, + BS	  Skin: No rashes, No ecchymoses, No cyanosis	  Neurologic: Non-focal  Extremities: Normal range of motion, No clubbing, cyanosis or edema  Vascular: Peripheral pulses palpable 2+ bilaterally    TELEMETRY: 	  none   ECG:  	nsr 72 , NAD, no acute ischemia   RADIOLOGY: < from: Xray Chest 2 Views PA/Lat (11.07.18 @ 13:17) >    IMPRESSION:   Clear lungs.    < end of copied text >      ASSESSMENT/PLAN: 	This patient is a 32yo M with PMH of ESRD 2/2 FSGS s/p renal transplant in 2004, s/p failure, HD x2 months, now on PD who presents to the ED with complaint of abdominal pain, admitted for evaluation of suspected sbp.       ABX per medicine   follow up on culture   PD per renal   steroid taper   cont norvasc , labetalol, - BP stable    GI / DVT prophylaxis., keep K>4, mag >2.0   D/W Dr Aldana
[facility] - [place] / Shanel S /Angus/ DEXTER Boyle/ DEXTER Kunz/ Basilio Lucio / COLUMBA Njhillu  ---------------------------------------------------------------------------------------------------------------    Patient seen and examined bedside    Subjective and Objective: No overnight events, denied sob/V/D. No complaints today. abd pain resolved  PD eefluent clear, draining well    Allergies: No Known Allergies      Hospital Medications:   MEDICATIONS  (STANDING):  amLODIPine   Tablet 10 milliGRAM(s) Oral daily  gentamicin 0.1% Ointment 1 Application(s) Topical daily  heparin  Injectable 5000 Unit(s) SubCutaneous every 8 hours  labetalol 200 milliGRAM(s) Oral every 12 hours  ondansetron Injectable 4 milliGRAM(s) IV Push once  predniSONE   Tablet 5 milliGRAM(s) Oral daily  sevelamer carbonate 1600 milliGRAM(s) Oral three times a day with meals      VITALS:  T(F): 98.3 (05-19-19 @ 08:39), Max: 98.7 (05-18-19 @ 20:40)  HR: 86 (05-19-19 @ 08:39)  BP: 137/80 (05-19-19 @ 08:39)  RR: 16 (05-19-19 @ 08:39)  SpO2: 93% (05-19-19 @ 08:39)  Wt(kg): --    05-18 @ 07:01  -  05-19 @ 07:00  --------------------------------------------------------  IN: 2040 mL / OUT: 1800 mL / NET: 240 mL    05-19 @ 07:01  -  05-19 @ 13:04  --------------------------------------------------------  IN: 1800 mL / OUT: 2200 mL / NET: -400 mL      PHYSICAL EXAM:  Constitutional: NAD  HEENT: anicteric sclera, oropharynx clear  Neck: No JVD  Respiratory: CTAB, no wheezes, rales or rhonchi  Cardiovascular: S1, S2, RRR  Gastrointestinal: BS+, soft, NT, +Tenchoff catheter  Extremities: No cyanosis or clubbing. No peripheral edema  Neurological: A/O x 3, no focal deficits  Psychiatric: Normal mood, normal affect  : No CVA tenderness. No fuller.   Skin: No rashes    LABS:  05-19    137  |  94<L>  |  60<H>  ----------------------------<  82  4.7   |  23  |  25.01<H>    Ca    8.6      19 May 2019 06:33      Creatinine Trend: 25.01 <--, 25.35 <--, 26.32 <--, 26.54 <--, 25.86 <--                        9.6    6.31  )-----------( 161      ( 19 May 2019 09:05 )             27.9     Urine Studies:        RADIOLOGY & ADDITIONAL STUDIES:
GABE JOSEPH 33y [id number]    Patient is a 33y old  Male who presents with a chief complaint of abdominal pain (20 May 2019 11:57)      Follow Up/CC:  ID following for abd pain    Interval History/ROS: no fever, feels ok, no pain, 1 loose BM today    Allergies    No Known Allergies    Intolerances        ANTIMICROBIALS:      MEDICATIONS  (STANDING):  amLODIPine   Tablet 10 milliGRAM(s) Oral daily  gentamicin 0.1% Ointment 1 Application(s) Topical daily  heparin  Injectable 5000 Unit(s) SubCutaneous every 8 hours  labetalol 200 milliGRAM(s) Oral every 12 hours  ondansetron Injectable 4 milliGRAM(s) IV Push once  predniSONE   Tablet 5 milliGRAM(s) Oral daily  sevelamer carbonate 1600 milliGRAM(s) Oral three times a day with meals    MEDICATIONS  (PRN):  oxyCODONE    IR 5 milliGRAM(s) Oral every 6 hours PRN Severe Pain (7 - 10)        Vital Signs Last 24 Hrs  T(C): 36.7 (20 May 2019 12:48), Max: 37.1 (19 May 2019 16:51)  T(F): 98 (20 May 2019 12:48), Max: 98.7 (19 May 2019 16:51)  HR: 85 (20 May 2019 12:48) (80 - 89)  BP: 138/91 (20 May 2019 12:48) (128/78 - 153/88)  BP(mean): --  RR: 18 (20 May 2019 12:48) (18 - 18)  SpO2: 96% (20 May 2019 12:48) (94% - 97%)    CBC Full  -  ( 20 May 2019 06:34 )  WBC Count : 7.0 K/uL  RBC Count : 3.37 M/uL  Hemoglobin : 10.5 g/dL  Hematocrit : 30.0 %  Platelet Count - Automated : 163 K/uL  Mean Cell Volume : 88.8 fl  Mean Cell Hemoglobin : 31.2 pg  Mean Cell Hemoglobin Concentration : 35.1 gm/dL  Auto Neutrophil # : x  Auto Lymphocyte # : x  Auto Monocyte # : x  Auto Eosinophil # : x  Auto Basophil # : x  Auto Neutrophil % : x  Auto Lymphocyte % : x  Auto Monocyte % : x  Auto Eosinophil % : x  Auto Basophil % : x    05-20    136  |  93<L>  |  56<H>  ----------------------------<  84  4.6   |  24  |  24.51<H>    Ca    8.8      20 May 2019 06:34            MICROBIOLOGY:  .Peritoneal Dialysis Fluid Specimen Source:  Dialysis (P.D.) Fluid,  05-16-19   No growth to date.  --  --      .Blood  05-15-19   No growth to date.  --  --        RADIOLOGY    < from: CT Abdomen and Pelvis w/ Oral Cont and w/ IV Cont (05.17.19 @ 19:03) >  Peritoneal dialysis catheter with tip in the right hemiabdomen. Moderate   volume ascites and small pneumoperitoneum.    Atrophic transplant kidney in the right lower quadrant.    Left seminal vesicle cyst.    < end of copied text >

## 2019-05-21 NOTE — PROGRESS NOTE ADULT - PROVIDER SPECIALTY LIST ADULT
Cardiology
Infectious Disease
Infectious Disease
Internal Medicine
Nephrology
Infectious Disease

## 2019-06-11 ENCOUNTER — APPOINTMENT (OUTPATIENT)
Dept: CARDIOLOGY | Facility: CLINIC | Age: 34
End: 2019-06-11

## 2019-08-15 NOTE — PATIENT PROFILE ADULT - RESOURCE/ENVIRONMENTAL CONCERNS
Stephanie Robles wants you to please send a revised order on Abiola Mccurdy  for dexa on 8/26/19. Dx code M85.80 does not comply.  
none

## 2019-10-01 ENCOUNTER — APPOINTMENT (OUTPATIENT)
Dept: TRANSPLANT | Facility: CLINIC | Age: 34
End: 2019-10-01

## 2019-10-01 ENCOUNTER — APPOINTMENT (OUTPATIENT)
Dept: NEPHROLOGY | Facility: CLINIC | Age: 34
End: 2019-10-01

## 2019-10-12 ENCOUNTER — INPATIENT (INPATIENT)
Facility: HOSPITAL | Age: 34
LOS: 6 days | Discharge: HOME | DRG: 682 | End: 2019-10-19
Attending: INTERNAL MEDICINE | Admitting: INTERNAL MEDICINE
Payer: MEDICARE

## 2019-10-12 VITALS
RESPIRATION RATE: 20 BRPM | OXYGEN SATURATION: 98 % | DIASTOLIC BLOOD PRESSURE: 114 MMHG | WEIGHT: 160.94 LBS | HEIGHT: 72 IN | SYSTOLIC BLOOD PRESSURE: 174 MMHG | HEART RATE: 79 BPM | TEMPERATURE: 99 F

## 2019-10-12 PROCEDURE — 99285 EMERGENCY DEPT VISIT HI MDM: CPT | Mod: GC

## 2019-10-12 PROCEDURE — 93010 ELECTROCARDIOGRAM REPORT: CPT

## 2019-10-12 NOTE — ED ADULT NURSE NOTE - NSIMPLEMENTINTERV_GEN_ALL_ED
Implemented All Universal Safety Interventions:  [place] to call system. Call bell, personal items and telephone within reach. Instruct patient to call for assistance. Room bathroom lighting operational. Non-slip footwear when patient is off stretcher. Physically safe environment: no spills, clutter or unnecessary equipment. Stretcher in lowest position, wheels locked, appropriate side rails in place.

## 2019-10-12 NOTE — ED ADULT NURSE NOTE - OBJECTIVE STATEMENT
35 YO male PMHx HTN, severe CKD currently undergoing daily peritoneal dialysis c/o HTN. Patient reports that he normally takes BP at home daily and noticed that his was high, 170's/110's and also experiencing a HA. Patient also endorsing subconjunctival hemorrhage, patient noticed this morning. Patient is A&OX3, PERRL, equal strength and sensation b/l. denies chest pain, SOB, N/V/D, abdominal pain, fever/chills, urinary symptoms, hematuria, weakness, dizziness, numbness, tinging. Peripheral pulses present b/l. Skin warm, dry and pink. Pt placed in position of comfort. Pt educated on call bell system and provided call bell. Bed in lowest position, wheels locked, appropriate side rails raised. Pt denies needs at this time.

## 2019-10-12 NOTE — ED ADULT TRIAGE NOTE - CHIEF COMPLAINT QUOTE
"my blood pressure is high 173/120" headache. On HD. "my blood pressure is high 173/120" headache. On peritoneal dialysis.

## 2019-10-13 DIAGNOSIS — Z29.9 ENCOUNTER FOR PROPHYLACTIC MEASURES, UNSPECIFIED: ICD-10-CM

## 2019-10-13 DIAGNOSIS — I16.0 HYPERTENSIVE URGENCY: ICD-10-CM

## 2019-10-13 DIAGNOSIS — N18.6 END STAGE RENAL DISEASE: ICD-10-CM

## 2019-10-13 LAB
ALBUMIN SERPL ELPH-MCNC: 3.1 G/DL — LOW (ref 3.3–5)
ALP SERPL-CCNC: 214 U/L — HIGH (ref 40–120)
ALT FLD-CCNC: 16 U/L — SIGNIFICANT CHANGE UP (ref 10–45)
ANION GAP SERPL CALC-SCNC: 13 MMOL/L — SIGNIFICANT CHANGE UP (ref 5–17)
AST SERPL-CCNC: 14 U/L — SIGNIFICANT CHANGE UP (ref 10–40)
BASOPHILS # BLD AUTO: 0.02 K/UL — SIGNIFICANT CHANGE UP (ref 0–0.2)
BASOPHILS NFR BLD AUTO: 0.4 % — SIGNIFICANT CHANGE UP (ref 0–2)
BILIRUB SERPL-MCNC: 0.4 MG/DL — SIGNIFICANT CHANGE UP (ref 0.2–1.2)
BUN SERPL-MCNC: 69 MG/DL — HIGH (ref 7–23)
CALCIUM SERPL-MCNC: 7.8 MG/DL — LOW (ref 8.4–10.5)
CHLORIDE SERPL-SCNC: 90 MMOL/L — LOW (ref 96–108)
CK MB BLD-MCNC: 0.6 % — SIGNIFICANT CHANGE UP (ref 0–3.5)
CK MB BLD-MCNC: 0.7 % — SIGNIFICANT CHANGE UP (ref 0–3.5)
CK MB CFR SERPL CALC: 2.3 NG/ML — SIGNIFICANT CHANGE UP (ref 0–6.7)
CK MB CFR SERPL CALC: 2.5 NG/ML — SIGNIFICANT CHANGE UP (ref 0–6.7)
CK SERPL-CCNC: 367 U/L — HIGH (ref 30–200)
CK SERPL-CCNC: 393 U/L — HIGH (ref 30–200)
CO2 SERPL-SCNC: 25 MMOL/L — SIGNIFICANT CHANGE UP (ref 22–31)
CREAT SERPL-MCNC: 22.22 MG/DL — HIGH (ref 0.5–1.3)
EOSINOPHIL # BLD AUTO: 0.17 K/UL — SIGNIFICANT CHANGE UP (ref 0–0.5)
EOSINOPHIL NFR BLD AUTO: 3.2 % — SIGNIFICANT CHANGE UP (ref 0–6)
GLUCOSE SERPL-MCNC: 88 MG/DL — SIGNIFICANT CHANGE UP (ref 70–99)
HCT VFR BLD CALC: 25 % — LOW (ref 39–50)
HGB BLD-MCNC: 8.9 G/DL — LOW (ref 13–17)
IMM GRANULOCYTES NFR BLD AUTO: 0.2 % — SIGNIFICANT CHANGE UP (ref 0–1.5)
LYMPHOCYTES # BLD AUTO: 1.58 K/UL — SIGNIFICANT CHANGE UP (ref 1–3.3)
LYMPHOCYTES # BLD AUTO: 29.7 % — SIGNIFICANT CHANGE UP (ref 13–44)
MAGNESIUM SERPL-MCNC: 1.7 MG/DL — SIGNIFICANT CHANGE UP (ref 1.6–2.6)
MCHC RBC-ENTMCNC: 28.6 PG — SIGNIFICANT CHANGE UP (ref 27–34)
MCHC RBC-ENTMCNC: 35.6 GM/DL — SIGNIFICANT CHANGE UP (ref 32–36)
MCV RBC AUTO: 80.4 FL — SIGNIFICANT CHANGE UP (ref 80–100)
MONOCYTES # BLD AUTO: 0.51 K/UL — SIGNIFICANT CHANGE UP (ref 0–0.9)
MONOCYTES NFR BLD AUTO: 9.6 % — SIGNIFICANT CHANGE UP (ref 2–14)
NEUTROPHILS # BLD AUTO: 3.03 K/UL — SIGNIFICANT CHANGE UP (ref 1.8–7.4)
NEUTROPHILS NFR BLD AUTO: 56.9 % — SIGNIFICANT CHANGE UP (ref 43–77)
NRBC # BLD: 0 /100 WBCS — SIGNIFICANT CHANGE UP (ref 0–0)
PLATELET # BLD AUTO: 150 K/UL — SIGNIFICANT CHANGE UP (ref 150–400)
POTASSIUM SERPL-MCNC: 5.5 MMOL/L — HIGH (ref 3.5–5.3)
POTASSIUM SERPL-SCNC: 5.5 MMOL/L — HIGH (ref 3.5–5.3)
PROT SERPL-MCNC: 5.9 G/DL — LOW (ref 6–8.3)
RBC # BLD: 3.11 M/UL — LOW (ref 4.2–5.8)
RBC # FLD: 15.4 % — HIGH (ref 10.3–14.5)
SODIUM SERPL-SCNC: 128 MMOL/L — LOW (ref 135–145)
TROPONIN T, HIGH SENSITIVITY RESULT: 107 NG/L — HIGH (ref 0–51)
TROPONIN T, HIGH SENSITIVITY RESULT: 113 NG/L — HIGH (ref 0–51)
WBC # BLD: 5.32 K/UL — SIGNIFICANT CHANGE UP (ref 3.8–10.5)
WBC # FLD AUTO: 5.32 K/UL — SIGNIFICANT CHANGE UP (ref 3.8–10.5)

## 2019-10-13 PROCEDURE — 93010 ELECTROCARDIOGRAM REPORT: CPT

## 2019-10-13 PROCEDURE — 70450 CT HEAD/BRAIN W/O DYE: CPT | Mod: 26

## 2019-10-13 PROCEDURE — 99222 1ST HOSP IP/OBS MODERATE 55: CPT | Mod: AI

## 2019-10-13 RX ORDER — SEVELAMER CARBONATE 2400 MG/1
1600 POWDER, FOR SUSPENSION ORAL THREE TIMES A DAY
Refills: 0 | Status: DISCONTINUED | OUTPATIENT
Start: 2019-10-13 | End: 2019-10-19

## 2019-10-13 RX ORDER — MAGNESIUM SULFATE 500 MG/ML
1 VIAL (ML) INJECTION ONCE
Refills: 0 | Status: COMPLETED | OUTPATIENT
Start: 2019-10-13 | End: 2019-10-13

## 2019-10-13 RX ORDER — METOPROLOL TARTRATE 50 MG
50 TABLET ORAL ONCE
Refills: 0 | Status: COMPLETED | OUTPATIENT
Start: 2019-10-13 | End: 2019-10-13

## 2019-10-13 RX ORDER — AMLODIPINE BESYLATE 2.5 MG/1
10 TABLET ORAL DAILY
Refills: 0 | Status: DISCONTINUED | OUTPATIENT
Start: 2019-10-13 | End: 2019-10-19

## 2019-10-13 RX ORDER — METOPROLOL TARTRATE 50 MG
50 TABLET ORAL
Refills: 0 | Status: DISCONTINUED | OUTPATIENT
Start: 2019-10-13 | End: 2019-10-19

## 2019-10-13 RX ORDER — ERYTHROPOIETIN 10000 [IU]/ML
10000 INJECTION, SOLUTION INTRAVENOUS; SUBCUTANEOUS ONCE
Refills: 0 | Status: COMPLETED | OUTPATIENT
Start: 2019-10-14 | End: 2019-10-14

## 2019-10-13 RX ORDER — LISINOPRIL 2.5 MG/1
40 TABLET ORAL DAILY
Refills: 0 | Status: DISCONTINUED | OUTPATIENT
Start: 2019-10-13 | End: 2019-10-19

## 2019-10-13 RX ORDER — CALCITRIOL 0.5 UG/1
1 CAPSULE ORAL DAILY
Refills: 0 | Status: DISCONTINUED | OUTPATIENT
Start: 2019-10-13 | End: 2019-10-19

## 2019-10-13 RX ORDER — HYDRALAZINE HCL 50 MG
25 TABLET ORAL EVERY 8 HOURS
Refills: 0 | Status: DISCONTINUED | OUTPATIENT
Start: 2019-10-13 | End: 2019-10-15

## 2019-10-13 RX ORDER — ACETAMINOPHEN 500 MG
975 TABLET ORAL ONCE
Refills: 0 | Status: COMPLETED | OUTPATIENT
Start: 2019-10-13 | End: 2019-10-13

## 2019-10-13 RX ADMIN — Medication 50 MILLIGRAM(S): at 12:13

## 2019-10-13 RX ADMIN — Medication 975 MILLIGRAM(S): at 21:59

## 2019-10-13 RX ADMIN — Medication 50 MILLIGRAM(S): at 18:22

## 2019-10-13 RX ADMIN — Medication 25 MILLIGRAM(S): at 21:29

## 2019-10-13 RX ADMIN — LISINOPRIL 40 MILLIGRAM(S): 2.5 TABLET ORAL at 13:23

## 2019-10-13 RX ADMIN — Medication 975 MILLIGRAM(S): at 22:29

## 2019-10-13 RX ADMIN — Medication 5 MILLIGRAM(S): at 12:13

## 2019-10-13 RX ADMIN — SEVELAMER CARBONATE 1600 MILLIGRAM(S): 2400 POWDER, FOR SUSPENSION ORAL at 18:22

## 2019-10-13 RX ADMIN — SEVELAMER CARBONATE 1600 MILLIGRAM(S): 2400 POWDER, FOR SUSPENSION ORAL at 12:16

## 2019-10-13 RX ADMIN — Medication 50 MILLIGRAM(S): at 21:29

## 2019-10-13 RX ADMIN — AMLODIPINE BESYLATE 10 MILLIGRAM(S): 2.5 TABLET ORAL at 12:13

## 2019-10-13 RX ADMIN — CALCITRIOL 1 MICROGRAM(S): 0.5 CAPSULE ORAL at 13:23

## 2019-10-13 RX ADMIN — Medication 100 GRAM(S): at 18:22

## 2019-10-13 NOTE — H&P ADULT - PROBLEM SELECTOR PLAN 2
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

## 2019-10-13 NOTE — CONSULT NOTE ADULT - SUBJECTIVE AND OBJECTIVE BOX
[facility] - Martinez / Shanel S /Angus/ S Boyle/ S Ze/ Basilio Zepedafeez / COLUMBA Njeru  ---------------------------------------------------------------------------------------------------------------  Patient seen and examined bedside      PAST MEDICAL & SURGICAL HISTORY:  Dialysis complication  CKD (chronic kidney disease), stage 4 (severe)  Hypertension  Glomerulonephritis  S/P kidney transplant: Right kidney in 2004      Allergies: No Known Allergies    Home Medications Reviewed  Hospital Medications:   MEDICATIONS  (STANDING):  amLODIPine   Tablet 10 milliGRAM(s) Oral daily  calcitriol   Capsule 1 MICROGram(s) Oral daily  lisinopril 40 milliGRAM(s) Oral daily  metoprolol tartrate 50 milliGRAM(s) Oral two times a day  predniSONE   Tablet 5 milliGRAM(s) Oral daily  sevelamer carbonate 1600 milliGRAM(s) Oral three times a day    SOCIAL HISTORY:  Denies ETOh,Smoking, illicit drug use  FAMILY HISTORY:  Family history of glomerulonephritis (Uncle)      REVIEW OF SYSTEMS:  CONSTITUTIONAL: No weakness, fevers or chills  EYES/ENT: No visual changes;  No vertigo or throat pain   NECK: No pain or stiffness  RESPIRATORY: No cough, wheezing, hemoptysis; No shortness of breath  CARDIOVASCULAR: No chest pain or palpitations.  GASTROINTESTINAL: No abdominal or epigastric pain. No nausea, vomiting, or hematemesis; No diarrhea or constipation. No melena or hematochezia.  GENITOURINARY: hasn't urinated in 4 months  NEUROLOGICAL: No numbness or weakness  SKIN: No itching, burning, rashes, or lesions   VASCULAR: No bilateral lower extremity edema.   All other review of systems is negative unless indicated above.    VITALS:  T(F): 98.5 (10-13-19 @ 13:21), Max: 99.2 (10-12-19 @ 23:00)  HR: 78 (10-13-19 @ 13:21)  BP: 164/104 (10-13-19 @ 13:21)  RR: 18 (10-13-19 @ 13:21)  SpO2: 96% (10-13-19 @ 13:21)  Wt(kg): --    Height (cm): 182.88 (10-12 @ 23:00)  Weight (kg): 73 (10-12 @ 23:00)  BMI (kg/m2): 21.8 (10-12 @ 23:00)  BSA (m2): 1.94 (10-12 @ 23:00)    PHYSICAL EXAM:  Constitutional: NAD  HEENT: anicteric sclera, oropharynx clear, MMM  Neck: No JVD  Respiratory: CTAB, no wheezes, rales or rhonchi  Cardiovascular: S1, S2, RRR  Gastrointestinal: BS+, soft, NT, +tenchoff catheter  Extremities: No cyanosis or clubbing. No peripheral edema  Neurological: A/O x 3, no focal deficits  Psychiatric: Normal mood, normal affect  : No fuller.   Skin: No rashes  Vascular Access: COLLIN AVF+thrill, +aneurysms    LABS:  10-13    128<L>  |  90<L>  |  69<H>  ----------------------------<  88  5.5<H>   |  25  |  22.22<H>    Ca    7.8<L>      13 Oct 2019 01:22    TPro  5.9<L>  /  Alb  3.1<L>  /  TBili  0.4  /  DBili      /  AST  14  /  ALT  16  /  AlkPhos  214<H>  10-13    Creatinine Trend: 22.22 <--                        8.9    5.32  )-----------( 150      ( 13 Oct 2019 01:22 )             25.0     Urine Studies:        RADIOLOGY & ADDITIONAL STUDIES: [facility] - [place] / Shanel S /Angus/ S [place]/ S Kunz/ Basilio Zepedafeez / COLUMBA Nju  ---------------------------------------------------------------------------------------------------------------  Patient seen and examined bedside    35 y/o m w/ PMH of ESRD 2/2 FSGS s/p renal transplant in 2004, s/p failure, was on HD, now on peritoneal dialysis, HTN who was admitted for uncontrolled HTN.  The patient has a history of difficult to control HTN and lisinopril was increased 2weeks ago by his nephrologist for better BP control.  The patient developed HA over the last few days, left eye conjuctival hemorrhage, sbp 170's and was advised to come to the ED by his PCP. Pt been on cycler at night uses 2.5% dextose bags total 10L, last fill uses 7.5% dextrose, doesn't do any day time exchange. pt last PD was 10/11 night, missed last night as came to ER. states net uf 700ml-1.2L. denied abd pain/V/fevers. Renal consulted for ESRD Mx, for PD. Pt well known to me. Over the last day the patient reports 5/10 constant chest pain.  CP with no radiations and is not exertional.  No alleviating or aggravating factors.  No associated symptoms such as dyspnea, diaphoresis, palpitations. Pt seen by cardiologist, concern for aortic dissection, rec CTA.     PAST MEDICAL & SURGICAL HISTORY:  Dialysis complication  CKD (chronic kidney disease), stage 4 (severe)  Hypertension  Glomerulonephritis  S/P kidney transplant: Right kidney in 2004      Allergies: No Known Allergies    Home Medications Reviewed  Hospital Medications:   MEDICATIONS  (STANDING):  amLODIPine   Tablet 10 milliGRAM(s) Oral daily  calcitriol   Capsule 1 MICROGram(s) Oral daily  lisinopril 40 milliGRAM(s) Oral daily  metoprolol tartrate 50 milliGRAM(s) Oral two times a day  predniSONE   Tablet 5 milliGRAM(s) Oral daily  sevelamer carbonate 1600 milliGRAM(s) Oral three times a day    SOCIAL HISTORY:  Denies ETOh, Smoking, illicit drug use  FAMILY HISTORY:  Family history of glomerulonephritis (Uncle)      REVIEW OF SYSTEMS:  CONSTITUTIONAL: No weakness, fevers or chills  EYES/ENT: No visual changes;  No vertigo or throat pain. "red shot" left eye  NECK: No pain or stiffness  RESPIRATORY: No cough, wheezing, hemoptysis; No shortness of breath  CARDIOVASCULAR: +chest pain, no palpitations.  GASTROINTESTINAL: No abdominal or epigastric pain. No nausea, vomiting, or hematemesis; No diarrhea or constipation. No melena or hematochezia.  GENITOURINARY: hasn't urinated in 4 months  NEUROLOGICAL: No numbness or weakness  SKIN: No itching, burning, rashes, or lesions   VASCULAR: No bilateral lower extremity edema.   All other review of systems is negative unless indicated above.    VITALS:  T(F): 98.5 (10-13-19 @ 13:21), Max: 99.2 (10-12-19 @ 23:00)  HR: 78 (10-13-19 @ 13:21)  BP: 164/104 (10-13-19 @ 13:21)  RR: 18 (10-13-19 @ 13:21)  SpO2: 96% (10-13-19 @ 13:21)  Wt(kg): --    Height (cm): 182.88 (10-12 @ 23:00)  Weight (kg): 73 (10-12 @ 23:00)  BMI (kg/m2): 21.8 (10-12 @ 23:00)  BSA (m2): 1.94 (10-12 @ 23:00)    PHYSICAL EXAM:  Constitutional: NAD  HEENT: anicteric sclera, oropharynx clear, MMM. left conjuctival hemorrhage  Neck: No JVD  Respiratory: CTAB, no wheezes, rales or rhonchi  Cardiovascular: S1, S2, RRR  Gastrointestinal: BS+, soft, NT, +tenchoff catheter, +fluid thrill  Extremities: No cyanosis or clubbing. No peripheral edema  Neurological: A/O x 3, no focal deficits  Psychiatric: Normal mood, normal affect  : No fuller.   Skin: No rashes  Vascular Access: COLLIN AVF+thrill, +aneurysms    LABS:  10-13    128<L>  |  90<L>  |  69<H>  ----------------------------<  88  5.5<H>   |  25  |  22.22<H>    Ca    7.8<L>      13 Oct 2019 01:22    TPro  5.9<L>  /  Alb  3.1<L>  /  TBili  0.4  /  DBili      /  AST  14  /  ALT  16  /  AlkPhos  214<H>  10-13    Creatinine Trend: 22.22 <--                        8.9    5.32  )-----------( 150      ( 13 Oct 2019 01:22 )             25.0     Urine Studies:        RADIOLOGY & ADDITIONAL STUDIES:  < from: CT Head No Cont (10.13.19 @ 01:50) >  Impression:     Normal CT of the head.    < end of copied text >

## 2019-10-13 NOTE — CONSULT NOTE ADULT - SUBJECTIVE AND OBJECTIVE BOX
Requesting Physician : Dr. Navarro     Reason for Consultation: Chest pain     HISTORY OF PRESENT ILLNESS: HPI:  35 y/o m w/ PMH of ESRD 2/2 FSGS s/p renal transplant in 2004, s/p failure, on peritoneal dialysis, HTN who was admitted with elevated BP.  The patient has a history of difficult to control HTN and was recently seen by his nephrologist at which time lisinopril was increased for better BP control.  The patient developed HA over the last few days and was advised to come to the ED by his primary nephrologist for further workup  Over the last day the patient reports 5/10 constant chest pain.  CP with no radiations and is not exertional.  No alleviating or aggravating factors.  No associated symptoms such as dyspnea, diaphoresis, palpitations.  Cardiology consulted to further evaluate.  Last cardiac workup includes TTE in Sept of 2019 which showed normal LV function and mild concentric LVH.  Last ischemic evaluation was in May of 2018 with normal stress echo.       In the ED, CT head was performed and showed no acute pathology. (13 Oct 2019 11:36)      PAST MEDICAL & SURGICAL HISTORY:  Dialysis complication  CKD (chronic kidney disease), stage 4 (severe)  Hypertension  Glomerulonephritis  S/P kidney transplant: Right kidney in 2004          MEDICATIONS:  MEDICATIONS  (STANDING):  amLODIPine   Tablet 10 milliGRAM(s) Oral daily  calcitriol   Capsule 1 MICROGram(s) Oral daily  lisinopril 40 milliGRAM(s) Oral daily  metoprolol tartrate 50 milliGRAM(s) Oral two times a day  predniSONE   Tablet 5 milliGRAM(s) Oral daily  sevelamer carbonate 1600 milliGRAM(s) Oral three times a day      Allergies    No Known Allergies    Intolerances        FAMILY HISTORY:  Family history of glomerulonephritis (Uncle)    Non-contributary for premature coronary disease or sudden cardiac death    SOCIAL HISTORY:    [x ] Non-smoker  [ ] Smoker  [ ] Alcohol      REVIEW OF SYSTEMS:  [ x]chest pain  [  ]shortness of breath  [  ]palpitations  [  ]syncope  [ ]near syncope [ ]upper extremity weakness   [ ] lower extremity weakness  [  ]diplopia  [  ]altered mental status   [  ]fevers  [ ]chills [ ]nausea  [ ]vomitting  [  ]dysphagia    [ ]abdominal pain  [ ]melena  [ ]BRBPR    [  ]epistaxis  [  ]rash    [ ]lower extremity edema        [x ] All others negative	  [ ] Unable to obtain    PHYSICAL EXAM:  T(C): 36.9 (10-13-19 @ 13:21), Max: 37.3 (10-12-19 @ 23:00)  HR: 78 (10-13-19 @ 13:21) (75 - 83)  BP: 164/104 (10-13-19 @ 13:21) (150/83 - 174/114)  RR: 18 (10-13-19 @ 13:21) (17 - 20)  SpO2: 96% (10-13-19 @ 13:21) (96% - 99%)  Wt(kg): --  I&O's Summary        HEENT:   Normal oral mucosa, PERRL, EOMI	  Lymphatic: No lymphadenopathy , no edema  Cardiovascular: Normal S1 S2, No JVD, No murmurs , Peripheral pulses palpable 2+ bilaterally  Respiratory: Lungs clear to auscultation, normal effort 	  Gastrointestinal:  Soft, Non-tender, + BS	  Skin: No rashes, No ecchymoses, No cyanosis, warm to touch  Musculoskeletal: Normal range of motion, normal strength  Psychiatry:  Mood & affect appropriate      TELEMETRY: 	    ECG:  < from: 12 Lead ECG (10.13.19 @ 04:29) >  NORMAL SINUS RHYTHM  POSSIBLE LEFT ATRIAL ENLARGEMENT  LEFT VENTRICULAR HYPERTROPHY  NONSPECIFIC T WAVE ABNORMALITY  PROLONGED QT  ABNORMAL ECG    < end of copied text >  	  RADIOLOGY:  OTHER:     DIAGNOSTIC TESTING:  [ ] Echocardiogram:  [ ]  Catheterization:  [ ] Stress Test:    	  	  LABS:	 	    CARDIAC MARKERS:                              8.9    5.32  )-----------( 150      ( 13 Oct 2019 01:22 )             25.0     10-13    128<L>  |  90<L>  |  69<H>  ----------------------------<  88  5.5<H>   |  25  |  22.22<H>    Ca    7.8<L>      13 Oct 2019 01:22    TPro  5.9<L>  /  Alb  3.1<L>  /  TBili  0.4  /  DBili  x   /  AST  14  /  ALT  16  /  AlkPhos  214<H>  10-13    proBNP:   Lipid Profile:   HgA1c:   TSH:     ASSESSMENT/PLAN: 35 y/o m w/ PMH of ESRD 2/2 FSGS s/p renal transplant in 2004, s/p failure, on peritoneal dialysis, HTN who was admitted with elevated BP and chest pain.    -Given constant chest pain with elevated blood pressure, would check CT c/a/p to rule out dissection  -Spoke with renal attending - pt. does not make urine and no renal contraindications for ct scan per renal   -Check cardiac enzymes  -Check 12 lead ECG  -Monitor on tele  -Further workup pending above  -Discussed with covering NP     Wild Aldana MD

## 2019-10-13 NOTE — PROVIDER CONTACT NOTE (OTHER) - BACKGROUND
admitted for HTN and left eye redness, hs ERSD on PD currently, pt sent to CTangio with RN and cardiac monitoring via defibrillator, pt refused CT angio after being educated on test risks and benefit

## 2019-10-13 NOTE — CHART NOTE - NSCHARTNOTEFT_GEN_A_CORE
35 y/o m w/ PMH of ESRD 2/2 FSGS s/p renal transplant in 2004, s/p failure, on peritoneal dialysis, HTN who was admitted with elevated BP and chest pain.    -Given constant chest pain with elevated blood pressure, Seen by Dr. Rome . Who would like to check CT c/a/p to rule out dissection  -Spoke with renal attending - pt. does not make urine and no renal contraindications for ct scan per renal   Added hdralazine with the current regimen.  -Check cardiac enzymes x3  -Check 12 lead ECG : No acute changes   -Monitor on tele    Patient was taken to CT scan for CT Angio of chest /Abd/pelvis to r/o dissection.  patient refused and returned to the floor   notified  regarding refusal of CT and with enzyme results .  recommends trending enzyme.  PD started .

## 2019-10-13 NOTE — H&P ADULT - PROBLEM SELECTOR PLAN 1
Pt's BPs have been uncontrolled, and as per renal admitted for observation and management  - will f/u renal recs;  discussed with NP who will call renal consult  - for now will continue pt on lisinopril 40mg po qd, metoprolol 50mg po bid, and norvasc 10mg po qd  - monitor BP's closely, and f/u renal on antihypertensive medical optimization Pt's BPs have been uncontrolled, and as per renal admitted for observation and management  - will f/u renal recs;  discussed with NP who will call renal consult  - for now will continue pt on lisinopril 40mg po qd, metoprolol 50mg po bid, and norvasc 10mg po qd  - monitor BP's closely, and f/u renal on antihypertensive medical optimization  - pt appears to have had conjunctival hemorrhage, but no vision changes or vision loss; consider optho eval if c/o vision changes

## 2019-10-13 NOTE — H&P ADULT - NSHPREVIEWOFSYSTEMS_GEN_ALL_CORE
OPHTHO: + left eye erythema  CONSTITUTIONAL: + weakness  NEUROLOGICAL: + headache, dizziness    EYES/ENT: No visual changes, no throat pain   RESPIRATORY: No cough, wheezing, hemoptysis; No shortness of breath  CARDIOVASCULAR: No chest pain or palpitations  GASTROINTESTINAL: No abdominal, nausea, vomiting, or hematemesis; No diarrhea or constipation. No melena or hematochezia.  GENITOURINARY: No dysuria, frequency or hematuria  SKIN: No itching, burning, rashes, or lesions   ALLERGY: No seasonal allergies, no rhinorrhea  MUSCULOSKELETAL: No arthralgias, no mylagias

## 2019-10-13 NOTE — H&P ADULT - HISTORY OF PRESENT ILLNESS
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  Pt has had 33 y/o m w/ PMH of ESRD 2/2 FSGS s/p renal transplant in 2004, s/p failure, on peritoneal dialysis p/w elevated blood pressures and headaches, lightheadedness, weakness.  Pt has had poorly controlled blood pressure for several months, and often times has headaches and lightheadedness and weakness associated with elevated blood pressure.  Pt's lisinopril dose was recently increased form 20mg to 40 mg about 2 weeks ago, after findings of higher blood pressure in Renal MD's office.  However, pt's symptoms became worse in the last couple of days, and he noticed his left eye had become bloodshot, with erythema in his left eye.  Pt was advised by his renal attending to come to the hospital for further observation and management of his blood pressures.    In the ED, CT head was performed and showed no acute pathology.

## 2019-10-13 NOTE — H&P ADULT - NSHPLABSRESULTS_GEN_ALL_CORE
8.9    5.32  )-----------( 150      ( 13 Oct 2019 01:22 )             25.0     10-13    128<L>  |  90<L>  |  69<H>  ----------------------------<  88  5.5<H>   |  25  |  22.22<H>    Ca    7.8<L>      13 Oct 2019 01:22    TPro  5.9<L>  /  Alb  3.1<L>  /  TBili  0.4  /  DBili  x   /  AST  14  /  ALT  16  /  AlkPhos  214<H>  10-13 8.9    5.32  )-----------( 150      ( 13 Oct 2019 01:22 )             25.0     10-13    128<L>  |  90<L>  |  69<H>  ----------------------------<  88  5.5<H>   |  25  |  22.22<H>    Ca    7.8<L>      13 Oct 2019 01:22    TPro  5.9<L>  /  Alb  3.1<L>  /  TBili  0.4  /  DBili  x   /  AST  14  /  ALT  16  /  AlkPhos  214<H>  10-13      EKG: NSR @ 78 bpm, LVH, possible left atrial enlargement    CT head: no acute pathology

## 2019-10-13 NOTE — ED PROVIDER NOTE - ATTENDING CONTRIBUTION TO CARE
33 yo male hx of FSGS s/p remote renal transplant, failed, on peritoneal dialysis complicated by labile and recalcitrant hypertension x several months, p/w acute on chronic HA, high BP, and atraumatic conjunctival hemorrhage in L eye.  no focal neuro deficits.  will check labs, CT head, BP control, reassess.

## 2019-10-13 NOTE — CHART NOTE - NSCHARTNOTEFT_GEN_A_CORE
Follow up on CTA chest and abdomen that patient refused earlier.. Radiology called and stated that patient can  have test done at 10 pm. Patient refused to have test done.  Discussed in detail with patient concerning the need for CTA chest and abdomen due to c/o chest pain earlier and  the need to r/o dissection. Patient verbalized understanding and continues to refuse. States he will have it done tomorrow.    Tad Anderson, ANP-C  Department of Medicine  [id number]

## 2019-10-13 NOTE — CONSULT NOTE ADULT - ASSESSMENT
no renal Objection for CTA    PD arranged-cycler overnight w/1 midday exchange 33 y/o m w/ PMH of ESRD 2/2 FSGS s/p renal transplant in 2004, s/p failure, was on HD, now on peritoneal dialysis, HTN who was admitted for uncontrolled HTN. Renal consulted for ESRD Mx, for PD.     ESRD on APD  w/hyponatremia likely 2/2 dilutional  mild hyperkalemia  HTN, uncontrolled  Anemia in CKD-Hb <goal  chest pain t/r/o dissection    labs, chart reviewed  consent for PD obtained from pt  no renal Objection for CTA (pt has no residual renal function)  PD arranged-cycler overnight w/1 midday exchange using all 2.5% dextrose dialysate. goal to inc uf  c/w renal diet, add fluid restriction 1L/day-d/w pt, mother bedside  c/w BB 50mg bid, Norvasc 10mg qd, Lisnopril 40mg qd  watch BP closely. add hydralazine 25mg tid  c/w renvela, calcitriol  rpt BMP in AM  add epogen subq x1 tomorrow when BP better  f/u w/cardiology  Thanks for consulting. will closely follow up.

## 2019-10-13 NOTE — ED PROVIDER NOTE - PROGRESS NOTE DETAILS
pts creatinine elevated to his baseline in the 20s, K to 5.5 but no EKG changes, pt is very well appearing, no complaints while here, CT head negative, BPs down to 160/99-spoke to nephrologist, Dr. Greco who would like to keep patient for BP monitoring/control in the setting of hypertensive urgency, Dr. Segovia aware of patient, pt okay with plans to stay  Nighat Pelaez, PGY-3 EM

## 2019-10-13 NOTE — ED PROVIDER NOTE - INTERPRETATION
EKG reviewed for rate, rhythm, axis, intervals and segments, including QRS morphology, P wave appearance T wave appearance, CA interval, and QT interval.  I find the EKG to be unremarkable in all of these regards except as follows: LVH, QTc 481, anterior TWI

## 2019-10-13 NOTE — ED PROVIDER NOTE - OBJECTIVE STATEMENT
35 yo M with PMH of ESRD 2/2 FSGS s/p renal transplant in 2004, s/p failure, HD x5 months, now on PD who presents to the ED with complaint of elevated blood pressures. Pt reports his outpatient team has had difficulty controlling his blood pressure despite being on labetalol, amlodipine. Reports BPs in the 180s regularly     abdominal pain. The patient states that he started peritoneal dialysis in January 2019. He was hospitalized at [facility] in April 2019 for SBP after a piece from his peritoneal catheter came loose. He was treated with a 21 day course of antibiotics. He had recurrence of his abdominal pain 2 days prior to admission, with diffuse intermittent localized abdominal pain, described as achy in quality. This pain is similar to that he experienced in April. He denies any fever, chills, lightheadedness, dizziness, nausea, vomiting. He reports chronic diarrhea with loose, nonbloody stools. He denies any changes in lower extremity swelling. He denies any muscle or joint pain. He has had sick contacts at school, with colds, but no recent travel.   The patient reports he has eating and drinking normally. Patient does PD at home, overnight. He reports using sterile technique. Dialysate fluid has been clear, without fibrin or pus. His dialysis catheter site has had no surrounding redness or swelling. He had no additional antibx use besides that which he had for his last episode of SBP. 35 yo M with PMH of ESRD 2/2 FSGS s/p renal transplant in 2004, s/p failure, HD x5 months, now on PD who presents to the ED with complaint of elevated blood pressures. Pt reports his outpatient team has had difficulty controlling his blood pressure despite being on labetalol, amlodipine. Reports BPs in the 180s regularly. Also reports feeling unwell over the last several weeks including headaches, body aches, dizziness. Partner at bedside reports they were out to dinner for his birthday but he had a bad enough headache that he needed to lay down. Also reports he has redness in his eye since this morning but no pain at all. Reports his nephrologist told him to come in.

## 2019-10-13 NOTE — H&P ADULT - NSHPPHYSICALEXAM_GEN_ALL_CORE
VITAL SIGNS:    Vital Signs Last 24 Hrs  T(C): 37.1 (13 Oct 2019 07:44), Max: 37.3 (12 Oct 2019 23:00)  T(F): 98.7 (13 Oct 2019 07:44), Max: 99.2 (12 Oct 2019 23:00)  HR: 80 (13 Oct 2019 07:44) (78 - 83)  BP: 166/93 (13 Oct 2019 07:44) (150/83 - 174/114)  BP(mean): --  RR: 18 (13 Oct 2019 07:44) (17 - 20)  SpO2: 98% (13 Oct 2019 07:44) (96% - 99%)    PHYSICAL EXAM:     GENERAL: no acute distress  HEENT: NC/AT, EOMI, neck supple, MMM; left eye conjunctival erythema  RESPIRATORY: LCTAB/L, no rhonchi, rales, or wheezing  CARDIOVASCULAR: RRR, no murmurs, gallops, rubs  ABDOMINAL: soft, non-tender, non-distended, positive bowel sounds   EXTREMITIES: no clubbing, cyanosis, or edema  NEUROLOGICAL: alert and oriented x 3, non-focal  SKIN: no rashes or lesions   MUSCULOSKELETAL: no gross joint deformity

## 2019-10-13 NOTE — H&P ADULT - ASSESSMENT
33 y/o m w/ PMH of ESRD 2/2 FSGS s/p renal transplant in 2004, s/p failure, on peritoneal dialysis p/w uncontrolled HTN and headaches

## 2019-10-14 LAB
ALBUMIN SERPL ELPH-MCNC: 2.9 G/DL — LOW (ref 3.3–5)
ALP SERPL-CCNC: 200 U/L — HIGH (ref 40–120)
ALT FLD-CCNC: 14 U/L — SIGNIFICANT CHANGE UP (ref 10–45)
ANION GAP SERPL CALC-SCNC: 16 MMOL/L — SIGNIFICANT CHANGE UP (ref 5–17)
AST SERPL-CCNC: 10 U/L — SIGNIFICANT CHANGE UP (ref 10–40)
BASOPHILS # BLD AUTO: 0.03 K/UL — SIGNIFICANT CHANGE UP (ref 0–0.2)
BASOPHILS NFR BLD AUTO: 0.5 % — SIGNIFICANT CHANGE UP (ref 0–2)
BILIRUB SERPL-MCNC: 0.6 MG/DL — SIGNIFICANT CHANGE UP (ref 0.2–1.2)
BUN SERPL-MCNC: 66 MG/DL — HIGH (ref 7–23)
CALCIUM SERPL-MCNC: 8.6 MG/DL — SIGNIFICANT CHANGE UP (ref 8.4–10.5)
CHLORIDE SERPL-SCNC: 91 MMOL/L — LOW (ref 96–108)
CO2 SERPL-SCNC: 24 MMOL/L — SIGNIFICANT CHANGE UP (ref 22–31)
CREAT SERPL-MCNC: 21.56 MG/DL — HIGH (ref 0.5–1.3)
EOSINOPHIL # BLD AUTO: 0.1 K/UL — SIGNIFICANT CHANGE UP (ref 0–0.5)
EOSINOPHIL NFR BLD AUTO: 1.7 % — SIGNIFICANT CHANGE UP (ref 0–6)
GLUCOSE SERPL-MCNC: 139 MG/DL — HIGH (ref 70–99)
HCT VFR BLD CALC: 23.4 % — LOW (ref 39–50)
HGB BLD-MCNC: 8.1 G/DL — LOW (ref 13–17)
IMM GRANULOCYTES NFR BLD AUTO: 0.3 % — SIGNIFICANT CHANGE UP (ref 0–1.5)
LYMPHOCYTES # BLD AUTO: 1.38 K/UL — SIGNIFICANT CHANGE UP (ref 1–3.3)
LYMPHOCYTES # BLD AUTO: 23.7 % — SIGNIFICANT CHANGE UP (ref 13–44)
MCHC RBC-ENTMCNC: 28.6 PG — SIGNIFICANT CHANGE UP (ref 27–34)
MCHC RBC-ENTMCNC: 34.6 GM/DL — SIGNIFICANT CHANGE UP (ref 32–36)
MCV RBC AUTO: 82.7 FL — SIGNIFICANT CHANGE UP (ref 80–100)
MONOCYTES # BLD AUTO: 0.38 K/UL — SIGNIFICANT CHANGE UP (ref 0–0.9)
MONOCYTES NFR BLD AUTO: 6.5 % — SIGNIFICANT CHANGE UP (ref 2–14)
NEUTROPHILS # BLD AUTO: 3.92 K/UL — SIGNIFICANT CHANGE UP (ref 1.8–7.4)
NEUTROPHILS NFR BLD AUTO: 67.3 % — SIGNIFICANT CHANGE UP (ref 43–77)
PLATELET # BLD AUTO: 137 K/UL — LOW (ref 150–400)
POTASSIUM SERPL-MCNC: 3.9 MMOL/L — SIGNIFICANT CHANGE UP (ref 3.5–5.3)
POTASSIUM SERPL-SCNC: 3.9 MMOL/L — SIGNIFICANT CHANGE UP (ref 3.5–5.3)
PROT SERPL-MCNC: 5.6 G/DL — LOW (ref 6–8.3)
RBC # BLD: 2.83 M/UL — LOW (ref 4.2–5.8)
RBC # FLD: 15.1 % — HIGH (ref 10.3–14.5)
SODIUM SERPL-SCNC: 131 MMOL/L — LOW (ref 135–145)
TROPONIN T, HIGH SENSITIVITY RESULT: 106 NG/L — HIGH (ref 0–51)
TROPONIN T, HIGH SENSITIVITY RESULT: 109 NG/L — HIGH (ref 0–51)
WBC # BLD: 5.83 K/UL — SIGNIFICANT CHANGE UP (ref 3.8–10.5)
WBC # FLD AUTO: 5.83 K/UL — SIGNIFICANT CHANGE UP (ref 3.8–10.5)

## 2019-10-14 PROCEDURE — 71275 CT ANGIOGRAPHY CHEST: CPT | Mod: 26

## 2019-10-14 PROCEDURE — 74174 CTA ABD&PLVS W/CONTRAST: CPT | Mod: 26

## 2019-10-14 RX ORDER — OXYCODONE HYDROCHLORIDE 5 MG/1
5 TABLET ORAL ONCE
Refills: 0 | Status: DISCONTINUED | OUTPATIENT
Start: 2019-10-14 | End: 2019-10-14

## 2019-10-14 RX ORDER — ACETAMINOPHEN 500 MG
1000 TABLET ORAL ONCE
Refills: 0 | Status: COMPLETED | OUTPATIENT
Start: 2019-10-14 | End: 2019-10-14

## 2019-10-14 RX ADMIN — Medication 25 MILLIGRAM(S): at 05:33

## 2019-10-14 RX ADMIN — AMLODIPINE BESYLATE 10 MILLIGRAM(S): 2.5 TABLET ORAL at 05:33

## 2019-10-14 RX ADMIN — Medication 25 MILLIGRAM(S): at 13:14

## 2019-10-14 RX ADMIN — Medication 50 MILLIGRAM(S): at 18:19

## 2019-10-14 RX ADMIN — LISINOPRIL 40 MILLIGRAM(S): 2.5 TABLET ORAL at 06:14

## 2019-10-14 RX ADMIN — Medication 1000 MILLIGRAM(S): at 06:03

## 2019-10-14 RX ADMIN — Medication 25 MILLIGRAM(S): at 21:21

## 2019-10-14 RX ADMIN — OXYCODONE HYDROCHLORIDE 5 MILLIGRAM(S): 5 TABLET ORAL at 23:01

## 2019-10-14 RX ADMIN — SEVELAMER CARBONATE 1600 MILLIGRAM(S): 2400 POWDER, FOR SUSPENSION ORAL at 13:14

## 2019-10-14 RX ADMIN — SEVELAMER CARBONATE 1600 MILLIGRAM(S): 2400 POWDER, FOR SUSPENSION ORAL at 18:19

## 2019-10-14 RX ADMIN — Medication 400 MILLIGRAM(S): at 05:33

## 2019-10-14 RX ADMIN — CALCITRIOL 1 MICROGRAM(S): 0.5 CAPSULE ORAL at 13:13

## 2019-10-14 RX ADMIN — Medication 50 MILLIGRAM(S): at 05:34

## 2019-10-14 RX ADMIN — ERYTHROPOIETIN 10000 UNIT(S): 10000 INJECTION, SOLUTION INTRAVENOUS; SUBCUTANEOUS at 10:12

## 2019-10-14 RX ADMIN — SEVELAMER CARBONATE 1600 MILLIGRAM(S): 2400 POWDER, FOR SUSPENSION ORAL at 09:28

## 2019-10-14 RX ADMIN — Medication 5 MILLIGRAM(S): at 05:33

## 2019-10-14 NOTE — PROGRESS NOTE ADULT - ASSESSMENT
33 y/o m w/ PMH of ESRD 2/2 FSGS s/p renal transplant in 2004, s/p failure, was on HD, now on peritoneal dialysis, HTN who was admitted for uncontrolled HTN. Renal consulted for ESRD Mx, for PD.     ESRD on APD  w/hyponatremia likely 2/2 dilutional  HTN, uncontrolled  Anemia in CKD-Hb <goal  chest pain     labs, chart reviewed)  c/w PD--cycler overnight w/1 midday exchange using all 2.5% dextrose dialysate. goal to inc uf  c/w renal diet, add fluid restriction 1L/day-d/w pt, mother bedside  c/w BB 50mg bid, Norvasc 10mg qd, Lisnopril 40mg qd  watch BP closely. c/w  hydralazine 25mg tid  c/w renvela, calcitriol  rpt BMP in AM  s/p  epogen 69175 subq x 1- trend hgb  f/u w/cardiology

## 2019-10-14 NOTE — PROGRESS NOTE ADULT - ASSESSMENT
· Assessment	  35 y/o m w/ PMH of ESRD 2/2 FSGS s/p renal transplant in 2004, s/p failure, on peritoneal dialysis p/w uncontrolled HTN and headaches     Problem/Plan - 1:  ·  Problem: Hypertensive urgency.  Plan: Pt's BPs have been uncontrolled, and as per renal admitted for observation and management  - Cardio/Renal eval noted.  - CTA C/A/P: No dissection     Problem/Plan - 2:  ·  Problem: ESRD (end stage renal disease) on dialysis.  Plan: Pt w/ ESRD 2/2 FSGS s/p failed renal tx, now on peritoneal dialysis  - cont pt on prednisone, calcitriol, sevelamer  - Renal f/up.    Dw pt's mother.

## 2019-10-14 NOTE — PROGRESS NOTE ADULT - SUBJECTIVE AND OBJECTIVE BOX
Patient seen and examined  complaining of high bp    No Known Allergies    Hospital Medications:   MEDICATIONS  (STANDING):  amLODIPine   Tablet 10 milliGRAM(s) Oral daily  calcitriol   Capsule 1 MICROGram(s) Oral daily  hydrALAZINE 25 milliGRAM(s) Oral every 8 hours  lisinopril 40 milliGRAM(s) Oral daily  metoprolol tartrate 50 milliGRAM(s) Oral two times a day  predniSONE   Tablet 5 milliGRAM(s) Oral daily  sevelamer carbonate 1600 milliGRAM(s) Oral three times a day        VITALS:  T(F): 98.4 (10-14-19 @ 13:18), Max: 98.5 (10-13-19 @ 16:30)  HR: 72 (10-14-19 @ 13:18)  BP: 168/110 (10-14-19 @ 13:18)  RR: 18 (10-14-19 @ 13:18)  SpO2: 98% (10-14-19 @ 13:18)  Wt(kg): --    10-13 @ 07:01  -  10-14 @ 07:00  --------------------------------------------------------  IN: 240 mL / OUT: 0 mL / NET: 240 mL    10-14 @ 07:01  -  10-14 @ 14:03  --------------------------------------------------------  IN: 240 mL / OUT: 0 mL / NET: 240 mL        PHYSICAL EXAM:  Constitutional: NAD  HEENT: anicteric sclera, oropharynx clear, MMM  Neck: No JVD  Respiratory: CTAB, no wheezes, rales or rhonchi  Cardiovascular: S1, S2, RRR  Gastrointestinal: BS+, soft, NT/ND + PD cathetr  Extremities: No cyanosis or clubbing. No peripheral edema  Vascular Access:    LABS:  10-14    131<L>  |  91<L>  |  66<H>  ----------------------------<  139<H>  3.9   |  24  |  21.56<H>    Ca    8.6      14 Oct 2019 06:13  Mg     1.7     10-13    TPro  5.6<L>  /  Alb  2.9<L>  /  TBili  0.6  /  DBili      /  AST  10  /  ALT  14  /  AlkPhos  200<H>  10-14    Creatinine Trend: 21.56 <--, 22.22 <--                        8.1    5.83  )-----------( 137      ( 14 Oct 2019 08:49 )             23.4     Urine Studies:      RADIOLOGY & ADDITIONAL STUDIES:

## 2019-10-14 NOTE — PROVIDER CONTACT NOTE (MEDICATION) - BACKGROUND
admitted for hypertensive urgency, hx of renal transplant, failed now on PD cycler and midday exchange.

## 2019-10-14 NOTE — PROGRESS NOTE ADULT - SUBJECTIVE AND OBJECTIVE BOX
Patient denies further chest pain. No shortness of breath or dizziness.   Review of systems otherwise (-)  	  MEDICATIONS  (STANDING):  amLODIPine   Tablet 10 milliGRAM(s) Oral daily  calcitriol   Capsule 1 MICROGram(s) Oral daily  hydrALAZINE 25 milliGRAM(s) Oral every 8 hours  lisinopril 40 milliGRAM(s) Oral daily  metoprolol tartrate 50 milliGRAM(s) Oral two times a day  predniSONE   Tablet 5 milliGRAM(s) Oral daily  sevelamer carbonate 1600 milliGRAM(s) Oral three times a day    MEDICATIONS  (PRN):      LABS:	 	                          8.1    5.83  )-----------( 137      ( 14 Oct 2019 08:49 )             23.4     Hemoglobin: 8.1 g/dL (10-14 @ 08:49)  Hemoglobin: 8.9 g/dL (10-13 @ 01:22)    10-14    131<L>  |  91<L>  |  66<H>  ----------------------------<  139<H>  3.9   |  24  |  21.56<H>    Ca    8.6      14 Oct 2019 06:13  Mg     1.7     10-13    TPro  5.6<L>  /  Alb  2.9<L>  /  TBili  0.6  /  DBili  x   /  AST  10  /  ALT  14  /  AlkPhos  200<H>  10-14    Creatinine Trend: 21.56<--, 22.22<--  COAGS:       proBNP:   Lipid Profile:   HgA1c:   TSH:     PHYSICAL EXAM:  T(C): 36.9 (10-14-19 @ 13:18), Max: 36.9 (10-13-19 @ 16:30)  HR: 72 (10-14-19 @ 13:18) (72 - 82)  BP: 168/110 (10-14-19 @ 13:18) (160/88 - 176/91)  RR: 18 (10-14-19 @ 13:18) (18 - 20)  SpO2: 98% (10-14-19 @ 13:18) (94% - 98%)  Wt(kg): --  I&O's Summary    13 Oct 2019 07:01  -  14 Oct 2019 07:00  --------------------------------------------------------  IN: 240 mL / OUT: 0 mL / NET: 240 mL    14 Oct 2019 07:01  -  14 Oct 2019 13:21  --------------------------------------------------------  IN: 240 mL / OUT: 0 mL / NET: 240 mL      Gen: Appears well in NAD  HEENT:  (-)icterus (-)pallor  CV: N S1 S2 1/6 KYARA (+)2 Pulses B/l  Resp:  Clear to auscultation B/L, normal effort  GI: (+) BS Soft, NT, ND  Lymph:  (-)Edema, (-)obvious lymphadenopathy  Skin: Warm to touch, Normal turgor  Psych: Appropriate mood and affect      TELEMETRY: NSR 60-90	      ECG:  < from: 12 Lead ECG (10.13.19 @ 04:29) >  NORMAL SINUS RHYTHM  POSSIBLE LEFT ATRIAL ENLARGEMENT  LEFT VENTRICULAR HYPERTROPHY  NONSPECIFIC T WAVE ABNORMALITY  PROLONGED QT  ABNORMAL ECG    < from: Transthoracic Echocardiogram (01.08.18 @ 09:10) >  Conclusions:  1. Normal mitral valve with redundant chordae. Minimal  mitral regurgitation.  2. Normal trileaflet aortic valve. No aortic valve  regurgitation seen.  3. Moderate concentric left ventricular hypertrophy.  4. Normal left ventricular systolic function. No segmental  wallmotion abnormalities.  5. Normal right ventricular size and function.  *** No previous Echo exam.    < end of copied text >      ASSESSMENT/PLAN: 33 y/o m w/ PMH of ESRD 2/2 FSGS s/p renal transplant in 2004, s/p failure, on peritoneal dialysis, HTN who was admitted with elevated BP and chest pain.    -Chest pain currently resolved  -Given recent constant chest pain with elevated blood pressure, would check CT c/a/p to rule out dissection (okay with renal)  -Elevated troponin noted with no sig delta in setting of ESRD with negative CKMB  -No urgent cath needed at this time  -Monitor on tele  -If CT scan is negative, will plan to check stress test for further fiona Pruitt, PA-C  [facility]  Pager: [phone] Patient denies further chest pain. No shortness of breath or dizziness.   Review of systems otherwise (-)  	  MEDICATIONS  (STANDING):  amLODIPine   Tablet 10 milliGRAM(s) Oral daily  calcitriol   Capsule 1 MICROGram(s) Oral daily  hydrALAZINE 25 milliGRAM(s) Oral every 8 hours  lisinopril 40 milliGRAM(s) Oral daily  metoprolol tartrate 50 milliGRAM(s) Oral two times a day  predniSONE   Tablet 5 milliGRAM(s) Oral daily  sevelamer carbonate 1600 milliGRAM(s) Oral three times a day    MEDICATIONS  (PRN):      LABS:	 	                          8.1    5.83  )-----------( 137      ( 14 Oct 2019 08:49 )             23.4     Hemoglobin: 8.1 g/dL (10-14 @ 08:49)  Hemoglobin: 8.9 g/dL (10-13 @ 01:22)    10-14    131<L>  |  91<L>  |  66<H>  ----------------------------<  139<H>  3.9   |  24  |  21.56<H>    Ca    8.6      14 Oct 2019 06:13  Mg     1.7     10-13    TPro  5.6<L>  /  Alb  2.9<L>  /  TBili  0.6  /  DBili  x   /  AST  10  /  ALT  14  /  AlkPhos  200<H>  10-14    Creatinine Trend: 21.56<--, 22.22<--  COAGS:       proBNP:   Lipid Profile:   HgA1c:   TSH:     PHYSICAL EXAM:  T(C): 36.9 (10-14-19 @ 13:18), Max: 36.9 (10-13-19 @ 16:30)  HR: 72 (10-14-19 @ 13:18) (72 - 82)  BP: 168/110 (10-14-19 @ 13:18) (160/88 - 176/91)  RR: 18 (10-14-19 @ 13:18) (18 - 20)  SpO2: 98% (10-14-19 @ 13:18) (94% - 98%)  Wt(kg): --  I&O's Summary    13 Oct 2019 07:01  -  14 Oct 2019 07:00  --------------------------------------------------------  IN: 240 mL / OUT: 0 mL / NET: 240 mL    14 Oct 2019 07:01  -  14 Oct 2019 13:21  --------------------------------------------------------  IN: 240 mL / OUT: 0 mL / NET: 240 mL      Gen: Appears well in NAD  HEENT:  (-)icterus (-)pallor  CV: N S1 S2 1/6 KYARA (+)2 Pulses B/l  Resp:  Clear to auscultation B/L, normal effort  GI: (+) BS Soft, NT, ND  Lymph:  (-)Edema, (-)obvious lymphadenopathy  Skin: Warm to touch, Normal turgor  Psych: Appropriate mood and affect      TELEMETRY: NSR 60-90	      ECG:  < from: 12 Lead ECG (10.13.19 @ 04:29) >  NORMAL SINUS RHYTHM  POSSIBLE LEFT ATRIAL ENLARGEMENT  LEFT VENTRICULAR HYPERTROPHY  NONSPECIFIC T WAVE ABNORMALITY  PROLONGED QT  ABNORMAL ECG    < from: Transthoracic Echocardiogram (01.08.18 @ 09:10) >  Conclusions:  1. Normal mitral valve with redundant chordae. Minimal  mitral regurgitation.  2. Normal trileaflet aortic valve. No aortic valve  regurgitation seen.  3. Moderate concentric left ventricular hypertrophy.  4. Normal left ventricular systolic function. No segmental  wallmotion abnormalities.  5. Normal right ventricular size and function.  *** No previous Echo exam.    < end of copied text >      ASSESSMENT/PLAN: 35 y/o m w/ PMH of ESRD 2/2 FSGS s/p renal transplant in 2004, s/p failure, on peritoneal dialysis, HTN who was admitted with elevated BP and chest pain.    -Chest pain currently resolved  -Given recent constant chest pain with elevated blood pressure, would check CT c/a/p to rule out dissection (okay with renal)  -Elevated troponin noted with no sig delta in setting of ESRD with negative CKMB  -No urgent cath needed at this time  -Monitor on tele  -If CT scan is negative, will plan to check exercise nuclear stress test for further eval    Thad Pruitt PA-C  [facility]  Pager: [phone] Patient denies further chest pain. No shortness of breath or dizziness.   Review of systems otherwise (-)  	  MEDICATIONS  (STANDING):  amLODIPine   Tablet 10 milliGRAM(s) Oral daily  calcitriol   Capsule 1 MICROGram(s) Oral daily  hydrALAZINE 25 milliGRAM(s) Oral every 8 hours  lisinopril 40 milliGRAM(s) Oral daily  metoprolol tartrate 50 milliGRAM(s) Oral two times a day  predniSONE   Tablet 5 milliGRAM(s) Oral daily  sevelamer carbonate 1600 milliGRAM(s) Oral three times a day    MEDICATIONS  (PRN):      LABS:	 	                          8.1    5.83  )-----------( 137      ( 14 Oct 2019 08:49 )             23.4     Hemoglobin: 8.1 g/dL (10-14 @ 08:49)  Hemoglobin: 8.9 g/dL (10-13 @ 01:22)    10-14    131<L>  |  91<L>  |  66<H>  ----------------------------<  139<H>  3.9   |  24  |  21.56<H>    Ca    8.6      14 Oct 2019 06:13  Mg     1.7     10-13    TPro  5.6<L>  /  Alb  2.9<L>  /  TBili  0.6  /  DBili  x   /  AST  10  /  ALT  14  /  AlkPhos  200<H>  10-14    Creatinine Trend: 21.56<--, 22.22<--  COAGS:       proBNP:   Lipid Profile:   HgA1c:   TSH:     PHYSICAL EXAM:  T(C): 36.9 (10-14-19 @ 13:18), Max: 36.9 (10-13-19 @ 16:30)  HR: 72 (10-14-19 @ 13:18) (72 - 82)  BP: 168/110 (10-14-19 @ 13:18) (160/88 - 176/91)  RR: 18 (10-14-19 @ 13:18) (18 - 20)  SpO2: 98% (10-14-19 @ 13:18) (94% - 98%)  Wt(kg): --  I&O's Summary    13 Oct 2019 07:01  -  14 Oct 2019 07:00  --------------------------------------------------------  IN: 240 mL / OUT: 0 mL / NET: 240 mL    14 Oct 2019 07:01  -  14 Oct 2019 13:21  --------------------------------------------------------  IN: 240 mL / OUT: 0 mL / NET: 240 mL      Gen: Appears well in NAD  HEENT:  (-)icterus (-)pallor  CV: N S1 S2 1/6 KYARA (+)2 Pulses B/l  Resp:  Clear to auscultation B/L, normal effort  GI: (+) BS Soft, NT, ND  Lymph:  (-)Edema, (-)obvious lymphadenopathy  Skin: Warm to touch, Normal turgor  Psych: Appropriate mood and affect      TELEMETRY: NSR 60-90	      ECG:  < from: 12 Lead ECG (10.13.19 @ 04:29) >  NORMAL SINUS RHYTHM  POSSIBLE LEFT ATRIAL ENLARGEMENT  LEFT VENTRICULAR HYPERTROPHY  NONSPECIFIC T WAVE ABNORMALITY  PROLONGED QT  ABNORMAL ECG    < from: Transthoracic Echocardiogram (01.08.18 @ 09:10) >  Conclusions:  1. Normal mitral valve with redundant chordae. Minimal  mitral regurgitation.  2. Normal trileaflet aortic valve. No aortic valve  regurgitation seen.  3. Moderate concentric left ventricular hypertrophy.  4. Normal left ventricular systolic function. No segmental  wallmotion abnormalities.  5. Normal right ventricular size and function.  *** No previous Echo exam.    < end of copied text >      ASSESSMENT/PLAN: 35 y/o m w/ PMH of ESRD 2/2 FSGS s/p renal transplant in 2004, s/p failure, on peritoneal dialysis, HTN who was admitted with elevated BP and chest pain.    -Chest pain currently resolved  -Given recent constant chest pain with elevated blood pressure, would check CT c/a/p to rule out dissection (okay with renal)  -Elevated troponin noted with no sig delta in setting of ESRD with negative CKMB  -No urgent cath needed at this time  -Monitor on tele  -If CT scan is negative, will plan to check exercise nuclear stress test for further eval  -Patient to f/u with Dr. Navarro after discharge    Thad Pruitt PA-C  [facility]  Pager: [phone]

## 2019-10-14 NOTE — PROGRESS NOTE ADULT - SUBJECTIVE AND OBJECTIVE BOX
Patient is a 34y old  Male who presents with a chief complaint of elevated blood pressures, headaches, lightheadedness (14 Oct 2019 14:02)      SUBJECTIVE / OVERNIGHT EVENTS:    Events noted.  CONSTITUTIONAL: No fever,  or fatigue  RESPIRATORY: No cough, wheezing,  No shortness of breath  CARDIOVASCULAR: No chest pain, palpitations, dizziness, or leg swelling  GASTROINTESTINAL: No abdominal or epigastric pain.   NEUROLOGICAL: No headaches,     MEDICATIONS  (STANDING):  amLODIPine   Tablet 10 milliGRAM(s) Oral daily  calcitriol   Capsule 1 MICROGram(s) Oral daily  hydrALAZINE 25 milliGRAM(s) Oral every 8 hours  lisinopril 40 milliGRAM(s) Oral daily  metoprolol tartrate 50 milliGRAM(s) Oral two times a day  oxyCODONE    IR 5 milliGRAM(s) Oral once  predniSONE   Tablet 5 milliGRAM(s) Oral daily  sevelamer carbonate 1600 milliGRAM(s) Oral three times a day    MEDICATIONS  (PRN):        CAPILLARY BLOOD GLUCOSE        I&O's Summary    13 Oct 2019 07:01  -  14 Oct 2019 07:00  --------------------------------------------------------  IN: 240 mL / OUT: 0 mL / NET: 240 mL    14 Oct 2019 07:01  -  14 Oct 2019 22:48  --------------------------------------------------------  IN: 960 mL / OUT: 0 mL / NET: 960 mL        PHYSICAL EXAM:  GENERAL: NAD  NECK: Supple, No JVD  CHEST/LUNG: Clear to auscultation bilaterally; No wheezing.  HEART: Regular rate and rhythm; No murmurs, rubs, or gallops  ABDOMEN: Soft, Nontender, Nondistended; Bowel sounds present  EXTREMITIES:   No edema  NEUROLOGY: AAO X 3      LABS:                        8.1    5.83  )-----------( 137      ( 14 Oct 2019 08:49 )             23.4     10-14    131<L>  |  91<L>  |  66<H>  ----------------------------<  139<H>  3.9   |  24  |  21.56<H>    Ca    8.6      14 Oct 2019 06:13  Mg     1.7     10-13    TPro  5.6<L>  /  Alb  2.9<L>  /  TBili  0.6  /  DBili  x   /  AST  10  /  ALT  14  /  AlkPhos  200<H>  10-14      CARDIAC MARKERS ( 13 Oct 2019 19:59 )  x     / x     / 367 U/L / x     / 2.5 ng/mL  CARDIAC MARKERS ( 13 Oct 2019 14:00 )  x     / x     / 393 U/L / x     / 2.3 ng/mL        CAPILLARY BLOOD GLUCOSE                    RADIOLOGY & ADDITIONAL TESTS:    Imaging Personally Reviewed:    Consultant(s) Notes Reviewed:      Care Discussed with Consultants/Other Providers:

## 2019-10-15 LAB
ANION GAP SERPL CALC-SCNC: 14 MMOL/L — SIGNIFICANT CHANGE UP (ref 5–17)
BUN SERPL-MCNC: 54 MG/DL — HIGH (ref 7–23)
CALCIUM SERPL-MCNC: 8.8 MG/DL — SIGNIFICANT CHANGE UP (ref 8.4–10.5)
CHLORIDE SERPL-SCNC: 92 MMOL/L — LOW (ref 96–108)
CO2 SERPL-SCNC: 26 MMOL/L — SIGNIFICANT CHANGE UP (ref 22–31)
CREAT SERPL-MCNC: 19.44 MG/DL — HIGH (ref 0.5–1.3)
GLUCOSE SERPL-MCNC: 104 MG/DL — HIGH (ref 70–99)
HCT VFR BLD CALC: 26 % — LOW (ref 39–50)
HGB BLD-MCNC: 9 G/DL — LOW (ref 13–17)
MCHC RBC-ENTMCNC: 28.8 PG — SIGNIFICANT CHANGE UP (ref 27–34)
MCHC RBC-ENTMCNC: 34.6 GM/DL — SIGNIFICANT CHANGE UP (ref 32–36)
MCV RBC AUTO: 83.1 FL — SIGNIFICANT CHANGE UP (ref 80–100)
PLATELET # BLD AUTO: 160 K/UL — SIGNIFICANT CHANGE UP (ref 150–400)
POTASSIUM SERPL-MCNC: 3.8 MMOL/L — SIGNIFICANT CHANGE UP (ref 3.5–5.3)
POTASSIUM SERPL-SCNC: 3.8 MMOL/L — SIGNIFICANT CHANGE UP (ref 3.5–5.3)
RBC # BLD: 3.13 M/UL — LOW (ref 4.2–5.8)
RBC # FLD: 15.4 % — HIGH (ref 10.3–14.5)
SODIUM SERPL-SCNC: 132 MMOL/L — LOW (ref 135–145)
TROPONIN T, HIGH SENSITIVITY RESULT: 111 NG/L — HIGH (ref 0–51)
WBC # BLD: 7.2 K/UL — SIGNIFICANT CHANGE UP (ref 3.8–10.5)
WBC # FLD AUTO: 7.2 K/UL — SIGNIFICANT CHANGE UP (ref 3.8–10.5)

## 2019-10-15 PROCEDURE — 93306 TTE W/DOPPLER COMPLETE: CPT | Mod: 26

## 2019-10-15 RX ORDER — GENTAMICIN SULFATE 0.1 %
1 OINTMENT (GRAM) TOPICAL DAILY
Refills: 0 | Status: DISCONTINUED | OUTPATIENT
Start: 2019-10-15 | End: 2019-10-19

## 2019-10-15 RX ORDER — ACETAMINOPHEN 500 MG
1000 TABLET ORAL ONCE
Refills: 0 | Status: COMPLETED | OUTPATIENT
Start: 2019-10-15 | End: 2019-10-15

## 2019-10-15 RX ORDER — HYDRALAZINE HCL 50 MG
50 TABLET ORAL EVERY 8 HOURS
Refills: 0 | Status: DISCONTINUED | OUTPATIENT
Start: 2019-10-15 | End: 2019-10-17

## 2019-10-15 RX ADMIN — LISINOPRIL 40 MILLIGRAM(S): 2.5 TABLET ORAL at 05:49

## 2019-10-15 RX ADMIN — Medication 50 MILLIGRAM(S): at 18:30

## 2019-10-15 RX ADMIN — Medication 50 MILLIGRAM(S): at 21:51

## 2019-10-15 RX ADMIN — Medication 1000 MILLIGRAM(S): at 08:20

## 2019-10-15 RX ADMIN — SEVELAMER CARBONATE 1600 MILLIGRAM(S): 2400 POWDER, FOR SUSPENSION ORAL at 07:58

## 2019-10-15 RX ADMIN — Medication 400 MILLIGRAM(S): at 07:51

## 2019-10-15 RX ADMIN — SEVELAMER CARBONATE 1600 MILLIGRAM(S): 2400 POWDER, FOR SUSPENSION ORAL at 12:49

## 2019-10-15 RX ADMIN — SEVELAMER CARBONATE 1600 MILLIGRAM(S): 2400 POWDER, FOR SUSPENSION ORAL at 18:30

## 2019-10-15 RX ADMIN — Medication 25 MILLIGRAM(S): at 12:48

## 2019-10-15 RX ADMIN — Medication 1 APPLICATION(S): at 18:32

## 2019-10-15 RX ADMIN — Medication 5 MILLIGRAM(S): at 05:49

## 2019-10-15 RX ADMIN — CALCITRIOL 1 MICROGRAM(S): 0.5 CAPSULE ORAL at 12:48

## 2019-10-15 RX ADMIN — AMLODIPINE BESYLATE 10 MILLIGRAM(S): 2.5 TABLET ORAL at 05:49

## 2019-10-15 RX ADMIN — Medication 25 MILLIGRAM(S): at 05:49

## 2019-10-15 RX ADMIN — Medication 50 MILLIGRAM(S): at 05:49

## 2019-10-15 NOTE — CONSULT NOTE ADULT - SUBJECTIVE AND OBJECTIVE BOX
[facility]), [facility]        Patient is a 34y old  Male who presents with a chief complaint of elevated blood pressures, headaches, lightheadedness (15 Oct 2019 13:39)    Excerpt from H&P 33 y/o m w/ PMH of ESRD 2/2 FSGS s/p renal transplant in , s/p failure, on peritoneal dialysis p/w elevated blood pressures and headaches, lightheadedness, weakness.  Pt has had poorly controlled blood pressure for several months, and often times has headaches and lightheadedness and weakness associated with elevated blood pressure.  Pt's lisinopril dose was recently increased form 20mg to 40 mg about 2 weeks ago, after findings of higher blood pressure in Renal MD's office.  However, pt's symptoms became worse in the last couple of days, and he noticed his left eye had become bloodshot, with erythema in his left eye.  Pt was advised by his renal attending to come to the hospital for further observation and management of his blood pressures.    In the ED, CT head was performed and showed no acute pathology.     pt reports that his bp is very high, hes compliant with diet/exercise and meds  headaches are daily and correlates with high bp              *****PAST MEDICAL / Surgical  HISTORY:  PAST MEDICAL & SURGICAL HISTORY:  Dialysis complication  CKD (chronic kidney disease), stage 4 (severe)  Hypertension  Glomerulonephritis  S/P kidney transplant: Right kidney in            *****FAMILY HISTORY:  FAMILY HISTORY:  Family history of glomerulonephritis (Uncle)           *****SOCIAL HISTORY:  Alcohol: None  Smoking: None  unemployed   goes to the gym 3 x week   generally poor appetite, due to a lot of restriction with diet.          *****ALLERGIES:   Allergies    No Known Allergies    Intolerances             *****MEDICATIONS: current medication reviewed and documented.   MEDICATIONS  (STANDING):  amLODIPine   Tablet 10 milliGRAM(s) Oral daily  calcitriol   Capsule 1 MICROGram(s) Oral daily  gentamicin 0.1% Ointment 1 Application(s) Topical daily  hydrALAZINE 25 milliGRAM(s) Oral every 8 hours  lisinopril 40 milliGRAM(s) Oral daily  metoprolol tartrate 50 milliGRAM(s) Oral two times a day  predniSONE   Tablet 5 milliGRAM(s) Oral daily  sevelamer carbonate 1600 milliGRAM(s) Oral three times a day    MEDICATIONS  (PRN):           *****REVIEW OF SYSTEM:  GEN: no fever, no chills, no pain  RESP: no SOB, no cough, no sputum  CVS: no chest pain, no palpitations, no edema  GI: no abdominal pain, no nausea, no vomiting, no constipation, no diarrhea  : no dysurea, no frequency, no hematurea  Neuro: no headache, no dizziness  PSYCH: no anxiety, no depression  Derm : no itching, no rash         *****VITAL SIGNS:  T(C): 36.8 (10-15-19 @ 05:02), Max: 36.9 (10-15-19 @ 03:26)  HR: 76 (10-15-19 @ 09:00) (72 - 84)  BP: 162/92 (10-15-19 @ 09:00) (162/86 - 177/110)  RR: 18 (10-15-19 @ 05:02) (18 - 18)  SpO2: 96% (10-15-19 @ 05:02) (96% - 98%)  Wt(kg): --    10-14 @ 07:01  -  10-15 @ 07:00  --------------------------------------------------------  IN: 1320 mL / OUT: 0 mL / NET: 1320 mL    10-15 @ 07:01  -  10-15 @ 15:46  --------------------------------------------------------  IN: 280 mL / OUT: 0 mL / NET: 280 mL             *****PHYSICAL EXAM:   Alert oriented x 3   Attention comprehension are fair. Able to name, repeat, read without any difficulty.   Able to follow 3 step commands.     EOMI fundi not visualized,  VFF to confrontration  No facial asymmetry   Tongue is midline   Palate elevates symmetrically   Moving all 4 ext symmetrically no pronator drift   Reflexes are symmetric throughout   sensation is grossly symmetric  Gait : not assessed.  B/L down going toes               *****LAB AND IMAGIN.0    7.20  )-----------( 160      ( 15 Oct 2019 08:49 )             26.0               10-15    132<L>  |  92<L>  |  54<H>  ----------------------------<  104<H>  3.8   |  26  |  19.44<H>    Ca    8.8      15 Oct 2019 06:03    TPro  5.6<L>  /  Alb  2.9<L>  /  TBili  0.6  /  DBili  x   /  AST  10  /  ALT  14  /  AlkPhos  200<H>  10-14                CARDIAC MARKERS ( 13 Oct 2019 19:59 )  x     / x     / 367 U/L / x     / 2.5 ng/mL     < from: CT Head No Cont (10.13.19 @ 01:50) >    Sulci and ventricles are within normal limits for the patient's age. No   acute intracranial hemorrhage, mass effect, or midline shift. Basal   cisterns are patent. No abnormal extra axial fluid collections. Calvarium   is intact. The paranasal sinuses and tympanomastoid cavities appear free   of acute disease.     Impression:     Normal CT of the head.            < end of copied text >              [All pertinent recent Imaging reports reviewed]         *****A S S E S S M E N T   A N D   P L A N :      Excerpt from H&P 33 y/o m w/ PMH of ESRD 2/2 FSGS s/p renal transplant in 2004, s/p failure, on peritoneal dialysis p/w elevated blood pressures and headaches, lightheadedness, weakness.  Pt has had poorly controlled blood pressure for several months, and often times has headaches and lightheadedness and weakness associated with elevated blood pressure.  Pt's lisinopril dose was recently increased form 20mg to 40 mg about 2 weeks ago, after findings of higher blood pressure in Renal MD's office.  However, pt's symptoms became worse in the last couple of days, and he noticed his left eye had become bloodshot, with erythema in his left eye.  Pt was advised by his renal attending to come to the hospital for further observation and management of his blood pressures.    In the ED, CT head was performed and showed no acute pathology.     Problem/Recommendations 1: Headaches hypertensive headaches vs. chronic daily headaches vs. hypnic headaches.   reports getting interrupted sleep   headache worse in am ?hypnic headaches suggest sleep study as pt wakes up multiple times  ck co2 in am to eval for co2 retention.   ck orthostatics.   consider mg for headache prophylaxis if ok with renal .   mri/mra/mrv to r/o other causes for headache             Problem/Recommendations 2: htn multi drug resistant   defer to nephrology for management   should we ck renin levels?     pt reports that his bp is very high, despite being compliant with diet/exercise and meds  headaches are daily and correlates with high bp   cta no evidence of any stenosis   discussed with pt about salt restrictions in diet.              ___________________________  Will follow with you.  Thank you,  Elle Connelly MD  Diplomate of the American Board of Neurology and Psychiatry.  Diplomate of the American Board of Vascular Neurology.   [facility] ([facility]), [facility]   Ph: [phone]    Differential diagnosis and plan of care discussed with patient after the evaluation.   Advanced care planning options discussed.   Pain assessed and judicious use of narcotics when appropriate was discussed.  Importance of Fall prevention discussed.  Counseling on Smoking and Alcohol cessation was offered when appropriate.  Counseling on Diet, exercise, and medication compliance was done.     123 minutes spent on the total encounter;  more than 50 % of the visit was spent on counseling  and or coordinating care by the attending physician.    Thank you for allowing me to participate in the care of this gilda patient. Please do not hesitate to call me if you have any questions.     This and subsequent notes were partially created using voice recognition software and will  inherently be subject to errors including those of syntax and sound alike substitutions which may escape proofreading. In such instances original meaning may be extrapolated by contextual derivation. [facility]), [facility]        Patient is a 34y old  Male who presents with a chief complaint of elevated blood pressures, headaches, lightheadedness (15 Oct 2019 13:39)    Excerpt from H&P 33 y/o m w/ PMH of ESRD 2/2 FSGS s/p renal transplant in , s/p failure, on peritoneal dialysis p/w elevated blood pressures and headaches, lightheadedness, weakness.  Pt has had poorly controlled blood pressure for several months, and often times has headaches and lightheadedness and weakness associated with elevated blood pressure.  Pt's lisinopril dose was recently increased form 20mg to 40 mg about 2 weeks ago, after findings of higher blood pressure in Renal MD's office.  However, pt's symptoms became worse in the last couple of days, and he noticed his left eye had become bloodshot, with erythema in his left eye.  Pt was advised by his renal attending to come to the hospital for further observation and management of his blood pressures.    In the ED, CT head was performed and showed no acute pathology.     pt reports that his bp is very high, hes compliant with diet/exercise and meds  headaches are daily and correlates with high bp              *****PAST MEDICAL / Surgical  HISTORY:  PAST MEDICAL & SURGICAL HISTORY:  Dialysis complication  CKD (chronic kidney disease), stage 4 (severe)  Hypertension  Glomerulonephritis  S/P kidney transplant: Right kidney in            *****FAMILY HISTORY:  FAMILY HISTORY:  Family history of glomerulonephritis (Uncle)           *****SOCIAL HISTORY:  Alcohol: None  Smoking: None  unemployed   goes to the gym 3 x week   generally poor appetite, due to a lot of restriction with diet.          *****ALLERGIES:   Allergies    No Known Allergies    Intolerances             *****MEDICATIONS: current medication reviewed and documented.   MEDICATIONS  (STANDING):  amLODIPine   Tablet 10 milliGRAM(s) Oral daily  calcitriol   Capsule 1 MICROGram(s) Oral daily  gentamicin 0.1% Ointment 1 Application(s) Topical daily  hydrALAZINE 25 milliGRAM(s) Oral every 8 hours  lisinopril 40 milliGRAM(s) Oral daily  metoprolol tartrate 50 milliGRAM(s) Oral two times a day  predniSONE   Tablet 5 milliGRAM(s) Oral daily  sevelamer carbonate 1600 milliGRAM(s) Oral three times a day    MEDICATIONS  (PRN):           *****REVIEW OF SYSTEM:  GEN: no fever, no chills, no pain  RESP: no SOB, no cough, no sputum  CVS: no chest pain, no palpitations, no edema  GI: no abdominal pain, no nausea, no vomiting, no constipation, no diarrhea  : no dysurea, no frequency, no hematurea  Neuro: no headache, no dizziness  PSYCH: no anxiety, no depression  Derm : no itching, no rash         *****VITAL SIGNS:  T(C): 36.8 (10-15-19 @ 05:02), Max: 36.9 (10-15-19 @ 03:26)  HR: 76 (10-15-19 @ 09:00) (72 - 84)  BP: 162/92 (10-15-19 @ 09:00) (162/86 - 177/110)  RR: 18 (10-15-19 @ 05:02) (18 - 18)  SpO2: 96% (10-15-19 @ 05:02) (96% - 98%)  Wt(kg): --    10-14 @ 07:01  -  10-15 @ 07:00  --------------------------------------------------------  IN: 1320 mL / OUT: 0 mL / NET: 1320 mL    10-15 @ 07:01  -  10-15 @ 15:46  --------------------------------------------------------  IN: 280 mL / OUT: 0 mL / NET: 280 mL             *****PHYSICAL EXAM:   Alert oriented x 3   Attention comprehension are fair. Able to name, repeat, read without any difficulty.   Able to follow 3 step commands.     EOMI fundi not visualized,  VFF to confrontration  No facial asymmetry   Tongue is midline   Palate elevates symmetrically   Moving all 4 ext symmetrically no pronator drift   Reflexes are symmetric throughout   sensation is grossly symmetric  Gait : not assessed.  B/L down going toes               *****LAB AND IMAGIN.0    7.20  )-----------( 160      ( 15 Oct 2019 08:49 )             26.0               10-15    132<L>  |  92<L>  |  54<H>  ----------------------------<  104<H>  3.8   |  26  |  19.44<H>    Ca    8.8      15 Oct 2019 06:03    TPro  5.6<L>  /  Alb  2.9<L>  /  TBili  0.6  /  DBili  x   /  AST  10  /  ALT  14  /  AlkPhos  200<H>  10-14                CARDIAC MARKERS ( 13 Oct 2019 19:59 )  x     / x     / 367 U/L / x     / 2.5 ng/mL     < from: CT Head No Cont (10.13.19 @ 01:50) >    Sulci and ventricles are within normal limits for the patient's age. No   acute intracranial hemorrhage, mass effect, or midline shift. Basal   cisterns are patent. No abnormal extra axial fluid collections. Calvarium   is intact. The paranasal sinuses and tympanomastoid cavities appear free   of acute disease.     Impression:     Normal CT of the head.            < end of copied text >              [All pertinent recent Imaging reports reviewed]         *****A S S E S S M E N T   A N D   P L A N :      Excerpt from H&P 33 y/o m w/ PMH of ESRD 2/2 FSGS s/p renal transplant in 2004, s/p failure, on peritoneal dialysis p/w elevated blood pressures and headaches, lightheadedness, weakness.  Pt has had poorly controlled blood pressure for several months, and often times has headaches and lightheadedness and weakness associated with elevated blood pressure.  Pt's lisinopril dose was recently increased form 20mg to 40 mg about 2 weeks ago, after findings of higher blood pressure in Renal MD's office.  However, pt's symptoms became worse in the last couple of days, and he noticed his left eye had become bloodshot, with erythema in his left eye.  Pt was advised by his renal attending to come to the hospital for further observation and management of his blood pressures.    In the ED, CT head was performed and showed no acute pathology.     Problem/Recommendations 1: Headaches hypertensive headaches vs. chronic daily headaches vs. hypnic headaches.   reports getting interrupted sleep   headache worse in am ?hypnic headaches suggest sleep study as pt wakes up multiple times  ck co2 in am to eval for co2 retention.   ck orthostatics.   consider mg for headache prophylaxis if ok with renal .   mri/mra/mrv to r/o other causes for headache but pt refused, can do it outpt nonemergently             Problem/Recommendations 2: htn multi drug resistant   defer to nephrology for management   should we ck renin levels?     pt reports that his bp is very high, despite being compliant with diet/exercise and meds  headaches are daily and correlates with high bp   cta no evidence of any stenosis   discussed with pt about salt restrictions in diet.              ___________________________  Will follow with you.  Thank you,  Elle Connelly MD  Diplomate of the American Board of Neurology and Psychiatry.  Diplomate of the American Board of Vascular Neurology.   [facility] ([facility]), [facility]   Ph: [phone]    Differential diagnosis and plan of care discussed with patient after the evaluation.   Advanced care planning options discussed.   Pain assessed and judicious use of narcotics when appropriate was discussed.  Importance of Fall prevention discussed.  Counseling on Smoking and Alcohol cessation was offered when appropriate.  Counseling on Diet, exercise, and medication compliance was done.     123 minutes spent on the total encounter;  more than 50 % of the visit was spent on counseling  and or coordinating care by the attending physician.    Thank you for allowing me to participate in the care of this gilda patient. Please do not hesitate to call me if you have any questions.     This and subsequent notes were partially created using voice recognition software and will  inherently be subject to errors including those of syntax and sound alike substitutions which may escape proofreading. In such instances original meaning may be extrapolated by contextual derivation.

## 2019-10-15 NOTE — PROGRESS NOTE ADULT - ASSESSMENT
· Assessment	  33 y/o m w/ PMH of ESRD 2/2 FSGS s/p renal transplant in 2004, s/p failure, on peritoneal dialysis p/w uncontrolled HTN and headaches     Problem/Plan -   ·  Problem: Hypertensive urgency.  Plan: Pt's BPs have been uncontrolled, and as per renal admitted for observation and management  - Cardio/Renal f/up noted.  - CTA C/A/P: No dissectio  - Increased hydralazine    = Persistent Headache:  Neuro eval noted.  MRI/MRV brain as out pt.     Problem/Plan -   ·  Problem: ESRD (end stage renal disease) on dialysis.  Plan: Pt w/ ESRD 2/2 FSGS s/p failed renal tx, now on peritoneal dialysis  - cont pt on prednisone, calcitriol, sevelamer  - Renal f/up.    Dw pt's mother.

## 2019-10-15 NOTE — PROVIDER CONTACT NOTE (OTHER) - SITUATION
pt reports having difficulty breathing. pt states his pd fluid volume is too much. he only fills with 2L at home.

## 2019-10-15 NOTE — PROGRESS NOTE ADULT - SUBJECTIVE AND OBJECTIVE BOX
S: Denies chest pain, SOB, palpitations or dizziness. Review of systems otherwise (-)  	    MEDICATIONS  (STANDING):  amLODIPine   Tablet 10 milliGRAM(s) Oral daily  calcitriol   Capsule 1 MICROGram(s) Oral daily  gentamicin 0.1% Ointment 1 Application(s) Topical daily  hydrALAZINE 25 milliGRAM(s) Oral every 8 hours  lisinopril 40 milliGRAM(s) Oral daily  metoprolol tartrate 50 milliGRAM(s) Oral two times a day  predniSONE   Tablet 5 milliGRAM(s) Oral daily  sevelamer carbonate 1600 milliGRAM(s) Oral three times a day    MEDICATIONS  (PRN):      LABS:                            9.0    7.20  )-----------( 160      ( 15 Oct 2019 08:49 )             26.0     Hemoglobin: 9.0 g/dL (10-15 @ 08:49)  Hemoglobin: 8.1 g/dL (10-14 @ 08:49)  Hemoglobin: 8.9 g/dL (10-13 @ 01:22)    10-15    132<L>  |  92<L>  |  54<H>  ----------------------------<  104<H>  3.8   |  26  |  19.44<H>    Ca    8.8      15 Oct 2019 06:03  Mg     1.7     10-13    TPro  5.6<L>  /  Alb  2.9<L>  /  TBili  0.6  /  DBili  x   /  AST  10  /  ALT  14  /  AlkPhos  200<H>  10-14    Creatinine Trend: 19.44<--, 21.56<--, 22.22<--     CARDIAC MARKERS ( 13 Oct 2019 19:59 )  x     / x     / 367 U/L / x     / 2.5 ng/mL  CARDIAC MARKERS ( 13 Oct 2019 14:00 )  x     / x     / 393 U/L / x     / 2.3 ng/mL        10-14-19 @ 07:01  -  10-15-19 @ 07:00  --------------------------------------------------------  IN: 1320 mL / OUT: 0 mL / NET: 1320 mL    10-15-19 @ 07:01  -  10-15-19 @ 13:40  --------------------------------------------------------  IN: 280 mL / OUT: 0 mL / NET: 280 mL        PHYSICAL EXAM  Vital Signs Last 24 Hrs  T(C): 36.8 (15 Oct 2019 05:02), Max: 36.9 (15 Oct 2019 03:26)  T(F): 98.2 (15 Oct 2019 05:02), Max: 98.4 (15 Oct 2019 03:26)  HR: 76 (15 Oct 2019 09:00) (72 - 84)  BP: 162/92 (15 Oct 2019 09:00) (150/78 - 177/110)  BP(mean): --  RR: 18 (15 Oct 2019 05:02) (18 - 18)  SpO2: 96% (15 Oct 2019 05:02) (96% - 98%)      Gen: Appears well in NAD  HEENT:  (-)icterus (-)pallor  CV: N S1 S2 1/6 KYARA (+)2 Pulses B/l  Resp:  Clear to auscultation B/L, normal effort  GI: (+) BS Soft, NT, ND  Lymph:  (-)Edema, (-)obvious lymphadenopathy  Skin: Warm to touch, Normal turgor  Psych: Appropriate mood and affect      TELEMETRY: SR/ST       ECG:  < from: 12 Lead ECG (10.13.19 @ 04:29) >  NORMAL SINUS RHYTHM  POSSIBLE LEFT ATRIAL ENLARGEMENT  LEFT VENTRICULAR HYPERTROPHY  NONSPECIFIC T WAVE ABNORMALITY  PROLONGED QT  ABNORMAL ECG    < from: Transthoracic Echocardiogram (01.08.18 @ 09:10) >  Conclusions:  1. Normal mitral valve with redundant chordae. Minimal  mitral regurgitation.  2. Normal trileaflet aortic valve. No aortic valve  regurgitation seen.  3. Moderate concentric left ventricular hypertrophy.  4. Normal left ventricular systolic function. No segmental  wallmotion abnormalities.  5. Normal right ventricular size and function.  *** No previous Echo exam.    < end of copied text >    < from: CT Angio Chest w/ IV Cont (10.14.19 @ 12:14) >  IMPRESSION:   No aortic intramural hematoma, aneurysm, or dissection.    Ascites.    < end of copied text >      ASSESSMENT/PLAN: 35 y/o m w/ PMH of ESRD 2/2 FSGS s/p renal transplant in 2004, s/p failure, on peritoneal dialysis, HTN who was admitted with elevated BP and chest pain.    -Chest pain currently resolved  -Elevated troponin noted with no sig delta in setting of ESRD with negative CKMB  -No urgent cath needed at this time  -Monitor on tele  -Management of HTN per renal and primary team  -CT negative for dissection  -TTE pending to eval LV function and for any wall motion abnormalities  -Check exercise NST to evaluate for CAD given chest pain and elevated trop  -Patient to f/u with Dr. Navarro after discharge    Thad Pruitt PA-C  [facility]  Pager: [phone]

## 2019-10-15 NOTE — PROGRESS NOTE ADULT - ASSESSMENT
33 y/o m w/ PMH of ESRD 2/2 FSGS s/p renal transplant in 2004, s/p failure, was on HD, now on peritoneal dialysis, HTN who was admitted for uncontrolled HTN. Renal consulted for ESRD Mx, for PD.     ESRD on APD  w/hyponatremia likely 2/2 dilutional  HTN, uncontrolled  Anemia in CKD-Hb <goal  chest pain     labs, chart reviewed)  c/w PD--cycler overnight w/1 midday exchange using all 2.5% dextrose dialysate. goal to inc uf  c/w renal diet, add fluid restriction 1L/day-d/w pt, mother bedside  c/w BB 50mg bid, Norvasc 10mg qd, Lisinopril 40mg qd  watch BP closely. inc hydralazine 25mg tid to 50mg tid  c/w renvela, calcitriol  rpt BMP in AM  s/p  epogen 87905 subq x 1- trend hgb  f/u w/cardiology

## 2019-10-15 NOTE — PROGRESS NOTE ADULT - SUBJECTIVE AND OBJECTIVE BOX
Patient is a 34y old  Male who presents with a chief complaint of elevated blood pressures, headaches, lightheadedness (15 Oct 2019 16:21)      SUBJECTIVE / OVERNIGHT EVENTS:    Events noted. Persistent Headache  CONSTITUTIONAL: No fever,  or fatigue  RESPIRATORY: No cough, wheezing,  No shortness of breath  CARDIOVASCULAR: No chest pain, palpitations, dizziness, or leg swelling  GASTROINTESTINAL: No abdominal or epigastric pain. No nausea, vomiting.      MEDICATIONS  (STANDING):  amLODIPine   Tablet 10 milliGRAM(s) Oral daily  calcitriol   Capsule 1 MICROGram(s) Oral daily  gentamicin 0.1% Ointment 1 Application(s) Topical daily  hydrALAZINE 50 milliGRAM(s) Oral every 8 hours  lisinopril 40 milliGRAM(s) Oral daily  metoprolol tartrate 50 milliGRAM(s) Oral two times a day  predniSONE   Tablet 5 milliGRAM(s) Oral daily  sevelamer carbonate 1600 milliGRAM(s) Oral three times a day    MEDICATIONS  (PRN):        CAPILLARY BLOOD GLUCOSE        I&O's Summary    14 Oct 2019 07:01  -  15 Oct 2019 07:00  --------------------------------------------------------  IN: 1320 mL / OUT: 0 mL / NET: 1320 mL    15 Oct 2019 07:01  -  16 Oct 2019 00:37  --------------------------------------------------------  IN: 1020 mL / OUT: 0 mL / NET: 1020 mL        PHYSICAL EXAM:  GENERAL: NAD  NECK: Supple, No JVD  CHEST/LUNG: Clear to auscultation bilaterally; No wheezing.  HEART: Regular rate and rhythm; No murmurs, rubs, or gallops  ABDOMEN: Soft, Nontender, Nondistended; Bowel sounds present  EXTREMITIES:   No edema  NEUROLOGY: AAO X 3      LABS:                        9.0    7.20  )-----------( 160      ( 15 Oct 2019 08:49 )             26.0     10-15    132<L>  |  92<L>  |  54<H>  ----------------------------<  104<H>  3.8   |  26  |  19.44<H>    Ca    8.8      15 Oct 2019 06:03    TPro  5.6<L>  /  Alb  2.9<L>  /  TBili  0.6  /  DBili  x   /  AST  10  /  ALT  14  /  AlkPhos  200<H>  10-14            CAPILLARY BLOOD GLUCOSE                    RADIOLOGY & ADDITIONAL TESTS:    Imaging Personally Reviewed:    Consultant(s) Notes Reviewed:      Care Discussed with Consultants/Other Providers:

## 2019-10-15 NOTE — PROVIDER CONTACT NOTE (OTHER) - SITUATION
Pt Disconnected [facility] in between last fill. Stated that, He knows how to disconnect the PD and unable to tolerate more than 1900 mi.

## 2019-10-15 NOTE — PROGRESS NOTE ADULT - SUBJECTIVE AND OBJECTIVE BOX
Patient seen and examined  disconnected PD catheter himself-   says he cannot tolerate more 2000 liters in midday exchange  denies any cp or sob    No Known Allergies    Hospital Medications:   MEDICATIONS  (STANDING):  amLODIPine   Tablet 10 milliGRAM(s) Oral daily  calcitriol   Capsule 1 MICROGram(s) Oral daily  gentamicin 0.1% Ointment 1 Application(s) Topical daily  hydrALAZINE 25 milliGRAM(s) Oral every 8 hours  lisinopril 40 milliGRAM(s) Oral daily  metoprolol tartrate 50 milliGRAM(s) Oral two times a day  predniSONE   Tablet 5 milliGRAM(s) Oral daily  sevelamer carbonate 1600 milliGRAM(s) Oral three times a day        VITALS:  T(F): 98.2 (10-15-19 @ 15:45), Max: 98.4 (10-15-19 @ 03:26)  HR: 78 (10-15-19 @ 15:45)  BP: 180/97 (10-15-19 @ 15:45)  RR: 18 (10-15-19 @ 15:45)  SpO2: 97% (10-15-19 @ 15:45)  Wt(kg): --    10-14 @ 07:01  -  10-15 @ 07:00  --------------------------------------------------------  IN: 1320 mL / OUT: 0 mL / NET: 1320 mL    10-15 @ 07:01  -  10-15 @ 16:21  --------------------------------------------------------  IN: 280 mL / OUT: 0 mL / NET: 280 mL      PHYSICAL EXAM:  Constitutional: NAD  HEENT: anicteric sclera, oropharynx clear, MMM  Neck: No JVD  Respiratory: CTAB, no wheezes, rales or rhonchi  Cardiovascular: S1, S2, RRR  Gastrointestinal: BS+, soft, NT/ND + PD catheter  Extremities: No cyanosis or clubbing. No peripheral edema  Vascular Access:    LABS:  10-15    132<L>  |  92<L>  |  54<H>  ----------------------------<  104<H>  3.8   |  26  |  19.44<H>    Ca    8.8      15 Oct 2019 06:03    TPro  5.6<L>  /  Alb  2.9<L>  /  TBili  0.6  /  DBili      /  AST  10  /  ALT  14  /  AlkPhos  200<H>  10-14    Creatinine Trend: 19.44 <--, 21.56 <--, 22.22 <--                        9.0    7.20  )-----------( 160      ( 15 Oct 2019 08:49 )             26.0     Urine Studies:      RADIOLOGY & ADDITIONAL STUDIES:

## 2019-10-15 NOTE — CONSULT NOTE ADULT - REASON FOR ADMISSION
elevated blood pressures, headaches, lightheadedness

## 2019-10-16 LAB
ANION GAP SERPL CALC-SCNC: 16 MMOL/L — SIGNIFICANT CHANGE UP (ref 5–17)
BASE EXCESS BLDA CALC-SCNC: 4.5 MMOL/L — HIGH (ref -2–2)
BUN SERPL-MCNC: 50 MG/DL — HIGH (ref 7–23)
CALCIUM SERPL-MCNC: 8.7 MG/DL — SIGNIFICANT CHANGE UP (ref 8.4–10.5)
CHLORIDE SERPL-SCNC: 94 MMOL/L — LOW (ref 96–108)
CO2 BLDA-SCNC: 29 MMOL/L — SIGNIFICANT CHANGE UP (ref 22–30)
CO2 SERPL-SCNC: 26 MMOL/L — SIGNIFICANT CHANGE UP (ref 22–31)
CREAT SERPL-MCNC: 18.71 MG/DL — HIGH (ref 0.5–1.3)
GLUCOSE SERPL-MCNC: 108 MG/DL — HIGH (ref 70–99)
HCO3 BLDA-SCNC: 28 MMOL/L — SIGNIFICANT CHANGE UP (ref 21–29)
HCT VFR BLD CALC: 23.8 % — LOW (ref 39–50)
HGB BLD-MCNC: 8.2 G/DL — LOW (ref 13–17)
HOROWITZ INDEX BLDA+IHG-RTO: 21 — SIGNIFICANT CHANGE UP
MCHC RBC-ENTMCNC: 28.9 PG — SIGNIFICANT CHANGE UP (ref 27–34)
MCHC RBC-ENTMCNC: 34.5 GM/DL — SIGNIFICANT CHANGE UP (ref 32–36)
MCV RBC AUTO: 83.8 FL — SIGNIFICANT CHANGE UP (ref 80–100)
PCO2 BLDA: 38 MMHG — SIGNIFICANT CHANGE UP (ref 32–46)
PH BLDA: 7.48 — HIGH (ref 7.35–7.45)
PLATELET # BLD AUTO: 149 K/UL — LOW (ref 150–400)
PO2 BLDA: 156 MMHG — HIGH (ref 74–108)
POTASSIUM SERPL-MCNC: 3.4 MMOL/L — LOW (ref 3.5–5.3)
POTASSIUM SERPL-SCNC: 3.4 MMOL/L — LOW (ref 3.5–5.3)
RBC # BLD: 2.84 M/UL — LOW (ref 4.2–5.8)
RBC # FLD: 15.5 % — HIGH (ref 10.3–14.5)
SAO2 % BLDA: 99 % — HIGH (ref 92–96)
SODIUM SERPL-SCNC: 136 MMOL/L — SIGNIFICANT CHANGE UP (ref 135–145)
TROPONIN T, HIGH SENSITIVITY RESULT: 109 NG/L — HIGH (ref 0–51)
WBC # BLD: 7.46 K/UL — SIGNIFICANT CHANGE UP (ref 3.8–10.5)
WBC # FLD AUTO: 7.46 K/UL — SIGNIFICANT CHANGE UP (ref 3.8–10.5)

## 2019-10-16 PROCEDURE — 78452 HT MUSCLE IMAGE SPECT MULT: CPT | Mod: 26

## 2019-10-16 PROCEDURE — 93016 CV STRESS TEST SUPVJ ONLY: CPT

## 2019-10-16 PROCEDURE — 93018 CV STRESS TEST I&R ONLY: CPT

## 2019-10-16 RX ORDER — ACETAMINOPHEN 500 MG
1000 TABLET ORAL ONCE
Refills: 0 | Status: COMPLETED | OUTPATIENT
Start: 2019-10-16 | End: 2019-10-16

## 2019-10-16 RX ORDER — KETOROLAC TROMETHAMINE 30 MG/ML
15 SYRINGE (ML) INJECTION ONCE
Refills: 0 | Status: DISCONTINUED | OUTPATIENT
Start: 2019-10-16 | End: 2019-10-16

## 2019-10-16 RX ADMIN — Medication 50 MILLIGRAM(S): at 05:36

## 2019-10-16 RX ADMIN — Medication 1000 MILLIGRAM(S): at 09:10

## 2019-10-16 RX ADMIN — Medication 5 MILLIGRAM(S): at 05:36

## 2019-10-16 RX ADMIN — LISINOPRIL 40 MILLIGRAM(S): 2.5 TABLET ORAL at 05:36

## 2019-10-16 RX ADMIN — Medication 400 MILLIGRAM(S): at 08:21

## 2019-10-16 RX ADMIN — Medication 1 APPLICATION(S): at 18:02

## 2019-10-16 RX ADMIN — Medication 15 MILLIGRAM(S): at 11:45

## 2019-10-16 RX ADMIN — AMLODIPINE BESYLATE 10 MILLIGRAM(S): 2.5 TABLET ORAL at 05:36

## 2019-10-16 RX ADMIN — CALCITRIOL 1 MICROGRAM(S): 0.5 CAPSULE ORAL at 18:00

## 2019-10-16 RX ADMIN — Medication 15 MILLIGRAM(S): at 11:25

## 2019-10-16 RX ADMIN — SEVELAMER CARBONATE 1600 MILLIGRAM(S): 2400 POWDER, FOR SUSPENSION ORAL at 18:00

## 2019-10-16 RX ADMIN — Medication 50 MILLIGRAM(S): at 17:59

## 2019-10-16 RX ADMIN — Medication 50 MILLIGRAM(S): at 18:00

## 2019-10-16 NOTE — PROGRESS NOTE ADULT - SUBJECTIVE AND OBJECTIVE BOX
S: Reports feeling trouble breathing while he is sleeping. Denies chest pain, SOB currently, palpitations or dizziness. Review of systems otherwise (-)  	    MEDICATIONS  (STANDING):  amLODIPine   Tablet 10 milliGRAM(s) Oral daily  calcitriol   Capsule 1 MICROGram(s) Oral daily  gentamicin 0.1% Ointment 1 Application(s) Topical daily  hydrALAZINE 50 milliGRAM(s) Oral every 8 hours  lisinopril 40 milliGRAM(s) Oral daily  metoprolol tartrate 50 milliGRAM(s) Oral two times a day  predniSONE   Tablet 5 milliGRAM(s) Oral daily  sevelamer carbonate 1600 milliGRAM(s) Oral three times a day    MEDICATIONS  (PRN):      LABS:                            8.2    7.46  )-----------( 149      ( 16 Oct 2019 10:02 )             23.8     Hemoglobin: 8.2 g/dL (10-16 @ 10:02)  Hemoglobin: 9.0 g/dL (10-15 @ 08:49)  Hemoglobin: 8.1 g/dL (10-14 @ 08:49)  Hemoglobin: 8.9 g/dL (10-13 @ 01:22)    10-16    136  |  94<L>  |  50<H>  ----------------------------<  108<H>  3.4<L>   |  26  |  18.71<H>    Ca    8.7      16 Oct 2019 06:15      Creatinine Trend: 18.71<--, 19.44<--, 21.56<--, 22.22<--     CARDIAC MARKERS ( 13 Oct 2019 19:59 )  x     / x     / 367 U/L / x     / 2.5 ng/mL  CARDIAC MARKERS ( 13 Oct 2019 14:00 )  x     / x     / 393 U/L / x     / 2.3 ng/mL          10-15-19 @ 07:01  -  10-16-19 @ 07:00  --------------------------------------------------------  IN: 1140 mL / OUT: 0 mL / NET: 1140 mL        PHYSICAL EXAM  Vital Signs Last 24 Hrs  T(C): 36.9 (16 Oct 2019 10:10), Max: 37.2 (15 Oct 2019 22:52)  T(F): 98.5 (16 Oct 2019 10:10), Max: 98.9 (15 Oct 2019 22:52)  HR: 89 (16 Oct 2019 10:10) (74 - 92)  BP: 168/82 (16 Oct 2019 10:10) (145/79 - 180/97)  BP(mean): --  RR: 18 (16 Oct 2019 10:10) (18 - 18)  SpO2: 97% (16 Oct 2019 10:10) (95% - 98%)        Gen: Appears well in NAD  HEENT:  (-)icterus (-)pallor  CV: N S1 S2 1/6 KYARA (+)2 Pulses B/l  Resp:  Clear to auscultation B/L, normal effort  GI: (+) BS Soft, NT, ND  Lymph:  (-)Edema, (-)obvious lymphadenopathy  Skin: Warm to touch, Normal turgor  Psych: Appropriate mood and affect      TELEMETRY: NSR 70-90    ECG:  < from: 12 Lead ECG (10.13.19 @ 04:29) >  NORMAL SINUS RHYTHM  POSSIBLE LEFT ATRIAL ENLARGEMENT  LEFT VENTRICULAR HYPERTROPHY  NONSPECIFIC T WAVE ABNORMALITY  PROLONGED QT  ABNORMAL ECG    < from: Transthoracic Echocardiogram (01.08.18 @ 09:10) >  Conclusions:  1. Normal mitral valve with redundant chordae. Minimal  mitral regurgitation.  2. Normal trileaflet aortic valve. No aortic valve  regurgitation seen.  3. Moderate concentric left ventricular hypertrophy.  4. Normal left ventricular systolic function. No segmental  wallmotion abnormalities.  5. Normal right ventricular size and function.  *** No previous Echo exam.    < end of copied text >    < from: CT Angio Chest w/ IV Cont (10.14.19 @ 12:14) >  IMPRESSION:   No aortic intramural hematoma, aneurysm, or dissection.    Ascites.    < end of copied text >    < from: Transthoracic Echocardiogram (10.15.19 @ 09:28) >  Conclusions:  1. Mild concentric left ventricular hypertrophy.  2. Normal left ventricular systolic function. No segmental  wall motion abnormalities.  3. Normal diastolic function  4. Normal right ventricular size and function.  5. Estimated right ventricular systolic pressure equals 12  mm Hg, assuming right atrial pressure equals 8 mm Hg,  consistent with normal pulmonary pressures.  *** Compared with echocardiogram of 1/8/2018, no  significant changes noted.    < end of copied text >      ASSESSMENT/PLAN: 35 y/o m w/ PMH of ESRD 2/2 FSGS s/p renal transplant in 2004, s/p failure, on peritoneal dialysis, HTN who was admitted with elevated BP and chest pain.    -Chest pain currently resolved  -Elevated troponin noted with no sig delta in setting of ESRD with negative CKMB  -Monitor on tele - remains NSR  -Management of HTN per renal and primary team  -CT negative for dissection  -TTE noted above with normal LV/RV function and no segmental WMA  -Check exercise NST to evaluate for CAD given chest pain and elevated trop  -Consider pulm eval for THIAGO as patient has complaints of trouble breathing while sleeping  -Further cardiac w/u pending above  -Patient to f/u with Dr. Navarro after discharge    Thad Pruitt PA-C  [facility]  Pager: [phone] S: Reports feeling trouble breathing while he is sleeping. Denies chest pain, SOB currently, palpitations or dizziness. Review of systems otherwise (-)  	    MEDICATIONS  (STANDING):  amLODIPine   Tablet 10 milliGRAM(s) Oral daily  calcitriol   Capsule 1 MICROGram(s) Oral daily  gentamicin 0.1% Ointment 1 Application(s) Topical daily  hydrALAZINE 50 milliGRAM(s) Oral every 8 hours  lisinopril 40 milliGRAM(s) Oral daily  metoprolol tartrate 50 milliGRAM(s) Oral two times a day  predniSONE   Tablet 5 milliGRAM(s) Oral daily  sevelamer carbonate 1600 milliGRAM(s) Oral three times a day    MEDICATIONS  (PRN):      LABS:                            8.2    7.46  )-----------( 149      ( 16 Oct 2019 10:02 )             23.8     Hemoglobin: 8.2 g/dL (10-16 @ 10:02)  Hemoglobin: 9.0 g/dL (10-15 @ 08:49)  Hemoglobin: 8.1 g/dL (10-14 @ 08:49)  Hemoglobin: 8.9 g/dL (10-13 @ 01:22)    10-16    136  |  94<L>  |  50<H>  ----------------------------<  108<H>  3.4<L>   |  26  |  18.71<H>    Ca    8.7      16 Oct 2019 06:15      Creatinine Trend: 18.71<--, 19.44<--, 21.56<--, 22.22<--     CARDIAC MARKERS ( 13 Oct 2019 19:59 )  x     / x     / 367 U/L / x     / 2.5 ng/mL  CARDIAC MARKERS ( 13 Oct 2019 14:00 )  x     / x     / 393 U/L / x     / 2.3 ng/mL          10-15-19 @ 07:01  -  10-16-19 @ 07:00  --------------------------------------------------------  IN: 1140 mL / OUT: 0 mL / NET: 1140 mL        PHYSICAL EXAM  Vital Signs Last 24 Hrs  T(C): 36.9 (16 Oct 2019 10:10), Max: 37.2 (15 Oct 2019 22:52)  T(F): 98.5 (16 Oct 2019 10:10), Max: 98.9 (15 Oct 2019 22:52)  HR: 89 (16 Oct 2019 10:10) (74 - 92)  BP: 168/82 (16 Oct 2019 10:10) (145/79 - 180/97)  BP(mean): --  RR: 18 (16 Oct 2019 10:10) (18 - 18)  SpO2: 97% (16 Oct 2019 10:10) (95% - 98%)        Gen: Appears well in NAD  HEENT:  (-)icterus (-)pallor  CV: N S1 S2 1/6 KYARA (+)2 Pulses B/l  Resp:  Clear to auscultation B/L, normal effort  GI: (+) BS Soft, NT, ND  Lymph:  (-)Edema, (-)obvious lymphadenopathy  Skin: Warm to touch, Normal turgor  Psych: Appropriate mood and affect      TELEMETRY: NSR 70-90    ECG:  < from: 12 Lead ECG (10.13.19 @ 04:29) >  NORMAL SINUS RHYTHM  POSSIBLE LEFT ATRIAL ENLARGEMENT  LEFT VENTRICULAR HYPERTROPHY  NONSPECIFIC T WAVE ABNORMALITY  PROLONGED QT  ABNORMAL ECG    < from: Transthoracic Echocardiogram (01.08.18 @ 09:10) >  Conclusions:  1. Normal mitral valve with redundant chordae. Minimal  mitral regurgitation.  2. Normal trileaflet aortic valve. No aortic valve  regurgitation seen.  3. Moderate concentric left ventricular hypertrophy.  4. Normal left ventricular systolic function. No segmental  wallmotion abnormalities.  5. Normal right ventricular size and function.  *** No previous Echo exam.    < end of copied text >    < from: CT Angio Chest w/ IV Cont (10.14.19 @ 12:14) >  IMPRESSION:   No aortic intramural hematoma, aneurysm, or dissection.    Ascites.    < end of copied text >    < from: Transthoracic Echocardiogram (10.15.19 @ 09:28) >  Conclusions:  1. Mild concentric left ventricular hypertrophy.  2. Normal left ventricular systolic function. No segmental  wall motion abnormalities.  3. Normal diastolic function  4. Normal right ventricular size and function.  5. Estimated right ventricular systolic pressure equals 12  mm Hg, assuming right atrial pressure equals 8 mm Hg,  consistent with normal pulmonary pressures.  *** Compared with echocardiogram of 1/8/2018, no  significant changes noted.    < end of copied text >      ASSESSMENT/PLAN: 35 y/o m w/ PMH of ESRD 2/2 FSGS s/p renal transplant in 2004, s/p failure, on peritoneal dialysis, HTN who was admitted with elevated BP and chest pain.    -Chest pain currently resolved  -Elevated troponin noted with no sig delta in setting of ESRD with negative CKMB  -Monitor on tele - remains NSR  -Management of HTN per renal and primary team  -CT negative for dissection  -TTE noted above with normal LV/RV function and no segmental WMA  -Check NST to evaluate for CAD given chest pain and elevated trop  -Consider pulm eval for THIAGO as patient has complaints of trouble breathing while sleeping  -Further cardiac w/u pending above  -Patient to f/u with Dr. Navarro after discharge    Thad Pruitt PA-C  [facility]  Pager: [phone]

## 2019-10-16 NOTE — PROGRESS NOTE ADULT - SUBJECTIVE AND OBJECTIVE BOX
[facility]      Seen earlier today, and examined.  - Today, patient is without complaints.           *****MEDICATIONS: Current medication reviewed and documented.    MEDICATIONS  (STANDING):  amLODIPine   Tablet 10 milliGRAM(s) Oral daily  calcitriol   Capsule 1 MICROGram(s) Oral daily  gentamicin 0.1% Ointment 1 Application(s) Topical daily  hydrALAZINE 50 milliGRAM(s) Oral every 8 hours  lisinopril 40 milliGRAM(s) Oral daily  metoprolol tartrate 50 milliGRAM(s) Oral two times a day  predniSONE   Tablet 5 milliGRAM(s) Oral daily  sevelamer carbonate 1600 milliGRAM(s) Oral three times a day    MEDICATIONS  (PRN):          ***** VITAL SIGNS:  T(F): 98.5 (10-16-19 @ 10:10), Max: 98.9 (10-15-19 @ 22:52)  HR: 89 (10-16-19 @ 10:10) (74 - 92)  BP: 168/82 (10-16-19 @ 10:10) (145/79 - 180/97)  RR: 18 (10-16-19 @ 10:10) (18 - 18)  SpO2: 97% (10-16-19 @ 10:10) (95% - 98%)  Wt(kg): --  ,   I&O's Summary    15 Oct 2019 07:01  -  16 Oct 2019 07:00  --------------------------------------------------------  IN: 1140 mL / OUT: 0 mL / NET: 1140 mL             *****PHYSICAL EXAM: Alert oriented x 3   Attention comprehension are fair. Able to name, repeat, read without any difficulty.   Able to follow 3 step commands.     EOMI fundi not visualized,  VFF to confrontration  No facial asymmetry   Tongue is midline   Palate elevates symmetrically   Moving all 4 ext symmetrically no pronator drift   Reflexes are symmetric throughout   sensation is grossly symmetric  Gait : not assessed.  B/L down going toes            *****LAB AND IMAGIN.2    7.46  )-----------( 149      ( 16 Oct 2019 10:02 )             23.8               10-16    136  |  94<L>  |  50<H>  ----------------------------<  108<H>  3.4<L>   |  26  |  18.71<H>    Ca    8.7      16 Oct 2019 06:15                           [All pertinent recent Imaging/Reports reviewed]           *****A S S E S S M E N T   A N D   P L A N :   Excerpt from H&P 33 y/o m w/ PMH of ESRD 2/2 FSGS s/p renal transplant in , s/p failure, on peritoneal dialysis p/w elevated blood pressures and headaches, lightheadedness, weakness.  Pt has had poorly controlled blood pressure for several months, and often times has headaches and lightheadedness and weakness associated with elevated blood pressure.  Pt's lisinopril dose was recently increased form 20mg to 40 mg about 2 weeks ago, after findings of higher blood pressure in Renal MD's office.  However, pt's symptoms became worse in the last couple of days, and he noticed his left eye had become bloodshot, with erythema in his left eye.  Pt was advised by his renal attending to come to the hospital for further observation and management of his blood pressures.    In the ED, CT head was performed and showed no acute pathology.     Problem/Recommendations 1: Headaches hypertensive headaches vs. chronic daily headaches vs. hypnic headaches.   reports getting interrupted sleep   headache worse in am ?hypnic headaches suggest sleep study as pt wakes up multiple times  ck co2 in am to eval for co2 retention.   ck orthostatics.   consider mg/riboflavin for headache prophylaxis if ok with renal .   mri/mra/mrv to r/o other causes for headache but pt refused, can do it outpt nonemergently       D/w NP Ann-Marie         Thank you for allowing me to participate in the care of this patient. Please do not hesitate to call me if you have any  questions.        ________________  Elle Connelly MD  [facility] ([facility]  [phone]      33 minutes spent on total encounter; more than 50 % of the visit was  spent counseling about plan of care, compliance to diet/exercise and medication regimen and or  coordinating care by the attending physician.      It is advised that stroke patients follow up with RICHARD Miranda @ [phone] in 1- 2 weeks.   Others please follow up with Dr. Michael Nissenbaum [phone]

## 2019-10-16 NOTE — PROVIDER CONTACT NOTE (OTHER) - SITUATION
Pt is requesting for his fill volume to be 2L instead of 2.5L. He states that it is too much for him to handle.

## 2019-10-16 NOTE — PROGRESS NOTE ADULT - ASSESSMENT
· Assessment	  35 y/o m w/ PMH of ESRD 2/2 FSGS s/p renal transplant in 2004, s/p failure, on peritoneal dialysis p/w uncontrolled HTN and headaches     Problem/Plan -   ·  Problem: Hypertensive urgency.  Plan: Pt's BPs have been uncontrolled, and as per renal admitted for observation and management  - Cardio/Renal f/up noted.  - CTA C/A/P: No dissectio  - Increased hydralazine    = Persistent Headache:  Neuro eval noted.  MRI/MRV brain as out pt.     Problem/Plan -   ·  Problem: ESRD (end stage renal disease) on dialysis.  Plan: Pt w/ ESRD 2/2 FSGS s/p failed renal tx, now on peritoneal dialysis  - cont pt on prednisone, calcitriol, sevelamer  - Renal f/up.    Dw pt's mother.

## 2019-10-17 LAB
ALDOST SERPL-MCNC: 3.8 NG/DL — SIGNIFICANT CHANGE UP
ANION GAP SERPL CALC-SCNC: 15 MMOL/L — SIGNIFICANT CHANGE UP (ref 5–17)
BUN SERPL-MCNC: 48 MG/DL — HIGH (ref 7–23)
CALCIUM SERPL-MCNC: 8.7 MG/DL — SIGNIFICANT CHANGE UP (ref 8.4–10.5)
CHLORIDE SERPL-SCNC: 95 MMOL/L — LOW (ref 96–108)
CO2 SERPL-SCNC: 28 MMOL/L — SIGNIFICANT CHANGE UP (ref 22–31)
CREAT SERPL-MCNC: 18.96 MG/DL — HIGH (ref 0.5–1.3)
GLUCOSE SERPL-MCNC: 109 MG/DL — HIGH (ref 70–99)
HCT VFR BLD CALC: 25.3 % — LOW (ref 39–50)
HGB BLD-MCNC: 8.4 G/DL — LOW (ref 13–17)
MCHC RBC-ENTMCNC: 28.3 PG — SIGNIFICANT CHANGE UP (ref 27–34)
MCHC RBC-ENTMCNC: 33.2 GM/DL — SIGNIFICANT CHANGE UP (ref 32–36)
MCV RBC AUTO: 85.2 FL — SIGNIFICANT CHANGE UP (ref 80–100)
PLATELET # BLD AUTO: 173 K/UL — SIGNIFICANT CHANGE UP (ref 150–400)
POTASSIUM SERPL-MCNC: 3.5 MMOL/L — SIGNIFICANT CHANGE UP (ref 3.5–5.3)
POTASSIUM SERPL-SCNC: 3.5 MMOL/L — SIGNIFICANT CHANGE UP (ref 3.5–5.3)
RBC # BLD: 2.97 M/UL — LOW (ref 4.2–5.8)
RBC # FLD: 15.3 % — HIGH (ref 10.3–14.5)
SODIUM SERPL-SCNC: 138 MMOL/L — SIGNIFICANT CHANGE UP (ref 135–145)
WBC # BLD: 6.03 K/UL — SIGNIFICANT CHANGE UP (ref 3.8–10.5)
WBC # FLD AUTO: 6.03 K/UL — SIGNIFICANT CHANGE UP (ref 3.8–10.5)

## 2019-10-17 PROCEDURE — 71045 X-RAY EXAM CHEST 1 VIEW: CPT | Mod: 26

## 2019-10-17 RX ORDER — HYDRALAZINE HCL 50 MG
100 TABLET ORAL EVERY 8 HOURS
Refills: 0 | Status: DISCONTINUED | OUTPATIENT
Start: 2019-10-17 | End: 2019-10-19

## 2019-10-17 RX ADMIN — Medication 5 MILLIGRAM(S): at 05:28

## 2019-10-17 RX ADMIN — Medication 0.1 MILLIGRAM(S): at 12:01

## 2019-10-17 RX ADMIN — Medication 100 MILLIGRAM(S): at 08:01

## 2019-10-17 RX ADMIN — CALCITRIOL 1 MICROGRAM(S): 0.5 CAPSULE ORAL at 12:01

## 2019-10-17 RX ADMIN — Medication 50 MILLIGRAM(S): at 00:24

## 2019-10-17 RX ADMIN — LISINOPRIL 40 MILLIGRAM(S): 2.5 TABLET ORAL at 05:29

## 2019-10-17 RX ADMIN — AMLODIPINE BESYLATE 10 MILLIGRAM(S): 2.5 TABLET ORAL at 05:28

## 2019-10-17 RX ADMIN — Medication 100 MILLIGRAM(S): at 15:02

## 2019-10-17 RX ADMIN — Medication 1 APPLICATION(S): at 12:01

## 2019-10-17 RX ADMIN — Medication 50 MILLIGRAM(S): at 05:28

## 2019-10-17 NOTE — PROGRESS NOTE ADULT - SUBJECTIVE AND OBJECTIVE BOX
S: Overnight events noted - had SOB/chest pain during dialysis. Denies chest pain, SOB currently, palpitations or dizziness. Review of systems otherwise (-)  	    MEDICATIONS  (STANDING):  amLODIPine   Tablet 10 milliGRAM(s) Oral daily  calcitriol   Capsule 1 MICROGram(s) Oral daily  cloNIDine 0.1 milliGRAM(s) Oral three times a day  gentamicin 0.1% Ointment 1 Application(s) Topical daily  hydrALAZINE 100 milliGRAM(s) Oral every 8 hours  lisinopril 40 milliGRAM(s) Oral daily  metoprolol tartrate 50 milliGRAM(s) Oral two times a day  predniSONE   Tablet 5 milliGRAM(s) Oral daily  sevelamer carbonate 1600 milliGRAM(s) Oral three times a day    MEDICATIONS  (PRN):      LABS:                            8.4    6.03  )-----------( 173      ( 17 Oct 2019 07:46 )             25.3     Hemoglobin: 8.4 g/dL (10-17 @ 07:46)  Hemoglobin: 8.2 g/dL (10-16 @ 10:02)  Hemoglobin: 9.0 g/dL (10-15 @ 08:49)  Hemoglobin: 8.1 g/dL (10-14 @ 08:49)  Hemoglobin: 8.9 g/dL (10-13 @ 01:22)    10-17    138  |  95<L>  |  48<H>  ----------------------------<  109<H>  3.5   |  28  |  18.96<H>    Ca    8.7      17 Oct 2019 06:06      Creatinine Trend: 18.96<--, 18.71<--, 19.44<--, 21.56<--, 22.22<--       10-16-19 @ 07:01  -  10-17-19 @ 07:00  --------------------------------------------------------  IN: 660 mL / OUT: 0 mL / NET: 660 mL    10-17-19 @ 07:01  -  10-17-19 @ 11:32  --------------------------------------------------------  IN: 200 mL / OUT: 0 mL / NET: 200 mL      PHYSICAL EXAM  Vital Signs Last 24 Hrs  T(C): 36.7 (17 Oct 2019 05:38), Max: 36.9 (16 Oct 2019 18:01)  T(F): 98.1 (17 Oct 2019 05:38), Max: 98.4 (16 Oct 2019 18:01)  HR: 86 (17 Oct 2019 06:38) (79 - 93)  BP: 164/99 (17 Oct 2019 06:38) (162/93 - 189/98)  BP(mean): --  RR: 18 (17 Oct 2019 09:00) (18 - 18)  SpO2: 98% (17 Oct 2019 09:00) (96% - 100%)      Gen: Appears well in NAD  HEENT:  (-)icterus (-)pallor  CV: N S1 S2 1/6 KYARA (+)2 Pulses B/l  Resp:  Clear to auscultation B/L, normal effort  GI: (+) BS Soft, NT, ND  Lymph:  (-)Edema, (-)obvious lymphadenopathy  Skin: Warm to touch, Normal turgor  Psych: Appropriate mood and affect      TELEMETRY: NSR 65-90 overnight however noncompliant with tele this AM    ECG:  < from: 12 Lead ECG (10.13.19 @ 04:29) >  NORMAL SINUS RHYTHM  POSSIBLE LEFT ATRIAL ENLARGEMENT  LEFT VENTRICULAR HYPERTROPHY  NONSPECIFIC T WAVE ABNORMALITY  PROLONGED QT  ABNORMAL ECG    < from: Transthoracic Echocardiogram (01.08.18 @ 09:10) >  Conclusions:  1. Normal mitral valve with redundant chordae. Minimal  mitral regurgitation.  2. Normal trileaflet aortic valve. No aortic valve  regurgitation seen.  3. Moderate concentric left ventricular hypertrophy.  4. Normal left ventricular systolic function. No segmental  wallmotion abnormalities.  5. Normal right ventricular size and function.  *** No previous Echo exam.    < end of copied text >    < from: CT Angio Chest w/ IV Cont (10.14.19 @ 12:14) >  IMPRESSION:   No aortic intramural hematoma, aneurysm, or dissection.    Ascites.    < end of copied text >    < from: Transthoracic Echocardiogram (10.15.19 @ 09:28) >  Conclusions:  1. Mild concentric left ventricular hypertrophy.  2. Normal left ventricular systolic function. No segmental  wall motion abnormalities.  3. Normal diastolic function  4. Normal right ventricular size and function.  5. Estimated right ventricular systolic pressure equals 12  mm Hg, assuming right atrial pressure equals 8 mm Hg,  consistent with normal pulmonary pressures.  *** Compared with echocardiogram of 1/8/2018, no  significant changes noted.    < end of copied text >    < from: Nuclear Stress Test-Pharmacologic (10.16.19 @ 15:59) >  IMPRESSIONS:Abnormal Study  * Chest Pain: No chest pain with administration of  Regadenoson.  * Symptom: Flushing.  * HR Response: Appropriate.  * BP Response: Appropriate.  * Heart Rhythm: Sinus Rhythm - 74 BPM.  * Baseline ECG: Nonspecific ST-T wave abnormality.  * ECG Changes: There were less than 0.5 mm horizontal ST  segment depressions with T wave flattening and more  pronounced T wave inversions in leads II, III, aVF, and  V4-V6 starting at 1:00 min following regadenoson infusion  at 106 bpm and persisting 7:00 min in recovery.  * Arrhythmia: Occasional VPDs occurred during recovery.  * Review of raw data shows: Extensive splanchnic uptake  * The left ventricle was enlarged. There are medium-sized,  mild to moderate defects in the inferior and basal  inferoseptal walls that are mostly fixed suggestive of  scar with mild ischemia.  * There are medium-sized, mild to moderate defects in the  basal to mid anterior and basal to mid anteroseptal walls  that are mostly fixed suggestive of scar with mild  ischemia.  * These defects did not correct with prone imaging.  * Post-stress gated wall motion analysis was performed  (LVEF = 52 %;LVEDV = 187 ml.) revealing mild hypokinesis  of the inferior and basal inferoseptal walls and reduced  systolic thickening of the basal to mid anterior and basal  to mid anteroseptal walls.  *** No previous Nuclear/Stress exam.    < end of copied text >      ASSESSMENT/PLAN: 33 y/o m w/ PMH of ESRD 2/2 FSGS s/p renal transplant in 2004, s/p failure, on peritoneal dialysis, HTN who was admitted with elevated BP and chest pain.    -Elevated troponin noted with no sig delta in setting of ESRD with negative CKMB  -Management of HTN per renal and primary team - clonidine added today  -CT negative for dissection  -TTE noted above with normal LV/RV function and no segmental WMA  -Abnormal NST noted above  -Renal f/u - okay with proceeding with cath  -Plan for cardiac cath today - risks/benefits discussed - patient and mother agreeable  -Patient to f/u with Dr. Navarro after discharge    Thad Pruitt PA-C  [facility]  Pager: [phone]

## 2019-10-17 NOTE — CHART NOTE - NSCHARTNOTEFT_GEN_A_CORE
MEDICINE NP    GABE JOSEPH  34y Male    Patient is a 34y old  Male who presents with a chief complaint of elevated blood pressures, headaches, lightheadedness (16 Oct 2019 16:09)       > Event Summary:  Notified by RN, Patient with         -Vital Signs Last 24 Hrs  T(C): 36.8 (16 Oct 2019 21:43), Max: 37.2 (16 Oct 2019 05:30)  T(F): 98.2 (16 Oct 2019 21:43), Max: 98.9 (16 Oct 2019 05:30)  HR: 89 (17 Oct 2019 03:04) (81 - 93)  BP: 162/93 (17 Oct 2019 03:04) (145/84 - 180/84)  RR: 18 (16 Oct 2019 21:43) (18 - 18)  SpO2: 98% (16 Oct 2019 21:43) (96% - 98%)    > Physical Assessment:  General: Awake, No acute distress.   Neurology: A&Ox3, nonfocal  CV: +S1S2, RRR.  No peripheral edema  Respiratory: Even, unlabored.  CTA B/L.    Abdomen:  +BS.  Soft, NT, ND.  No palpable mass  MSK: REYES x4.   Skin: Warm and Dry         > Assessment & Plan:  - HPI:     -Plan:               RONDA Raoy-BC  Medicine Department MEDICINE NP    GABE JOSEPH  34y Male    Patient is a 34y old  Male who presents with a chief complaint of elevated blood pressures, headaches, lightheadedness (16 Oct 2019 16:09)       > Event Summary:  Notified by RN, Patient with c/o SOB with current PD @bedside, states 2/2 fill volume of 2L.  Patient seen at bedside, AAOx3.  Reports SOB chronic and intermittent and positional, worse while on PD.  Reports similar episode @ home 2/2 fill volume of 2L.    Denies chest pain, chest tightness, palpitations.  +Relenting intermittent HA without dizziness or visual changes.      -Vital Signs Last 24 Hrs  T(C): 36.8 (16 Oct 2019 21:43), Max: 37.2 (16 Oct 2019 05:30)  T(F): 98.2 (16 Oct 2019 21:43), Max: 98.9 (16 Oct 2019 05:30)  HR: 89 (17 Oct 2019 03:04) (81 - 93)  BP: 162/93 (17 Oct 2019 03:04) (145/84 - 180/84)  RR: 18 (16 Oct 2019 21:43) (18 - 18)  SpO2: 98% (16 Oct 2019 21:43) (96% - 98%)    > Physical Assessment:  General: Awake, No acute distress.   Neurology: A&Ox3, nonfocal  CV: +S1S2, RRR.  No peripheral edema  Respiratory: Even, unlabored.  CTA B/L.    Abdomen:  +BS.  Soft, NT, ND.  RLL w/ PD site clean/dry/intact.    MSK: REYES x4.   Skin: Warm and Dry         > Assessment & Plan:   HPI: 35 y/o Male w/ PMH of ESRD 2/2 FSGS s/p Renal Transplant in 2004, s/p failure, on peritoneal dialysis, HTN.  Admitted with elevated BP and chest pain.  Now with recurrent SOB.    -Patient reports symptoms chronic which he is attributing to PD fill volume, similar to when at home.    -Currently without distress, able to speak with full sentences and without accessory muscle use.  -C/w O2 3LNC and continuous pulse ox, keep SpO2 >92%  -f/u CXR  -RN team to reach out to Renal /PD Fellow for recommendations  -C/w Telemetry monitoring       RONDA Rayo-BC  Medicine Department  #[id number]          >>>ADDEND:   (10-17-19 @ 05:38)  Notified by RN, /98, Patient re-evaluated @ bedside, in NAD.  Denies new or worsening symptoms from prior above.    -RN has spoken to renal Dr. Boyle and recommended CXR STAT and increasing Hydralazine  -AM BP medications of Norvasc, Zestril, Metoprolol given and will re-evaluate BP in 1hr and treat as indicated  -Hydralazine increased to 100mg PO Q8hrs as per Renal   -Will c/w close monitoring and endorse to day team       T(C): 36.7 (10-17-19 @ 05:38), Max: 36.9 (10-16-19 @ 10:10)  T(F): 98.1 (10-17-19 @ 05:38), Max: 98.5 (10-16-19 @ 10:10)  HR: 91 (10-17-19 @ 05:40) (79 - 93)  BP: 181/110 (10-17-19 @ 05:40) (145/84 - 189/98)  RR: 18 (10-17-19 @ 05:38) (18 - 18)  SpO2: 100% (10-17-19 @ 05:38) (96% - 100%)      RONDA Rayo-BC  Medicine Department  #[id number] MEDICINE NP    GABE JOSEPH  34y Male    Patient is a 34y old  Male who presents with a chief complaint of elevated blood pressures, headaches, lightheadedness (16 Oct 2019 16:09)       > Event Summary:  Notified by RN, Patient with c/o SOB with current PD @bedside, states 2/2 fill volume of 2L.  Patient seen at bedside, AAOx3.  Reports SOB chronic and intermittent and positional, worse while on PD.  Reports similar episode @ home 2/2 fill volume of 2L.    Denies chest pain, chest tightness, palpitations.  +Relenting intermittent HA without dizziness or visual changes.      -Vital Signs Last 24 Hrs  T(C): 36.8 (16 Oct 2019 21:43), Max: 37.2 (16 Oct 2019 05:30)  T(F): 98.2 (16 Oct 2019 21:43), Max: 98.9 (16 Oct 2019 05:30)  HR: 89 (17 Oct 2019 03:04) (81 - 93)  BP: 162/93 (17 Oct 2019 03:04) (145/84 - 180/84)  RR: 18 (16 Oct 2019 21:43) (18 - 18)  SpO2: 98% (16 Oct 2019 21:43) (96% - 98%)    >RADIOLOGY:  < CT Angio Abdomen and Pelvis w/ IV Cont (10.14.19 @ 12:14) >  CHEST:   LUNGS AND LARGE AIRWAYS: Patent central airways. No endobronchial   lesions. A right middle lobe nodule associated with the fissure, likely intrapulmonary lymph node.  PLEURA: No pleural effusion.  VESSELS: No aortic intramural hematoma, aneurysm, or dissection.  HEART: Heart size is normal. No pericardial effusion.  MEDIASTINUM AND BARON: No lymphadenopathy.  CHEST WALL AND LOWER NECK: Within normal limits.    IMPRESSION:   No aortic intramural hematoma, aneurysm, or dissection.  Ascites.  < end of copied text >        > Physical Assessment:  General: Awake, No acute distress.   Neurology: A&Ox3, nonfocal  CV: +S1S2, RRR.  No peripheral edema  Respiratory: Even, unlabored.  CTA B/L.    Abdomen:  +BS.  Soft, NT, ND.  RLL w/ PD site clean/dry/intact.    MSK: REYES x4.   Skin: Warm and Dry         > Assessment & Plan:   HPI: 35 y/o Male w/ PMH of ESRD 2/2 FSGS s/p Renal Transplant in 2004, s/p failure, on peritoneal dialysis, HTN.  Admitted with elevated BP and chest pain.  Now with recurrent SOB.    -Patient reports symptoms chronic which he is attributing to PD fill volume, similar to when at home.    -Currently without distress, able to speak with full sentences and without accessory muscle use.  -C/w O2 3LNC and continuous pulse ox, keep SpO2 >92%  -Recent CTA C/A/P without acute findings.  f/u CXR  -RN team to reach out to Renal /PD Fellow for recommendations  -C/w Telemetry monitoring       Esmer Freire [facility]  Medicine Department  #[id number]          >>>ADDEND:   (10-17-19 @ 05:38)  Notified by RN, /98, Patient re-evaluated @ bedside, in NAD.  Denies new or worsening symptoms from prior above.    -RN has spoken to renal Dr. Boyle and recommended CXR STAT and increasing Hydralazine  -AM BP medications of Norvasc, Zestril, Metoprolol given and will re-evaluate BP in 1hr and treat as indicated  -Hydralazine increased to 100mg PO Q8hrs as per Renal   -Will c/w close monitoring and endorse to day team       T(C): 36.7 (10-17-19 @ 05:38), Max: 36.9 (10-16-19 @ 10:10)  T(F): 98.1 (10-17-19 @ 05:38), Max: 98.5 (10-16-19 @ 10:10)  HR: 91 (10-17-19 @ 05:40) (79 - 93)  BP: 181/110 (10-17-19 @ 05:40) (145/84 - 189/98)  RR: 18 (10-17-19 @ 05:38) (18 - 18)  SpO2: 100% (10-17-19 @ 05:38) (96% - 100%)      Esmer Freire [facility]  Medicine Department  #[id number] MEDICINE NP    GABE JOSEPH  34y Male    Patient is a 34y old  Male who presents with a chief complaint of elevated blood pressures, headaches, lightheadedness (16 Oct 2019 16:09)       > Event Summary:  Notified by RN, Patient with c/o SOB with current PD @bedside, states 2/2 fill volume of 2L.  Patient seen at bedside, AAOx3.  Reports SOB chronic and intermittent and positional, worse while on PD.  Reports similar episode @ home 2/2 fill volume of 2L.    Denies chest pain, chest tightness, palpitations.  +Relenting intermittent HA without dizziness or visual changes.      -Vital Signs Last 24 Hrs  T(C): 36.8 (16 Oct 2019 21:43), Max: 37.2 (16 Oct 2019 05:30)  T(F): 98.2 (16 Oct 2019 21:43), Max: 98.9 (16 Oct 2019 05:30)  HR: 89 (17 Oct 2019 03:04) (81 - 93)  BP: 162/93 (17 Oct 2019 03:04) (145/84 - 180/84)  RR: 18 (16 Oct 2019 21:43) (18 - 18)  SpO2: 98% (16 Oct 2019 21:43) (96% - 98%)    >RADIOLOGY:  < CT Angio Abdomen and Pelvis w/ IV Cont (10.14.19 @ 12:14) >  CHEST:   LUNGS AND LARGE AIRWAYS: Patent central airways. No endobronchial   lesions. A right middle lobe nodule associated with the fissure, likely intrapulmonary lymph node.  PLEURA: No pleural effusion.  VESSELS: No aortic intramural hematoma, aneurysm, or dissection.  HEART: Heart size is normal. No pericardial effusion.  MEDIASTINUM AND BARON: No lymphadenopathy.  CHEST WALL AND LOWER NECK: Within normal limits.    IMPRESSION:   No aortic intramural hematoma, aneurysm, or dissection.  Ascites.  < end of copied text >        > Physical Assessment:  General: Awake, No acute distress.   Neurology: A&Ox3, nonfocal  CV: +S1S2, RRR.  No peripheral edema  Respiratory: Even, unlabored.  CTA B/L.    Abdomen:  +BS.  Soft, NT, ND.  RLL w/ PD site clean/dry/intact.    MSK: REYES x4.   Skin: Warm and Dry       > Assessment & Plan:   HPI: 33 y/o Male w/ PMH of ESRD 2/2 FSGS s/p Renal Transplant in 2004, s/p failure, was on HD now APD, HTN.  Admitted with uncontrolled HTN, and Chest Pain.  Now with recurrent SOB.      -Patient reports symptoms chronic which he is attributing to PD fill volume, similar to when at home.    -Currently without distress, able to speak with full sentences and without accessory muscle use.  -C/w O2 3LNC and continuous pulse ox, keep SpO2 >92%  -Recent CTA C/A/P without acute findings.  f/u CXR  -RN team to reach out to Renal /PD Fellow for recommendations  -C/w Telemetry monitoring       Esmer Freire [facility]  Medicine Department  #[id number]          >>>ADDEND:   (10-17-19 @ 05:38)  Notified by RN, /98, Patient re-evaluated @ bedside, in NAD.  Denies new or worsening symptoms from prior above.    -RN has spoken to renal Dr. Boyle and recommended CXR STAT and increasing Hydralazine  -AM BP medications of Norvasc, Zestril, Metoprolol given and will re-evaluate BP in 1hr and treat as indicated  -Hydralazine increased to 100mg PO Q8hrs as per Renal   -Will c/w close monitoring and endorse to day team       T(C): 36.7 (10-17-19 @ 05:38), Max: 36.9 (10-16-19 @ 10:10)  T(F): 98.1 (10-17-19 @ 05:38), Max: 98.5 (10-16-19 @ 10:10)  HR: 91 (10-17-19 @ 05:40) (79 - 93)  BP: 181/110 (10-17-19 @ 05:40) (145/84 - 189/98)  RR: 18 (10-17-19 @ 05:38) (18 - 18)  SpO2: 100% (10-17-19 @ 05:38) (96% - 100%)      Esmer Freire [facility]  Medicine Department  #[id number]

## 2019-10-17 NOTE — PROGRESS NOTE ADULT - SUBJECTIVE AND OBJECTIVE BOX
Patient seen and examined  still complaining of a headache    No Known Allergies    Hospital Medications:   MEDICATIONS  (STANDING):  amLODIPine   Tablet 10 milliGRAM(s) Oral daily  calcitriol   Capsule 1 MICROGram(s) Oral daily  cloNIDine 0.1 milliGRAM(s) Oral three times a day  gentamicin 0.1% Ointment 1 Application(s) Topical daily  hydrALAZINE 100 milliGRAM(s) Oral every 8 hours  lisinopril 40 milliGRAM(s) Oral daily  metoprolol tartrate 50 milliGRAM(s) Oral two times a day  predniSONE   Tablet 5 milliGRAM(s) Oral daily  sevelamer carbonate 1600 milliGRAM(s) Oral three times a day        VITALS:  T(F): 97.8 (10-17-19 @ 11:57), Max: 98.4 (10-16-19 @ 18:01)  HR: 81 (10-17-19 @ 11:57)  BP: 147/81 (10-17-19 @ 11:57)  RR: 18 (10-17-19 @ 11:57)  SpO2: 97% (10-17-19 @ 11:57)  Wt(kg): --    10-16 @ 07:01  -  10-17 @ 07:00  --------------------------------------------------------  IN: 660 mL / OUT: 0 mL / NET: 660 mL    10-17 @ 07:01  -  10-17 @ 11:57  --------------------------------------------------------  IN: 200 mL / OUT: 0 mL / NET: 200 mL        PHYSICAL EXAM:  Constitutional: NAD  HEENT: anicteric sclera, oropharynx clear, MMM  Neck: No JVD  Respiratory: CTAB, no wheezes, rales or rhonchi  Cardiovascular: S1, S2, RRR  Gastrointestinal: BS+, soft, NT/ND + PD catheter  Extremities: No cyanosis or clubbing. No peripheral edema    LABS:  10-17    138  |  95<L>  |  48<H>  ----------------------------<  109<H>  3.5   |  28  |  18.96<H>    Ca    8.7      17 Oct 2019 06:06      Creatinine Trend: 18.96 <--, 18.71 <--, 19.44 <--, 21.56 <--, 22.22 <--                        8.4    6.03  )-----------( 173      ( 17 Oct 2019 07:46 )             25.3     Urine Studies:      RADIOLOGY & ADDITIONAL STUDIES:

## 2019-10-17 NOTE — PROGRESS NOTE ADULT - ASSESSMENT
· Assessment	  35 y/o m w/ PMH of ESRD 2/2 FSGS s/p renal transplant in 2004, s/p failure, on peritoneal dialysis p/w uncontrolled HTN and headaches    Abnormal stress test:  For C. Cath  Cardio f/up noted.     Problem/Plan -   ·  Problem: Hypertensive urgency.  Plan: Pt's BPs have been uncontrolled, and as per renal admitted for observation and management  - Cardio/Renal f/up noted.  - Increased hydralazine    = Persistent Headache:  Neuro f/up noted.  MRI/MRV brain as out pt.     Problem/Plan -   ·  Problem: ESRD (end stage renal disease) on dialysis.  Plan: Pt w/ ESRD 2/2 FSGS s/p failed renal tx, now on peritoneal dialysis  - cont pt on prednisone, calcitriol, sevelamer  - Renal f/up.    Dw pt's mother.

## 2019-10-17 NOTE — PROVIDER CONTACT NOTE (OTHER) - BACKGROUND
33 yo M with PMH of ESRD 2/2 FSGS s/p renal transplant in 2004, s/p failure, HD x5 months, now on PD who presents to the ED with complaint of elevated blood pressures

## 2019-10-17 NOTE — PROGRESS NOTE ADULT - ASSESSMENT
33 y/o m w/ PMH of ESRD 2/2 FSGS s/p renal transplant in 2004, s/p failure, was on HD, now on peritoneal dialysis, HTN who was admitted for uncontrolled HTN. Renal consulted for ESRD Mx, for PD.     ESRD on APD  w/hyponatremia likely 2/2 dilutional  HTN, uncontrolled  Anemia in CKD-Hb <goal  chest pain     labs, chart reviewed)  c/w PD--cycler overnight w/1 midday exchange using all 2.5% dextrose dialysate. goal to inc uf- changed from 2.5Liters to 2 liter ( should get 5 exchanges with cycler)  c/w renal diet, add fluid restriction 1L/day-d/w pt, mother bedside  c/w BB 50mg bid, Norvasc 10mg qd, Lisinopril 40mg qd  watch BP closely. inc hydralazine 50mg tid to 1000mg tid; added clonidine 0.1mg po tid  c/w renvela, calcitriol  rpt BMP in AM  s/p  epogen [id number] subq x 1- trend hgb  f/u w/cardiology-- abnormal NST- awaiting cardiac cath

## 2019-10-17 NOTE — PROVIDER CONTACT NOTE (OTHER) - BACKGROUND
35 yo M with PMH of ESRD 2/2 FSGS s/p renal transplant in 2004, s/p failure, HD x5 months, now on PD who presents to the ED with complaint of elevated blood pressures.

## 2019-10-18 ENCOUNTER — TRANSCRIPTION ENCOUNTER (OUTPATIENT)
Age: 34
End: 2019-10-18

## 2019-10-18 LAB
ANION GAP SERPL CALC-SCNC: 14 MMOL/L — SIGNIFICANT CHANGE UP (ref 5–17)
BUN SERPL-MCNC: 49 MG/DL — HIGH (ref 7–23)
CALCIUM SERPL-MCNC: 8.6 MG/DL — SIGNIFICANT CHANGE UP (ref 8.4–10.5)
CHLORIDE SERPL-SCNC: 95 MMOL/L — LOW (ref 96–108)
CO2 SERPL-SCNC: 29 MMOL/L — SIGNIFICANT CHANGE UP (ref 22–31)
CREAT SERPL-MCNC: 20.14 MG/DL — HIGH (ref 0.5–1.3)
GLUCOSE SERPL-MCNC: 109 MG/DL — HIGH (ref 70–99)
POTASSIUM SERPL-MCNC: 3.7 MMOL/L — SIGNIFICANT CHANGE UP (ref 3.5–5.3)
POTASSIUM SERPL-SCNC: 3.7 MMOL/L — SIGNIFICANT CHANGE UP (ref 3.5–5.3)
SODIUM SERPL-SCNC: 138 MMOL/L — SIGNIFICANT CHANGE UP (ref 135–145)

## 2019-10-18 PROCEDURE — 93010 ELECTROCARDIOGRAM REPORT: CPT

## 2019-10-18 PROCEDURE — 93458 L HRT ARTERY/VENTRICLE ANGIO: CPT | Mod: 26,GC

## 2019-10-18 RX ORDER — LISINOPRIL 2.5 MG/1
1 TABLET ORAL
Qty: 30 | Refills: 0
Start: 2019-10-18 | End: 2019-11-16

## 2019-10-18 RX ORDER — LISINOPRIL 2.5 MG/1
1 TABLET ORAL
Qty: 0 | Refills: 0 | DISCHARGE

## 2019-10-18 RX ORDER — HYDRALAZINE HCL 50 MG
1 TABLET ORAL
Qty: 90 | Refills: 0
Start: 2019-10-18 | End: 2019-11-16

## 2019-10-18 RX ADMIN — CALCITRIOL 1 MICROGRAM(S): 0.5 CAPSULE ORAL at 12:02

## 2019-10-18 RX ADMIN — SEVELAMER CARBONATE 1600 MILLIGRAM(S): 2400 POWDER, FOR SUSPENSION ORAL at 12:02

## 2019-10-18 RX ADMIN — Medication 0.1 MILLIGRAM(S): at 23:13

## 2019-10-18 RX ADMIN — Medication 50 MILLIGRAM(S): at 17:40

## 2019-10-18 RX ADMIN — LISINOPRIL 40 MILLIGRAM(S): 2.5 TABLET ORAL at 05:58

## 2019-10-18 RX ADMIN — Medication 0.1 MILLIGRAM(S): at 13:50

## 2019-10-18 RX ADMIN — AMLODIPINE BESYLATE 10 MILLIGRAM(S): 2.5 TABLET ORAL at 12:02

## 2019-10-18 RX ADMIN — SEVELAMER CARBONATE 1600 MILLIGRAM(S): 2400 POWDER, FOR SUSPENSION ORAL at 17:40

## 2019-10-18 RX ADMIN — Medication 100 MILLIGRAM(S): at 23:13

## 2019-10-18 RX ADMIN — Medication 5 MILLIGRAM(S): at 05:58

## 2019-10-18 RX ADMIN — Medication 50 MILLIGRAM(S): at 05:59

## 2019-10-18 RX ADMIN — Medication 0.1 MILLIGRAM(S): at 05:59

## 2019-10-18 NOTE — DISCHARGE NOTE PROVIDER - PROVIDER TOKENS
PROVIDER:[TOKEN:[[id number]]],PROVIDER:[TOKEN:[[id number]]] PROVIDER:[TOKEN:[[id number]],FOLLOWUP:[2 weeks]],PROVIDER:[TOKEN:[[id number]],FOLLOWUP:[1 week]],PROVIDER:[TOKEN:[[id number]],FOLLOWUP:[2 weeks]],PROVIDER:[TOKEN:[[id number]],FOLLOWUP:[1 week]]

## 2019-10-18 NOTE — DISCHARGE NOTE PROVIDER - NSDCCAREPROVSEEN_GEN_ALL_CORE_FT
Clark Multani, Meghna Crowell  [facility] Medicine, Advance PracticeTeam  [place], [facility]

## 2019-10-18 NOTE — DISCHARGE NOTE PROVIDER - HOSPITAL COURSE
33 y/o m w/ PMH of ESRD 2/2 FSGS s/p renal transplant in 2004, s/p failure, on peritoneal dialysis p/w uncontrolled HTN and headaches; followed by renal - antihypertensives adjusted; followed by cardiology for CP with elevated troponins; abnormal nuclear stress test; cardiac cath 10/18 33 y/o m w/ PMH of ESRD 2/2 FSGS s/p renal transplant in 2004, s/p failure, on peritoneal dialysis p/w uncontrolled HTN and headaches; followed by renal - antihypertensives adjusted; followed by cardiology for CP with elevated troponins; abnormal nuclear stress test; cardiac cath 10/18 with no flow limitation, irregularities in coronary vessels will be medically managed. 33 y/o m w/ PMH of ESRD 2/2 FSGS s/p renal transplant in 2004, s/p failure, on peritoneal dialysis p/w uncontrolled HTN and headaches; followed by renal - antihypertensives adjusted; followed by cardiology for CP with elevated troponins; abnormal nuclear stress test; cardiac cath 10/18 with no flow limitation, irregularities in coronary vessels will be medically managed. Hypertension better controlled, continue medications and fellow with Nephrologist and primary care provider in 1 week. Medications reviewed with Dr. Segovia who cleared pt for discharge. 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 Hypertension better controlled, continue medications and fellow with Nephrologist and primary care provider in 1 week. Medications reviewed with Dr. Segovia who cleared pt for discharge.    10/19; Pt cleared for d/c by Cardiology & Medicine.  Per Renal Attending Pt c/o lightheadedness while laying flat today ;    if othostatics negative with no dizziness upon walking may be discharged today. Pt. stated has own PD equipment. 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 Hypertension better controlled, continue medications and fellow with Nephrologist and primary care provider in 1 week. Medications reviewed with Dr. Segovia who cleared pt for discharge.    10/19; Pt cleared for d/c by Cardiology & Medicine.  Per Renal Attending Pt c/o lightheadedness while laying flat today ;    if othostatics negative with no dizziness upon walking may be discharged today. Pt. stated has own PD equipment.     Orthostatic negative; no dizziness upon ambulation. Pt. discharged.

## 2019-10-18 NOTE — PROVIDER CONTACT NOTE (OTHER) - ACTION/TREATMENT ORDERED:
pt place on 2L NC
Explained to pt about aseptic technique and risk for infection.
IVPB tylenol 1000mg x1
NO ACTION AT THIS TIME
OK to change fill volume to 2L instead of 2.5L. Will continue to monitor.
Will use 2.5% 2L dianeal as per Dr. Boyle
okay to start PD exchange at this time, serum MG tested and pt supplemented with magnesium, cardiology called
pt reeducated about tele/ and pulse ox monitoring and the reason for it pt inreturn verbalised understanding.but still refused
will continue to monitor

## 2019-10-18 NOTE — DISCHARGE NOTE PROVIDER - NSDCCPCAREPLAN_GEN_ALL_CORE_FT
PRINCIPAL DISCHARGE DIAGNOSIS  Diagnosis: Hypertensive urgency  Assessment and Plan of Treatment: Follow up with your medical doctor to establish long term blood pressure treatment goals. PRINCIPAL DISCHARGE DIAGNOSIS  Diagnosis: Hypertensive urgency  Assessment and Plan of Treatment: Follow up with your medical doctor to establish long term blood pressure treatment goals  Continue Hydralazine, Clonidine and Metoprolol        SECONDARY DISCHARGE DIAGNOSES  Diagnosis: ESRD (end stage renal disease) on dialysis  Assessment and Plan of Treatment: ESRD (end stage renal disease) on dialysis  Continue Hemodialysis  Follow up with your Nephrologist PRINCIPAL DISCHARGE DIAGNOSIS  Diagnosis: Hypertensive urgency  Assessment and Plan of Treatment: with headache & chest pain. pt evaluated by cardiology & neurology as inpatient. Followup outpt neurology for possible MRI/MRA tests.  cardiac cath performed 10/18 via right radial artery; no obstructive disease  Restricted use with no heavy lifting of affected arm for 48 hours.  No submerging the arm in water for 48 hours.  You may start showering today.  Call your doctor for any bleeding, swelling, loss of sensation in the hand or fingers, or fingers turning blue.  If heavy bleeding or large lumps form, hold pressure at the spot and come to the Emergency Room.  Follow up with your medical doctor to establish long term blood pressure treatment goals  Continue Hydralazine, Clonidine and Metoprolol        SECONDARY DISCHARGE DIAGNOSES  Diagnosis: ESRD (end stage renal disease) on dialysis  Assessment and Plan of Treatment: ESRD (end stage renal disease) on dialysis  Continue Hemodialysis  Follow up with your Nephrologist

## 2019-10-18 NOTE — PROGRESS NOTE ADULT - SUBJECTIVE AND OBJECTIVE BOX
Patient seen and examined  bp improving  awaiting cardiac cath    No Known Allergies    Hospital Medications:   MEDICATIONS  (STANDING):  amLODIPine   Tablet 10 milliGRAM(s) Oral daily  calcitriol   Capsule 1 MICROGram(s) Oral daily  cloNIDine 0.1 milliGRAM(s) Oral three times a day  gentamicin 0.1% Ointment 1 Application(s) Topical daily  hydrALAZINE 100 milliGRAM(s) Oral every 8 hours  lisinopril 40 milliGRAM(s) Oral daily  metoprolol tartrate 50 milliGRAM(s) Oral two times a day  predniSONE   Tablet 5 milliGRAM(s) Oral daily  sevelamer carbonate 1600 milliGRAM(s) Oral three times a day      VITALS:  T(F): 98.4 (10-18-19 @ 09:09), Max: 98.4 (10-18-19 @ 05:28)  HR: 89 (10-18-19 @ 09:09)  BP: 136/78 (10-18-19 @ 12:31)  RR: 18 (10-18-19 @ 09:09)  SpO2: 99% (10-18-19 @ 09:09)  Wt(kg): --    10-17 @ 07:01  -  10-18 @ 07:00  --------------------------------------------------------  IN: 200 mL / OUT: 0 mL / NET: 200 mL      PHYSICAL EXAM:  Constitutional: NAD  HEENT: anicteric sclera, oropharynx clear, MMM  Neck: No JVD  Respiratory: CTAB, no wheezes, rales or rhonchi  Cardiovascular: S1, S2, RRR  Gastrointestinal: BS+, soft, NT/ND + PD catheter  Extremities: No cyanosis or clubbing. No peripheral edema    LABS:  10-18    138  |  95<L>  |  49<H>  ----------------------------<  109<H>  3.7   |  29  |  20.14<H>    Ca    8.6      18 Oct 2019 06:46      Creatinine Trend: 20.14 <--, 18.96 <--, 18.71 <--, 19.44 <--, 21.56 <--, 22.22 <--                        8.4    6.03  )-----------( 173      ( 17 Oct 2019 07:46 )             25.3     Urine Studies:      RADIOLOGY & ADDITIONAL STUDIES:

## 2019-10-18 NOTE — DISCHARGE NOTE PROVIDER - CARE PROVIDER_API CALL
Donita Harvey)  Cardiovascular Disease; Internal Medicine  [address] [facility], [address]  [place] [address]  Phone: [phone]  Fax: [phone]  Follow Up Time:     Meghna Boyle)  Internal Medicine  [address] [place] [address]  Phone: [phone]  Fax: [phone]  Follow Up Time: Donita Harvey)  Cardiovascular Disease; Internal Medicine  [address] [facility], [address]  [place] [address]  Phone: [phone]  Fax: [phone]  Follow Up Time: 2 weeks    Meghna Boyle)  Internal Medicine  [address] [place] [address]  Phone: [phone]  Fax: [phone]  Follow Up Time: 1 week    Elle Connelly)  Neurology; Vascular Neurology  [address] [place] [address]  Phone: [phone]  Fax: [phone]  Follow Up Time: 2 weeks    Stone Segovia)  Internal Medicine  [address] [address] [address]  Phone: [phone]  Fax: [phone]  Follow Up Time: 1 week

## 2019-10-18 NOTE — DISCHARGE NOTE NURSING/CASE MANAGEMENT/SOCIAL WORK - PATIENT PORTAL LINK FT
You can access the FollowMyHealth Patient Portal offered by [facility] by registering at the following website: [email or web address]. By joining [employer]’s FollowMyHealth portal, you will also be able to view your health information using other applications (apps) compatible with our system.

## 2019-10-18 NOTE — PROGRESS NOTE ADULT - ASSESSMENT
· Assessment	  35 y/o m w/ PMH of ESRD 2/2 FSGS s/p renal transplant in 2004, s/p failure, on peritoneal dialysis p/w uncontrolled HTN and headaches    Abnormal stress test:  S/p  C. Cath  Cardio f/up noted.     Problem/Plan -   ·  Problem: Hypertensive urgency.  Plan: Pt's BPs have been uncontrolled, and as per renal admitted for observation and management  - Cardio/Renal f/up noted.  -  hydralazine     Persistent Headache:  Neuro f/up noted.  MRI/MRV brain as out pt.     Problem/Plan -   ·  Problem: ESRD (end stage renal disease) on dialysis.  Plan: Pt w/ ESRD 2/2 FSGS s/p failed renal tx, now on peritoneal dialysis  - cont pt on prednisone, calcitriol, sevelamer  - Renal f/up.    Dw pt's mother.

## 2019-10-18 NOTE — PROGRESS NOTE ADULT - ASSESSMENT
33 y/o m w/ PMH of ESRD 2/2 FSGS s/p renal transplant in 2004, s/p failure, was on HD, now on peritoneal dialysis, HTN who was admitted for uncontrolled HTN. Renal consulted for ESRD Mx, for PD.     ESRD on APD  w/hyponatremia likely 2/2 dilutional  HTN, uncontrolled  Anemia in CKD-Hb <goal  chest pain     labs, chart reviewed)  c/w PD--cycler overnight w/1 midday exchange using all 2.5% dextrose dialysate. goal to inc uf- changed from 2.5Liters to 2 liter ( should get 5 exchanges with cycler)  c/w renal diet, add fluid restriction 1L/day-d/w pt, mother bedside  c/w BB 50mg bid, Norvasc 10mg qd, Lisinopril 40mg qd  watch BP closely. c/w hydralazine 100mg tid; c/w clonidine 0.1mg po tid  c/w renvela, calcitriol  rpt BMP in AM   epogen 10,000 subq x 1-today-  trend hgb  f/u w/cardiology-- abnormal NST- awaiting cardiac cath

## 2019-10-18 NOTE — DISCHARGE NOTE PROVIDER - CARE PROVIDERS DIRECT ADDRESSES
,DirectAddress_Unknown,DirectAddress_Unknown ,DirectAddress_Unknown,DirectAddress_Unknown,DirectAddress_Unknown,[email or web address]

## 2019-10-19 VITALS — SYSTOLIC BLOOD PRESSURE: 141 MMHG | HEART RATE: 85 BPM | DIASTOLIC BLOOD PRESSURE: 79 MMHG

## 2019-10-19 LAB
METANEPHRINE, PL: 56 PG/ML — SIGNIFICANT CHANGE UP (ref 0–62)
NORMETANEPHRINE, PL: 176 PG/ML — HIGH (ref 0–145)

## 2019-10-19 RX ADMIN — LISINOPRIL 40 MILLIGRAM(S): 2.5 TABLET ORAL at 06:41

## 2019-10-19 RX ADMIN — Medication 100 MILLIGRAM(S): at 09:19

## 2019-10-19 RX ADMIN — Medication 0.1 MILLIGRAM(S): at 06:38

## 2019-10-19 RX ADMIN — Medication 50 MILLIGRAM(S): at 06:40

## 2019-10-19 RX ADMIN — Medication 5 MILLIGRAM(S): at 06:41

## 2019-10-19 RX ADMIN — SEVELAMER CARBONATE 1600 MILLIGRAM(S): 2400 POWDER, FOR SUSPENSION ORAL at 09:20

## 2019-10-19 NOTE — PROGRESS NOTE ADULT - SUBJECTIVE AND OBJECTIVE BOX
[facility] : Ans Serv [phone], Office [phone]  Dr Greco/Dr Henriquez / Dr Shanel RENTERIA /Dr Eron hay /Dr DEXTER Boyle/Dr Freddy Kunz/Dr Basilio Lucio /Dr COLUMBA Banks  _______________________________________________________________________________________________    seen and examined today for End Stage Renal Disease on Dialysis - Peritoneal Dialysis   Interval : blood pressure higher - improving now  VITALS:  T(F): 98.7 (10-19-19 @ 09:16), Max: 98.9 (10-18-19 @ 19:29)  HR: 85 (10-19-19 @ 10:37)  BP: 141/79 (10-19-19 @ 10:37)  RR: 18 (10-19-19 @ 09:16)  SpO2: 95% (10-19-19 @ 09:16)    10-18 @ 07:01  -  10-19 @ 07:00  --------------------------------------------------------  IN: 570 mL / OUT: 0 mL / NET: 570 mL    Physical Exam :-  Constitutional: NAD  Neck: Supple.  Respiratory: Bilateral equal breath sounds, no Crackles present.  Cardiovascular: S1, S2 normal, positive Murmur  Gastrointestinal: Bowel Sounds present, soft, non tender.  Extremities: no Edema Feet  Neurological: Alert and Oriented x 3, no focal deficits  Psychiatric: Normal mood, normal affect  Data:-  Allergies :   No Known Allergies    Hospital Medications:   MEDICATIONS  (STANDING):  amLODIPine   Tablet 10 milliGRAM(s) Oral daily  calcitriol   Capsule 1 MICROGram(s) Oral daily  cloNIDine 0.1 milliGRAM(s) Oral three times a day  gentamicin 0.1% Ointment 1 Application(s) Topical daily  hydrALAZINE 100 milliGRAM(s) Oral every 8 hours  lisinopril 40 milliGRAM(s) Oral daily  metoprolol tartrate 50 milliGRAM(s) Oral two times a day  predniSONE   Tablet 5 milliGRAM(s) Oral daily  sevelamer carbonate 1600 milliGRAM(s) Oral three times a day    10-18    138  |  95<L>  |  49<H>  ----------------------------<  109<H>  3.7   |  29  |  20.14<H>    Ca    8.6      18 Oct 2019 06:46      Creatinine Trend: 20.14 <--, 18.96 <--, 18.71 <--, 19.44 <--, 21.56 <--, 22.22 <--

## 2019-10-19 NOTE — PROGRESS NOTE ADULT - ATTENDING COMMENTS
CARDIOLOGY ATTENDING    Patient seen and examined. Agree with above. Cath today - confirmed with cath lab charge nurse
Patient seen and examined, agree with above assessment and plan as transcribed above.    - Abnormal stres plan for cath today  - D /W patient and his mother at bedside    Clark Multani MD, [facility]  BEEPER [phone]
Patient seen and examined, agree with above assessment and plan as transcribed above.    - No hematoma or wrist bleeding   - No need for further inpatient cardiac work up.  - D/C per joanna Multani MD, [facility]  BEEPER [phone]
Patient seen and examined.  Agree with above.   -hydralazine increased for better BP control  -CT negative for dissection  -check nst when optimized    Wild Aldana MD
contact with question   cell [phone]  [facility]- [phone]
CARDIOLOGY ATTENDING    Patient seen and examined. Agree with above. Echo unremarkable - awaiting NST. If NST unremarkable then no further inpatient cardiac workup needed.
Patient seen and examined.  Agree with above.   follow up CT scan  if CT scan negative, check NST to evaluate for CAD given chest pain and elevated trop    Wild Aldana MD

## 2019-10-19 NOTE — PROGRESS NOTE ADULT - REASON FOR ADMISSION
elevated blood pressures, headaches, lightheadedness

## 2019-10-19 NOTE — PROGRESS NOTE ADULT - ASSESSMENT
33 y/o m w/ PMH of ESRD 2/2 FSGS s/p renal transplant in 2004, s/p failure, was on HD, now on peritoneal dialysis, HTN who was admitted for uncontrolled HTN. Renal consulted for ESRD Mx, for PD.     ESRD on APD-   HTN, uncontrolled--> improving now, cont current regimen as out pt  Anemia in CKD-Hb <goal  CAD- s/p cardiac cath- mild cad- with risk factor, follow up with cards  dc plan as per team

## 2019-10-19 NOTE — CHART NOTE - NSCHARTNOTEFT_GEN_A_CORE
GABE JOSEPH    Notified by RN last night that patient wants to go home in the morning. vss. F/U with am team.                              John Wilson ANP-BC  Spectralink #[id number]

## 2019-10-23 LAB
CORTICOSTEROID BINDING GLOBULIN RESULT: 1.6 MG/DL — LOW
CORTIS F/TOTAL MFR SERPL: <3.5 % — SIGNIFICANT CHANGE UP
CORTIS SERPL-MCNC: <1 UG/DL — LOW
CORTISOL, FREE RESULT: <0.03 UG/DL — LOW

## 2019-10-24 LAB — RENIN PLAS-CCNC: 0.53 NG/ML/HR — SIGNIFICANT CHANGE UP (ref 0.17–5.38)

## 2019-11-12 PROCEDURE — 78452 HT MUSCLE IMAGE SPECT MULT: CPT

## 2019-11-12 PROCEDURE — 85027 COMPLETE CBC AUTOMATED: CPT

## 2019-11-12 PROCEDURE — 82088 ASSAY OF ALDOSTERONE: CPT

## 2019-11-12 PROCEDURE — 84244 ASSAY OF RENIN: CPT

## 2019-11-12 PROCEDURE — 82553 CREATINE MB FRACTION: CPT

## 2019-11-12 PROCEDURE — 83735 ASSAY OF MAGNESIUM: CPT

## 2019-11-12 PROCEDURE — 82550 ASSAY OF CK (CPK): CPT

## 2019-11-12 PROCEDURE — 93005 ELECTROCARDIOGRAM TRACING: CPT

## 2019-11-12 PROCEDURE — 93017 CV STRESS TEST TRACING ONLY: CPT

## 2019-11-12 PROCEDURE — 93458 L HRT ARTERY/VENTRICLE ANGIO: CPT

## 2019-11-12 PROCEDURE — 70450 CT HEAD/BRAIN W/O DYE: CPT

## 2019-11-12 PROCEDURE — 74174 CTA ABD&PLVS W/CONTRAST: CPT

## 2019-11-12 PROCEDURE — 71275 CT ANGIOGRAPHY CHEST: CPT

## 2019-11-12 PROCEDURE — 82803 BLOOD GASES ANY COMBINATION: CPT

## 2019-11-12 PROCEDURE — 93306 TTE W/DOPPLER COMPLETE: CPT

## 2019-11-12 PROCEDURE — C1887: CPT

## 2019-11-12 PROCEDURE — C1769: CPT

## 2019-11-12 PROCEDURE — 71045 X-RAY EXAM CHEST 1 VIEW: CPT

## 2019-11-12 PROCEDURE — 83835 ASSAY OF METANEPHRINES: CPT

## 2019-11-12 PROCEDURE — C1894: CPT

## 2019-11-12 PROCEDURE — 80048 BASIC METABOLIC PNL TOTAL CA: CPT

## 2019-11-12 PROCEDURE — 80053 COMPREHEN METABOLIC PANEL: CPT

## 2019-11-12 PROCEDURE — 84484 ASSAY OF TROPONIN QUANT: CPT

## 2019-11-12 PROCEDURE — 82533 TOTAL CORTISOL: CPT

## 2019-11-12 PROCEDURE — 99285 EMERGENCY DEPT VISIT HI MDM: CPT

## 2019-11-12 PROCEDURE — A9500: CPT

## 2020-01-17 ENCOUNTER — NON-APPOINTMENT (OUTPATIENT)
Age: 35
End: 2020-01-17

## 2020-01-17 ENCOUNTER — APPOINTMENT (OUTPATIENT)
Dept: OPHTHALMOLOGY | Facility: CLINIC | Age: 35
End: 2020-01-17
Payer: MEDICARE

## 2020-01-17 PROCEDURE — 92012 INTRM OPH EXAM EST PATIENT: CPT

## 2020-01-22 ENCOUNTER — APPOINTMENT (OUTPATIENT)
Dept: ORTHOPEDIC SURGERY | Facility: CLINIC | Age: 35
End: 2020-01-22

## 2020-02-07 ENCOUNTER — APPOINTMENT (OUTPATIENT)
Dept: TRANSPLANT | Facility: CLINIC | Age: 35
End: 2020-02-07
Payer: COMMERCIAL

## 2020-02-07 VITALS
HEART RATE: 76 BPM | OXYGEN SATURATION: 98 % | HEIGHT: 72 IN | RESPIRATION RATE: 14 BRPM | WEIGHT: 160 LBS | DIASTOLIC BLOOD PRESSURE: 84 MMHG | TEMPERATURE: 98.2 F | SYSTOLIC BLOOD PRESSURE: 142 MMHG | BODY MASS INDEX: 21.67 KG/M2

## 2020-02-07 PROCEDURE — 99215 OFFICE O/P EST HI 40 MIN: CPT

## 2020-02-07 RX ORDER — FOLIC ACID/VIT B COMPLEX AND C 0.8 MG
TABLET ORAL
Qty: 90 | Refills: 0 | Status: DISCONTINUED | COMMUNITY
Start: 2018-07-09 | End: 2020-02-07

## 2020-02-07 RX ORDER — OMEPRAZOLE 40 MG/1
40 CAPSULE, DELAYED RELEASE ORAL
Qty: 30 | Refills: 11 | Status: DISCONTINUED | COMMUNITY
Start: 2018-07-09 | End: 2020-02-07

## 2020-02-07 RX ORDER — CLONIDINE HYDROCHLORIDE 0.1 MG/1
0.1 TABLET ORAL
Refills: 0 | Status: ACTIVE | COMMUNITY
Start: 2020-02-07

## 2020-02-07 RX ORDER — CINACALCET HYDROCHLORIDE 30 MG/1
30 TABLET, COATED ORAL
Refills: 0 | Status: ACTIVE | COMMUNITY
Start: 2020-02-07

## 2020-02-07 RX ORDER — LEVOTHYROXINE SODIUM 0.03 MG/1
25 TABLET ORAL
Refills: 0 | Status: ACTIVE | COMMUNITY
Start: 2020-02-07

## 2020-02-07 NOTE — PHYSICAL EXAM
[] : right dorsalis pedis palpable [Normal] : normal [TextBox_25] : PD catheter in situ [TextBox_34] : numbness on plantar aspect of left hallux, no weakness, no visible/palpable abnormality, good cap refill

## 2020-02-07 NOTE — HISTORY OF PRESENT ILLNESS
[de-identified] : annual followup\par 34M\par s/p live donor (mother) transplant 2004\par Failed 14 years later\par Started HD 2018, but poorly tolerated (cramps and chest pain - negative coronary cath), so now on PD\par \par Only complaint - new burning discomfort plantar left hallux \par constant, worse with standing, assoc with decreased sensation\par -known to orthopedic surgeon, MRI yesterday - report pending\par \par Otherwise well\par \par

## 2020-02-07 NOTE — END OF VISIT
[>50% of Time Spent on Counseling for ____] : Greater than 50% of the encounter time was spent on counseling for [unfilled] [Time Spent: ___ minutes] : I have spent [unfilled] minutes of face to face time with the patient [Attestation] : We have discussed with the patient at length the risks and benefits of transplantation. The patient is aware that transplantation is a process that requires a life-long commitment, and that it is a personal choice to proceed with it rather than to remain with alternative treatments such as dialysis.  We discussed the differences in quality of life and patient survival between remaining on dialysis versus receiving a kidney transplant. We also discussed increased benefits of receiving a live donor kidney transplant versus a  donor kidney transplant. We discussed the risks of kidney transplantation in depth. Surgical risks included but were not limited to bleeding, infection, graft thrombosis, ureteral and vascular strictures, delayed graft function, urine leak, the development of fluid collections, cardiac events, damage to native blood vessels and potential need for re operation postoperatively. We also discussed the risks of postoperative immunosuppression including but not limited to increased risk of infection, cancer, weight gain, new onset or worsening of diabetes or hypertension on a temporary or permanent basis, water retention, back pain, constipation, diarrhea, dizziness, headache, joint pain, loss of appetite, nausea, stomach pain or upset, trouble sleeping, vomiting, and/or mental or mood changes were.  The patient also understands that there is a risk of recurrent primary kidney disease in the transplanted organ. We also discussed the risks and benefits of receiving a kidney from a donor with a Kidney Donor Profile Index (KDPI) score greater than 85% including a greater risk of delayed graft function, increased need for dialysis within the first 2-3 weeks after transplant, and statistically lower graft survival at 3 years. We discussed the one-year observed and expected patient and graft survival rates in comparison to the national one-year averages as described in the evaluation consent forms. Patient consent is in the chart. Patient is aware that they may withdraw their consent for transplantation at any time. I have answered all of the patient's questions as well as all questions of those present with the patient.  At the culmination of the patient’s transplant candidacy workup, the patient will be presented to the Multidisciplinary Transplant Selection Committee for a decision whether to proceed with listing and transplantation.

## 2020-02-07 NOTE — REASON FOR VISIT
[Follow-Up] : a follow-up visit for [End-Stage Renal Disease] : end-stage renal disease [Kidney Transplant Evaluation] : kidney transplant evaluation [Parent] : parent

## 2020-02-10 LAB
ABO + RH PNL BLD: NORMAL
ALBUMIN SERPL ELPH-MCNC: 3.8 G/DL
ALP BLD-CCNC: 212 U/L
ALT SERPL-CCNC: 12 U/L
ANION GAP SERPL CALC-SCNC: 22 MMOL/L
AST SERPL-CCNC: 16 U/L
BASOPHILS # BLD AUTO: 0.04 K/UL
BASOPHILS NFR BLD AUTO: 0.8 %
BILIRUB SERPL-MCNC: 0.4 MG/DL
BUN SERPL-MCNC: 72 MG/DL
CALCIUM SERPL-MCNC: 8.9 MG/DL
CHLORIDE SERPL-SCNC: 94 MMOL/L
CMV IGG SERPL QL: >10 U/ML
CMV IGG SERPL-IMP: POSITIVE
CO2 SERPL-SCNC: 24 MMOL/L
CREAT SERPL-MCNC: 22.52 MG/DL
EBV DNA SERPL NAA+PROBE-ACNC: NOT DETECTED IU/ML
EBVPCR LOG: NOT DETECTED LOG10IU/ML
EOSINOPHIL # BLD AUTO: 0.18 K/UL
EOSINOPHIL NFR BLD AUTO: 3.5 %
ESTIMATED AVERAGE GLUCOSE: 97 MG/DL
GLUCOSE SERPL-MCNC: 87 MG/DL
HAV IGM SER QL: NONREACTIVE
HBA1C MFR BLD HPLC: 5 %
HBV CORE IGG+IGM SER QL: NONREACTIVE
HBV SURFACE AB SER QL: REACTIVE
HBV SURFACE AB SERPL IA-ACNC: 215.8 MIU/ML
HBV SURFACE AG SER QL: NONREACTIVE
HCT VFR BLD CALC: 26.9 %
HCV AB SER QL: NONREACTIVE
HCV S/CO RATIO: 0.46 S/CO
HEPATITIS A IGG ANTIBODY: NONREACTIVE
HGB BLD-MCNC: 9 G/DL
HIV1+2 AB SPEC QL IA.RAPID: NONREACTIVE
HSV 1+2 IGG SER IA-IMP: NEGATIVE
HSV 1+2 IGG SER IA-IMP: POSITIVE
HSV1 IGG SER QL: 60.2 INDEX
HSV2 IGG SER QL: 0.1 INDEX
IMM GRANULOCYTES NFR BLD AUTO: 0.2 %
LYMPHOCYTES # BLD AUTO: 1.69 K/UL
LYMPHOCYTES NFR BLD AUTO: 33.3 %
MAGNESIUM SERPL-MCNC: 1.7 MG/DL
MAN DIFF?: NORMAL
MCHC RBC-ENTMCNC: 27.6 PG
MCHC RBC-ENTMCNC: 33.5 GM/DL
MCV RBC AUTO: 82.5 FL
MONOCYTES # BLD AUTO: 0.43 K/UL
MONOCYTES NFR BLD AUTO: 8.5 %
NEUTROPHILS # BLD AUTO: 2.73 K/UL
NEUTROPHILS NFR BLD AUTO: 53.7 %
PHOSPHATE SERPL-MCNC: 6.6 MG/DL
PLATELET # BLD AUTO: 211 K/UL
POTASSIUM SERPL-SCNC: 4.4 MMOL/L
PROT SERPL-MCNC: 6.4 G/DL
RBC # BLD: 3.26 M/UL
RBC # FLD: 16.3 %
RUBV IGG FLD-ACNC: 9.2 INDEX
RUBV IGG SER-IMP: POSITIVE
SODIUM SERPL-SCNC: 140 MMOL/L
T GONDII AB SER-IMP: NEGATIVE
T GONDII IGG SER QL: <3 IU/ML
T PALLIDUM AB SER QL IA: NEGATIVE
VZV AB TITR SER: POSITIVE
VZV IGG SER IF-ACNC: 3142 INDEX
WBC # FLD AUTO: 5.08 K/UL

## 2020-02-12 LAB
M TB IFN-G BLD-IMP: NEGATIVE
QUANTIFERON TB PLUS MITOGEN MINUS NIL: 2.76 IU/ML
QUANTIFERON TB PLUS NIL: 0.01 IU/ML
QUANTIFERON TB PLUS TB1 MINUS NIL: 0 IU/ML
QUANTIFERON TB PLUS TB2 MINUS NIL: 0 IU/ML

## 2020-02-18 LAB — ISOAGGLUTININ TITER, ANTI-A: 16

## 2020-03-07 NOTE — PATIENT PROFILE ADULT - NAME OF PERSON WHO ASSISTED
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  Supra therapeutic INR in patient with PE history    Secondary Diagnoses:  Past Medical History:   Diagnosis Date   • Arthritis     LS stenosis   • Blood clot associated with vein wall inflammation    • Cerebral infarction (CMS/HCC)    • Chronic pain     back   • Diastolic dysfunction    • Fracture     compression fx L1   • Gout    • High cholesterol    • Hypertension    • Insomnia    • Pulmonary embolism (CMS/HCC) 2017   • Stroke (CMS/HCC) 12/2017      HPI from 3/4:  \"Patient appears to be a poor historian on exam and has had noted memory problems in the past  Abisai Lorenzo is a 69 year old male with past medical history of PE (on coumadin), diastolic heart failure, hyperlipidemia, hypertension who presented to the ED due to abnormal labs. Apparently patient was getting his INR checked at [facility] when he was noted to have a high INR of near 8 so was sent to the ER. Patient states that only recent symptoms have been weakness as well as lightheadedness that have been ongoing for the past few weeks. He has been compliant with his lasix. He denies any recent excessive use of NSAIDs. He states that he has been having adequate PO fluid as well as food intake. He denies any recent diarrhea. No recent fevers, chills, cough, shortness of breath or chest pain. 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 Also as his BP has been low in 90s while off of all of these medications will also stop amlodipine. The patient is to followup with his PCP for BP control and to reassess renal function as may need to be restarted on antihypertensives if BP rises. Patient also having services set up to help with wound changes    Supra therapeutic INR in patient with PE history  - gave PO vit K on admission and then 3/5 and now as no longer supratherapeutic pharmacy gave an extra dose prior to discharge and otherwise recommended home dose of warfarin on [place] 3/8/20 and to have INR checked on Tuesday 3/10/20     Diastolic heart failure- not in exacerbation, HTN, HLD  - as appeared dry held lasix and given the severity of KASH will discharge on 40 mg instead of the 80 mg patient was taking  - continue metoprolol and statin but hold amlodipine HCTZ, ACE inhibitor given fact that BP was systolic 90s for most part in hospital while off of these medications and HCTZ, ACE inhibitor or nephrotoxic. Patient to followup with PCP for BP control and if elevated at that time may need to restart one of these       Consults   IP Consult Orders (From admission, onward)     Start     Ordered    03/04/20 2052  Inpatient consult to Nephrology  ONE TIME     Provider:  Erin Blas MD    03/04/20 2052                Procedures performed Us Renal Complete (complete Urinary System)    Result Date: 3/5/2020  INTERPRETATION LOCATION: [facility] PROCEDURE: US RENAL COMPLETE (COMPLETE URINARY SYSTEM) DATE: 03/05/2020 07:58 COMPARISONS: None. CLINICAL INDICATION: 69 years Male KASH TECHNIQUE: Grayscale and color Doppler technique were utilized to evaluate the retroperitoneum. FINDINGS: KIDNEYS: The right kidney  measures 11.1 cm. The left kidney measures 10.5 cm. There is no hydronephrosis. A few small bilateral renal cysts are noted. No solid-appearing renal masses are identified. BLADDER: The bladder is partially filled. ?     IMPRESSION: Small bilateral renal cysts. No hydronephrosis. Signed by: Maxim Mesa     Xr Chest Ap Or Pa - Portable    Result Date: 3/4/2020  INTERPRETATION LOCATION: [facility] PROCEDURE: XR CHEST AP OR PA DATE: 03/04/2020 15:08 COMPARISONS: Prior chest x-rays CLINICAL INDICATION/ORDERING DIAGNOSES: hypotensive No Clinical Info FINDINGS: The heart size is normal. There is no pleural effusions. There is no pneumothorax. There is no focal areas of consolidation. There is some mild linear scarring in the right midlung.     IMPRESSION: 1. There is no focal areas of consolidation. Signed by: Oscar Mack     Ct Head Level 1    Result Date: 3/4/2020  INTERPRETATION LOCATION: [facility] PROCEDURE:  CT HEAD LEVEL 1 DATE: 03/04/2020 15:16 COMPARISONS: Head CT dated December 12, 2018 TECHNIQUE: Axial scans were obtained from the skull base through the calvarium. Individualized dose optimization technique was used for the procedure performed. Total exam DLP: 1322.9 mGy-cm CLINICAL INDICATION/ORDERING DIAGNOSES: Altered mental status (AMS), unclear cause No Clinical Info FINDINGS: The mastoid air cells are not opacified. There is mild mucosal thickening in the sphenoid sinuses. There is no intracranial hemorrhage. There is no midline shift. The basilar cisterns are present. The fourth ventricle is present. The some calcification along the interhemispheric fissure. There is some mild prominence of the CSF spaces ventral to the frontal lobes. This is stable.     IMPRESSION: 1. There is no intracranial hemorrhage or mass effect in the brain parenchyma. Report sent to the emergency room at the time of dictation. Signed by: Oscar Mack       Discharge Exam    Blood  pressure 112/71, pulse (!) 55, temperature 98.5 °F (36.9 °C), temperature source Oral, resp. rate 20, height 5' 6\" (1.676 m), weight 113.1 kg, SpO2 94 %.    GENERAL: Patient in no acute distress.  LUNGS: Bilateral air entry equal. No wheezing or rales. No increased work of breathing or accessory muscle usage  CARDIAC: Regular rate and rhythm, normal S1 and S2 heard. No R/G/M.  ABDOMEN: Soft, non tender and non distended. BS audible.  EXTREMITIES: No pedal edema. Pulses palpable.    Activity: As tolerated    Diet: Cardiac    Code Status: Full Resuscitation    Pending issues to be followed up by PCP    Followup of renal function, followup of BP control    New Medications Started During Hospitalization: Lasix dose reduced    Medications Discontinued During Hospitalization:amlodipine, HZTA and lisinopril    Discharge Medication List:       Summary of your Discharge Medications      Take these Medications      Details   allopurinol 100 MG tablet  Commonly known as:  ZYLOPRIM   Take 100 mg by mouth daily.     clotrimazole-betamethasone 1-0.05 % cream  Commonly known as:  LOTRISONE   Apply 1 application topically 3 times daily as needed. Indications: RASH     furosemide 40 MG tablet  Commonly known as:  LASIX   Take 1 tablet by mouth daily. Indications: Edema     MEDIHONEY WOUND/BURN DRESSING EX   Apply 1 application topically 2 times daily as needed. Indications: RASH     metoPROLOL succinate 25 MG 24 hr tablet  Commonly known as:  TOPROL-XL   Take 25 mg by mouth daily.     simvastatin 20 MG tablet  Commonly known as:  ZOCOR   Take 20 mg by mouth daily.     traZODone 50 MG tablet  Commonly known as:  DESYREL   Take 50 mg by mouth nightly as needed.     warfarin 2.5 MG tablet  Commonly known as:  COUMADIN  Notes to patient:  Tomorrow 3/8/2020   Take 2.5 mg by mouth daily. (as directed by [facility])             Disposition: patient is being discharged to home with services    Follow-up with:    Ting WHITE  MD Braeden  [address]  [facility] [address]  [phone]    Go on 3/11/2020  at 1:00pm      Time spent on discharge was greater than 30 minutes    Signed:    Kana Venegas MD  3/7/2020  9:37 AM   Tricia A

## 2020-03-24 ENCOUNTER — APPOINTMENT (OUTPATIENT)
Dept: NEPHROLOGY | Facility: CLINIC | Age: 35
End: 2020-03-24

## 2020-07-24 NOTE — PATIENT PROFILE ADULT. - MENTAL HEALTH CONDITIONS/SYMPTOMS, PROFILE
Routing refill request to provider for review/approval because:  Drug not on the G refill protocol     Gabapentin  Last Written Prescription Date:  4/13/2020  Last Fill Quantity: 720,  # refills: 0   Last office visit: 6/26/2020 with prescribing provider:  scherling   Future Office Visit:      Requested Prescriptions   Pending Prescriptions Disp Refills     gabapentin (NEURONTIN) 300 MG capsule [Pharmacy Med Name: Gabapentin 300 MG Oral Capsule] 720 capsule 0     Sig: TAKE 4 CAPSULES BY MOUTH TWICE DAILY       There is no refill protocol information for this order        Tammi Singh RN on 7/24/2020 at 1:24 PM    
none

## 2020-08-03 NOTE — ED PROVIDER NOTE - MUSCULOSKELETAL NEGATIVE STATEMENT, MLM
Possible Skilled Nursing Facilty (SNF)/Possible Home
no back pain, no gout, no musculoskeletal pain, no neck pain, and no weakness.

## 2020-10-09 ENCOUNTER — APPOINTMENT (OUTPATIENT)
Dept: ULTRASOUND IMAGING | Facility: IMAGING CENTER | Age: 35
End: 2020-10-09

## 2020-10-09 ENCOUNTER — APPOINTMENT (OUTPATIENT)
Dept: RADIOLOGY | Facility: IMAGING CENTER | Age: 35
End: 2020-10-09

## 2020-10-10 ENCOUNTER — INPATIENT (INPATIENT)
Facility: HOSPITAL | Age: 35
LOS: 3 days | Discharge: HOME | DRG: 919 | End: 2020-10-14
Attending: INTERNAL MEDICINE | Admitting: INTERNAL MEDICINE
Payer: MEDICARE

## 2020-10-10 VITALS
DIASTOLIC BLOOD PRESSURE: 96 MMHG | OXYGEN SATURATION: 98 % | WEIGHT: 149.91 LBS | HEART RATE: 73 BPM | SYSTOLIC BLOOD PRESSURE: 142 MMHG | TEMPERATURE: 97 F | HEIGHT: 72 IN | RESPIRATION RATE: 18 BRPM

## 2020-10-10 DIAGNOSIS — E03.9 HYPOTHYROIDISM, UNSPECIFIED: ICD-10-CM

## 2020-10-10 DIAGNOSIS — Z94.0 KIDNEY TRANSPLANT STATUS: ICD-10-CM

## 2020-10-10 DIAGNOSIS — T85.71XA INFECTION AND INFLAMMATORY REACTION DUE TO PERITONEAL DIALYSIS CATHETER, INITIAL ENCOUNTER: ICD-10-CM

## 2020-10-10 DIAGNOSIS — Z02.9 ENCOUNTER FOR ADMINISTRATIVE EXAMINATIONS, UNSPECIFIED: ICD-10-CM

## 2020-10-10 DIAGNOSIS — Z29.9 ENCOUNTER FOR PROPHYLACTIC MEASURES, UNSPECIFIED: ICD-10-CM

## 2020-10-10 DIAGNOSIS — N18.9 CHRONIC KIDNEY DISEASE, UNSPECIFIED: ICD-10-CM

## 2020-10-10 DIAGNOSIS — I10 ESSENTIAL (PRIMARY) HYPERTENSION: ICD-10-CM

## 2020-10-10 LAB
ALBUMIN SERPL ELPH-MCNC: 3.6 G/DL — SIGNIFICANT CHANGE UP (ref 3.3–5)
ALP SERPL-CCNC: 88 U/L — SIGNIFICANT CHANGE UP (ref 40–120)
ALT FLD-CCNC: 30 U/L — SIGNIFICANT CHANGE UP (ref 10–45)
ANION GAP SERPL CALC-SCNC: 15 MMOL/L — SIGNIFICANT CHANGE UP (ref 5–17)
AST SERPL-CCNC: 28 U/L — SIGNIFICANT CHANGE UP (ref 10–40)
BASOPHILS # BLD AUTO: 0.03 K/UL — SIGNIFICANT CHANGE UP (ref 0–0.2)
BASOPHILS NFR BLD AUTO: 0.5 % — SIGNIFICANT CHANGE UP (ref 0–2)
BILIRUB SERPL-MCNC: 0.4 MG/DL — SIGNIFICANT CHANGE UP (ref 0.2–1.2)
BUN SERPL-MCNC: 54 MG/DL — HIGH (ref 7–23)
CALCIUM SERPL-MCNC: 9.7 MG/DL — SIGNIFICANT CHANGE UP (ref 8.4–10.5)
CHLORIDE SERPL-SCNC: 94 MMOL/L — LOW (ref 96–108)
CO2 SERPL-SCNC: 26 MMOL/L — SIGNIFICANT CHANGE UP (ref 22–31)
CREAT SERPL-MCNC: 17.94 MG/DL — HIGH (ref 0.5–1.3)
EOSINOPHIL # BLD AUTO: 0.24 K/UL — SIGNIFICANT CHANGE UP (ref 0–0.5)
EOSINOPHIL NFR BLD AUTO: 4.1 % — SIGNIFICANT CHANGE UP (ref 0–6)
GLUCOSE SERPL-MCNC: 113 MG/DL — HIGH (ref 70–99)
HCT VFR BLD CALC: 37.7 % — LOW (ref 39–50)
HGB BLD-MCNC: 12.7 G/DL — LOW (ref 13–17)
IMM GRANULOCYTES NFR BLD AUTO: 0.2 % — SIGNIFICANT CHANGE UP (ref 0–1.5)
LYMPHOCYTES # BLD AUTO: 1.4 K/UL — SIGNIFICANT CHANGE UP (ref 1–3.3)
LYMPHOCYTES # BLD AUTO: 23.9 % — SIGNIFICANT CHANGE UP (ref 13–44)
MCHC RBC-ENTMCNC: 29.8 PG — SIGNIFICANT CHANGE UP (ref 27–34)
MCHC RBC-ENTMCNC: 33.7 GM/DL — SIGNIFICANT CHANGE UP (ref 32–36)
MCV RBC AUTO: 88.5 FL — SIGNIFICANT CHANGE UP (ref 80–100)
MONOCYTES # BLD AUTO: 0.5 K/UL — SIGNIFICANT CHANGE UP (ref 0–0.9)
MONOCYTES NFR BLD AUTO: 8.5 % — SIGNIFICANT CHANGE UP (ref 2–14)
NEUTROPHILS # BLD AUTO: 3.69 K/UL — SIGNIFICANT CHANGE UP (ref 1.8–7.4)
NEUTROPHILS NFR BLD AUTO: 62.8 % — SIGNIFICANT CHANGE UP (ref 43–77)
NRBC # BLD: 0 /100 WBCS — SIGNIFICANT CHANGE UP (ref 0–0)
PLATELET # BLD AUTO: 190 K/UL — SIGNIFICANT CHANGE UP (ref 150–400)
POTASSIUM SERPL-MCNC: 4 MMOL/L — SIGNIFICANT CHANGE UP (ref 3.5–5.3)
POTASSIUM SERPL-SCNC: 4 MMOL/L — SIGNIFICANT CHANGE UP (ref 3.5–5.3)
PROT SERPL-MCNC: 7.1 G/DL — SIGNIFICANT CHANGE UP (ref 6–8.3)
RBC # BLD: 4.26 M/UL — SIGNIFICANT CHANGE UP (ref 4.2–5.8)
RBC # FLD: 14 % — SIGNIFICANT CHANGE UP (ref 10.3–14.5)
SARS-COV-2 RNA SPEC QL NAA+PROBE: SIGNIFICANT CHANGE UP
SODIUM SERPL-SCNC: 135 MMOL/L — SIGNIFICANT CHANGE UP (ref 135–145)
WBC # BLD: 5.87 K/UL — SIGNIFICANT CHANGE UP (ref 3.8–10.5)
WBC # FLD AUTO: 5.87 K/UL — SIGNIFICANT CHANGE UP (ref 3.8–10.5)

## 2020-10-10 PROCEDURE — 99223 1ST HOSP IP/OBS HIGH 75: CPT | Mod: GC

## 2020-10-10 PROCEDURE — 99285 EMERGENCY DEPT VISIT HI MDM: CPT | Mod: CS

## 2020-10-10 RX ORDER — METOPROLOL TARTRATE 50 MG
1 TABLET ORAL
Qty: 0 | Refills: 0 | DISCHARGE

## 2020-10-10 RX ORDER — CALCITRIOL 0.5 UG/1
0.25 CAPSULE ORAL
Refills: 0 | Status: DISCONTINUED | OUTPATIENT
Start: 2020-10-10 | End: 2020-10-14

## 2020-10-10 RX ORDER — CINACALCET 30 MG/1
60 TABLET, FILM COATED ORAL DAILY
Refills: 0 | Status: DISCONTINUED | OUTPATIENT
Start: 2020-10-11 | End: 2020-10-14

## 2020-10-10 RX ORDER — HEPARIN SODIUM 5000 [USP'U]/ML
5000 INJECTION INTRAVENOUS; SUBCUTANEOUS EVERY 12 HOURS
Refills: 0 | Status: DISCONTINUED | OUTPATIENT
Start: 2020-10-11 | End: 2020-10-14

## 2020-10-10 RX ORDER — SEVELAMER CARBONATE 2400 MG/1
800 POWDER, FOR SUSPENSION ORAL
Refills: 0 | Status: DISCONTINUED | OUTPATIENT
Start: 2020-10-11 | End: 2020-10-14

## 2020-10-10 RX ORDER — GENTAMICIN SULFATE 0.1 %
1 OINTMENT (GRAM) TOPICAL DAILY
Refills: 0 | Status: DISCONTINUED | OUTPATIENT
Start: 2020-10-10 | End: 2020-10-14

## 2020-10-10 RX ORDER — METOPROLOL TARTRATE 50 MG
100 TABLET ORAL DAILY
Refills: 0 | Status: DISCONTINUED | OUTPATIENT
Start: 2020-10-11 | End: 2020-10-14

## 2020-10-10 RX ORDER — CALCITRIOL 0.5 UG/1
2 CAPSULE ORAL
Qty: 0 | Refills: 0 | DISCHARGE

## 2020-10-10 RX ORDER — AMLODIPINE BESYLATE 2.5 MG/1
10 TABLET ORAL DAILY
Refills: 0 | Status: DISCONTINUED | OUTPATIENT
Start: 2020-10-11 | End: 2020-10-14

## 2020-10-10 RX ORDER — MEROPENEM 1 G/30ML
1000 INJECTION INTRAVENOUS ONCE
Refills: 0 | Status: DISCONTINUED | OUTPATIENT
Start: 2020-10-10 | End: 2020-10-11

## 2020-10-10 RX ORDER — ERGOCALCIFEROL 1.25 MG/1
50000 CAPSULE ORAL
Refills: 0 | Status: DISCONTINUED | OUTPATIENT
Start: 2020-10-10 | End: 2020-10-14

## 2020-10-10 RX ORDER — LIDOCAINE AND PRILOCAINE CREAM 25; 25 MG/G; MG/G
1 CREAM TOPICAL
Refills: 0 | Status: DISCONTINUED | OUTPATIENT
Start: 2020-10-10 | End: 2020-10-14

## 2020-10-10 RX ORDER — ERTAPENEM SODIUM 1 G/1
1000 INJECTION, POWDER, LYOPHILIZED, FOR SOLUTION INTRAMUSCULAR; INTRAVENOUS ONCE
Refills: 0 | Status: COMPLETED | OUTPATIENT
Start: 2020-10-10 | End: 2020-10-10

## 2020-10-10 RX ORDER — LEVOTHYROXINE SODIUM 125 MCG
25 TABLET ORAL DAILY
Refills: 0 | Status: DISCONTINUED | OUTPATIENT
Start: 2020-10-11 | End: 2020-10-14

## 2020-10-10 RX ORDER — SEVELAMER CARBONATE 2400 MG/1
800 POWDER, FOR SUSPENSION ORAL ONCE
Refills: 0 | Status: DISCONTINUED | OUTPATIENT
Start: 2020-10-10 | End: 2020-10-14

## 2020-10-10 RX ADMIN — Medication 0.1 MILLIGRAM(S): at 21:51

## 2020-10-10 RX ADMIN — ERTAPENEM SODIUM 120 MILLIGRAM(S): 1 INJECTION, POWDER, LYOPHILIZED, FOR SOLUTION INTRAMUSCULAR; INTRAVENOUS at 16:34

## 2020-10-10 NOTE — ED PROVIDER NOTE - ATTENDING CONTRIBUTION TO CARE
Attending MD Bright:   I personally have seen and examined this patient.  Physician assistant note reviewed and agree on plan of care and except where noted.  See HPI, PE, and MDM for details.

## 2020-10-10 NOTE — PROVIDER CONTACT NOTE (OTHER) - ACTION/TREATMENT ORDERED:
MD says order is for manual PD, 6hr dwell 2L of dextrose 1.5% overnight. MD says he will clarify order tomorrow.

## 2020-10-10 NOTE — H&P ADULT - NSHPPHYSICALEXAM_GEN_ALL_CORE
Vital Signs Last 24 Hrs  T(C): 36.8 (10 Oct 2020 17:52), Max: 36.9 (10 Oct 2020 15:41)  T(F): 98.2 (10 Oct 2020 17:52), Max: 98.5 (10 Oct 2020 15:41)  HR: 68 (10 Oct 2020 17:52) (68 - 73)  BP: 142/93 (10 Oct 2020 17:52) (142/93 - 142/98)  BP(mean): --  RR: 18 (10 Oct 2020 17:52) (18 - 18)  SpO2: 99% (10 Oct 2020 17:52) (98% - 99%)    PHYSICAL EXAM: PENDING  GENERAL: NAD, well-groomed, well-developed  EYES: EOMI, PERRLA, conjunctiva and sclera clear  ENMT: No tonsillar erythema, exudates, or enlargement; Moist mucous membranes  NECK: Supple, No JVD, Normal thyroid  CHEST/LUNG: Clear to auscultation bilaterally; No crackles, rhonchi, wheezing, or rubs  HEART: Regular rate and rhythm; No murmurs, rubs, or gallops  ABDOMEN: Soft, Nontender, Nondistended; Bowel sounds present  EXTREMITIES:  2+ Peripheral Pulses, No clubbing, cyanosis, or edema  LYMPH: No lymphadenopathy noted  SKIN: No rashes or lesions  NERVOUS SYSTEM:  Alert & Oriented X3, Good concentration; Motor Strength 5/5 B/L upper and lower extremities; DTRs 2+ intact and symmetric   PSYCH: nl affect, responding appropriately Vital Signs Last 24 Hrs  T(C): 36.8 (10 Oct 2020 17:52), Max: 36.9 (10 Oct 2020 15:41)  T(F): 98.2 (10 Oct 2020 17:52), Max: 98.5 (10 Oct 2020 15:41)  HR: 68 (10 Oct 2020 17:52) (68 - 73)  BP: 142/93 (10 Oct 2020 17:52) (142/93 - 142/98)  BP(mean): --  RR: 18 (10 Oct 2020 17:52) (18 - 18)  SpO2: 99% (10 Oct 2020 17:52) (98% - 99%)    PHYSICAL EXAM:   GENERAL: NAD, well-groomed, well-developed  EYES: EOMI, PERRLA, conjunctiva and sclera clear  NECK: Supple  CHEST/LUNG: Clear to auscultation bilaterally; No crackles, rhonchi, wheezing, or rubs  HEART: Regular rate and rhythm; No murmurs, rubs, or gallops  ABDOMEN: Soft, Nontender, Nondistended; Bowel sounds present  LLQ peritoneal dialysis cath site clean dry and intact.  EXTREMITIES:  2+ Peripheral Pulses, No clubbing, cyanosis, or edema  LYMPH: No lymphadenopathy noted  SKIN: No rashes or lesions  NERVOUS SYSTEM:  Alert & Oriented X3, Good concentration  PSYCH: nl affect, responding appropriately Vital Signs Last 24 Hrs  T(C): 36.8 (10 Oct 2020 17:52), Max: 36.9 (10 Oct 2020 15:41)  T(F): 98.2 (10 Oct 2020 17:52), Max: 98.5 (10 Oct 2020 15:41)  HR: 68 (10 Oct 2020 17:52) (68 - 73)  BP: 142/93 (10 Oct 2020 17:52) (142/93 - 142/98)  BP(mean): --  RR: 18 (10 Oct 2020 17:52) (18 - 18)  SpO2: 99% (10 Oct 2020 17:52) (98% - 99%)    PHYSICAL EXAM:   GENERAL: NAD, well-groomed, well-developed  EYES: EOMI, PERRLA, conjunctiva and sclera clear  NECK: Supple  CHEST/LUNG: Clear to auscultation bilaterally; No crackles, rhonchi, wheezing, or rubs  HEART: Regular rate and rhythm; No murmurs, rubs, or gallops  ABDOMEN: Soft, Nontender, Nondistended; Bowel sounds present  RLQ peritoneal dialysis cath site clean dry and intact.  EXTREMITIES:  2+ Peripheral Pulses, No clubbing, cyanosis, or edema  LYMPH: No lymphadenopathy noted  SKIN: No rashes or lesions  NERVOUS SYSTEM:  Alert & Oriented X3, Good concentration  PSYCH: nl affect, responding appropriately Vital Signs Last 24 Hrs  T(C): 36.8 (10 Oct 2020 17:52), Max: 36.9 (10 Oct 2020 15:41)  T(F): 98.2 (10 Oct 2020 17:52), Max: 98.5 (10 Oct 2020 15:41)  HR: 68 (10 Oct 2020 17:52) (68 - 73)  BP: 142/93 (10 Oct 2020 17:52) (142/93 - 142/98)  BP(mean): --  RR: 18 (10 Oct 2020 17:52) (18 - 18)  SpO2: 99% (10 Oct 2020 17:52) (98% - 99%)    PHYSICAL EXAM:   GENERAL: NAD, well-groomed, well-developed  EYES: EOMI, PERRLA, conjunctiva and sclera clear  NECK: Supple no jvd  CHEST/LUNG: Clear to auscultation bilaterally; No crackles, rhonchi, wheezing, or rubs  HEART: Regular rate and rhythm; No murmurs, rubs, or gallops no sig LE edema.  ABDOMEN: Soft, Nontender, Nondistended; Bowel sounds present  RLQ peritoneal dialysis cath site clean dry and intact.  EXTREMITIES:  2+ Peripheral Pulses, No clubbing, cyanosis, or edema  LYMPH: No lymphadenopathy noted, no lymphangitis  SKIN: No rashes or lesions, no petechiae  NERVOUS SYSTEM:  Alert & Oriented X3, Good concentration motor grossly intact sensation grossly intact no facial droop or dysarthria.   PSYCH: nl affect, responding appropriately no si no hi

## 2020-10-10 NOTE — H&P ADULT - HISTORY OF PRESENT ILLNESS
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    Patient was being followed by nephrology outpatient for bacterial peritoneal fluid infection, treated with ceftazidime, cefepime and vancomycin. Peritoneal fluid culture resulted today with ESBL Klebsiella, with sensitivity positive for ertapenem. Presented to ED for IV antibiotics. ROS neg for f/c, CP, SOB, abd pain, n/v/d.

## 2020-10-10 NOTE — H&P ADULT - PROBLEM SELECTOR PLAN 2
With graft failure. On daily prednisone. Nephrology aware of admission.  - c/w With graft failure. On daily prednisone. Nephrology aware of admission.  - c/w PD qd With graft failure. On daily prednisone. Nephrology Dr. Vickers aware of admission.  - c/w PD qd With graft failure. Nephrology Dr. Vickers aware of admission.  - c/w PD qd With graft failure. Nephrology Dr. Vickers aware of admission.  - c/w PD qd  -pt states he is no longer on prednisone or other immunosuppressives.

## 2020-10-10 NOTE — H&P ADULT - ATTENDING COMMENTS
Pt seen and examined at bedside.  I have precepted this case with house staff and agree with resident note above and have edited it where appropriate.  PD patient admitted for peritonitis based on o/p dialysate cultures growing ESBL kleb.  pt is nontoxic appearing, afebrile, HD stable, and without leukocytosis. Abd nontender. c/w IV carbapenem.  PD per renal.   Care to be assumed by Dr. Segovia at 8 am.  This patient was assigned to me by the hospitalist in charge; my involvement in this case has consisted of the initial history, physical, chart review, and management plan.  This patient was previously unknown to me.

## 2020-10-10 NOTE — H&P ADULT - PROBLEM SELECTOR PLAN 3
renal tx c/b graft failure, on peritoneal dialysis. Nephrology aware of admission.   - c/w PD renal tx c/b graft failure, on peritoneal dialysis. Nephrology aware of admission.   - c/w PD qd renal tx c/b graft failure, on peritoneal dialysis. Nephrology aware of admission.   - c/w PD qd  - c/w sevelamer, c/w vit D, c/w cinacalcet. Renal tx c/b graft failure, on peritoneal dialysis. Nephrology aware of admission.   - c/w PD qd  - c/w sevelamer, c/w vit D, c/w cinacalcet.

## 2020-10-10 NOTE — H&P ADULT - NSHPLABSRESULTS_GEN_ALL_CORE
10-10    135  |  94<L>  |  54<H>  ----------------------------<  113<H>  4.0   |  26  |  17.94<H>    Ca    9.7      10 Oct 2020 16:31    TPro  7.1  /  Alb  3.6  /  TBili  0.4  /  DBili  x   /  AST  28  /  ALT  30  /  AlkPhos  88  10-10                                              12.7   5.87  )-----------( 190      ( 10 Oct 2020 16:31 )             37.7     CAPILLARY BLOOD GLUCOSE 10-10    135  |  94<L>  |  54<H>  ----------------------------<  113<H>  4.0   |  26  |  17.94<H>    Ca    9.7      10 Oct 2020 16:31    TPro  7.1  /  Alb  3.6  /  TBili  0.4  /  DBili  x   /  AST  28  /  ALT  30  /  AlkPhos  88  10-10                          12.7   5.87  )-----------( 190      ( 10 Oct 2020 16:31 )             37.7     CAPILLARY BLOOD GLUCOSE

## 2020-10-10 NOTE — H&P ADULT - NSHPREVIEWOFSYSTEMS_GEN_ALL_CORE
CONSTITUTIONAL: No weakness, fevers or chills  EYES/ENT: No visual changes;  No vertigo or throat pain   NECK: No pain or stiffness  RESPIRATORY: No cough, wheezing, hemoptysis; No shortness of breath  CARDIOVASCULAR: No chest pain or palpitations  GASTROINTESTINAL: No abdominal or epigastric pain. No nausea, vomiting, or hematemesis; No diarrhea or constipation. No melena or hematochezia.  GENITOURINARY: No dysuria, frequency or hematuria  NEUROLOGICAL: No numbness or weakness  SKIN: No itching, burning, rashes, or lesions   PSYCH: no hx depression, no hx anxiety

## 2020-10-10 NOTE — ED PROVIDER NOTE - CLINICAL SUMMARY MEDICAL DECISION MAKING FREE TEXT BOX
Attending MD Bright: 33 y/o m w/ PMH of ESRD 2/2 FSGS s/p renal transplant in 2004, s/p failure, on peritoneal dialysis presenting with peritoneal fluid infection that has failed out patient treatment. Patient was on tazicef, vancomycin and cefepime but today cultures grew ESBL klebisiella sensitive to ertapenem. Patient not demonstrating septic physiology at this time. Plan for IV ertrapenem, nephro consult, admission

## 2020-10-10 NOTE — H&P ADULT - PROBLEM SELECTOR PLAN 1
# Outpatient peritoneal cx now with ESBL Klebsiella, sensitive to Ertapenem.  - s/p ertapenem 1 g in ED  - C/w ertapenem 1 g q24h, next dose 10/11 @ 4 pm  - Trend WBC, fever curve, s/s infection. # Outpatient peritoneal cx now with ESBL Klebsiella, sensitive to Ertapenem.  - s/p ertapenem 1 g in ED  - C/w ertapenem 1 g q24h, next dose 10/11 @ 4 pm  - F/u bld cx x 2 in lab  - F/u peritoneal fluid studies to be collected  - Trend WBC, fever curve, s/s infection. # Outpatient peritoneal cx now with ESBL Klebsiella, sensitive to Ertapenem. No leukocytosis or fever. Not SIRS or sepsis.  - s/p ertapenem 1 g in ED  - C/w ertapenem 1 g q24h, next dose 10/11 @ 4 pm  - F/u bld cx x 2 in lab  - F/u peritoneal fluid studies to be collected  - Trend WBC, fever curve, s/s infection. # Outpatient peritoneal cx now with ESBL Klebsiella, sensitive to Ertapenem. No leukocytosis or fever. Not SIRS or sepsis.  - s/p ertapenem 1 g in ED  - C/w ertapenem 1 g q24h, next dose 10/11 @ 4 pm  - F/u bld cx x 2 in lab, f/u original peritoneal cx with nephrology  - F/u peritoneal fluid studies to be collected  - Trend WBC, fever curve, s/s infection.

## 2020-10-10 NOTE — H&P ADULT - ASSESSMENT
33 yo M with hx ESRD 2/2 FSGS s/p renal transplant (2004) c/b graft failure on peritoneal dialysis and HTN, who presented due to bacteria found on outpatient peritoneal fluid culture, admitted for IV antibiotics for ESBL Klebsiella bacterial peritonitis.

## 2020-10-10 NOTE — ED ADULT NURSE NOTE - OBJECTIVE STATEMENT
34M to ED c/o infection in peritoneal dialysis catheter/fluid. Patient was told to come to the ED for admission and IV ABx. Patient is alert and oriented x4. Patient denies pain, fever, SOB, chest pain. Patient has 20 gauge IV to R FA.

## 2020-10-10 NOTE — ED PROVIDER NOTE - CARE PLAN
Principal Discharge DX:	Peritoneal fluid abnormality   Principal Discharge DX:	Peritonitis associated with peritoneal dialysis, initial encounter

## 2020-10-10 NOTE — H&P ADULT - PROBLEM SELECTOR PLAN 5
DVT ppx: heparin SQ  Diet: DASH/renal diet  Dispo: pending resolution of peritonitis - c/w Synthroid - c/w Synthroid 25 mcg qd

## 2020-10-10 NOTE — H&P ADULT - NSHPSOCIALHISTORY_GEN_ALL_CORE
Marital Status:  (   )    (   ) Single    (   )    (  )   Occupation:   Lives with: (  ) alone  (  ) children   (  ) spouse   (  ) parents  (  ) other    Substance Use (street drugs): (  ) never used  (  ) other:  Tobacco Usage:  (   ) never smoked   (   ) former smoker   (   ) current smoker  (     ) pack year  (        ) last cigarette date  Alcohol Usage: Marital Status:  (   )    (   ) Single    (   )    (  )   Lives with: (  ) alone  (  ) children   (  ) spouse   (  ) parents  (  ) other  Substance Use (street drugs): (  ) never used  (  ) other:  Tobacco Usage:  (   ) never smoked   (   ) former smoker   (   ) current smoker  (     ) pack year  (        ) last cigarette date  Alcohol Usage:

## 2020-10-10 NOTE — ED PROVIDER NOTE - PROGRESS NOTE DETAILS
Spoke to Dr Vickers [phone] will follow patient as nephrologist, Spoke to Dr. Boyle [phone] who states patient should be admitted to Dr. Segovia [phone]. Dr. Segovia states admit under his service, cbc, cmp, blood culture x2 and start ertapenem 1 gram. ---Oskar Crockett

## 2020-10-10 NOTE — ED PROVIDER NOTE - OBJECTIVE STATEMENT
35 y/o m w/ PMH of ESRD 2/2 FSGS s/p renal transplant in 2004, s/p failure, on peritoneal dialysis presenting with peritoneal fluid infection that has failed out patient treatment. Patient was on tazicef, vancomycin and cefepime but today cultures grew ESBL klebisiella sensitive to ertapenem.  Patient was told by Dr. Vickers (nephrologist) and Dr Boyle (primary care) to come to the emergency room for iv antibiotics and admission.  Denies fever, chills, nausea, vomiting shortness of breath, chest pain, or leg swelling.

## 2020-10-10 NOTE — H&P ADULT - PROBLEM SELECTOR PLAN 6
Transitions of Care Status:  1.  Name of PCP:  2.  PCP Contacted on Admission: [ ] Y    [ ] N    3.  PCP contacted at Discharge: [ ] Y    [ ] N    [ ] N/A  4.  Post-Discharge Appointment Date and Location:  5.  Summary of Handoff given to PCP: DVT ppx: heparin SQ  Diet: DASH/renal diet low phosp  Dispo: pending resolution of peritonitis

## 2020-10-10 NOTE — H&P ADULT - PROBLEM SELECTOR PLAN 4
2/2 CKD. On amlodipine, clonidine and hydralazine at home.  - C/w clonidine 2/2 CKD. On amlodipine, clonidine and Toprol at home.  - C/w with meds above.

## 2020-10-11 LAB
ANION GAP SERPL CALC-SCNC: 17 MMOL/L — SIGNIFICANT CHANGE UP (ref 5–17)
B PERT IGG+IGM PNL SER: CLEAR — SIGNIFICANT CHANGE UP
BASOPHILS # BLD AUTO: 0.03 K/UL — SIGNIFICANT CHANGE UP (ref 0–0.2)
BASOPHILS NFR BLD AUTO: 0.5 % — SIGNIFICANT CHANGE UP (ref 0–2)
BUN SERPL-MCNC: 62 MG/DL — HIGH (ref 7–23)
CALCIUM SERPL-MCNC: 9.5 MG/DL — SIGNIFICANT CHANGE UP (ref 8.4–10.5)
CHLORIDE SERPL-SCNC: 95 MMOL/L — LOW (ref 96–108)
CO2 SERPL-SCNC: 23 MMOL/L — SIGNIFICANT CHANGE UP (ref 22–31)
COLOR FLD: SIGNIFICANT CHANGE UP
CREAT SERPL-MCNC: 18.6 MG/DL — HIGH (ref 0.5–1.3)
EOSINOPHIL # BLD AUTO: 0.33 K/UL — SIGNIFICANT CHANGE UP (ref 0–0.5)
EOSINOPHIL # FLD: 2 % — SIGNIFICANT CHANGE UP
EOSINOPHIL NFR BLD AUTO: 5.4 % — SIGNIFICANT CHANGE UP (ref 0–6)
FLUID INTAKE SUBSTANCE CLASS: SIGNIFICANT CHANGE UP
FLUID SEGMENTED GRANULOCYTES: 2 % — SIGNIFICANT CHANGE UP
GLUCOSE SERPL-MCNC: 82 MG/DL — SIGNIFICANT CHANGE UP (ref 70–99)
GRAM STN FLD: SIGNIFICANT CHANGE UP
HCT VFR BLD CALC: 34.9 % — LOW (ref 39–50)
HGB BLD-MCNC: 12.4 G/DL — LOW (ref 13–17)
IMM GRANULOCYTES NFR BLD AUTO: 0.2 % — SIGNIFICANT CHANGE UP (ref 0–1.5)
LYMPHOCYTES # BLD AUTO: 2.15 K/UL — SIGNIFICANT CHANGE UP (ref 1–3.3)
LYMPHOCYTES # BLD AUTO: 35.3 % — SIGNIFICANT CHANGE UP (ref 13–44)
LYMPHOCYTES # FLD: 59 % — SIGNIFICANT CHANGE UP
MAGNESIUM SERPL-MCNC: 1.6 MG/DL — SIGNIFICANT CHANGE UP (ref 1.6–2.6)
MCHC RBC-ENTMCNC: 31.3 PG — SIGNIFICANT CHANGE UP (ref 27–34)
MCHC RBC-ENTMCNC: 35.5 GM/DL — SIGNIFICANT CHANGE UP (ref 32–36)
MCV RBC AUTO: 88.1 FL — SIGNIFICANT CHANGE UP (ref 80–100)
MONOCYTES # BLD AUTO: 0.51 K/UL — SIGNIFICANT CHANGE UP (ref 0–0.9)
MONOCYTES NFR BLD AUTO: 8.4 % — SIGNIFICANT CHANGE UP (ref 2–14)
MONOS+MACROS # FLD: 36 % — SIGNIFICANT CHANGE UP
NEUTROPHILS # BLD AUTO: 3.06 K/UL — SIGNIFICANT CHANGE UP (ref 1.8–7.4)
NEUTROPHILS NFR BLD AUTO: 50.2 % — SIGNIFICANT CHANGE UP (ref 43–77)
NIGHT BLUE STAIN TISS: SIGNIFICANT CHANGE UP
NRBC # BLD: 0 /100 WBCS — SIGNIFICANT CHANGE UP (ref 0–0)
NRBC # FLD: 3 % — HIGH (ref 0–0)
OTHER CELLS FLD MANUAL: 1 % — SIGNIFICANT CHANGE UP
PHOSPHATE SERPL-MCNC: 5.8 MG/DL — HIGH (ref 2.5–4.5)
PLATELET # BLD AUTO: 163 K/UL — SIGNIFICANT CHANGE UP (ref 150–400)
POTASSIUM SERPL-MCNC: 4.1 MMOL/L — SIGNIFICANT CHANGE UP (ref 3.5–5.3)
POTASSIUM SERPL-SCNC: 4.1 MMOL/L — SIGNIFICANT CHANGE UP (ref 3.5–5.3)
RBC # BLD: 3.96 M/UL — LOW (ref 4.2–5.8)
RBC # FLD: 13.8 % — SIGNIFICANT CHANGE UP (ref 10.3–14.5)
RCV VOL RI: 9 /UL — HIGH (ref 0–0)
SARS-COV-2 IGG SERPL QL IA: NEGATIVE — SIGNIFICANT CHANGE UP
SARS-COV-2 IGM SERPL IA-ACNC: 0.09 INDEX — SIGNIFICANT CHANGE UP
SODIUM SERPL-SCNC: 135 MMOL/L — SIGNIFICANT CHANGE UP (ref 135–145)
SPECIMEN SOURCE: SIGNIFICANT CHANGE UP
SPECIMEN SOURCE: SIGNIFICANT CHANGE UP
TOTAL NUCLEATED CELL COUNT, BODY FLUID: 31 /UL — SIGNIFICANT CHANGE UP
TUBE TYPE: SIGNIFICANT CHANGE UP
WBC # BLD: 6.09 K/UL — SIGNIFICANT CHANGE UP (ref 3.8–10.5)
WBC # FLD AUTO: 6.09 K/UL — SIGNIFICANT CHANGE UP (ref 3.8–10.5)

## 2020-10-11 PROCEDURE — 99222 1ST HOSP IP/OBS MODERATE 55: CPT

## 2020-10-11 RX ORDER — ERTAPENEM SODIUM 1 G/1
500 INJECTION, POWDER, LYOPHILIZED, FOR SOLUTION INTRAMUSCULAR; INTRAVENOUS EVERY 24 HOURS
Refills: 0 | Status: DISCONTINUED | OUTPATIENT
Start: 2020-10-11 | End: 2020-10-14

## 2020-10-11 RX ORDER — CHLORHEXIDINE GLUCONATE 213 G/1000ML
1 SOLUTION TOPICAL DAILY
Refills: 0 | Status: DISCONTINUED | OUTPATIENT
Start: 2020-10-11 | End: 2020-10-12

## 2020-10-11 RX ADMIN — Medication 100 MILLIGRAM(S): at 06:34

## 2020-10-11 RX ADMIN — Medication 1 APPLICATION(S): at 12:51

## 2020-10-11 RX ADMIN — HEPARIN SODIUM 5000 UNIT(S): 5000 INJECTION INTRAVENOUS; SUBCUTANEOUS at 18:00

## 2020-10-11 RX ADMIN — AMLODIPINE BESYLATE 10 MILLIGRAM(S): 2.5 TABLET ORAL at 06:34

## 2020-10-11 RX ADMIN — CINACALCET 60 MILLIGRAM(S): 30 TABLET, FILM COATED ORAL at 12:52

## 2020-10-11 RX ADMIN — Medication 25 MICROGRAM(S): at 06:34

## 2020-10-11 RX ADMIN — Medication 0.1 MILLIGRAM(S): at 13:53

## 2020-10-11 RX ADMIN — HEPARIN SODIUM 5000 UNIT(S): 5000 INJECTION INTRAVENOUS; SUBCUTANEOUS at 06:34

## 2020-10-11 RX ADMIN — SEVELAMER CARBONATE 800 MILLIGRAM(S): 2400 POWDER, FOR SUSPENSION ORAL at 12:52

## 2020-10-11 RX ADMIN — SEVELAMER CARBONATE 800 MILLIGRAM(S): 2400 POWDER, FOR SUSPENSION ORAL at 08:55

## 2020-10-11 RX ADMIN — Medication 0.1 MILLIGRAM(S): at 06:34

## 2020-10-11 RX ADMIN — Medication 0.1 MILLIGRAM(S): at 19:25

## 2020-10-11 RX ADMIN — ERTAPENEM SODIUM 100 MILLIGRAM(S): 1 INJECTION, POWDER, LYOPHILIZED, FOR SOLUTION INTRAMUSCULAR; INTRAVENOUS at 19:25

## 2020-10-11 RX ADMIN — Medication 1 TABLET(S): at 12:51

## 2020-10-11 RX ADMIN — SEVELAMER CARBONATE 800 MILLIGRAM(S): 2400 POWDER, FOR SUSPENSION ORAL at 18:00

## 2020-10-11 NOTE — PROGRESS NOTE ADULT - SUBJECTIVE AND OBJECTIVE BOX
Patient is a 34y old  Male who presents with a chief complaint of bacteria in peritoneal fluid (11 Oct 2020 11:01)      SUBJECTIVE / OVERNIGHT EVENTS:    Events noted.  CONSTITUTIONAL: No fever,  or fatigue  RESPIRATORY: No cough, wheezing,  No shortness of breath  CARDIOVASCULAR: No chest pain, palpitations, dizziness, or leg swelling  GASTROINTESTINAL: No abdominal or epigastric pain. No nausea, vomiting.  NEUROLOGICAL: No headaches,     MEDICATIONS  (STANDING):  amLODIPine   Tablet 10 milliGRAM(s) Oral daily  calcitriol   Capsule 0.25 MICROGram(s) Oral <User Schedule>  chlorhexidine 4% Liquid 1 Application(s) Topical daily  cinacalcet 60 milliGRAM(s) Oral daily  cloNIDine 0.1 milliGRAM(s) Oral three times a day  ergocalciferol 68551 Unit(s) Oral <User Schedule>  ertapenem  IVPB 500 milliGRAM(s) IV Intermittent every 24 hours  gentamicin 0.1% Ointment 1 Application(s) Topical daily  heparin   Injectable 5000 Unit(s) SubCutaneous every 12 hours  levothyroxine 25 MICROGram(s) Oral daily  lidocaine/prilocaine Cream 1 Application(s) Topical <User Schedule>  metoprolol succinate  milliGRAM(s) Oral daily  Nephro-kenneth 1 Tablet(s) Oral daily  sevelamer carbonate 800 milliGRAM(s) Oral once  sevelamer carbonate 800 milliGRAM(s) Oral three times a day with meals    MEDICATIONS  (PRN):        CAPILLARY BLOOD GLUCOSE        I&O's Summary    10 Oct 2020 07:01  -  11 Oct 2020 07:00  --------------------------------------------------------  IN: 240 mL / OUT: 0 mL / NET: 240 mL    11 Oct 2020 07:01  -  11 Oct 2020 22:07  --------------------------------------------------------  IN: 840 mL / OUT: 0 mL / NET: 840 mL        PHYSICAL EXAM:  GENERAL: NAD  NECK: Supple, No JVD  CHEST/LUNG: Clear to auscultation bilaterally; No wheezing.  HEART: Regular rate and rhythm; No murmurs, rubs, or gallops  ABDOMEN: Soft, Nontender, Nondistended; Bowel sounds present  EXTREMITIES:   No edema  NEUROLOGY: AAO X 3      LABS:                        12.4   6.09  )-----------( 163      ( 11 Oct 2020 05:53 )             34.9     10-11    135  |  95<L>  |  62<H>  ----------------------------<  82  4.1   |  23  |  18.60<H>    Ca    9.5      11 Oct 2020 05:53  Phos  5.8     10-11  Mg     1.6     10-11    TPro  7.1  /  Alb  3.6  /  TBili  0.4  /  DBili  x   /  AST  28  /  ALT  30  /  AlkPhos  88  10-10            CAPILLARY BLOOD GLUCOSE        10-11 @ 04:03  Culture-urine --  Culture results --  method type --  Organism --  Organism Identification --  Specimen source .Smear Other  10-10 @ 18:59  Culture-urine --  Culture results   No growth to date.  method type --  Organism --  Organism Identification --  Specimen source .Blood Blood      10-11 @ 04:03  Culture-CSF --  Culture results --  Gram stain   polymorphonuclear leukocytes seen  No organisms seen  by cytocentrifuge  Gram stain spinal fluid --  Method type --  Organism --  Organism identification --  Specimen source .Smear Other       10-11 @ 04:03  Culture blood --  Culture results --  Gram stain   polymorphonuclear leukocytes seen  No organisms seen  by cytocentrifuge  Gram stain blood --  Method type --  Organism --  Organism identification --  Specimen source .Smear Other   10-10 @ 18:59  Culture blood --  Culture results   No growth to date.  Gram stain --  Gram stain blood --  Method type --  Organism --  Organism identification --  Specimen source .Blood Blood      RADIOLOGY & ADDITIONAL TESTS:    Imaging Personally Reviewed:    Consultant(s) Notes Reviewed:      Care Discussed with Consultants/Other Providers:    Stone Segovia MD, CMD, FACP    [id number] [id number]  Office Tel: [phone]  Cell: [phone]

## 2020-10-11 NOTE — PROGRESS NOTE ADULT - PROBLEM SELECTOR PLAN 3
Renal tx c/b graft failure, on peritoneal dialysis.   - c/w PD qd  - c/w sevelamer, c/w vit D, c/w cinacalcet.

## 2020-10-11 NOTE — CHART NOTE - NSCHARTNOTEFT_GEN_A_CORE
Asked by RN to evaluate Pt's  high BP  and to speak to mom about PD fluids   Pt seen and assessed , denied dizziness , HA , chest pain , chest pressure , N/V , changes in Vision or any transient weakness   speech clear thought process coherent ,   Reviewed  BP meds  and readings with DR Najera , recommended for clonidine 0.1 mg Po x 1 stat   Repeat BP in 1 hr   Reassured Mom about PD Fluids   will sign out to night covering team   ruy luo [id number]

## 2020-10-11 NOTE — CONSULT NOTE ADULT - PROBLEM SELECTOR RECOMMENDATION 9
Culture with ESBL Kleb sensitive to carbapenem, Quinolones and bactrim  CAPD ordered  Appreciate Infectious disease specialist recs

## 2020-10-11 NOTE — CONSULT NOTE ADULT - ASSESSMENT
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
34 year old male with ESRD secondary to FSGS s/p renal transplant 2004 with graft failure on PD, HTN presenting with positive culture from peritoneal fluid.    Patient states abd pain started about two weeks ago, improved with outpatient abx, but then returned. Currently without abd pain. Outpatient peritoneal cx from 10/5 with ESBL Kleb pneumo.    Recommend:  #Peritonitis associated with PD catheter with ESBL Kleb  -Continue ertapenem 500 mg IV q 24 hours  -F/U blood cultures  -F/U peritoneal fluid cultures - bacterial, fungal, afb    #Transplanted kidney, FSGS  -Continue PD    Jai Zepeda MD  Pager [phone]  After 5pm/weekends call [phone]    Discussed plan with primary team.

## 2020-10-11 NOTE — CONSULT NOTE ADULT - SUBJECTIVE AND OBJECTIVE BOX
[facility] - KEAGAN Henriquez / KEAGAN Ugarte / GARY Greco/ KEAGAN Boyle/ KEAGAN Kunz/ FLAVIO Lucio / DIANA Banks / TIMMY Vickers  -------------------------------------------------------------------------------------------------------  The patient seen and examined today.  HPI:  35 yo M with hx ESRD 2/2 FSGS s/p renal transplant (2004) c/b graft failure on peritoneal dialysis and HTN, who presented due to bacteria found on peritoneal fluid culture.  Patient had cloudy PD fluid 3 weeks back and for the last 2 weeks had received Abx (ceftazidime, then cefepime and vancomycin) Peritoneal fluid culture resulted yesterday (draw on 10/5/20) with ESBL Klebsiella, with sensitivity positive for ertapenem. Presented to ED for IV antibiotics. ROS neg for f/c, CP, SOB, abd pain, n/v/d.       PAST MEDICAL & SURGICAL HISTORY:  Dialysis complication    CKD (chronic kidney disease), stage 4 (severe)    Hypertension    Glomerulonephritis    S/P kidney transplant  Right kidney in 2004      Allergies :- No Known Allergies    Home Medications Reviewed  Hospital Medications:   MEDICATIONS  (STANDING):  amLODIPine   Tablet 10 milliGRAM(s) Oral daily  calcitriol   Capsule 0.25 MICROGram(s) Oral <User Schedule>  cinacalcet 60 milliGRAM(s) Oral daily  cloNIDine 0.1 milliGRAM(s) Oral three times a day  ergocalciferol 94202 Unit(s) Oral <User Schedule>  gentamicin 0.1% Ointment 1 Application(s) Topical daily  heparin   Injectable 5000 Unit(s) SubCutaneous every 12 hours  levothyroxine 25 MICROGram(s) Oral daily  lidocaine/prilocaine Cream 1 Application(s) Topical <User Schedule>  meropenem  IVPB 1000 milliGRAM(s) IV Intermittent once  metoprolol succinate  milliGRAM(s) Oral daily  Nephro-kenneth 1 Tablet(s) Oral daily  sevelamer carbonate 800 milliGRAM(s) Oral once  sevelamer carbonate 800 milliGRAM(s) Oral three times a day with meals    SOCIAL HISTORY:  Denies ETOh,Smoking,   FAMILY HISTORY:  Family history of glomerulonephritis (Uncle)        REVIEW OF SYSTEMS:  CONSTITUTIONAL: No weakness, fevers or chills  EYES/ENT: No visual changes;  No vertigo or throat pain   NECK: No pain or stiffness  RESPIRATORY: No cough, wheezing, hemoptysis; No shortness of breath  CARDIOVASCULAR: No chest pain or palpitations.  GASTROINTESTINAL: previously cloudy PD fluid  GENITOURINARY: No dysuria, frequency, foamy urine, urinary urgency, incontinence or hematuria  NEUROLOGICAL: No numbness or weakness  SKIN: No itching, burning, rashes, or lesions   VASCULAR: No bilateral lower extremity edema.   All other review of systems is negative unless indicated above.    VITALS:  T(F): 97.8 (10-11-20 @ 06:05), Max: 99.2 (10-10-20 @ 21:50)  HR: 61 (10-11-20 @ 06:05)  BP: 160/93 (10-11-20 @ 06:05)  RR: 18 (10-11-20 @ 06:05)  SpO2: 99% (10-11-20 @ 06:05)  Wt(kg): --    10-10 @ 07:01  -  10-11 @ 07:00  --------------------------------------------------------  IN: 240 mL / OUT: 0 mL / NET: 240 mL      Height (cm): 182.9 (10-10 @ 19:25)  Weight (kg): 72.2 (10-10 @ 19:25)  BMI (kg/m2): 21.6 (10-10 @ 19:25)  BSA (m2): 1.93 (10-10 @ 19:25)    PHYSICAL EXAM:  Constitutional: NAD  HEENT: anicteric sclera, oropharynx clear, MMM  Neck: supple.   Respiratory: Bilateral equal breath sounds , no wheezes, no crackles  Cardiovascular: S1, S2, Regular, Murmur present.  Gastrointestinal: Bowel Sound present, soft, NT/ND  Extremities: No cyanosis or clubbing. No peripheral edema  Neurological: Alert and oriented x 3, no focal deficits  Psychiatric: Normal mood, normal affect  : No CVA tenderness. No fuller.   Skin: No rashes    Data:  10-11    135  |  95<L>  |  62<H>  ----------------------------<  82  4.1   |  23  |  18.60<H>    Ca    9.5      11 Oct 2020 05:53  Phos  5.8     10-11  Mg     1.6     10-11    TPro  7.1  /  Alb  3.6  /  TBili  0.4  /  DBili      /  AST  28  /  ALT  30  /  AlkPhos  88  10-10    Creatinine Trend: 18.60 <--, 17.94 <--                        12.4   6.09  )-----------( 163      ( 11 Oct 2020 05:53 )             34.9     Urine Studies:

## 2020-10-11 NOTE — PROVIDER CONTACT NOTE (MEDICATION) - SITUATION
Pt due for clonidine @ 22:00 but received a dose @ 1900 for high BP, SBP dropped 30 points. Pt will be receiving @ 00:00.

## 2020-10-11 NOTE — PROGRESS NOTE ADULT - PROBLEM SELECTOR PLAN 2
With graft failure. Nephrology f/up noted  - c/w PD qd  -No longer on prednisone or other immunosuppressives.

## 2020-10-11 NOTE — CONSULT NOTE ADULT - SUBJECTIVE AND OBJECTIVE BOX
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    Patient was being followed by nephrology outpatient for bacterial peritoneal fluid infection, treated with ceftazidime, cefepime and vancomycin. Peritoneal fluid culture resulted today with ESBL Klebsiella, with sensitivity positive for ertapenem. Presented to ED for IV antibiotics. ROS neg for f/c, CP, SOB, abd pain, n/v/d.     WBC WNL. Afebrile. Dialysate with TNC 31 cells. Culture sent.    PAST MEDICAL & SURGICAL HISTORY:  Dialysis complication    CKD (chronic kidney disease), stage 4 (severe)    Hypertension    Glomerulonephritis    S/P kidney transplant  Right kidney in 2004        Allergies    No Known Allergies    Intolerances        ANTIMICROBIALS:  meropenem  IVPB 1000 once      OTHER MEDS:  amLODIPine   Tablet 10 milliGRAM(s) Oral daily  calcitriol   Capsule 0.25 MICROGram(s) Oral <User Schedule>  cinacalcet 60 milliGRAM(s) Oral daily  cloNIDine 0.1 milliGRAM(s) Oral three times a day  ergocalciferol 99228 Unit(s) Oral <User Schedule>  gentamicin 0.1% Ointment 1 Application(s) Topical daily  heparin   Injectable 5000 Unit(s) SubCutaneous every 12 hours  levothyroxine 25 MICROGram(s) Oral daily  lidocaine/prilocaine Cream 1 Application(s) Topical <User Schedule>  metoprolol succinate  milliGRAM(s) Oral daily  Nephro-kenneth 1 Tablet(s) Oral daily  sevelamer carbonate 800 milliGRAM(s) Oral once  sevelamer carbonate 800 milliGRAM(s) Oral three times a day with meals      SOCIAL HISTORY:    FAMILY HISTORY:  Family history of glomerulonephritis (Uncle)        Drug Dosing Weight  Height (cm): 182.9 (10 Oct 2020 19:25)  Weight (kg): 72.2 (10 Oct 2020 19:25)  BMI (kg/m2): 21.6 (10 Oct 2020 19:25)  BSA (m2): 1.93 (10 Oct 2020 19:25)    PE:    Vital Signs Last 24 Hrs  T(C): 36.6 (11 Oct 2020 06:05), Max: 37.3 (10 Oct 2020 21:50)  T(F): 97.8 (11 Oct 2020 06:05), Max: 99.2 (10 Oct 2020 21:50)  HR: 61 (11 Oct 2020 06:05) (61 - 74)  BP: 160/93 (11 Oct 2020 06:05) (142/93 - 160/93)  BP(mean): --  RR: 18 (11 Oct 2020 06:05) (18 - 18)  SpO2: 99% (11 Oct 2020 06:05) (95% - 100%)    Gen: AOx3, NAD, non-toxic, pleasant  CV: S1+S2 normal, no murmurs, nontachycardic  Resp: Clear bilat, no resp distress, no crackles/wheezes  Abd: Soft, nontender, +BS  Ext: No LE edema, no wounds  : No Piedra  IV/Skin: No thrombophlebitis  Msk: No low back pain, no arthralgias, no joint swelling  Neuro: No sensory deficits, no motor deficits    LABS:                          12.4   6.09  )-----------( 163      ( 11 Oct 2020 05:53 )             34.9       10-11    135  |  95<L>  |  62<H>  ----------------------------<  82  4.1   |  23  |  18.60<H>    Ca    9.5      11 Oct 2020 05:53  Phos  5.8     10-11  Mg     1.6     10-11    TPro  7.1  /  Alb  3.6  /  TBili  0.4  /  DBili  x   /  AST  28  /  ALT  30  /  AlkPhos  88  10-10    MICROBIOLOGY:  v  .Smear Other  10-11-20 --  --    polymorphonuclear leukocytes seen  No organisms seen  by cytocentrifuge    RADIOLOGY:    < from: Xray Chest 1 View- PORTABLE-Urgent (Xray Chest 1 View- PORTABLE-Urgent .) (10.17.19 @ 06:11) >  Impression:    Normal portable chest. 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    Patient was being followed by nephrology outpatient for bacterial peritoneal fluid infection, treated with ceftazidime, cefepime and vancomycin. Peritoneal fluid culture resulted today with ESBL Klebsiella, with sensitivity positive for ertapenem. Presented to ED for IV antibiotics. ROS neg for f/c, CP, SOB, abd pain, n/v/d.     WBC WNL. Afebrile. Dialysate with TNC 31 cells. No longer with abd pain.     PAST MEDICAL & SURGICAL HISTORY:  Dialysis complication    CKD (chronic kidney disease), stage 4 (severe)    Hypertension    Glomerulonephritis    S/P kidney transplant  Right kidney in 2004        Allergies    No Known Allergies    Intolerances        ANTIMICROBIALS:  meropenem  IVPB 1000 once      OTHER MEDS:  amLODIPine   Tablet 10 milliGRAM(s) Oral daily  calcitriol   Capsule 0.25 MICROGram(s) Oral <User Schedule>  cinacalcet 60 milliGRAM(s) Oral daily  cloNIDine 0.1 milliGRAM(s) Oral three times a day  ergocalciferol 26509 Unit(s) Oral <User Schedule>  gentamicin 0.1% Ointment 1 Application(s) Topical daily  heparin   Injectable 5000 Unit(s) SubCutaneous every 12 hours  levothyroxine 25 MICROGram(s) Oral daily  lidocaine/prilocaine Cream 1 Application(s) Topical <User Schedule>  metoprolol succinate  milliGRAM(s) Oral daily  Nephro-kenneth 1 Tablet(s) Oral daily  sevelamer carbonate 800 milliGRAM(s) Oral once  sevelamer carbonate 800 milliGRAM(s) Oral three times a day with meals      SOCIAL HISTORY: Denies smoking, alcohol, drug use.    FAMILY HISTORY:  Family history of glomerulonephritis (Uncle)    Drug Dosing Weight  Height (cm): 182.9 (10 Oct 2020 19:25)  Weight (kg): 72.2 (10 Oct 2020 19:25)  BMI (kg/m2): 21.6 (10 Oct 2020 19:25)  BSA (m2): 1.93 (10 Oct 2020 19:25)    PE:    Vital Signs Last 24 Hrs  T(C): 36.6 (11 Oct 2020 06:05), Max: 37.3 (10 Oct 2020 21:50)  T(F): 97.8 (11 Oct 2020 06:05), Max: 99.2 (10 Oct 2020 21:50)  HR: 61 (11 Oct 2020 06:05) (61 - 74)  BP: 160/93 (11 Oct 2020 06:05) (142/93 - 160/93)  BP(mean): --  RR: 18 (11 Oct 2020 06:05) (18 - 18)  SpO2: 99% (11 Oct 2020 06:05) (95% - 100%)    Gen: AOx3, NAD  CV: S1+S2 normal, no murmurs  Resp: Clear bilat, no resp distress  Abd: Soft, nontender, +BS, PD catheter intact  Ext: No LE edema, no wounds  : No Piedra  IV/Skin: No thrombophlebitis  Msk: No low back pain, no arthralgias, no joint swelling  Neuro: No sensory deficits, no motor deficits    LABS:                          12.4   6.09  )-----------( 163      ( 11 Oct 2020 05:53 )             34.9       10-11    135  |  95<L>  |  62<H>  ----------------------------<  82  4.1   |  23  |  18.60<H>    Ca    9.5      11 Oct 2020 05:53  Phos  5.8     10-11  Mg     1.6     10-11    TPro  7.1  /  Alb  3.6  /  TBili  0.4  /  DBili  x   /  AST  28  /  ALT  30  /  AlkPhos  88  10-10    MICROBIOLOGY:  v  .Smear Other  10-11-20 --  --    polymorphonuclear leukocytes seen  No organisms seen  by cytocentrifuge    RADIOLOGY:    < from: Xray Chest 1 View- PORTABLE-Urgent (Xray Chest 1 View- PORTABLE-Urgent .) (10.17.19 @ 06:11) >  Impression:    Normal portable chest. No change from prior    < end of copied text >

## 2020-10-12 LAB
ANION GAP SERPL CALC-SCNC: 15 MMOL/L — SIGNIFICANT CHANGE UP (ref 5–17)
BUN SERPL-MCNC: 65 MG/DL — HIGH (ref 7–23)
CALCIUM SERPL-MCNC: 8.4 MG/DL — SIGNIFICANT CHANGE UP (ref 8.4–10.5)
CHLORIDE SERPL-SCNC: 92 MMOL/L — LOW (ref 96–108)
CO2 SERPL-SCNC: 25 MMOL/L — SIGNIFICANT CHANGE UP (ref 22–31)
CREAT SERPL-MCNC: 18.13 MG/DL — HIGH (ref 0.5–1.3)
GLUCOSE SERPL-MCNC: 88 MG/DL — SIGNIFICANT CHANGE UP (ref 70–99)
GRAM STN FLD: SIGNIFICANT CHANGE UP
HCT VFR BLD CALC: 33 % — LOW (ref 39–50)
HGB BLD-MCNC: 11.3 G/DL — LOW (ref 13–17)
MAGNESIUM SERPL-MCNC: 1.6 MG/DL — SIGNIFICANT CHANGE UP (ref 1.6–2.6)
MCHC RBC-ENTMCNC: 30.1 PG — SIGNIFICANT CHANGE UP (ref 27–34)
MCHC RBC-ENTMCNC: 34.2 GM/DL — SIGNIFICANT CHANGE UP (ref 32–36)
MCV RBC AUTO: 88 FL — SIGNIFICANT CHANGE UP (ref 80–100)
NRBC # BLD: 0 /100 WBCS — SIGNIFICANT CHANGE UP (ref 0–0)
PLATELET # BLD AUTO: 184 K/UL — SIGNIFICANT CHANGE UP (ref 150–400)
POTASSIUM SERPL-MCNC: 3.9 MMOL/L — SIGNIFICANT CHANGE UP (ref 3.5–5.3)
POTASSIUM SERPL-SCNC: 3.9 MMOL/L — SIGNIFICANT CHANGE UP (ref 3.5–5.3)
RBC # BLD: 3.75 M/UL — LOW (ref 4.2–5.8)
RBC # FLD: 13.7 % — SIGNIFICANT CHANGE UP (ref 10.3–14.5)
SODIUM SERPL-SCNC: 132 MMOL/L — LOW (ref 135–145)
SPECIMEN SOURCE: SIGNIFICANT CHANGE UP
WBC # BLD: 6.16 K/UL — SIGNIFICANT CHANGE UP (ref 3.8–10.5)
WBC # FLD AUTO: 6.16 K/UL — SIGNIFICANT CHANGE UP (ref 3.8–10.5)

## 2020-10-12 PROCEDURE — 99232 SBSQ HOSP IP/OBS MODERATE 35: CPT

## 2020-10-12 PROCEDURE — 93010 ELECTROCARDIOGRAM REPORT: CPT

## 2020-10-12 RX ORDER — CHLORHEXIDINE GLUCONATE 213 G/1000ML
1 SOLUTION TOPICAL DAILY
Refills: 0 | Status: DISCONTINUED | OUTPATIENT
Start: 2020-10-12 | End: 2020-10-14

## 2020-10-12 RX ADMIN — SEVELAMER CARBONATE 800 MILLIGRAM(S): 2400 POWDER, FOR SUSPENSION ORAL at 17:27

## 2020-10-12 RX ADMIN — AMLODIPINE BESYLATE 10 MILLIGRAM(S): 2.5 TABLET ORAL at 05:12

## 2020-10-12 RX ADMIN — Medication 100 MILLIGRAM(S): at 05:12

## 2020-10-12 RX ADMIN — SEVELAMER CARBONATE 800 MILLIGRAM(S): 2400 POWDER, FOR SUSPENSION ORAL at 12:30

## 2020-10-12 RX ADMIN — Medication 25 MICROGRAM(S): at 05:12

## 2020-10-12 RX ADMIN — ERTAPENEM SODIUM 100 MILLIGRAM(S): 1 INJECTION, POWDER, LYOPHILIZED, FOR SOLUTION INTRAMUSCULAR; INTRAVENOUS at 17:26

## 2020-10-12 RX ADMIN — Medication 0.1 MILLIGRAM(S): at 05:12

## 2020-10-12 RX ADMIN — Medication 1 TABLET(S): at 12:30

## 2020-10-12 RX ADMIN — Medication 0.1 MILLIGRAM(S): at 14:03

## 2020-10-12 RX ADMIN — Medication 0.1 MILLIGRAM(S): at 21:36

## 2020-10-12 RX ADMIN — Medication 0.1 MILLIGRAM(S): at 00:54

## 2020-10-12 RX ADMIN — CALCITRIOL 0.25 MICROGRAM(S): 0.5 CAPSULE ORAL at 05:12

## 2020-10-12 RX ADMIN — Medication 1 APPLICATION(S): at 12:32

## 2020-10-12 RX ADMIN — ERGOCALCIFEROL 50000 UNIT(S): 1.25 CAPSULE ORAL at 05:12

## 2020-10-12 RX ADMIN — CINACALCET 60 MILLIGRAM(S): 30 TABLET, FILM COATED ORAL at 12:30

## 2020-10-12 RX ADMIN — SEVELAMER CARBONATE 800 MILLIGRAM(S): 2400 POWDER, FOR SUSPENSION ORAL at 09:19

## 2020-10-12 RX ADMIN — CHLORHEXIDINE GLUCONATE 1 APPLICATION(S): 213 SOLUTION TOPICAL at 12:29

## 2020-10-12 RX ADMIN — HEPARIN SODIUM 5000 UNIT(S): 5000 INJECTION INTRAVENOUS; SUBCUTANEOUS at 17:27

## 2020-10-12 NOTE — PROGRESS NOTE ADULT - ASSESSMENT
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

## 2020-10-12 NOTE — CONSULT NOTE ADULT - ATTENDING COMMENTS
Patient seen and examined, agree with above assessment and plan as transcribed above.    - EKG is mildly abnormal but unchanged from the time of his prior cardiac w/u  - Abx per primary team  - Monitor BP  - Will follow with you    Clark Multani MD, [facility]  BEEPER [phone]
Thank you for allowing me to participate in the care of your patient    For any question, call:  Cell # [phone]  Pager # [phone]  Callback # [phone]

## 2020-10-12 NOTE — PROGRESS NOTE ADULT - ATTENDING COMMENTS
Thank you for allowing me to participate in the care of your patient    For any question, call:  Cell # [phone]  Pager # [phone]  Callback # [phone]

## 2020-10-12 NOTE — CONSULT NOTE ADULT - SUBJECTIVE AND OBJECTIVE BOX
HISTORY OF PRESENT ILLNESS: HPI:  Patient is a 36 y/o Male well known to our office (Cardiologist - Dr. 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 Patient reports abd pain at first but now resolved. He currently has no complaints. Denies chest pain, SOB, TINEO, palpitations, dizziness, syncope, abd pain, n/v, back pain, fever/chills.    PAST MEDICAL & SURGICAL HISTORY:  Dialysis complication    CKD (chronic kidney disease), stage 4 (severe)    Hypertension    Glomerulonephritis    S/P kidney transplant  Right kidney in 2004      MEDICATIONS:  MEDICATIONS  (STANDING):  amLODIPine   Tablet 10 milliGRAM(s) Oral daily  calcitriol   Capsule 0.25 MICROGram(s) Oral <User Schedule>  chlorhexidine 2% Cloths 1 Application(s) Topical daily  cinacalcet 60 milliGRAM(s) Oral daily  cloNIDine 0.1 milliGRAM(s) Oral three times a day  ergocalciferol 78291 Unit(s) Oral <User Schedule>  ertapenem  IVPB 500 milliGRAM(s) IV Intermittent every 24 hours  gentamicin 0.1% Ointment 1 Application(s) Topical daily  heparin   Injectable 5000 Unit(s) SubCutaneous every 12 hours  levothyroxine 25 MICROGram(s) Oral daily  lidocaine/prilocaine Cream 1 Application(s) Topical <User Schedule>  metoprolol succinate  milliGRAM(s) Oral daily  Nephro-kenneth 1 Tablet(s) Oral daily  sevelamer carbonate 800 milliGRAM(s) Oral once  sevelamer carbonate 800 milliGRAM(s) Oral three times a day with meals      Allergies    No Known Allergies    Intolerances        FAMILY HISTORY:  Family history of glomerulonephritis (Uncle)      Non-contributary for premature coronary disease or sudden cardiac death    SOCIAL HISTORY:    [ x] Non-smoker  [ ] Smoker  [ ] Alcohol    FLU VACCINE THIS YEAR STARTS IN AUGUST:  [ ] Yes    [ ] No    IF OVER 65 HAVE YOU EVER HAD A PNA VACCINE:  [ ] Yes    [ ] No       [ ] N/A      REVIEW OF SYSTEMS:  [ ]chest pain  [  ]shortness of breath  [  ]palpitations  [  ]syncope  [ ]near syncope [ ]upper extremity weakness   [ ] lower extremity weakness  [  ]diplopia  [  ]altered mental status   [  ]fevers  [ ]chills [ ]nausea  [ ]vomitting  [  ]dysphagia    [ x]abdominal pain  [ ]melena  [ ]BRBPR    [  ]epistaxis  [  ]rash    [ ]lower extremity edema        [x ] All others negative	  [ ] Unable to obtain      LABS:	 	    CARDIAC MARKERS:                              11.3   6.16  )-----------( 184      ( 12 Oct 2020 06:06 )             33.0     Hb Trend: 11.3<--    10-12    132<L>  |  92<L>  |  65<H>  ----------------------------<  88  3.9   |  25  |  18.13<H>    Ca    8.4      12 Oct 2020 06:05  Phos  5.8     10-11  Mg     1.6     10-12    TPro  7.1  /  Alb  3.6  /  TBili  0.4  /  DBili  x   /  AST  28  /  ALT  30  /  AlkPhos  88  10-10    Creatinine Trend: 18.13<--, 18.60<--, 17.94<--    Coags:      proBNP:   Lipid Profile:   HgA1c:   TSH:         PHYSICAL EXAM:  T(C): 37.2 (10-12-20 @ 06:00), Max: 37.2 (10-12-20 @ 06:00)  HR: 65 (10-12-20 @ 06:00) (60 - 73)  BP: 138/100 (10-12-20 @ 06:00) (134/106 - 175/103)  RR: 18 (10-12-20 @ 06:00) (18 - 18)  SpO2: 96% (10-12-20 @ 06:00) (96% - 98%)  Wt(kg): --   BMI (kg/m2): 21.6 (10-10-20 @ 19:25)  I&O's Summary    11 Oct 2020 07:01  -  12 Oct 2020 07:00  --------------------------------------------------------  IN: 1080 mL / OUT: 0 mL / NET: 1080 mL        Gen: Appears well in NAD  HEENT:  (-)icterus (-)pallor  CV: N S1 S2 1/6 KYARA (+)2 Pulses B/l  Resp:  Clear to auscultation B/L, normal effort  GI: (+) BS Soft, NT, ND  Lymph:  (-)Edema, (-)obvious lymphadenopathy  Skin: Warm to touch, Normal turgor  Psych: Appropriate mood and affect    TELEMETRY: None	      ECG: Pending (Ordered) 	    < from: Transthoracic Echocardiogram (10.15.19 @ 09:28) >  Conclusions:  1. Mild concentric left ventricular hypertrophy.  2. Normal left ventricular systolic function. No segmental  wall motion abnormalities.  3. Normal diastolic function  4. Normal right ventricular size and function.  5. Estimated right ventricular systolic pressure equals 12  mm Hg, assuming right atrial pressure equals 8 mm Hg,  consistent with normal pulmonary pressures.  *** Compared with echocardiogram of 1/8/2018, no  significant changes noted.    < end of copied text >    < from: Cardiac Cath Lab - Adult (10.18.19 @ 15:27) >  PROCEDURE:  --  Left heart catheterization.  --  Left coronary angiography.  --  Right coronary angiography.  VENTRICLES: EF by echo was 60 %.  CORONARY VESSELS: The coronary circulation is right dominant.  LM:   --  LM: Normal.  LAD:   --  LAD: Angiography showed minor luminal irregularities with no  flow limiting lesions.  CX:   --  Circumflex: Angiography showed minor luminal irregularities with  no flow limiting lesions.  RCA:   --  RCA: Angiographyshowed minor luminal irregularities with no  flow limiting lesions.  COMPLICATIONS: There were no complications.  DIAGNOSTIC RECOMMENDATIONS: Continue aggressive medical therapy for  multiple cardiovascular risk factors.  Prepared and signed by  Geoffrey Duncan M.D.  Signed 10/18/2019 17:13:42    < end of copied text >      ASSESSMENT/PLAN: Patient is a 36 y/o Male well known to our office (Cardiologist - Dr. 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     - Continue Abx per ID  - Blood cultures negative so far  - Continue BP control with Clonidine, Norvasc, Toprol XL  - Dialysis per renal  - No further inpatient cardiac w/u anticipated at this time as long as blood cultures remain negative    Thad Pruitt PA-C  Pager: [phone]

## 2020-10-12 NOTE — PROGRESS NOTE ADULT - SUBJECTIVE AND OBJECTIVE BOX
CC: Patient is a 35y old  Male who presents with a chief complaint of bacteria in peritoneal fluid (12 Oct 2020 11:18)    ID following ESBL Kleb pneumo peritonitis    Interval History/ROS: Patient has no complaints. No abd pain. No fevers, no chills.    Rest of ROS negative.    Allergies  No Known Allergies    ANTIMICROBIALS:  ertapenem  IVPB 500 every 24 hours    OTHER MEDS:  amLODIPine   Tablet 10 milliGRAM(s) Oral daily  calcitriol   Capsule 0.25 MICROGram(s) Oral <User Schedule>  chlorhexidine 2% Cloths 1 Application(s) Topical daily  cinacalcet 60 milliGRAM(s) Oral daily  cloNIDine 0.1 milliGRAM(s) Oral three times a day  ergocalciferol 82381 Unit(s) Oral <User Schedule>  gentamicin 0.1% Ointment 1 Application(s) Topical daily  heparin   Injectable 5000 Unit(s) SubCutaneous every 12 hours  levothyroxine 25 MICROGram(s) Oral daily  lidocaine/prilocaine Cream 1 Application(s) Topical <User Schedule>  metoprolol succinate  milliGRAM(s) Oral daily  Nephro-kenneth 1 Tablet(s) Oral daily  sevelamer carbonate 800 milliGRAM(s) Oral once  sevelamer carbonate 800 milliGRAM(s) Oral three times a day with meals    PE:    Vital Signs Last 24 Hrs  T(C): 36.7 (12 Oct 2020 12:16), Max: 37.2 (12 Oct 2020 06:00)  T(F): 98 (12 Oct 2020 12:16), Max: 98.9 (12 Oct 2020 06:00)  HR: 64 (12 Oct 2020 12:16) (62 - 73)  BP: 142/98 (12 Oct 2020 14:03) (134/106 - 175/103)  BP(mean): --  RR: 18 (12 Oct 2020 12:16) (18 - 18)  SpO2: 95% (12 Oct 2020 12:16) (95% - 98%)    Gen: AOx3, NAD  CV: S1+S2 normal, no murmurs  Resp: Clear bilat, no resp distress  Abd: Soft, nontender, +BS, PD catheter intact  Ext: No LE edema, no wounds  : No Piedra  IV/Skin: No thrombophlebitis  Neuro: no focal deficits    LABS:                          11.3   6.16  )-----------( 184      ( 12 Oct 2020 06:06 )             33.0       10-12    132<L>  |  92<L>  |  65<H>  ----------------------------<  88  3.9   |  25  |  18.13<H>    Ca    8.4      12 Oct 2020 06:05  Phos  5.8     10-11  Mg     1.6     10-12    TPro  7.1  /  Alb  3.6  /  TBili  0.4  /  DBili  x   /  AST  28  /  ALT  30  /  AlkPhos  88  10-10          MICROBIOLOGY:  v  .Body Fluid peritoneal fluid  10-11-20 --  --    polymorphonuclear leukocytes seen  No organisms seen  by cytocentrifuge      .Body Fluid peritoneal  fluid  10-11-20   Testing in progress  --  --      .Smear Other  10-11-20 --  --    polymorphonuclear leukocytes seen  No organisms seen  by cytocentrifuge      .Blood Blood  10-10-20   No growth to date.  --  --    RADIOLOGY:    < from: Xray Chest 1 View- PORTABLE-Urgent (Xray Chest 1 View- PORTABLE-Urgent .) (10.17.19 @ 06:11) >  Impression:    Normal portable chest. No change from prior    < end of copied text >

## 2020-10-12 NOTE — PROGRESS NOTE ADULT - SUBJECTIVE AND OBJECTIVE BOX
Patient seen and examined  no complaints  no abd pain or nausea or vomiting      No Known Allergies    Hospital Medications:   MEDICATIONS  (STANDING):  amLODIPine   Tablet 10 milliGRAM(s) Oral daily  calcitriol   Capsule 0.25 MICROGram(s) Oral <User Schedule>  chlorhexidine 2% Cloths 1 Application(s) Topical daily  cinacalcet 60 milliGRAM(s) Oral daily  cloNIDine 0.1 milliGRAM(s) Oral three times a day  ergocalciferol 85402 Unit(s) Oral <User Schedule>  ertapenem  IVPB 500 milliGRAM(s) IV Intermittent every 24 hours  gentamicin 0.1% Ointment 1 Application(s) Topical daily  heparin   Injectable 5000 Unit(s) SubCutaneous every 12 hours  levothyroxine 25 MICROGram(s) Oral daily  lidocaine/prilocaine Cream 1 Application(s) Topical <User Schedule>  metoprolol succinate  milliGRAM(s) Oral daily  Nephro-kenneth 1 Tablet(s) Oral daily  sevelamer carbonate 800 milliGRAM(s) Oral once  sevelamer carbonate 800 milliGRAM(s) Oral three times a day with meals        VITALS:  T(F): 98 (10-12-20 @ 12:16), Max: 98.9 (10-12-20 @ 06:00)  HR: 64 (10-12-20 @ 12:16)  BP: 142/98 (10-12-20 @ 14:03)  RR: 18 (10-12-20 @ 12:16)  SpO2: 95% (10-12-20 @ 12:16)  Wt(kg): --    10-11 @ 07:01  -  10-12 @ 07:00  --------------------------------------------------------  IN: 1080 mL / OUT: 0 mL / NET: 1080 mL        PHYSICAL EXAM:  Constitutional: NAD  HEENT: anicteric sclera, oropharynx clear, MMM  Neck: supple.   Respiratory: Bilateral equal breath sounds , no wheezes, no crackles  Cardiovascular: S1, S2, Regular, Murmur present.  Gastrointestinal: non tender; + PD catheter;   Extremities: No cyanosis or clubbing. No peripheral edema  Neurological: Alert and oriented x 3, no focal deficits  Psychiatric: Normal mood, normal affect  : No CVA tenderness. No fuller.   Skin: No rashes    LABS:  10-12    132<L>  |  92<L>  |  65<H>  ----------------------------<  88  3.9   |  25  |  18.13<H>    Ca    8.4      12 Oct 2020 06:05  Phos  5.8     10-11  Mg     1.6     10-12    TPro  7.1  /  Alb  3.6  /  TBili  0.4  /  DBili      /  AST  28  /  ALT  30  /  AlkPhos  88  10-10    Creatinine Trend: 18.13 <--, 18.60 <--, 17.94 <--                        11.3   6.16  )-----------( 184      ( 12 Oct 2020 06:06 )             33.0     Urine Studies:      RADIOLOGY & ADDITIONAL STUDIES:

## 2020-10-12 NOTE — PROGRESS NOTE ADULT - ASSESSMENT
34 year old male with ESRD secondary to FSGS s/p renal transplant 2004 with graft failure on PD, HTN presenting with positive culture from peritoneal fluid.    Patient states abd pain started about two weeks ago, improved with outpatient abx, but then returned. Currently without abd pain. Outpatient peritoneal cx from 10/5 with ESBL Kleb pneumo.    Recommend:  #Peritonitis associated with PD catheter with ESBL Kleb  -Continue ertapenem 500 mg IV q 24 hours  -F/U blood cultures so far no growth  -F/U peritoneal fluid cultures  -Discussed with Nephrology Dr. Boyle and will arrange for outpatient IP meropenem for 21 days. Will need surveillance cell count and culture after completing abx.     #Transplanted kidney, FSGS  -Continue PD    Jai Zepeda MD  Pager [phone]  After 5pm/weekends call [phone]    Discussed plan with primary team.

## 2020-10-12 NOTE — PROGRESS NOTE ADULT - SUBJECTIVE AND OBJECTIVE BOX
Patient is a 35y old  Male who presents with a chief complaint of bacteria in peritoneal fluid (12 Oct 2020 14:14)      SUBJECTIVE / OVERNIGHT EVENTS:    Events noted.  CONSTITUTIONAL: No fever,  or fatigue  RESPIRATORY: No cough, wheezing,  No shortness of breath  CARDIOVASCULAR: No chest pain, palpitations  GASTROINTESTINAL: No abdominal or epigastric pain.   NEUROLOGICAL: No headaches,     MEDICATIONS  (STANDING):  amLODIPine   Tablet 10 milliGRAM(s) Oral daily  calcitriol   Capsule 0.25 MICROGram(s) Oral <User Schedule>  chlorhexidine 2% Cloths 1 Application(s) Topical daily  cinacalcet 60 milliGRAM(s) Oral daily  cloNIDine 0.1 milliGRAM(s) Oral three times a day  ergocalciferol 48656 Unit(s) Oral <User Schedule>  ertapenem  IVPB 500 milliGRAM(s) IV Intermittent every 24 hours  gentamicin 0.1% Ointment 1 Application(s) Topical daily  heparin   Injectable 5000 Unit(s) SubCutaneous every 12 hours  levothyroxine 25 MICROGram(s) Oral daily  lidocaine/prilocaine Cream 1 Application(s) Topical <User Schedule>  metoprolol succinate  milliGRAM(s) Oral daily  Nephro-kenneth 1 Tablet(s) Oral daily  sevelamer carbonate 800 milliGRAM(s) Oral once  sevelamer carbonate 800 milliGRAM(s) Oral three times a day with meals    MEDICATIONS  (PRN):        CAPILLARY BLOOD GLUCOSE        I&O's Summary    11 Oct 2020 07:01  -  12 Oct 2020 07:00  --------------------------------------------------------  IN: 1080 mL / OUT: 0 mL / NET: 1080 mL        PHYSICAL EXAM:    NECK: Supple, No JVD  CHEST/LUNG: Clear to auscultation bilaterally; No wheezing.  HEART: Regular rate and rhythm; No murmurs, rubs, or gallops  ABDOMEN: Soft, Nontender, Nondistended; Bowel sounds present  EXTREMITIES:   No edema  NEUROLOGY: AAO X 3      LABS:                        11.3   6.16  )-----------( 184      ( 12 Oct 2020 06:06 )             33.0     10-12    132<L>  |  92<L>  |  65<H>  ----------------------------<  88  3.9   |  25  |  18.13<H>    Ca    8.4      12 Oct 2020 06:05  Phos  5.8     10-11  Mg     1.6     10-12              CAPILLARY BLOOD GLUCOSE        10-11 @ 21:50  Culture-urine --  Culture results   No growth  method type --  Organism --  Organism Identification --  Specimen source .Body Fluid peritoneal fluid  10-11 @ 21:47  Culture-urine --  Culture results   Testing in progress  method type --  Organism --  Organism Identification --  Specimen source .Body Fluid peritoneal  fluid  10-11 @ 04:03  Culture-urine --  Culture results --  method type --  Organism --  Organism Identification --  Specimen source .Smear Other  10-10 @ 18:59  Culture-urine --  Culture results   No growth to date.  method type --  Organism --  Organism Identification --  Specimen source .Blood Blood      10-11 @ 21:50  Culture-CSF --  Culture results   No growth  Gram stain   polymorphonuclear leukocytes seen  No organisms seen  by cytocentrifuge  Gram stain spinal fluid --  Method type --  Organism --  Organism identification --  Specimen source .Body Fluid peritoneal fluid  10-11 @ 21:47  Culture-CSF --  Culture results   Testing in progress  Gram stain --  Gram stain spinal fluid --  Method type --  Organism --  Organism identification --  Specimen source .Body Fluid peritoneal  fluid       10-11 @ 21:50  Culture blood --  Culture results   No growth  Gram stain   polymorphonuclear leukocytes seen  No organisms seen  by cytocentrifuge  Gram stain blood --  Method type --  Organism --  Organism identification --  Specimen source .Body Fluid peritoneal fluid   10-11 @ 21:47  Culture blood --  Culture results   Testing in progress  Gram stain --  Gram stain blood --  Method type --  Organism --  Organism identification --  Specimen source .Body Fluid peritoneal  fluid   10-11 @ 04:03  Culture blood --  Culture results --  Gram stain   polymorphonuclear leukocytes seen  No organisms seen  by cytocentrifuge  Gram stain blood --  Method type --  Organism --  Organism identification --  Specimen source .Smear Other   10-10 @ 18:59  Culture blood --  Culture results   No growth to date.  Gram stain --  Gram stain blood --  Method type --  Organism --  Organism identification --  Specimen source .Blood Blood      RADIOLOGY & ADDITIONAL TESTS:    Imaging Personally Reviewed:    Consultant(s) Notes Reviewed:      Care Discussed with Consultants/Other Providers:    Stone Segovia MD, CMD, FACP    [address] [place] [address]  Office Tel: [phone]  Cell: [phone]

## 2020-10-13 LAB
ANION GAP SERPL CALC-SCNC: 16 MMOL/L — SIGNIFICANT CHANGE UP (ref 5–17)
B PERT IGG+IGM PNL SER: CLEAR — SIGNIFICANT CHANGE UP
BUN SERPL-MCNC: 63 MG/DL — HIGH (ref 7–23)
CALCIUM SERPL-MCNC: 8.6 MG/DL — SIGNIFICANT CHANGE UP (ref 8.4–10.5)
CHLORIDE SERPL-SCNC: 94 MMOL/L — LOW (ref 96–108)
CO2 SERPL-SCNC: 25 MMOL/L — SIGNIFICANT CHANGE UP (ref 22–31)
COLOR FLD: SIGNIFICANT CHANGE UP
CREAT SERPL-MCNC: 18.37 MG/DL — HIGH (ref 0.5–1.3)
EOSINOPHIL # FLD: 20 % — SIGNIFICANT CHANGE UP
FLUID INTAKE SUBSTANCE CLASS: SIGNIFICANT CHANGE UP
FLUID SEGMENTED GRANULOCYTES: 20 % — SIGNIFICANT CHANGE UP
GLUCOSE SERPL-MCNC: 94 MG/DL — SIGNIFICANT CHANGE UP (ref 70–99)
GRAM STN FLD: SIGNIFICANT CHANGE UP
HCT VFR BLD CALC: 33.8 % — LOW (ref 39–50)
HGB BLD-MCNC: 11.9 G/DL — LOW (ref 13–17)
LYMPHOCYTES # FLD: 40 % — SIGNIFICANT CHANGE UP
MCHC RBC-ENTMCNC: 30.9 PG — SIGNIFICANT CHANGE UP (ref 27–34)
MCHC RBC-ENTMCNC: 35.2 GM/DL — SIGNIFICANT CHANGE UP (ref 32–36)
MCV RBC AUTO: 87.8 FL — SIGNIFICANT CHANGE UP (ref 80–100)
MONOS+MACROS # FLD: 20 % — SIGNIFICANT CHANGE UP
NRBC # BLD: 0 /100 WBCS — SIGNIFICANT CHANGE UP (ref 0–0)
PLATELET # BLD AUTO: 188 K/UL — SIGNIFICANT CHANGE UP (ref 150–400)
POTASSIUM SERPL-MCNC: 4 MMOL/L — SIGNIFICANT CHANGE UP (ref 3.5–5.3)
POTASSIUM SERPL-SCNC: 4 MMOL/L — SIGNIFICANT CHANGE UP (ref 3.5–5.3)
RBC # BLD: 3.85 M/UL — LOW (ref 4.2–5.8)
RBC # FLD: 13.8 % — SIGNIFICANT CHANGE UP (ref 10.3–14.5)
RCV VOL RI: 14 /UL — HIGH (ref 0–0)
SODIUM SERPL-SCNC: 135 MMOL/L — SIGNIFICANT CHANGE UP (ref 135–145)
SPECIMEN SOURCE: SIGNIFICANT CHANGE UP
TOTAL NUCLEATED CELL COUNT, BODY FLUID: 3 /UL — SIGNIFICANT CHANGE UP
TUBE TYPE: SIGNIFICANT CHANGE UP
WBC # BLD: 5.52 K/UL — SIGNIFICANT CHANGE UP (ref 3.8–10.5)
WBC # FLD AUTO: 5.52 K/UL — SIGNIFICANT CHANGE UP (ref 3.8–10.5)

## 2020-10-13 PROCEDURE — 99232 SBSQ HOSP IP/OBS MODERATE 35: CPT

## 2020-10-13 RX ORDER — METOPROLOL TARTRATE 50 MG
1 TABLET ORAL
Qty: 0 | Refills: 0 | DISCHARGE

## 2020-10-13 RX ORDER — CINACALCET 30 MG/1
1 TABLET, FILM COATED ORAL
Qty: 0 | Refills: 0 | DISCHARGE

## 2020-10-13 RX ORDER — ERGOCALCIFEROL 1.25 MG/1
1 CAPSULE ORAL
Qty: 0 | Refills: 0 | DISCHARGE

## 2020-10-13 RX ORDER — LEVOTHYROXINE SODIUM 125 MCG
1 TABLET ORAL
Qty: 0 | Refills: 0 | DISCHARGE

## 2020-10-13 RX ORDER — GABAPENTIN 400 MG/1
100 CAPSULE ORAL
Refills: 0 | Status: DISCONTINUED | OUTPATIENT
Start: 2020-10-13 | End: 2020-10-14

## 2020-10-13 RX ORDER — CALCITRIOL 0.5 UG/1
3 CAPSULE ORAL
Qty: 0 | Refills: 0 | DISCHARGE

## 2020-10-13 RX ORDER — LIDOCAINE AND PRILOCAINE CREAM 25; 25 MG/G; MG/G
1 CREAM TOPICAL
Qty: 0 | Refills: 0 | DISCHARGE

## 2020-10-13 RX ORDER — AMLODIPINE BESYLATE 2.5 MG/1
1 TABLET ORAL
Qty: 0 | Refills: 0 | DISCHARGE

## 2020-10-13 RX ADMIN — GABAPENTIN 100 MILLIGRAM(S): 400 CAPSULE ORAL at 18:20

## 2020-10-13 RX ADMIN — CHLORHEXIDINE GLUCONATE 1 APPLICATION(S): 213 SOLUTION TOPICAL at 12:48

## 2020-10-13 RX ADMIN — Medication 25 MICROGRAM(S): at 06:33

## 2020-10-13 RX ADMIN — SEVELAMER CARBONATE 800 MILLIGRAM(S): 2400 POWDER, FOR SUSPENSION ORAL at 17:23

## 2020-10-13 RX ADMIN — Medication 0.1 MILLIGRAM(S): at 06:33

## 2020-10-13 RX ADMIN — SEVELAMER CARBONATE 800 MILLIGRAM(S): 2400 POWDER, FOR SUSPENSION ORAL at 12:45

## 2020-10-13 RX ADMIN — Medication 0.1 MILLIGRAM(S): at 13:27

## 2020-10-13 RX ADMIN — AMLODIPINE BESYLATE 10 MILLIGRAM(S): 2.5 TABLET ORAL at 01:08

## 2020-10-13 RX ADMIN — Medication 1 TABLET(S): at 12:44

## 2020-10-13 RX ADMIN — HEPARIN SODIUM 5000 UNIT(S): 5000 INJECTION INTRAVENOUS; SUBCUTANEOUS at 17:23

## 2020-10-13 RX ADMIN — Medication 100 MILLIGRAM(S): at 06:33

## 2020-10-13 RX ADMIN — ERTAPENEM SODIUM 100 MILLIGRAM(S): 1 INJECTION, POWDER, LYOPHILIZED, FOR SOLUTION INTRAMUSCULAR; INTRAVENOUS at 17:23

## 2020-10-13 RX ADMIN — CINACALCET 60 MILLIGRAM(S): 30 TABLET, FILM COATED ORAL at 12:44

## 2020-10-13 RX ADMIN — Medication 0.1 MILLIGRAM(S): at 21:06

## 2020-10-13 RX ADMIN — SEVELAMER CARBONATE 800 MILLIGRAM(S): 2400 POWDER, FOR SUSPENSION ORAL at 10:05

## 2020-10-13 NOTE — PROGRESS NOTE ADULT - SUBJECTIVE AND OBJECTIVE BOX
CC: Patient is a 35y old  Male who presents with a chief complaint of bacteria in peritoneal fluid (13 Oct 2020 15:12)    ID following for peritonitis associated with PD catheter ESBL Kleb pneumo    Interval History/ROS: Patient feels well. No longer with abd pain. No fevers, no chills.    Rest of ROS negative.    Allergies  No Known Allergies    ANTIMICROBIALS:  ertapenem  IVPB 500 every 24 hours    OTHER MEDS:  amLODIPine   Tablet 10 milliGRAM(s) Oral daily  calcitriol   Capsule 0.25 MICROGram(s) Oral <User Schedule>  chlorhexidine 2% Cloths 1 Application(s) Topical daily  cinacalcet 60 milliGRAM(s) Oral daily  cloNIDine 0.1 milliGRAM(s) Oral three times a day  ergocalciferol 77584 Unit(s) Oral <User Schedule>  gentamicin 0.1% Ointment 1 Application(s) Topical daily  heparin   Injectable 5000 Unit(s) SubCutaneous every 12 hours  levothyroxine 25 MICROGram(s) Oral daily  lidocaine/prilocaine Cream 1 Application(s) Topical <User Schedule>  metoprolol succinate  milliGRAM(s) Oral daily  Nephro-kenneth 1 Tablet(s) Oral daily  sevelamer carbonate 800 milliGRAM(s) Oral once  sevelamer carbonate 800 milliGRAM(s) Oral three times a day with meals    PE:    Vital Signs Last 24 Hrs  T(C): 36.6 (13 Oct 2020 12:30), Max: 36.9 (13 Oct 2020 00:30)  T(F): 97.9 (13 Oct 2020 12:30), Max: 98.5 (13 Oct 2020 00:30)  HR: 55 (13 Oct 2020 12:30) (55 - 74)  BP: 151/106 (13 Oct 2020 12:30) (125/85 - 182/111)  BP(mean): --  RR: 18 (13 Oct 2020 12:30) (16 - 18)  SpO2: 99% (13 Oct 2020 12:30) (95% - 99%)    Gen: AOx3, NAD  CV: S1+S2 normal, no murmurs  Resp: Clear bilat, no resp distress  Abd: Soft, nontender, +BS, PD catheter  Ext: No LE edema, no wounds  : No Piedra  IV/Skin: No thrombophlebitis  Neuro: no focal deficits    LABS:                          11.9   5.52  )-----------( 188      ( 13 Oct 2020 06:11 )             33.8       10-13    135  |  94<L>  |  63<H>  ----------------------------<  94  4.0   |  25  |  18.37<H>    Ca    8.6      13 Oct 2020 06:11  Mg     1.6     10-12    MICROBIOLOGY:  v  .Smear peritoneal dialysis  10-13-20 --  --    polymorphonuclear leukocytes seen  No organisms seen  by cytocentrifuge      .Body Fluid peritoneal fluid  10-11-20   No growth  --    polymorphonuclear leukocytes seen  No organisms seen  by cytocentrifuge      .Body Fluid peritoneal  fluid  10-11-20   Testing in progress  --  --      .Smear Other  10-11-20 --  --    polymorphonuclear leukocytes seen  No organisms seen  by cytocentrifuge      .Blood Blood  10-10-20   No growth to date.  --  --    RADIOLOGY:    < from: Xray Chest 1 View- PORTABLE-Urgent (Xray Chest 1 View- PORTABLE-Urgent .) (10.17.19 @ 06:11) >  Impression:    Normal portable chest. No change from prior    < end of copied text >

## 2020-10-13 NOTE — PROGRESS NOTE ADULT - SUBJECTIVE AND OBJECTIVE BOX
Patient seen and examined  no complaints  abd pain resolved    No Known Allergies    Hospital Medications:   MEDICATIONS  (STANDING):  amLODIPine   Tablet 10 milliGRAM(s) Oral daily  calcitriol   Capsule 0.25 MICROGram(s) Oral <User Schedule>  chlorhexidine 2% Cloths 1 Application(s) Topical daily  cinacalcet 60 milliGRAM(s) Oral daily  cloNIDine 0.1 milliGRAM(s) Oral three times a day  ergocalciferol 36326 Unit(s) Oral <User Schedule>  ertapenem  IVPB 500 milliGRAM(s) IV Intermittent every 24 hours  gentamicin 0.1% Ointment 1 Application(s) Topical daily  heparin   Injectable 5000 Unit(s) SubCutaneous every 12 hours  levothyroxine 25 MICROGram(s) Oral daily  lidocaine/prilocaine Cream 1 Application(s) Topical <User Schedule>  metoprolol succinate  milliGRAM(s) Oral daily  Nephro-kenneth 1 Tablet(s) Oral daily  sevelamer carbonate 800 milliGRAM(s) Oral once  sevelamer carbonate 800 milliGRAM(s) Oral three times a day with meals        VITALS:  T(F): 97.9 (10-13-20 @ 12:30), Max: 98.5 (10-13-20 @ 00:30)  HR: 55 (10-13-20 @ 12:30)  BP: 151/106 (10-13-20 @ 12:30)  RR: 18 (10-13-20 @ 12:30)  SpO2: 99% (10-13-20 @ 12:30)  Wt(kg): --    10-12 @ 07:01  -  10-13 @ 07:00  --------------------------------------------------------  IN: 2360 mL / OUT: 2000 mL / NET: 360 mL    10-13 @ 07:01  -  10-13 @ 15:12  --------------------------------------------------------  IN: 1000 mL / OUT: 0 mL / NET: 1000 mL      PHYSICAL EXAM:  Constitutional: NAD  HEENT: anicteric sclera, oropharynx clear, MMM  Neck: supple.   Respiratory: Bilateral equal breath sounds , no wheezes, no crackles  Cardiovascular: S1, S2, Regular, Murmur present.  Gastrointestinal: non tender; + PD catheter;   Extremities: No cyanosis or clubbing. No peripheral edema  Neurological: Alert and oriented x 3, no focal deficits  Psychiatric: Normal mood, normal affect  : No CVA tenderness. No fuller.   Skin: No rashes      LABS:  10-13    135  |  94<L>  |  63<H>  ----------------------------<  94  4.0   |  25  |  18.37<H>    Ca    8.6      13 Oct 2020 06:11  Mg     1.6     10-12      Creatinine Trend: 18.37 <--, 18.13 <--, 18.60 <--, 17.94 <--                        11.9   5.52  )-----------( 188      ( 13 Oct 2020 06:11 )             33.8     Urine Studies:      RADIOLOGY & ADDITIONAL STUDIES:

## 2020-10-13 NOTE — PROGRESS NOTE ADULT - SUBJECTIVE AND OBJECTIVE BOX
Patient is a 35y old  Male who presents with a chief complaint of bacteria in peritoneal fluid (13 Oct 2020 15:59)      SUBJECTIVE / OVERNIGHT EVENTS:    Events noted.  CONSTITUTIONAL: No fever,  or fatigue  RESPIRATORY: No cough, wheezing,  No shortness of breath  CARDIOVASCULAR: No chest pain, palpitations,   GASTROINTESTINAL: No abdominal or epigastric pain.  NEUROLOGICAL: No headaches,     MEDICATIONS  (STANDING):  amLODIPine   Tablet 10 milliGRAM(s) Oral daily  calcitriol   Capsule 0.25 MICROGram(s) Oral <User Schedule>  chlorhexidine 2% Cloths 1 Application(s) Topical daily  cinacalcet 60 milliGRAM(s) Oral daily  cloNIDine 0.1 milliGRAM(s) Oral three times a day  ergocalciferol 72727 Unit(s) Oral <User Schedule>  ertapenem  IVPB 500 milliGRAM(s) IV Intermittent every 24 hours  gabapentin 100 milliGRAM(s) Oral two times a day  gentamicin 0.1% Ointment 1 Application(s) Topical daily  heparin   Injectable 5000 Unit(s) SubCutaneous every 12 hours  levothyroxine 25 MICROGram(s) Oral daily  lidocaine/prilocaine Cream 1 Application(s) Topical <User Schedule>  metoprolol succinate  milliGRAM(s) Oral daily  Nephro-kenneth 1 Tablet(s) Oral daily  sevelamer carbonate 800 milliGRAM(s) Oral once  sevelamer carbonate 800 milliGRAM(s) Oral three times a day with meals    MEDICATIONS  (PRN):        CAPILLARY BLOOD GLUCOSE        I&O's Summary    12 Oct 2020 07:01  -  13 Oct 2020 07:00  --------------------------------------------------------  IN: 2360 mL / OUT: 2000 mL / NET: 360 mL    13 Oct 2020 07:01  -  13 Oct 2020 21:34  --------------------------------------------------------  IN: 1240 mL / OUT: 0 mL / NET: 1240 mL        PHYSICAL EXAM:  GENERAL: NAD  NECK: Supple, No JVD  CHEST/LUNG: Clear to auscultation bilaterally; No wheezing.  HEART: Regular rate and rhythm; No murmurs, rubs, or gallops  ABDOMEN: Soft, Nontender, Nondistended; Bowel sounds present  EXTREMITIES:   No edema  NEUROLOGY: AAO X 3      LABS:                        11.9   5.52  )-----------( 188      ( 13 Oct 2020 06:11 )             33.8     10-13    135  |  94<L>  |  63<H>  ----------------------------<  94  4.0   |  25  |  18.37<H>    Ca    8.6      13 Oct 2020 06:11  Mg     1.6     10-12              CAPILLARY BLOOD GLUCOSE        10-13 @ 12:56  Culture-urine --  Culture results --  method type --  Organism --  Organism Identification --  Specimen source .Smear peritoneal dialysis  10-11 @ 21:50  Culture-urine --  Culture results   No growth  method type --  Organism --  Organism Identification --  Specimen source .Body Fluid peritoneal fluid  10-11 @ 21:47  Culture-urine --  Culture results   Testing in progress  method type --  Organism --  Organism Identification --  Specimen source .Body Fluid peritoneal  fluid  10-11 @ 04:03  Culture-urine --  Culture results --  method type --  Organism --  Organism Identification --  Specimen source .Smear Other  10-10 @ 18:59  Culture-urine --  Culture results   No growth to date.  method type --  Organism --  Organism Identification --  Specimen source .Blood Blood      10-13 @ 12:56  Culture-CSF --  Culture results --  Gram stain   polymorphonuclear leukocytes seen  No organisms seen  by cytocentrifuge  Gram stain spinal fluid --  Method type --  Organism --  Organism identification --  Specimen source .Smear peritoneal dialysis       10-13 @ 12:56  Culture blood --  Culture results --  Gram stain   polymorphonuclear leukocytes seen  No organisms seen  by cytocentrifuge  Gram stain blood --  Method type --  Organism --  Organism identification --  Specimen source .Smear peritoneal dialysis   10-11 @ 21:50  Culture blood --  Culture results   No growth  Gram stain   polymorphonuclear leukocytes seen  No organisms seen  by cytocentrifuge  Gram stain blood --  Method type --  Organism --  Organism identification --  Specimen source .Body Fluid peritoneal fluid   10-11 @ 21:47  Culture blood --  Culture results   Testing in progress  Gram stain --  Gram stain blood --  Method type --  Organism --  Organism identification --  Specimen source .Body Fluid peritoneal  fluid   10-11 @ 04:03  Culture blood --  Culture results --  Gram stain   polymorphonuclear leukocytes seen  No organisms seen  by cytocentrifuge  Gram stain blood --  Method type --  Organism --  Organism identification --  Specimen source .Smear Other   10-10 @ 18:59  Culture blood --  Culture results   No growth to date.  Gram stain --  Gram stain blood --  Method type --  Organism --  Organism identification --  Specimen source .Blood Blood      RADIOLOGY & ADDITIONAL TESTS:    Imaging Personally Reviewed:    Consultant(s) Notes Reviewed:      Care Discussed with Consultants/Other Providers:    Stone Segovia MD, CMD, FACP    [address] [place] [address]  Office Tel: [phone]  Cell: [phone]

## 2020-10-13 NOTE — PROGRESS NOTE ADULT - SUBJECTIVE AND OBJECTIVE BOX
Patient denies chest pain or shortness of breath.   Review of systems otherwise (-)  	    MEDICATIONS  (STANDING):  amLODIPine   Tablet 10 milliGRAM(s) Oral daily  calcitriol   Capsule 0.25 MICROGram(s) Oral <User Schedule>  chlorhexidine 2% Cloths 1 Application(s) Topical daily  cinacalcet 60 milliGRAM(s) Oral daily  cloNIDine 0.1 milliGRAM(s) Oral three times a day  ergocalciferol 40810 Unit(s) Oral <User Schedule>  ertapenem  IVPB 500 milliGRAM(s) IV Intermittent every 24 hours  gentamicin 0.1% Ointment 1 Application(s) Topical daily  heparin   Injectable 5000 Unit(s) SubCutaneous every 12 hours  levothyroxine 25 MICROGram(s) Oral daily  lidocaine/prilocaine Cream 1 Application(s) Topical <User Schedule>  metoprolol succinate  milliGRAM(s) Oral daily  Nephro-kenneth 1 Tablet(s) Oral daily  sevelamer carbonate 800 milliGRAM(s) Oral once  sevelamer carbonate 800 milliGRAM(s) Oral three times a day with meals      LABS:	 	                          11.9   5.52  )-----------( 188      ( 13 Oct 2020 06:11 )             33.8     Hemoglobin: 11.9 g/dL (10-13 @ 06:11)  Hemoglobin: 11.3 g/dL (10-12 @ 06:06)  Hemoglobin: 12.4 g/dL (10-11 @ 05:53)  Hemoglobin: 12.7 g/dL (10-10 @ 16:31)    10-13    135  |  94<L>  |  63<H>  ----------------------------<  94  4.0   |  25  |  18.37<H>    Ca    8.6      13 Oct 2020 06:11  Mg     1.6     10-12      Creatinine Trend: 18.37<--, 18.13<--, 18.60<--, 17.94<--  COAGS:       proBNP:   Lipid Profile:   HgA1c:   TSH:     PHYSICAL EXAM:  T(C): 36.6 (10-13-20 @ 12:30), Max: 36.9 (10-13-20 @ 00:30)  HR: 55 (10-13-20 @ 12:30) (55 - 74)  BP: 151/106 (10-13-20 @ 12:30) (125/85 - 182/111)  RR: 18 (10-13-20 @ 12:30) (16 - 18)  SpO2: 99% (10-13-20 @ 12:30) (95% - 99%)  Wt(kg): --  I&O's Summary    12 Oct 2020 07:01  -  13 Oct 2020 07:00  --------------------------------------------------------  IN: 2360 mL / OUT: 2000 mL / NET: 360 mL          Gen: Appears well in NAD  HEENT:  (-)icterus (-)pallor  CV: N S1 S2 1/6 KYARA (+)2 Pulses B/l  Resp:  Clear to auscultation B/L, normal effort  GI: (+) BS Soft, NT, ND  Lymph:  (-)Edema, (-)obvious lymphadenopathy  Skin: Warm to touch, Normal turgor  Psych: Appropriate mood and affect      TELEMETRY: None	      < from: Transthoracic Echocardiogram (10.15.19 @ 09:28) >  Conclusions:  1. Mild concentric left ventricular hypertrophy.  2. Normal left ventricular systolic function. No segmental  wall motion abnormalities.  3. Normal diastolic function  4. Normal right ventricular size and function.  5. Estimated right ventricular systolic pressure equals 12  mm Hg, assuming right atrial pressure equals 8 mm Hg,  consistent with normal pulmonary pressures.  *** Compared with echocardiogram of 1/8/2018, no  significant changes noted.    < end of copied text >    < from: Cardiac Cath Lab - Adult (10.18.19 @ 15:27) >  PROCEDURE:  --  Left heart catheterization.  --  Left coronary angiography.  --  Right coronary angiography.  VENTRICLES: EF by echo was 60 %.  CORONARY VESSELS: The coronary circulation is right dominant.  LM:   --  LM: Normal.  LAD:   --  LAD: Angiography showed minor luminal irregularities with no  flow limiting lesions.  CX:   --  Circumflex: Angiography showed minor luminal irregularities with  no flow limiting lesions.  RCA:   --  RCA: Angiographyshowed minor luminal irregularities with no  flow limiting lesions.  COMPLICATIONS: There were no complications.  DIAGNOSTIC RECOMMENDATIONS: Continue aggressive medical therapy for  multiple cardiovascular risk factors.  Prepared and signed by  Geoffrey Duncan M.D.  Signed 10/18/2019 17:13:42    < end of copied text >      ASSESSMENT/PLAN: Patient is a 34 y/o Male well known to our office (Cardiologist - Dr. 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     - Continue Abx per ID  - Blood cultures negative so far  - EKG is mildly abnormal but unchanged from the time of his prior cardiac w/u  - Continue BP control with Clonidine, Norvasc, Toprol XL  - Dialysis per renal  - No further inpatient cardiac w/u anticipated at this time as long as blood cultures remain negative    Thad Pruitt PA-C  Pager: [phone]

## 2020-10-14 ENCOUNTER — TRANSCRIPTION ENCOUNTER (OUTPATIENT)
Age: 35
End: 2020-10-14

## 2020-10-14 VITALS — SYSTOLIC BLOOD PRESSURE: 153 MMHG | DIASTOLIC BLOOD PRESSURE: 94 MMHG

## 2020-10-14 LAB
ANION GAP SERPL CALC-SCNC: 15 MMOL/L — SIGNIFICANT CHANGE UP (ref 5–17)
B PERT IGG+IGM PNL SER: CLEAR — SIGNIFICANT CHANGE UP
BUN SERPL-MCNC: 63 MG/DL — HIGH (ref 7–23)
CALCIUM SERPL-MCNC: 8.6 MG/DL — SIGNIFICANT CHANGE UP (ref 8.4–10.5)
CHLORIDE SERPL-SCNC: 94 MMOL/L — LOW (ref 96–108)
CO2 SERPL-SCNC: 26 MMOL/L — SIGNIFICANT CHANGE UP (ref 22–31)
COLOR FLD: SIGNIFICANT CHANGE UP
CREAT SERPL-MCNC: 19.18 MG/DL — HIGH (ref 0.5–1.3)
EOSINOPHIL # FLD: 3 % — SIGNIFICANT CHANGE UP
FLUID INTAKE SUBSTANCE CLASS: SIGNIFICANT CHANGE UP
FLUID SEGMENTED GRANULOCYTES: 5 % — SIGNIFICANT CHANGE UP
GLUCOSE SERPL-MCNC: 90 MG/DL — SIGNIFICANT CHANGE UP (ref 70–99)
HCT VFR BLD CALC: 34.8 % — LOW (ref 39–50)
HGB BLD-MCNC: 12.3 G/DL — LOW (ref 13–17)
LYMPHOCYTES # FLD: 65 % — SIGNIFICANT CHANGE UP
MCHC RBC-ENTMCNC: 31 PG — SIGNIFICANT CHANGE UP (ref 27–34)
MCHC RBC-ENTMCNC: 35.3 GM/DL — SIGNIFICANT CHANGE UP (ref 32–36)
MCV RBC AUTO: 87.7 FL — SIGNIFICANT CHANGE UP (ref 80–100)
MONOS+MACROS # FLD: 27 % — SIGNIFICANT CHANGE UP
NRBC # BLD: 0 /100 WBCS — SIGNIFICANT CHANGE UP (ref 0–0)
NRBC # FLD: 3 % — HIGH (ref 0–0)
PLATELET # BLD AUTO: 186 K/UL — SIGNIFICANT CHANGE UP (ref 150–400)
POTASSIUM SERPL-MCNC: 4.2 MMOL/L — SIGNIFICANT CHANGE UP (ref 3.5–5.3)
POTASSIUM SERPL-SCNC: 4.2 MMOL/L — SIGNIFICANT CHANGE UP (ref 3.5–5.3)
RBC # BLD: 3.97 M/UL — LOW (ref 4.2–5.8)
RBC # FLD: 13.8 % — SIGNIFICANT CHANGE UP (ref 10.3–14.5)
RCV VOL RI: 10 /UL — HIGH (ref 0–0)
SODIUM SERPL-SCNC: 135 MMOL/L — SIGNIFICANT CHANGE UP (ref 135–145)
TOTAL NUCLEATED CELL COUNT, BODY FLUID: 13 /UL — SIGNIFICANT CHANGE UP
TUBE TYPE: SIGNIFICANT CHANGE UP
WBC # BLD: 5.61 K/UL — SIGNIFICANT CHANGE UP (ref 3.8–10.5)
WBC # FLD AUTO: 5.61 K/UL — SIGNIFICANT CHANGE UP (ref 3.8–10.5)

## 2020-10-14 RX ORDER — CEFEPIME 1 G/1
1 INJECTION, POWDER, FOR SOLUTION INTRAMUSCULAR; INTRAVENOUS
Qty: 17 | Refills: 0
Start: 2020-10-14 | End: 2020-10-30

## 2020-10-14 RX ORDER — FLUCONAZOLE 150 MG/1
200 TABLET ORAL
Refills: 0 | Status: DISCONTINUED | OUTPATIENT
Start: 2020-10-14 | End: 2020-10-14

## 2020-10-14 RX ORDER — FLUCONAZOLE 150 MG/1
1 TABLET ORAL
Qty: 10 | Refills: 0
Start: 2020-10-14 | End: 2020-11-03

## 2020-10-14 RX ORDER — GABAPENTIN 400 MG/1
1 CAPSULE ORAL
Qty: 0 | Refills: 0 | DISCHARGE
Start: 2020-10-14

## 2020-10-14 RX ORDER — GABAPENTIN 400 MG/1
1 CAPSULE ORAL
Qty: 0 | Refills: 0 | DISCHARGE

## 2020-10-14 RX ORDER — MEROPENEM 1 G/30ML
1 INJECTION INTRAVENOUS
Qty: 17 | Refills: 0
Start: 2020-10-14 | End: 2020-10-30

## 2020-10-14 RX ADMIN — Medication 25 MICROGRAM(S): at 06:28

## 2020-10-14 RX ADMIN — SEVELAMER CARBONATE 800 MILLIGRAM(S): 2400 POWDER, FOR SUSPENSION ORAL at 13:04

## 2020-10-14 RX ADMIN — Medication 1 APPLICATION(S): at 14:54

## 2020-10-14 RX ADMIN — CALCITRIOL 0.25 MICROGRAM(S): 0.5 CAPSULE ORAL at 06:28

## 2020-10-14 RX ADMIN — AMLODIPINE BESYLATE 10 MILLIGRAM(S): 2.5 TABLET ORAL at 06:28

## 2020-10-14 RX ADMIN — Medication 0.1 MILLIGRAM(S): at 13:03

## 2020-10-14 RX ADMIN — ERTAPENEM SODIUM 100 MILLIGRAM(S): 1 INJECTION, POWDER, LYOPHILIZED, FOR SOLUTION INTRAMUSCULAR; INTRAVENOUS at 16:09

## 2020-10-14 RX ADMIN — GABAPENTIN 100 MILLIGRAM(S): 400 CAPSULE ORAL at 06:32

## 2020-10-14 RX ADMIN — Medication 0.1 MILLIGRAM(S): at 06:28

## 2020-10-14 RX ADMIN — HEPARIN SODIUM 5000 UNIT(S): 5000 INJECTION INTRAVENOUS; SUBCUTANEOUS at 06:28

## 2020-10-14 RX ADMIN — SEVELAMER CARBONATE 800 MILLIGRAM(S): 2400 POWDER, FOR SUSPENSION ORAL at 07:49

## 2020-10-14 RX ADMIN — Medication 100 MILLIGRAM(S): at 06:28

## 2020-10-14 RX ADMIN — Medication 1 TABLET(S): at 13:03

## 2020-10-14 RX ADMIN — CINACALCET 60 MILLIGRAM(S): 30 TABLET, FILM COATED ORAL at 13:03

## 2020-10-14 NOTE — DISCHARGE NOTE PROVIDER - HOSPITAL COURSE
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 Patient was being followed by nephrology outpatient for bacterial peritoneal fluid infection, treated with ceftazidime, cefepime and vancomycin. Peritoneal fluid culture resulted today with ESBL Klebsiella, with sensitivity positive for ertapenem. Presented to ED for IV antibiotics. Started on IV ertapenem, per ID recommendations with plan to change to IP Meronpenem one gram x 21 days--> needs insurance removal  start diflucan 200mg po every other day based on guidelines

## 2020-10-14 NOTE — DISCHARGE NOTE PROVIDER - NSDCCPCAREPLAN_GEN_ALL_CORE_FT
PRINCIPAL DISCHARGE DIAGNOSIS  Diagnosis: Peritonitis associated with peritoneal dialysis, initial encounter  Assessment and Plan of Treatment: Please continue Meropenem 1gm IP daily until 10/30/2020 with Difucan 200mg every other day until 10/30/2020 as well.   Please follow up with Dr. Yo Ochoa within 2-3 days.   Please monitor signs and symptoms of infection such as: pain, fever/chills, body aches.      SECONDARY DISCHARGE DIAGNOSES  Diagnosis: Hypertension  Assessment and Plan of Treatment: Low salt diet  Activity as tolerated.  Take all medication as prescribed.  Follow up with your medical doctor for routine blood pressure monitoring at your next visit.  Notify your doctor if you have any of the following symptoms:   Dizziness, Lightheadedness, Blurry vision, Headache, Chest pain, Shortness of breath

## 2020-10-14 NOTE — PROGRESS NOTE ADULT - SUBJECTIVE AND OBJECTIVE BOX
Patient seen and examined  no abd pain  no fevers    No Known Allergies    Hospital Medications:   MEDICATIONS  (STANDING):  amLODIPine   Tablet 10 milliGRAM(s) Oral daily  calcitriol   Capsule 0.25 MICROGram(s) Oral <User Schedule>  chlorhexidine 2% Cloths 1 Application(s) Topical daily  cinacalcet 60 milliGRAM(s) Oral daily  cloNIDine 0.1 milliGRAM(s) Oral three times a day  ergocalciferol 67670 Unit(s) Oral <User Schedule>  ertapenem  IVPB 500 milliGRAM(s) IV Intermittent every 24 hours  fluconAZOLE   Tablet 200 milliGRAM(s) Oral <User Schedule>  gabapentin 100 milliGRAM(s) Oral two times a day  gentamicin 0.1% Ointment 1 Application(s) Topical daily  heparin   Injectable 5000 Unit(s) SubCutaneous every 12 hours  levothyroxine 25 MICROGram(s) Oral daily  lidocaine/prilocaine Cream 1 Application(s) Topical <User Schedule>  metoprolol succinate  milliGRAM(s) Oral daily  Nephro-kenneth 1 Tablet(s) Oral daily  sevelamer carbonate 800 milliGRAM(s) Oral once  sevelamer carbonate 800 milliGRAM(s) Oral three times a day with meals      VITALS:  T(F): 97.7 (10-14-20 @ 12:14), Max: 97.7 (10-13-20 @ 21:00)  HR: 56 (10-14-20 @ 12:14)  BP: 108/70 (10-14-20 @ 12:14)  RR: 18 (10-14-20 @ 05:00)  SpO2: 98% (10-14-20 @ 05:00)  Wt(kg): --    10-13 @ 07:01  -  10-14 @ 07:00  --------------------------------------------------------  IN: 5490 mL / OUT: 4700 mL / NET: 790 mL    10-14 @ 07:01  -  10-14 @ 12:42  --------------------------------------------------------  IN: 2000 mL / OUT: 2500 mL / NET: -500 mL      PHYSICAL EXAM:  Constitutional: NAD  HEENT: anicteric sclera, oropharynx clear, MMM  Neck: supple.   Respiratory: Bilateral equal breath sounds , no wheezes, no crackles  Cardiovascular: S1, S2, Regular, Murmur present.  Gastrointestinal: non tender; + PD catheter;   Extremities: No cyanosis or clubbing. No peripheral edema  Neurological: Alert and oriented x 3, no focal deficits  Psychiatric: Normal mood, normal affect  : No CVA tenderness. No fuller.   Skin: No rashes    LABS:  10-14    135  |  94<L>  |  63<H>  ----------------------------<  90  4.2   |  26  |  19.18<H>    Ca    8.6      14 Oct 2020 07:00      Creatinine Trend: 19.18 <--, 18.37 <--, 18.13 <--, 18.60 <--, 17.94 <--                        12.3   5.61  )-----------( 186      ( 14 Oct 2020 07:00 )             34.8     Urine Studies:      RADIOLOGY & ADDITIONAL STUDIES:

## 2020-10-14 NOTE — DISCHARGE NOTE PROVIDER - NSDCMRMEDTOKEN_GEN_ALL_CORE_FT
amLODIPine 10 mg oral tablet: 1 tab(s) orally once a day  calcitriol 0.5 mcg oral capsule: 3 cap(s) orally 3 times a week  cinacalcet 60 mg oral tablet: 1 tab(s) orally once a day  cloNIDine 0.1 mg oral tablet: 1 tab(s) orally 3 times a day  ergocalciferol 50,000 intl units (1.25 mg) oral capsule: 1 cap(s) orally once a week  gabapentin 100 mg oral capsule: 1-2x daily for pain   gentamicin 0.1% topical ointment: 1 application topically once a day to peritoneal site  levothyroxine 25 mcg (0.025 mg) oral tablet: 1 tab(s) orally once a day  lidocaine-prilocaine 2.5%-2.5% topical cream: Apply topically to affected area 3 times a week  meropenem 1000 mg intravenous injection: 1 milligram(s) intraperitoneal once a day during Mid-day intraperitoneal dialysis until 10/30/2020  metoprolol succinate 100 mg oral tablet, extended release: 1 tab(s) orally once a day  Nephro-Home oral tablet: 1 tab(s) orally once a day  sevelamer carbonate 800 mg oral tablet: 3 tab(s) orally 3 times a day (with meals)

## 2020-10-14 NOTE — DISCHARGE NOTE NURSING/CASE MANAGEMENT/SOCIAL WORK - PATIENT PORTAL LINK FT
You can access the FollowMyHealth Patient Portal offered by [facility] by registering at the following website: [email or web address]. By joining [employer]’s FollowMyHealth portal, you will also be able to view your health information using other applications (apps) compatible with our system.

## 2020-10-14 NOTE — PROGRESS NOTE ADULT - ASSESSMENT
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

## 2020-10-14 NOTE — PROGRESS NOTE ADULT - SUBJECTIVE AND OBJECTIVE BOX
S: Denies chest pain or shortness of breath.   Review of systems otherwise (-)  	    MEDICATIONS  (STANDING):  amLODIPine   Tablet 10 milliGRAM(s) Oral daily  calcitriol   Capsule 0.25 MICROGram(s) Oral <User Schedule>  chlorhexidine 2% Cloths 1 Application(s) Topical daily  cinacalcet 60 milliGRAM(s) Oral daily  cloNIDine 0.1 milliGRAM(s) Oral three times a day  ergocalciferol 94400 Unit(s) Oral <User Schedule>  ertapenem  IVPB 500 milliGRAM(s) IV Intermittent every 24 hours  fluconAZOLE   Tablet 200 milliGRAM(s) Oral <User Schedule>  gabapentin 100 milliGRAM(s) Oral two times a day  gentamicin 0.1% Ointment 1 Application(s) Topical daily  heparin   Injectable 5000 Unit(s) SubCutaneous every 12 hours  levothyroxine 25 MICROGram(s) Oral daily  lidocaine/prilocaine Cream 1 Application(s) Topical <User Schedule>  metoprolol succinate  milliGRAM(s) Oral daily  Nephro-kenneth 1 Tablet(s) Oral daily  sevelamer carbonate 800 milliGRAM(s) Oral once  sevelamer carbonate 800 milliGRAM(s) Oral three times a day with meals    MEDICATIONS  (PRN):      LABS:                            12.3   5.61  )-----------( 186      ( 14 Oct 2020 07:00 )             34.8     Hemoglobin: 12.3 g/dL (10-14 @ 07:00)  Hemoglobin: 11.9 g/dL (10-13 @ 06:11)  Hemoglobin: 11.3 g/dL (10-12 @ 06:06)  Hemoglobin: 12.4 g/dL (10-11 @ 05:53)  Hemoglobin: 12.7 g/dL (10-10 @ 16:31)    10-14    135  |  94<L>  |  63<H>  ----------------------------<  90  4.2   |  26  |  19.18<H>    Ca    8.6      14 Oct 2020 07:00      Creatinine Trend: 19.18<--, 18.37<--, 18.13<--, 18.60<--, 17.94<--             10-13-20 @ 07:01  -  10-14-20 @ 07:00  --------------------------------------------------------  IN: 5490 mL / OUT: 4700 mL / NET: 790 mL    10-14-20 @ 07:01  -  10-14-20 @ 12:31  --------------------------------------------------------  IN: 2000 mL / OUT: 2500 mL / NET: -500 mL        PHYSICAL EXAM  Vital Signs Last 24 Hrs  T(C): 36.5 (14 Oct 2020 12:14), Max: 36.5 (13 Oct 2020 21:00)  T(F): 97.7 (14 Oct 2020 12:14), Max: 97.7 (13 Oct 2020 21:00)  HR: 56 (14 Oct 2020 12:14) (56 - 78)  BP: 108/70 (14 Oct 2020 12:14) (108/70 - 165/81)  BP(mean): --  RR: 18 (14 Oct 2020 05:00) (18 - 18)  SpO2: 98% (14 Oct 2020 05:00) (95% - 98%)        Gen: Appears well in NAD  HEENT:  (-)icterus (-)pallor  CV: N S1 S2 1/6 KYARA (+)2 Pulses B/l  Resp:  Clear to auscultation B/L, normal effort  GI: (+) BS Soft, NT, ND  Lymph:  (-)Edema, (-)obvious lymphadenopathy  Skin: Warm to touch, Normal turgor  Psych: Appropriate mood and affect      TELEMETRY: None	      < from: Transthoracic Echocardiogram (10.15.19 @ 09:28) >  Conclusions:  1. Mild concentric left ventricular hypertrophy.  2. Normal left ventricular systolic function. No segmental  wall motion abnormalities.  3. Normal diastolic function  4. Normal right ventricular size and function.  5. Estimated right ventricular systolic pressure equals 12  mm Hg, assuming right atrial pressure equals 8 mm Hg,  consistent with normal pulmonary pressures.  *** Compared with echocardiogram of 1/8/2018, no  significant changes noted.    < end of copied text >    < from: Cardiac Cath Lab - Adult (10.18.19 @ 15:27) >  PROCEDURE:  --  Left heart catheterization.  --  Left coronary angiography.  --  Right coronary angiography.  VENTRICLES: EF by echo was 60 %.  CORONARY VESSELS: The coronary circulation is right dominant.  LM:   --  LM: Normal.  LAD:   --  LAD: Angiography showed minor luminal irregularities with no  flow limiting lesions.  CX:   --  Circumflex: Angiography showed minor luminal irregularities with  no flow limiting lesions.  RCA:   --  RCA: Angiographyshowed minor luminal irregularities with no  flow limiting lesions.  COMPLICATIONS: There were no complications.  DIAGNOSTIC RECOMMENDATIONS: Continue aggressive medical therapy for  multiple cardiovascular risk factors.  Prepared and signed by  Geoffrey Duncan M.D.  Signed 10/18/2019 17:13:42    < end of copied text >      ASSESSMENT/PLAN: Patient is a 34 y/o Male well known to our office (Cardiologist - Dr. 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     - Continue Abx per ID  - Blood cultures negative so far  - EKG is mildly abnormal but unchanged from the time of his prior cardiac w/u  - Continue BP control with Clonidine, Norvasc, Toprol XL  - Dialysis per renal  - No further inpatient cardiac w/u needed at this time  - Patient to f/u in our office with Dr. Navarro after d/c    Thad Pruitt PA-C  Pager: [phone]

## 2020-10-14 NOTE — PROGRESS NOTE ADULT - REASON FOR ADMISSION
bacteria in peritoneal fluid

## 2020-10-14 NOTE — DISCHARGE NOTE PROVIDER - CARE PROVIDER_API CALL
Bhavik Greco  INTERNAL MEDICINE  [address] [place] [address]  Phone: [phone]  Fax: [phone]  Follow Up Time:

## 2020-10-15 LAB
CULTURE RESULTS: SIGNIFICANT CHANGE UP
CULTURE RESULTS: SIGNIFICANT CHANGE UP
SPECIMEN SOURCE: SIGNIFICANT CHANGE UP
SPECIMEN SOURCE: SIGNIFICANT CHANGE UP

## 2020-10-15 RX ORDER — MEROPENEM 1 G/30ML
1 INJECTION INTRAVENOUS
Qty: 17 | Refills: 0
Start: 2020-10-15 | End: 2020-10-31

## 2020-10-16 LAB
CULTURE RESULTS: SIGNIFICANT CHANGE UP
SPECIMEN SOURCE: SIGNIFICANT CHANGE UP

## 2020-10-19 LAB
CULTURE RESULTS: NO GROWTH — SIGNIFICANT CHANGE UP
SPECIMEN SOURCE: SIGNIFICANT CHANGE UP

## 2020-10-21 PROCEDURE — 87206 SMEAR FLUORESCENT/ACID STAI: CPT

## 2020-10-21 PROCEDURE — 80053 COMPREHEN METABOLIC PANEL: CPT

## 2020-10-21 PROCEDURE — 86769 SARS-COV-2 COVID-19 ANTIBODY: CPT

## 2020-10-21 PROCEDURE — 87205 SMEAR GRAM STAIN: CPT

## 2020-10-21 PROCEDURE — 87015 SPECIMEN INFECT AGNT CONCNTJ: CPT

## 2020-10-21 PROCEDURE — 84100 ASSAY OF PHOSPHORUS: CPT

## 2020-10-21 PROCEDURE — 87102 FUNGUS ISOLATION CULTURE: CPT

## 2020-10-21 PROCEDURE — 85027 COMPLETE CBC AUTOMATED: CPT

## 2020-10-21 PROCEDURE — 89051 BODY FLUID CELL COUNT: CPT

## 2020-10-21 PROCEDURE — 93005 ELECTROCARDIOGRAM TRACING: CPT

## 2020-10-21 PROCEDURE — 99285 EMERGENCY DEPT VISIT HI MDM: CPT

## 2020-10-21 PROCEDURE — 87116 MYCOBACTERIA CULTURE: CPT

## 2020-10-21 PROCEDURE — U0003: CPT

## 2020-10-21 PROCEDURE — 87070 CULTURE OTHR SPECIMN AEROBIC: CPT

## 2020-10-21 PROCEDURE — 87075 CULTR BACTERIA EXCEPT BLOOD: CPT

## 2020-10-21 PROCEDURE — 87040 BLOOD CULTURE FOR BACTERIA: CPT

## 2020-10-21 PROCEDURE — 83735 ASSAY OF MAGNESIUM: CPT

## 2020-10-21 PROCEDURE — 85025 COMPLETE CBC W/AUTO DIFF WBC: CPT

## 2020-10-21 PROCEDURE — 80048 BASIC METABOLIC PNL TOTAL CA: CPT

## 2020-11-05 ENCOUNTER — APPOINTMENT (OUTPATIENT)
Dept: CARDIOLOGY | Facility: CLINIC | Age: 35
End: 2020-11-05

## 2020-11-11 LAB
CULTURE RESULTS: SIGNIFICANT CHANGE UP
SPECIMEN SOURCE: SIGNIFICANT CHANGE UP

## 2020-12-02 LAB
CULTURE RESULTS: SIGNIFICANT CHANGE UP
SPECIMEN SOURCE: SIGNIFICANT CHANGE UP

## 2021-02-16 ENCOUNTER — APPOINTMENT (OUTPATIENT)
Dept: TRANSPLANT | Facility: CLINIC | Age: 36
End: 2021-02-16
Payer: COMMERCIAL

## 2021-02-16 VITALS
OXYGEN SATURATION: 99 % | DIASTOLIC BLOOD PRESSURE: 105 MMHG | BODY MASS INDEX: 23.03 KG/M2 | HEIGHT: 72 IN | RESPIRATION RATE: 14 BRPM | TEMPERATURE: 98.2 F | SYSTOLIC BLOOD PRESSURE: 161 MMHG | WEIGHT: 170 LBS | HEART RATE: 85 BPM

## 2021-02-16 PROCEDURE — 99072 ADDL SUPL MATRL&STAF TM PHE: CPT

## 2021-02-16 PROCEDURE — 99215 OFFICE O/P EST HI 40 MIN: CPT

## 2021-02-16 NOTE — REASON FOR VISIT
[Follow-Up] : a follow-up visit for [End-Stage Renal Disease] : end-stage renal disease [Kidney Transplant Evaluation] : kidney transplant evaluation [FreeTextEntry1] : pt is a listed renal patient presents today for annual appointment.  [FreeTextEntry2] : Joshua Prieto MD

## 2021-02-16 NOTE — HISTORY OF PRESENT ILLNESS
[Other: ___] : [unfilled] [Previous Kidney Transplant] : previous kidney transplant [Cardiac History] : no cardiac history [Blood Transfusion] : no prior blood transfusion [Claudication/Angina] : no claudication/angina [Hx of DVT/Thrombosis/Miscarriage] : no history of dvt/thrombosis/miscarriage [Diabetes] : no diabetes [TextBox_16] : pre-emptive, 2018 [TextBox_20] : at age 13, 2015 [TextBox_24] : 2006 [TextBox_28] : Toni [TextBox_30] : 3 miles [de-identified] : Mother (Tyesha) also present\par s/p live donor (mother) kidney transplant 2004 - [facility] - lymphocele requiring drainage - right lower quadrant - allograft still in place - failed 2018\par ABO B\par PRA 0% \par Father: alive - age 59 - healthy\par Mother: alive - age 62 - thyroid disease (donor of previous kidney transplant)\par Family history:\par - 2 paternal uncles on dialysis \par - 1 paternal cousin on dialysis\par 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

## 2021-02-16 NOTE — ASSESSMENT
[Good candidate] : a good candidate. We should proceed with our protocol for evaluation for kidney transplantation. [FreeTextEntry1] : Multiple live donors

## 2021-02-16 NOTE — CONSULT LETTER
[Dear  ___] : Dear  [unfilled], [Consult Letter:] : I had the pleasure of evaluating your patient, [unfilled]. [Please see my note below.] : Please see my note below. [Consult Closing:] : Thank you very much for allowing me to participate in the care of this patient.  If you have any questions, please do not hesitate to contact me. [Sincerely,] : Sincerely, [FreeTextEntry2] : Joshua Prieto MD [FreeTextEntry3] : Hay

## 2021-02-16 NOTE — PHYSICAL EXAM
[Well Developed] : well developed [Well Nourished] : well nourished [No Acute Distress] : no acute distress [Normocephalic] : normocephalic [Atraumatic] : atraumatic [Sclera Anicteric] : sclera anicteric [Neck Supple] : neck supple [Clear to Auscultation] : clear to auscultation [Breathing Comfortably on RA] : breathing comfortably on room air [Normal Rate] : normal rate [Regular Rhythm] : regular rhythm [Soft] : soft [Non-tender] : non-tender [In Left Arm] : fistula/graft in left arm [] : right dorsalis pedis palpable [Alert] : alert [Responds to Questions Appropriately] : responds to questions appropriately [Oriented] : oriented [Appropriate] : appropriate [FreeTextEntry1] : No carotid bruits [TextBox_25] : PD catheter in right lower quadrant [TextBox_34] : No ulcers or edema [TextBox_86] : No adenopathy

## 2021-02-16 NOTE — REVIEW OF SYSTEMS
[Can Walk (___ Blocks)] : can walk [unfilled] blocks [Urine Output: ____] : Urine Output: [unfilled] [Tattoos] : tattoos [Anemia] : anemia [Fever] : no fever [Chills] : no chills [Fatigue] : no fatigue [Night Sweats] : no night sweats [Recent Weight Gain (___ Lbs)] : no recent weight gain [Recent Weight Loss (___ Lbs)] : no recent weight loss [Sore throat] : no sore throat [Pain/Stiffness] : no pain/stiffness [Rhinorrhea] : no rhinorrhea [Trauma] : no trauma [Sclera anicteric] : sclera icteric [Double vision] : no double vision [Blurred Vision] : no blurred vision [Eye Pain] : no eye pain [Wears glasses] : does not wear glasses [Chest Pain] : no chest pain [Palpitations] : no palpitations [SOB] : no shortness of breath [Wheezing] : no wheezing [Cough] : no cough [Dyspnea on Exertion] : no dyspnea on exertion [Pleuritic Chest Pain] : no pleuritic chest pain [Abdominal Pain] : no abdominal pain [Nausea] : no nausea [Constipation] : no constipation [Diarrhea] : diarrhea [Vomiting] : no vomiting [Dysuria] : no dysuria [Frequency] : no frequency [Urgency] : no urinary urgency [Incontinence] : no incontinence [Hematuria] : no hematuria [UTI] : no UTI [Joint Pain] : no joint pain [Joint Stiffness] : no joint stiffness [Muscle Pain] : no muscle pain [Muscle Weakness] : no muscle weakness [Itching] : no itching [Skin Rash] : no skin rash [Hair Changes] : no hair changes [Headache] : no headache [Dizziness] : no dizziness [Fainting] : no fainting [Confusion] : no confusion [Memory Loss] : no memory loss [Unsteady Gait] : steady gait [Seizures] : no seizures [Suicidal] : not suicidal [Hallucinations] : no hallucinations [Anxiety] : no anxiety [Depression] : no depression [Adenopathy] : no adenopathy [Easy Bleeding] : no easy bleeding [Easy Bruising] : no easy bruising

## 2021-02-18 LAB
ABO + RH PNL BLD: NORMAL
ALBUMIN SERPL ELPH-MCNC: 4.3 G/DL
ALP BLD-CCNC: 125 U/L
ALT SERPL-CCNC: 40 U/L
ANION GAP SERPL CALC-SCNC: 19 MMOL/L
AST SERPL-CCNC: 21 U/L
BASOPHILS # BLD AUTO: 0.06 K/UL
BASOPHILS NFR BLD AUTO: 1.1 %
BILIRUB SERPL-MCNC: 0.6 MG/DL
BUN SERPL-MCNC: 53 MG/DL
C PEPTIDE SERPL-MCNC: 11 NG/ML
CALCIUM SERPL-MCNC: 9.6 MG/DL
CHLORIDE SERPL-SCNC: 93 MMOL/L
CHOLEST SERPL-MCNC: 154 MG/DL
CK SERPL-CCNC: 343 U/L
CMV IGG SERPL QL: >10 U/ML
CMV IGG SERPL-IMP: POSITIVE
CMV IGM SERPL QL: <8 AU/ML
CMV IGM SERPL QL: NEGATIVE
CO2 SERPL-SCNC: 26 MMOL/L
CREAT SERPL-MCNC: 19.45 MG/DL
CRP SERPL-MCNC: <0.1 MG/DL
EBV EA AB SER IA-ACNC: <5 U/ML
EBV EA AB TITR SER IF: POSITIVE
EBV EA IGG SER QL IA: 385 U/ML
EBV EA IGG SER-ACNC: NEGATIVE
EBV EA IGM SER IA-ACNC: POSITIVE
EBV PATRN SPEC IB-IMP: NORMAL
EBV VCA IGG SER IA-ACNC: >750 U/ML
EBV VCA IGM SER QL IA: 57.8 U/ML
EOSINOPHIL # BLD AUTO: 0.22 K/UL
EOSINOPHIL NFR BLD AUTO: 4.2 %
EPSTEIN-BARR VIRUS CAPSID ANTIGEN IGG: POSITIVE
ERYTHROCYTE [SEDIMENTATION RATE] IN BLOOD BY WESTERGREN METHOD: 20 MM/HR
GLUCOSE SERPL-MCNC: 84 MG/DL
HAV IGM SER QL: NONREACTIVE
HBV CORE IGG+IGM SER QL: NONREACTIVE
HBV SURFACE AB SER QL: REACTIVE
HBV SURFACE AB SERPL IA-ACNC: 161.1 MIU/ML
HBV SURFACE AG SER QL: NONREACTIVE
HCT VFR BLD CALC: 35.2 %
HCV AB SER QL: NONREACTIVE
HCV S/CO RATIO: 0.25 S/CO
HDLC SERPL-MCNC: 39 MG/DL
HGB BLD-MCNC: 12.2 G/DL
HIV1+2 AB SPEC QL IA.RAPID: NONREACTIVE
HSV 1+2 IGG SER IA-IMP: NEGATIVE
HSV 1+2 IGG SER IA-IMP: POSITIVE
HSV 1+2 IGG SER IA-IMP: POSITIVE
HSV1 IGG SER QL: 59.7 INDEX
HSV1 IGG SER QL: 59.7 INDEX
HSV1 IGM SER QL: NORMAL TITER
HSV2 AB FLD-ACNC: NORMAL TITER
HSV2 IGG SER QL: 0.1 INDEX
IMM GRANULOCYTES NFR BLD AUTO: 0.2 %
ISOAGGLUTININ TITER, ANTI-A: 32
LDLC SERPL CALC-MCNC: 91 MG/DL
LYMPHOCYTES # BLD AUTO: 1.75 K/UL
LYMPHOCYTES NFR BLD AUTO: 33.1 %
M TB IFN-G BLD-IMP: NEGATIVE
MAGNESIUM SERPL-MCNC: 2 MG/DL
MAN DIFF?: NORMAL
MCHC RBC-ENTMCNC: 33.2 PG
MCHC RBC-ENTMCNC: 34.7 GM/DL
MCV RBC AUTO: 95.9 FL
MONOCYTES # BLD AUTO: 0.38 K/UL
MONOCYTES NFR BLD AUTO: 7.2 %
NEUTROPHILS # BLD AUTO: 2.87 K/UL
NEUTROPHILS NFR BLD AUTO: 54.2 %
NONHDLC SERPL-MCNC: 115 MG/DL
PHOSPHATE SERPL-MCNC: 5.1 MG/DL
PLATELET # BLD AUTO: 181 K/UL
POTASSIUM SERPL-SCNC: 5 MMOL/L
PROT SERPL-MCNC: 7.2 G/DL
PSA SERPL-MCNC: 1.14 NG/ML
QUANTIFERON TB PLUS MITOGEN MINUS NIL: 0.77 IU/ML
QUANTIFERON TB PLUS NIL: 0.01 IU/ML
QUANTIFERON TB PLUS TB1 MINUS NIL: 0 IU/ML
QUANTIFERON TB PLUS TB2 MINUS NIL: 0 IU/ML
RBC # BLD: 3.67 M/UL
RBC # FLD: 14.1 %
RUBV IGG FLD-ACNC: 10.9 INDEX
RUBV IGG SER-IMP: POSITIVE
SARS-COV-2 IGG SERPL IA-ACNC: 0.07 INDEX
SARS-COV-2 IGG SERPL QL IA: NEGATIVE
SODIUM SERPL-SCNC: 137 MMOL/L
T GONDII AB SER-IMP: NEGATIVE
T GONDII IGG SER QL: <3 IU/ML
T PALLIDUM AB SER QL IA: NEGATIVE
T3 SERPL-MCNC: 151 NG/DL
T4 FREE SERPL-MCNC: 1.1 NG/DL
TRIGL SERPL-MCNC: 123 MG/DL
TSH SERPL-ACNC: 4.02 UIU/ML
URATE SERPL-MCNC: 5.4 MG/DL
VZV AB TITR SER: POSITIVE
VZV IGG SER IF-ACNC: >4000 INDEX
WBC # FLD AUTO: 5.29 K/UL

## 2021-02-23 LAB — CMV DNA SPEC QL NAA+PROBE: NOT DETECTED IU/ML

## 2021-02-28 NOTE — PROGRESS NOTE ADULT - SUBJECTIVE AND OBJECTIVE BOX
--------------------------------------------------------------------------------  Chief Complaint: Gen weakness, uremia    24 hour events/subjective:  received hemodialysis today      PAST HISTORY  --------------------------------------------------------------------------------  No significant changes to PMH, PSH, FHx, SHx, unless otherwise noted    ALLERGIES & MEDICATIONS  --------------------------------------------------------------------------------  Allergies    No Known Allergies    Intolerances      Standing Inpatient Medications  dextrose 5%. 1000 milliLiter(s) IV Continuous <Continuous>  influenza   Vaccine 0.5 milliLiter(s) IntraMuscular once  predniSONE   Tablet 5 milliGRAM(s) Oral daily  sodium bicarbonate 650 milliGRAM(s) Oral three times a day  tacrolimus 3 milliGRAM(s) Oral two times a day    PRN Inpatient Medications  glucagon  Injectable 1 milliGRAM(s) IntraMuscular once PRN      REVIEW OF SYSTEMS  --------------------------------------------------------------------------------  Gen: Has fatigue, No fevers/chills, weakness  Skin: No rashes  Head/Eyes/Ears/Mouth: No headache;No sore throat  Respiratory: No dyspnea, cough,   CV: No chest pain, PND, orthopnea  GI: No abdominal pain, diarrhea, constipation, nausea, vomiting  Transplant: No pain  : No increased frequency, dysuria, hematuria, nocturia  MSK: No joint pain/swelling; no back pain; no edema  Neuro: No dizziness/lightheadedness, weakness, seizures, numbness, tingling  Psych: No significant nervousness, anxiety, stress, depression    All other systems were reviewed and are negative, except as noted.    VITALS/PHYSICAL EXAM  --------------------------------------------------------------------------------  T(C): 37 (01-04-18 @ 16:00), Max: 37.1 (01-04-18 @ 09:45)  HR: 70 (01-04-18 @ 19:00) (68 - 91)  BP: 160/97 (01-04-18 @ 19:00) (132/76 - 162/108)  RR: 16 (01-04-18 @ 19:00) (10 - 34)  SpO2: 100% (01-04-18 @ 19:00) (95% - 100%)  Wt(kg): --  Height (cm): 182.88 (01-03-18 @ 22:35)  Weight (kg): 68.8 (01-03-18 @ 22:35)  BMI (kg/m2): 20.6 (01-03-18 @ 22:35)  BSA (m2): 1.89 (01-03-18 @ 22:35)      01-03-18 @ 07:01  -  01-04-18 @ 07:00  --------------------------------------------------------  IN: 175 mL / OUT: 1100 mL / NET: -925 mL    01-04-18 @ 07:01  -  01-04-18 @ 19:15  --------------------------------------------------------  IN: 480 mL / OUT: 1200 mL / NET: -720 mL      Physical Exam:  	Gen: NAD, well-appearing  	HEENT: PERRL, supple neck, clear oropharynx  	Pulm: CTA B/L  	CV: RRR, S1S2; no rub  	Back: No spinal or CVA tenderness; no sacral edema  	Abd: +BS, soft, nontender/nondistended                      Transplant: No tenderness, swelling  	: No suprapubic tenderness  	UE: Warm, FROM, intact strength; no edema; no asterixis  	LE: Warm, FROM, intact strength; no edema  	Neuro: No focal deficits, intact gait  	Psych: Normal affect and mood  	Skin: Warm, without rashes  Right I/j catheter- non tunneled noted      LABS/STUDIES  --------------------------------------------------------------------------------              9.8    8.6   >-----------<  133      [01-04-18 @ 12:28]              28.0     137  |  96  |  79  ----------------------------<  173      [01-04-18 @ 14:04]  4.9   |  26  |  15.44        Ca     8.8     [01-04-18 @ 14:04]      Mg     1.8     [01-04-18 @ 14:04]      Phos  3.3     [01-04-18 @ 14:04]    TPro  7.1  /  Alb  4.3  /  TBili  0.4  /  DBili  x   /  AST  14  /  ALT  32  /  AlkPhos  65  [01-03-18 @ 18:32]    PT/INR: PT 10.2 , INR 0.95       [01-03-18 @ 21:59]  PTT: 28.5       [01-03-18 @ 21:59]      Creatinine Trend:  SCr 15.44 [01-04 @ 14:04]  SCr 15.40 [01-04 @ 12:28]  SCr 21.82 [01-04 @ 03:33]  SCr 20.82 [01-04 @ 00:23]  SCr 21.50 [01-03 @ 21:31]    Tacrolimus (), Serum: <2.0 ng/mL (01-04 @ 08:04)            Urinalysis - [01-03-18 @ 20:55]      Color Colorless / Appearance Clear / SG 1.009 / pH 7.0      Gluc Negative / Ketone Negative  / Bili Negative / Urobili Negative       Blood Negative / Protein 150 / Leuk Est Negative / Nitrite Negative      RBC 0-2 / WBC 5-10 / Hyaline  / Gran  / Sq Epi  / Non Sq Epi  / Bacteria       HbA1c 5.1      [05-10-16 @ 12:59]    HBsAb <3.0      [01-04-18 @ 11:00]  HBsAg Nonreact      [01-04-18 @ 11:00]  HBcAb Nonreact      [01-04-18 @ 11:00]  HCV 0.29, Nonreact      [01-04-18 @ 11:00] no

## 2021-03-15 ENCOUNTER — APPOINTMENT (OUTPATIENT)
Dept: RADIOLOGY | Facility: IMAGING CENTER | Age: 36
End: 2021-03-15

## 2021-03-15 ENCOUNTER — APPOINTMENT (OUTPATIENT)
Dept: ULTRASOUND IMAGING | Facility: IMAGING CENTER | Age: 36
End: 2021-03-15

## 2021-03-15 ENCOUNTER — APPOINTMENT (OUTPATIENT)
Dept: CT IMAGING | Facility: IMAGING CENTER | Age: 36
End: 2021-03-15

## 2021-03-15 NOTE — PROVIDER CONTACT NOTE (OTHER) - DATE AND TIME:
10-Oct-2020 20:45 Render Risk Assessment In Note?: no Detail Level: Simple Additional Notes: Deferred her to vain specialist Additional Notes: Discussed with patient the importance of avoiding hot water and using moisturizer on her hand CeraVe

## 2021-04-14 ENCOUNTER — APPOINTMENT (OUTPATIENT)
Dept: CARDIOLOGY | Facility: CLINIC | Age: 36
End: 2021-04-14

## 2021-04-22 ENCOUNTER — NON-APPOINTMENT (OUTPATIENT)
Age: 36
End: 2021-04-22

## 2021-05-12 ENCOUNTER — APPOINTMENT (OUTPATIENT)
Dept: CARDIOLOGY | Facility: CLINIC | Age: 36
End: 2021-05-12

## 2021-06-08 ENCOUNTER — APPOINTMENT (OUTPATIENT)
Dept: TRANSPLANT | Facility: CLINIC | Age: 36
End: 2021-06-08
Payer: MEDICARE

## 2021-06-08 PROCEDURE — 99001T: CUSTOM

## 2021-06-16 ENCOUNTER — NON-APPOINTMENT (OUTPATIENT)
Age: 36
End: 2021-06-16

## 2021-06-16 ENCOUNTER — RESULT REVIEW (OUTPATIENT)
Age: 36
End: 2021-06-16

## 2021-06-16 ENCOUNTER — APPOINTMENT (OUTPATIENT)
Dept: RADIOLOGY | Facility: IMAGING CENTER | Age: 36
End: 2021-06-16
Payer: COMMERCIAL

## 2021-06-16 ENCOUNTER — APPOINTMENT (OUTPATIENT)
Dept: CARDIOLOGY | Facility: CLINIC | Age: 36
End: 2021-06-16
Payer: COMMERCIAL

## 2021-06-16 ENCOUNTER — OUTPATIENT (OUTPATIENT)
Dept: OUTPATIENT SERVICES | Facility: HOSPITAL | Age: 36
LOS: 1 days | End: 2021-06-16
Payer: COMMERCIAL

## 2021-06-16 ENCOUNTER — APPOINTMENT (OUTPATIENT)
Dept: ULTRASOUND IMAGING | Facility: IMAGING CENTER | Age: 36
End: 2021-06-16
Payer: COMMERCIAL

## 2021-06-16 VITALS
DIASTOLIC BLOOD PRESSURE: 88 MMHG | WEIGHT: 170 LBS | BODY MASS INDEX: 23.03 KG/M2 | HEIGHT: 72 IN | OXYGEN SATURATION: 98 % | HEART RATE: 70 BPM | SYSTOLIC BLOOD PRESSURE: 138 MMHG

## 2021-06-16 DIAGNOSIS — R07.89 OTHER CHEST PAIN: ICD-10-CM

## 2021-06-16 DIAGNOSIS — Z01.818 ENCOUNTER FOR OTHER PREPROCEDURAL EXAMINATION: ICD-10-CM

## 2021-06-16 PROCEDURE — 71046 X-RAY EXAM CHEST 2 VIEWS: CPT | Mod: 26

## 2021-06-16 PROCEDURE — 99213 OFFICE O/P EST LOW 20 MIN: CPT

## 2021-06-16 PROCEDURE — 99072 ADDL SUPL MATRL&STAF TM PHE: CPT

## 2021-06-16 PROCEDURE — 93979 VASCULAR STUDY: CPT | Mod: 26

## 2021-06-16 PROCEDURE — 93000 ELECTROCARDIOGRAM COMPLETE: CPT

## 2021-06-16 PROCEDURE — 71046 X-RAY EXAM CHEST 2 VIEWS: CPT

## 2021-06-16 PROCEDURE — 93979 VASCULAR STUDY: CPT

## 2021-06-20 NOTE — PHYSICAL EXAM
[Well Developed] : well developed [Well Nourished] : well nourished [No Acute Distress] : no acute distress [Normal Conjunctiva] : normal conjunctiva [Normal Venous Pressure] : normal venous pressure [No Carotid Bruit] : no carotid bruit [Normal S1, S2] : normal S1, S2 [No Murmur] : no murmur [No Rub] : no rub [No Gallop] : no gallop [Clear Lung Fields] : clear lung fields [Good Air Entry] : good air entry [No Respiratory Distress] : no respiratory distress  [Soft] : abdomen soft [Non Tender] : non-tender [Normal Gait] : normal gait [No Edema] : no edema [No Cyanosis] : no cyanosis [No Rash] : no rash [No Skin Lesions] : no skin lesions [Moves all extremities] : moves all extremities [No Focal Deficits] : no focal deficits [Normal Speech] : normal speech [Alert and Oriented] : alert and oriented [de-identified] : LUE fistula

## 2021-06-20 NOTE — CARDIOLOGY SUMMARY
[de-identified] : \par 06/16/21 - normal sinus rhythm, LVH with repolarization abnormality\par  [de-identified] : \par 10/16/19 (regadenoson MIBI) - medium-sized, mild to moderate defects in the inferior and basal inferoseptal walls that are mostly fixed suggestive of scar with mild ischemia, LVEF 52%\par 05/30/18 (stress echo) - normal stress echo with no evidence of inducible ischemia\par 05/05/16 (stress echo) - 9 METS, 79% MPHR, 08:15 min, no evidence of inducible ischemia at submaximal HR achieved\par  [de-identified] : \par 10/15/19 - peak AV gradient 9 mmHg, mild concentric LVH, normal RV size and function, RVSP 12 mmHg, LVEF 60-65%\par 05/05/16 - normal LA, normal LV and RV size and function, LVEF 64%

## 2021-06-20 NOTE — DISCUSSION/SUMMARY
[Hypertension] : hypertension [Stable] : stable [FreeTextEntry1] : \par Currently stable from a cardiovascular standpoint. Normotensive. Euvolemic. Asymptomatic. Continue current medications. ECG completed today and reviewed. Will schedule a stress echo for pre-transplant cardiac risk stratification. At this time, patient is considered an acceptable risk from a cardiac standpoint for renal transplant. Follow up annually pre-transplant.

## 2021-07-02 ENCOUNTER — APPOINTMENT (OUTPATIENT)
Dept: TRANSPLANT | Facility: CLINIC | Age: 36
End: 2021-07-02
Payer: MEDICARE

## 2021-07-02 PROCEDURE — 99001T: CUSTOM

## 2021-07-30 NOTE — ED ADULT NURSE NOTE - FALLEN IN THE PAST
Apixaban/Eliquis is used to treat and prevent blood clots. If you are not able to swallow the tablets whole, they may be crushed and mixed in water, apple juice, or applesauce and promptly taken within four hours. Never skip a dose of Apixaban/Eliquis. If you forget to take your Apixaban/Eliquis, take a dose as soon as you remember. If it is almost time for your next Apixaban/Eliquis dose, wait until then and take a regular dose. DO NOT take an extra pill to ‘catch up’.  NEVER TAKE A DOUBLE DOSE. Notify your doctor that you missed a dose. Take Apixaban/Eliquis at the same time each morning and evening. Apixaban/Eliquis may be taken with other medication or food.
no

## 2021-08-03 ENCOUNTER — APPOINTMENT (OUTPATIENT)
Dept: TRANSPLANT | Facility: CLINIC | Age: 36
End: 2021-08-03
Payer: MEDICARE

## 2021-08-03 PROCEDURE — 99001T: CUSTOM

## 2021-08-03 PROCEDURE — 86833 HLA CLASS II HIGH DEFIN QUAL: CPT

## 2021-08-03 PROCEDURE — 86832 HLA CLASS I HIGH DEFIN QUAL: CPT

## 2021-08-04 ENCOUNTER — NON-APPOINTMENT (OUTPATIENT)
Age: 36
End: 2021-08-04

## 2021-08-04 ENCOUNTER — APPOINTMENT (OUTPATIENT)
Dept: CARDIOLOGY | Facility: CLINIC | Age: 36
End: 2021-08-04
Payer: COMMERCIAL

## 2021-08-04 VITALS
SYSTOLIC BLOOD PRESSURE: 157 MMHG | HEIGHT: 72 IN | TEMPERATURE: 97.6 F | BODY MASS INDEX: 23.03 KG/M2 | RESPIRATION RATE: 14 BRPM | OXYGEN SATURATION: 98 % | WEIGHT: 170 LBS | HEART RATE: 77 BPM | DIASTOLIC BLOOD PRESSURE: 95 MMHG

## 2021-08-04 DIAGNOSIS — I10 ESSENTIAL (PRIMARY) HYPERTENSION: ICD-10-CM

## 2021-08-04 DIAGNOSIS — Z01.818 ENCOUNTER FOR OTHER PREPROCEDURAL EXAMINATION: ICD-10-CM

## 2021-08-04 PROCEDURE — 99072 ADDL SUPL MATRL&STAF TM PHE: CPT

## 2021-08-04 PROCEDURE — 93000 ELECTROCARDIOGRAM COMPLETE: CPT

## 2021-08-04 PROCEDURE — 99212 OFFICE O/P EST SF 10 MIN: CPT

## 2021-08-06 NOTE — CARDIOLOGY SUMMARY
[de-identified] : \par 08/04/21 - normal sinus rhythm, LVH with repolarization abnormality\par  [de-identified] : \par 10/16/19 (regadenoson MIBI) - medium-sized, mild to moderate defects in the inferior and basal inferoseptal walls that are mostly fixed suggestive of scar with mild ischemia, LVEF 52%\par 05/30/18 (stress echo) - normal stress echo with no evidence of inducible ischemia\par 05/05/16 (stress echo) - 9 METS, 79% MPHR, 08:15 min, no evidence of inducible ischemia at submaximal HR achieved\par  [de-identified] : \par 10/15/19 - peak AV gradient 9 mmHg, mild concentric LVH, normal RV size and function, RVSP 12 mmHg, LVEF 60-65%\par 05/05/16 - normal LA, normal LV and RV size and function, LVEF 64%

## 2021-08-06 NOTE — PHYSICAL EXAM
[Well Developed] : well developed [Well Nourished] : well nourished [No Acute Distress] : no acute distress [Normal Conjunctiva] : normal conjunctiva [Normal Venous Pressure] : normal venous pressure [No Carotid Bruit] : no carotid bruit [Normal S1, S2] : normal S1, S2 [No Murmur] : no murmur [No Rub] : no rub [No Gallop] : no gallop [Clear Lung Fields] : clear lung fields [Good Air Entry] : good air entry [No Respiratory Distress] : no respiratory distress  [Soft] : abdomen soft [Non Tender] : non-tender [Normal Gait] : normal gait [No Edema] : no edema [No Cyanosis] : no cyanosis [No Rash] : no rash [No Skin Lesions] : no skin lesions [Moves all extremities] : moves all extremities [No Focal Deficits] : no focal deficits [Normal Speech] : normal speech [Alert and Oriented] : alert and oriented [de-identified] : LUE fistula

## 2021-08-06 NOTE — DISCUSSION/SUMMARY
[Hypertension] : hypertension [FreeTextEntry1] : \par Currently stable from a cardiovascular standpoint. Hypertensive. Euvolemic. Asymptomatic. Continue current medications. ECG completed today and reviewed (findings as noted above). Patient to schedule stress echo for pre-transplant cardiac risk assessment. At this time, patient is considered an acceptable risk from a cardiac standpoint for renal transplant. Follow up annually pre-transplant.

## 2021-08-06 NOTE — HISTORY OF PRESENT ILLNESS
[FreeTextEntry1] : Doing well. Denies chest pain, shortness of breath or palpitations. Patient was supposed to schedule for stress testing which is not done.

## 2021-08-18 ENCOUNTER — OUTPATIENT (OUTPATIENT)
Dept: OUTPATIENT SERVICES | Facility: HOSPITAL | Age: 36
LOS: 1 days | End: 2021-08-18

## 2021-08-18 ENCOUNTER — APPOINTMENT (OUTPATIENT)
Dept: CV DIAGNOSITCS | Facility: HOSPITAL | Age: 36
End: 2021-08-18
Payer: COMMERCIAL

## 2021-08-18 DIAGNOSIS — I10 ESSENTIAL (PRIMARY) HYPERTENSION: ICD-10-CM

## 2021-08-18 PROCEDURE — 93350 STRESS TTE ONLY: CPT | Mod: 26

## 2021-08-18 PROCEDURE — 93325 DOPPLER ECHO COLOR FLOW MAPG: CPT | Mod: 26,GC

## 2021-08-18 PROCEDURE — 93320 DOPPLER ECHO COMPLETE: CPT | Mod: 26,GC

## 2021-09-03 ENCOUNTER — APPOINTMENT (OUTPATIENT)
Dept: TRANSPLANT | Facility: CLINIC | Age: 36
End: 2021-09-03
Payer: MEDICAID

## 2021-09-03 PROCEDURE — 99001T: CUSTOM

## 2021-10-06 ENCOUNTER — APPOINTMENT (OUTPATIENT)
Dept: TRANSPLANT | Facility: CLINIC | Age: 36
End: 2021-10-06
Payer: MEDICAID

## 2021-10-06 PROCEDURE — 99001T: CUSTOM

## 2021-10-12 NOTE — DISCHARGE NOTE PROVIDER - NSDCCPCAREPLAN_GEN_ALL_CORE_FT
PRINCIPAL DISCHARGE DIAGNOSIS  Diagnosis: Generalized abdominal pain  Assessment and Plan of Treatment: Resolved after holding Sensipar, continue to advance diet as tolerated  Return for fever, chills, or any signs of sepsis      SECONDARY DISCHARGE DIAGNOSES  Diagnosis: Hypertension  Assessment and Plan of Treatment: Low salt diet  Activity as tolerated.  Take all medication as prescribed.  Follow up with your medical doctor for routine blood pressure monitoring at your next visit.  Notify your doctor if you have any of the following symptoms:   Dizziness, Lightheadedness, Blurry vision, Headache, Chest pain, Shortness of breath    Diagnosis: Secondary hyperparathyroidism  Assessment and Plan of Treatment: continue with Sevelamer PO with food  Sensipar discontinued due to abdominal pain       Diagnosis: Hyperkalemia  Assessment and Plan of Treatment: Resolved after peritoneal dialysis.  Cleared by Renal, may resume PD as advised
No

## 2021-11-04 ENCOUNTER — APPOINTMENT (OUTPATIENT)
Dept: TRANSPLANT | Facility: CLINIC | Age: 36
End: 2021-11-04
Payer: MEDICARE

## 2021-11-04 PROCEDURE — 99001T: CUSTOM

## 2021-12-06 ENCOUNTER — APPOINTMENT (OUTPATIENT)
Dept: TRANSPLANT | Facility: CLINIC | Age: 36
End: 2021-12-06
Payer: MEDICAID

## 2021-12-06 PROCEDURE — 99001T: CUSTOM

## 2022-01-05 NOTE — ED ADULT NURSE NOTE - NSHISCREENINGQ1_ED_A_ED
Name: Jassi Reed  : 1972  MRN: [id number]    Oncology Navigation Follow-Up Note  Navigator met with pt. Face to face along with palliative care team. Pt. To be admitted for pain control. Dr. Raf Messina aware and pt. Needing Orthopedic consult ASAP. Pts. Chemotherapy remains pending and checked status of [facility] ETA=-Writer also e-mailing [facility] for confirmation that liquid sample was received. Pt. At this point still wanting to proceed with treatment . Brief report given to [facility] in ER and aware pt. Scheduled for port placement Friday. Plan to follow.    Electronically signed by Oscar Flores RN on 2022 at 12:48 PM
No

## 2022-01-06 ENCOUNTER — APPOINTMENT (OUTPATIENT)
Dept: TRANSPLANT | Facility: CLINIC | Age: 37
End: 2022-01-06
Payer: MEDICARE

## 2022-01-06 PROCEDURE — 99001T: CUSTOM

## 2022-02-07 ENCOUNTER — APPOINTMENT (OUTPATIENT)
Dept: TRANSPLANT | Facility: CLINIC | Age: 37
End: 2022-02-07
Payer: MEDICARE

## 2022-02-07 PROCEDURE — 99001T: CUSTOM

## 2022-02-07 PROCEDURE — 86832 HLA CLASS I HIGH DEFIN QUAL: CPT

## 2022-02-07 PROCEDURE — 86833 HLA CLASS II HIGH DEFIN QUAL: CPT

## 2022-02-23 ENCOUNTER — APPOINTMENT (OUTPATIENT)
Dept: TRANSPLANT | Facility: CLINIC | Age: 37
End: 2022-02-23

## 2022-03-01 ENCOUNTER — NON-APPOINTMENT (OUTPATIENT)
Age: 37
End: 2022-03-01

## 2022-03-02 ENCOUNTER — APPOINTMENT (OUTPATIENT)
Dept: TRANSPLANT | Facility: CLINIC | Age: 37
End: 2022-03-02
Payer: COMMERCIAL

## 2022-03-02 VITALS
DIASTOLIC BLOOD PRESSURE: 124 MMHG | WEIGHT: 171.96 LBS | SYSTOLIC BLOOD PRESSURE: 163 MMHG | HEIGHT: 72 IN | BODY MASS INDEX: 23.29 KG/M2 | OXYGEN SATURATION: 99 % | HEART RATE: 84 BPM | RESPIRATION RATE: 14 BRPM | TEMPERATURE: 97.5 F

## 2022-03-02 PROCEDURE — 99072 ADDL SUPL MATRL&STAF TM PHE: CPT

## 2022-03-02 PROCEDURE — 99214 OFFICE O/P EST MOD 30 MIN: CPT

## 2022-03-02 NOTE — HISTORY OF PRESENT ILLNESS
[Other: ___] : [unfilled] [Previous Kidney Transplant] : previous kidney transplant [Cardiac History] : no cardiac history [Blood Transfusion] : no prior blood transfusion [Claudication/Angina] : no claudication/angina [Hx of DVT/Thrombosis/Miscarriage] : no history of dvt/thrombosis/miscarriage [Diabetes] : no diabetes [TextBox_20] : at age 13, 2015 [TextBox_16] : pre-emptive, 2018 [TextBox_24] : 2006 [TextBox_28] : Toni [TextBox_42] : listed for starr transplant- second transplant\par on PD now for 4 years\par off all medications\par transplanted kidney still in place\par no changes since last visit one year ago\par one episode of hematuria last week for few days- says happened few days after Pfizer covid vaccine [TextBox_30] : 3 miles

## 2022-03-02 NOTE — PHYSICAL EXAM
[Normal] : normal [] : right dorsalis pedis palpable [TextBox_25] : PD cath in place. transplanted kidney in right lower quadrant

## 2022-03-03 LAB
ABO + RH PNL BLD: NORMAL
ALBUMIN SERPL ELPH-MCNC: 4.5 G/DL
ALP BLD-CCNC: 154 U/L
ALT SERPL-CCNC: 18 U/L
ANION GAP SERPL CALC-SCNC: 20 MMOL/L
AST SERPL-CCNC: 20 U/L
BASOPHILS # BLD AUTO: 0.05 K/UL
BASOPHILS NFR BLD AUTO: 1 %
BILIRUB SERPL-MCNC: 0.5 MG/DL
BUN SERPL-MCNC: 65 MG/DL
C PEPTIDE SERPL-MCNC: 6.2 NG/ML
CALCIUM SERPL-MCNC: 9.1 MG/DL
CHLORIDE SERPL-SCNC: 89 MMOL/L
CHOLEST SERPL-MCNC: 138 MG/DL
CK SERPL-CCNC: 573 U/L
CO2 SERPL-SCNC: 23 MMOL/L
COVID-19 SPIKE DOMAIN ANTIBODY INTERPRETATION: POSITIVE
CREAT SERPL-MCNC: 23.92 MG/DL
CRP SERPL-MCNC: <3 MG/L
EBV EA AB SER IA-ACNC: <5 U/ML
EBV EA AB TITR SER IF: POSITIVE
EBV EA IGG SER QL IA: 332 U/ML
EBV EA IGG SER-ACNC: NEGATIVE
EBV EA IGM SER IA-ACNC: NEGATIVE
EBV PATRN SPEC IB-IMP: NORMAL
EBV VCA IGG SER IA-ACNC: >750 U/ML
EBV VCA IGM SER QL IA: <10 U/ML
EGFR: 2 ML/MIN/1.73M2
EOSINOPHIL # BLD AUTO: 0.31 K/UL
EOSINOPHIL NFR BLD AUTO: 6.2 %
EPSTEIN-BARR VIRUS CAPSID ANTIGEN IGG: POSITIVE
ERYTHROCYTE [SEDIMENTATION RATE] IN BLOOD BY WESTERGREN METHOD: 30 MM/HR
ESTIMATED AVERAGE GLUCOSE: 88 MG/DL
GLUCOSE SERPL-MCNC: 87 MG/DL
HAV IGM SER QL: NONREACTIVE
HBA1C MFR BLD HPLC: 4.7 %
HBV CORE IGG+IGM SER QL: NONREACTIVE
HBV SURFACE AB SER QL: REACTIVE
HBV SURFACE AB SERPL IA-ACNC: 120.6 MIU/ML
HBV SURFACE AG SER QL: NONREACTIVE
HCT VFR BLD CALC: 35.9 %
HCV AB SER QL: NONREACTIVE
HCV S/CO RATIO: 0.38 S/CO
HDLC SERPL-MCNC: 40 MG/DL
HEPATITIS A IGG ANTIBODY: NONREACTIVE
HGB BLD-MCNC: 11.9 G/DL
HIV1+2 AB SPEC QL IA.RAPID: NONREACTIVE
HSV 1+2 IGG SER IA-IMP: NEGATIVE
HSV 1+2 IGG SER IA-IMP: POSITIVE
HSV 1+2 IGG SER IA-IMP: POSITIVE
HSV1 IGG SER QL: >62.2 INDEX
HSV1 IGG SER QL: >62.2 INDEX
HSV2 IGG SER QL: 0.13 INDEX
IMM GRANULOCYTES NFR BLD AUTO: 0.2 %
ISOAGGLUTININ TITER, ANTI-A, IGG: 32
ISOAGGLUTININ TITER, ANTI-A, IGM: 32
LDLC SERPL CALC-MCNC: 83 MG/DL
LYMPHOCYTES # BLD AUTO: 1.44 K/UL
LYMPHOCYTES NFR BLD AUTO: 28.9 %
MAGNESIUM SERPL-MCNC: 2 MG/DL
MAN DIFF?: NORMAL
MCHC RBC-ENTMCNC: 30.3 PG
MCHC RBC-ENTMCNC: 33.1 GM/DL
MCV RBC AUTO: 91.3 FL
MONOCYTES # BLD AUTO: 0.32 K/UL
MONOCYTES NFR BLD AUTO: 6.4 %
NEUTROPHILS # BLD AUTO: 2.86 K/UL
NEUTROPHILS NFR BLD AUTO: 57.3 %
NONHDLC SERPL-MCNC: 97 MG/DL
PHOSPHATE SERPL-MCNC: 5.8 MG/DL
PLATELET # BLD AUTO: 195 K/UL
POTASSIUM SERPL-SCNC: 5.5 MMOL/L
PROT SERPL-MCNC: 7.5 G/DL
PSA SERPL-MCNC: 1.1 NG/ML
RBC # BLD: 3.93 M/UL
RBC # FLD: 14.6 %
RUBV IGG FLD-ACNC: 9.7 INDEX
RUBV IGG SER-IMP: POSITIVE
SARS-COV-2 AB SERPL IA-ACNC: 12.7 U/ML
SODIUM SERPL-SCNC: 132 MMOL/L
T GONDII AB SER-IMP: NEGATIVE
T GONDII IGG SER QL: <3 IU/ML
T3 SERPL-MCNC: 72 NG/DL
T4 FREE SERPL-MCNC: 1 NG/DL
TRIGL SERPL-MCNC: 71 MG/DL
TSH SERPL-ACNC: 3.84 UIU/ML
URATE SERPL-MCNC: 5.8 MG/DL
VZV AB TITR SER: POSITIVE
VZV IGG SER IF-ACNC: 3131 INDEX
WBC # FLD AUTO: 4.99 K/UL

## 2022-03-09 LAB
CMV IGG SERPL QL: >10 U/ML
CMV IGG SERPL-IMP: POSITIVE
HSV1 IGM SER QL: NEGATIVE
HSV2 AB FLD-ACNC: NEGATIVE
T PALLIDUM AB SER QL IA: NEGATIVE

## 2022-03-10 LAB
M TB IFN-G BLD-IMP: NEGATIVE
QUANTIFERON TB PLUS MITOGEN MINUS NIL: 2.99 IU/ML
QUANTIFERON TB PLUS NIL: 0 IU/ML
QUANTIFERON TB PLUS TB1 MINUS NIL: 0 IU/ML
QUANTIFERON TB PLUS TB2 MINUS NIL: 0 IU/ML

## 2022-04-14 ENCOUNTER — APPOINTMENT (OUTPATIENT)
Dept: CT IMAGING | Facility: CLINIC | Age: 37
End: 2022-04-14
Payer: COMMERCIAL

## 2022-04-14 ENCOUNTER — OUTPATIENT (OUTPATIENT)
Dept: OUTPATIENT SERVICES | Facility: HOSPITAL | Age: 37
LOS: 1 days | End: 2022-04-14
Payer: COMMERCIAL

## 2022-04-14 DIAGNOSIS — Z01.818 ENCOUNTER FOR OTHER PREPROCEDURAL EXAMINATION: ICD-10-CM

## 2022-04-14 DIAGNOSIS — Z00.8 ENCOUNTER FOR OTHER GENERAL EXAMINATION: ICD-10-CM

## 2022-04-14 PROCEDURE — 74176 CT ABD & PELVIS W/O CONTRAST: CPT | Mod: 26

## 2022-04-14 PROCEDURE — 74176 CT ABD & PELVIS W/O CONTRAST: CPT

## 2022-06-03 NOTE — ED PROVIDER NOTE - EKG ADDITIONAL QUESTION - PERFORMED INDEPENDENT VISUALIZATION
Yes
PAST SURGICAL HISTORY:  H/O bilateral cataract extraction     H/O colonoscopy 2019    Stented coronary artery

## 2022-08-03 ENCOUNTER — APPOINTMENT (OUTPATIENT)
Dept: CARDIOLOGY | Facility: CLINIC | Age: 37
End: 2022-08-03

## 2022-08-10 NOTE — H&P ADULT - HISTORY OF PRESENT ILLNESS
Called spoke with patient and aware MRI order in patient will call and schedule. No further questions or concerns. Provided my contact information and encouraged to call me back with any questions.     The patient states that he started peritoneal dialysis in January 2019. He was hospitalized at [facility] in April 2019 for SBP after a piece from his peritoneal catheter came loose. He was treated with a 21 day course of antibiotics. He had recurrence of his abdominal pain 2 days prior to admission, with diffuse intermittent localized abdominal pain, described as achy in quality. This pain is similar to that he experienced in April. He denies any fever, chills, lightheadedness, dizziness, nausea, vomiting. He reports chronic diarrhea with loose, nonbloody stools. He denies any changes in lower extremity swelling. He denies any muscle or joint pain. He has had sick contacts at school, with colds, but no recent travel.   The patient reports he has eating and drinking normally. This patient is a 34yo M with PMH of ESRD 2/2 FSGS s/p renal transplant in 2004, s/p failure, HD x2 months, now on PD who presents to the ED with complaint of abdominal pain. The patient states that he started peritoneal dialysis in January 2019. He was hospitalized at [facility] in April 2019 for SBP after a piece from his peritoneal catheter came loose. He was treated with a 21 day course of antibiotics. He had recurrence of his abdominal pain 2 days prior to admission, with diffuse intermittent localized abdominal pain, described as achy in quality. This pain is similar to that he experienced in April. He denies any fever, chills, lightheadedness, dizziness, nausea, vomiting. He reports chronic diarrhea with loose, nonbloody stools. He denies any changes in lower extremity swelling. He denies any muscle or joint pain. He has had sick contacts at school, with colds, but no recent travel.   The patient reports he has eating and drinking normally. Patient does PD at home, overnight. He reports using sterile technique. Dialysate fluid has been clear, without fibrin or pus. His dialysis catheter site has had no surrounding redness or swelling. He had no additional antibx use besides that which he had for his last episode of SBP.     In the ED, T 98.5F, HR 87, /106, RR 16, SpO2 99%RA   Patient was given cefepime 1g,   Pt given tylenol 975mg, albuterol x2, calcium gluconate 2g, metoprolol tartrate 50mg PO x1, kayexelate 15g, morphine 4mg IVP

## 2022-08-23 ENCOUNTER — EMERGENCY (EMERGENCY)
Facility: HOSPITAL | Age: 37
LOS: 1 days | Discharge: HOME | End: 2022-08-23
Attending: STUDENT IN AN ORGANIZED HEALTH CARE EDUCATION/TRAINING PROGRAM | Admitting: STUDENT IN AN ORGANIZED HEALTH CARE EDUCATION/TRAINING PROGRAM

## 2022-08-23 VITALS
DIASTOLIC BLOOD PRESSURE: 75 MMHG | RESPIRATION RATE: 16 BRPM | HEART RATE: 78 BPM | HEIGHT: 72 IN | OXYGEN SATURATION: 99 % | TEMPERATURE: 97 F | SYSTOLIC BLOOD PRESSURE: 130 MMHG

## 2022-08-23 VITALS
DIASTOLIC BLOOD PRESSURE: 89 MMHG | RESPIRATION RATE: 17 BRPM | TEMPERATURE: 98 F | OXYGEN SATURATION: 100 % | SYSTOLIC BLOOD PRESSURE: 127 MMHG | HEART RATE: 80 BPM

## 2022-08-23 PROCEDURE — 93971 EXTREMITY STUDY: CPT | Mod: 26,LT

## 2022-08-23 PROCEDURE — 99284 EMERGENCY DEPT VISIT MOD MDM: CPT

## 2022-08-23 NOTE — CONSULT NOTE ADULT - SUBJECTIVE AND OBJECTIVE BOX
[facility] - S Martinez / Shanel S /D Angus/ DEXTER Boyle/ DEXTER Kunz/ Basilio Lucio / M Jocelyn/ O Alee  service -[phone], office [phone]  ---------------------------------------------------------------------------------------------------------------    Patient is a 36y Male whom presented to the hospital with   Denied recent NSAID use/Abx use/iv contrast studies.    Patient seen and examined    PAST MEDICAL & SURGICAL HISTORY:  Glomerulonephritis      Hypertension      CKD (chronic kidney disease), stage 4 (severe)      Dialysis complication      S/P kidney transplant  Right kidney in 2004          Allergies: No Known Allergies    Home Medications Reviewed  Hospital Medications:   MEDICATIONS  (STANDING):    SOCIAL HISTORY:  Denies ETOh,Smoking, illicit drug use  FAMILY HISTORY:  Family history of glomerulonephritis (Uncle)        REVIEW OF SYSTEMS:  CONSTITUTIONAL: No weakness, fevers or chills  EYES/ENT: No visual changes;  No vertigo or throat pain   NECK: No pain or stiffness  RESPIRATORY: No cough, wheezing, hemoptysis; No shortness of breath  CARDIOVASCULAR: No chest pain or palpitations.  GASTROINTESTINAL: No abdominal or epigastric pain. No nausea, vomiting, or hematemesis; No diarrhea or constipation. No melena or hematochezia.  GENITOURINARY: No dysuria, frequency, foamy urine, urinary urgency, incontinence or hematuria  NEUROLOGICAL: No numbness or weakness  SKIN: No itching, burning, rashes, or lesions   VASCULAR: No bilateral lower extremity edema.   All other review of systems is negative unless indicated above.    VITALS:  T(F): 97.8 (08-23-22 @ 11:27), Max: 97.8 (08-23-22 @ 11:27)  HR: 80 (08-23-22 @ 11:27)  BP: 127/89 (08-23-22 @ 11:27)  RR: 17 (08-23-22 @ 11:27)  SpO2: 100% (08-23-22 @ 11:27)  Wt(kg): --    Height (cm): 182.9 (08-23 @ 09:55)    PHYSICAL EXAM:  Constitutional: NAD  HEENT: anicteric sclera, oropharynx clear, MMM  Neck: No JVD  Respiratory: CTAB, no wheezes, rales or rhonchi  Cardiovascular: S1, S2, RRR  Gastrointestinal: BS+, soft, NT/ND  Extremities: No cyanosis or clubbing. No peripheral edema  Neurological: A/O x 3, no focal deficits  Psychiatric: Normal mood, normal affect  : No CVA tenderness. No fuller.   Skin: No rashes  Vascular Access:    LABS:        Creatinine Trend:     Urine Studies:        RADIOLOGY & ADDITIONAL STUDIES:                 [facility] - S Martinez / Shanel S /D Angus/ S [place]/ DEXTER Kunz/ Basilio Lucio / COLUMBA Arredondou/ O Alee  service -[phone], office [phone]  ---------------------------------------------------------------------------------------------------------------  Patient seen and examined in ER    36M with pmh ESRD on peritoneal dialysis, neuropathy p/w L distal shin erythema x 4 days. Pt was concerned for possible dvt and requesting for duplex. Mild pain to area, non pruritic. Denies any discharge to area, trauma, calf pain, f/c  Renal consulted for ESRD Mx. pt known to me, our gp PD pt. on CCPD/cycler at home daily. denied any isues w/PD. denied sob/cp/abd pain. PD fluid clear, draining well.     PAST MEDICAL & SURGICAL HISTORY:  Glomerulonephritis      Hypertension      CKD (chronic kidney disease), stage 4 (severe)      Dialysis complication      S/P kidney transplant  Right kidney in 2004    Allergies: No Known Allergies    Home Medications Reviewed  Hospital Medications:   MEDICATIONS  (STANDING):    SOCIAL HISTORY:  Denies ETOh,Smoking, illicit drug use  FAMILY HISTORY:  Family history of glomerulonephritis (Uncle)      REVIEW OF SYSTEMS:  CONSTITUTIONAL: No weakness, fevers or chills  EYES/ENT: No visual changes;  No vertigo or throat pain   NECK: No pain or stiffness  RESPIRATORY: No cough, wheezing, hemoptysis; No shortness of breath  CARDIOVASCULAR: No chest pain or palpitations.  GASTROINTESTINAL: No abdominal or epigastric pain. No nausea, vomiting, or hematemesis; No diarrhea or constipation. No melena or hematochezia.  GENITOURINARY: No dysuria, frequency, foamy urine, urinary urgency, incontinence or hematuria  NEUROLOGICAL: No numbness or weakness  SKIN: No itching, burning, rashes, or lesions   VASCULAR: No bilateral lower extremity edema.   All other review of systems is negative unless indicated above.    VITALS:  T(F): 97.8 (08-23-22 @ 11:27), Max: 97.8 (08-23-22 @ 11:27)  HR: 80 (08-23-22 @ 11:27)  BP: 127/89 (08-23-22 @ 11:27)  RR: 17 (08-23-22 @ 11:27)  SpO2: 100% (08-23-22 @ 11:27)  Wt(kg): --    Height (cm): 182.9 (08-23 @ 09:55)    PHYSICAL EXAM:  Constitutional: NAD  HEENT: anicteric sclera  Neck: No JVD  Respiratory: CTA, no wheezes, rales or rhonchi  Cardiovascular: S1, S2, RRR  Gastrointestinal: BS+, soft, NT, +PD cath  Extremities: No peripheral edema  Neurological: A/O x 3  Psychiatric: Normal mood, normal affect  : no fuller.     LABS:        Creatinine Trend:     Urine Studies:        RADIOLOGY & ADDITIONAL STUDIES:

## 2022-08-23 NOTE — ED PROVIDER NOTE - PROGRESS NOTE DETAILS
Herman Mendez: duplex neg. stable for d/c. advised pt to apply warm compress. ER return precautions discussed

## 2022-08-23 NOTE — ED PROVIDER NOTE - NS ED ATTENDING STATEMENT MOD
This was a shared visit with the MIRI. I reviewed and verified the documentation and independently performed the documented:

## 2022-08-23 NOTE — ED PROVIDER NOTE - PHYSICAL EXAMINATION
constitutional: well appearing no acute distress, sitting comfortably   HEENT: head- normocephalic, atraumatic                 eyes- no scleral icterus  Cardiac: regular rate and rhythm, normal S1 S2 no murmurs rubs or gallops  Respiratory: able to speak in full sentences, no wheezing rales or rhonchi, lungs CTA b/l   Abd: R lower peritoneal dialysis catheter in place  Msk: 2x2 well circumscribed erythematous lesion to L distal anterior leg. Minimal tenderness, no fluctuance. No calf tenderness b/l   Neuro: A x O x 4

## 2022-08-23 NOTE — CONSULT NOTE ADULT - ASSESSMENT
will arrange for PD if gets admitted Renal consulted for ESRD Mx.     ESRD on PD  not in chf  Informed consent for PD obtained from pt. In chart   bp stable    will arrange for PD if gets admitted  dose all meds for ESRD  Renal diet  f/u LE doppler    disposition per ER  Thanks for consulting. will f/u.  poc d/w pt  labs, chart reviewed  For any question, call:  Cell # [phone]  ans sv # [phone]  office # [phone]

## 2022-08-23 NOTE — ED PROVIDER NOTE - ATTENDING APP SHARED VISIT CONTRIBUTION OF CARE
Waldo Boyle DO:  patient seen and evaluated with the PA.  I was present for key portions of the History & Physical, and I agree with the Impression & Plan. 35 yo m pmh peritoneal dialysis, neuropathy, pw lesion to left le. 2 cm raised area w/ central area, no surrounding crepitus or ertyhema, no bullae. painless. saw pcp for sx, told it was a bug bite. comes to hospital because family member told him it was a dvt. arrives hds, well appearing, dp 2+ b/l, no peripheral edema. no neurovascular deficts. single area of 2 cm raised erythema firm non fluctuant to mid shin anterior. do not suspect dvt or cellulitis. although immunosuppressed (dialysis) would not place on abx. given no extension and stable. had discussion w/ pt regarding dvt vs current sx, pt requests US. will order, have pt f/u w/ pcp.

## 2022-08-23 NOTE — ED PROVIDER NOTE - PATIENT PORTAL LINK FT
You can access the FollowMyHealth Patient Portal offered by [facility] by registering at the following website: [email or web address]. By joining [employer]’s FollowMyHealth portal, you will also be able to view your health information using other applications (apps) compatible with our system.

## 2022-08-23 NOTE — ED PROVIDER NOTE - CLINICAL SUMMARY MEDICAL DECISION MAKING FREE TEXT BOX
36M with pmh ESRD on peritoneal dialysis, neuropathy p/w L distal shin erythema x 4 days. Small area of erythema to L anterior distal leg. Appears to be possible insect bite vs superficial thrombophlebitis. Will eval w/ L duplex. Otherwise no signs of cellulitis

## 2022-09-26 NOTE — ED ADULT TRIAGE NOTE - LOCATION:
"  Assessment & Plan     Type 2 diabetes mellitus without complication, without long-term current use of insulin (H)  Well controlled, HgbA1c even a bit higher than last time but did have a hip injection recently  - Hemoglobin A1c; Future  - Lipid panel reflex to direct LDL Fasting; Future  - Albumin Random Urine Quantitative with Creat Ratio; Future  - FOOT EXAM  - Hemoglobin A1c  - Lipid panel reflex to direct LDL Fasting  - HUMALOG KWIKPEN 100 UNIT/ML soln; INJECT 10 TO 18 UNITS UNDER THE SKIN 3 TIMES DAILY BEFORE MEALS.  MAX DOSE PER 24 HOURS IS 50 UNITS  - insulin glargine (BASAGLAR KWIKPEN) 100 UNIT/ML pen; Inject 34 units in the morning and 38 units in the evening.    Hip injury, right, subsequent encounter  Saw [facility], their note again reviewed.  Patient feels they told him surgery would be necessary, their note does not reflect this recommendation  He had an injection recently, was told no surgery for 3 months, they took him off work without a return to work date.  No further narcotics recommended/prescribed by me, this should come from ortho.      - [id number] - Urine Drug Confirmation Panel (Comprehensive); Future    Hypertension, goal below 140/90  Well controlled  - lisinopril (ZESTRIL) 2.5 MG tablet; Take 1 tablet (2.5 mg) by mouth daily    Unstable angina (H)  Denies angina.  This resolved with 4V CABG 15 months ago  - isosorbide mononitrate (IMDUR) 30 MG 24 hr tablet; Take 1 tablet (30 mg) by mouth daily    Coronary artery disease involving coronary bypass graft of native heart without angina pectoris  4V cabg at [employer] 6/2021 - did see cardiology once, told him he could stop the plavix in December 2021, he has remained on this.  I will discontinue  I did recommend cardiology follow up     Mass of soft tissue of shoulder  Seen on MRI 6/2022- saw [facility] and was told \"it wasn't sinister\" but it's still causing pain.  Vascular vs elastofibroma dorsi.  Will have him see ortho surgery that does soft tissue " "masses/tumors  - Orthopedic  Referral; Future    Pulmonary nodules   was told by previous PCP that he needed CT Scan q6 months  No longer smoking  Will get the 6 month CT and then determine follow up schedule from there, may be able to go to 12 months.   - CT Chest w/o Contrast; Future    Need for pneumococcal vaccine     - Pneumococcal 20 Valent Conjugate (Prevnar 20)    Need for vaccination for zoster     - ZOSTER VACCINE RECOMBINANT ADJUVANTED (SHINGRIX)             BMI:   Estimated body mass index is 32.18 kg/m  as calculated from the following:    Height as of this encounter: 1.778 m (5' 10\").    Weight as of this encounter: 101.7 kg (224 lb 4 oz).           No follow-ups on file.    Yue Ruvalcaba MD  [facility]    Russ Stephens is a 49 year old, presenting for the following health issues:  Diabetes      History of Present Illness       Reason for visit:  Follow up    He eats 0-1 servings of fruits and vegetables daily.He consumes 1 sweetened beverage(s) daily.He exercises with enough effort to increase his heart rate 9 or less minutes per day.    He is taking medications regularly.       Diabetes Follow-up  Insulin - Humalog and Basaglar  How often are you checking your blood sugar? Continuous glucose monitor  What time of day are you checking your blood sugars (select all that apply)?  Before and after meals  Have you had any blood sugars above 200?  No  Have you had any blood sugars below 70?  No    What symptoms do you notice when your blood sugar is low?  Dizzy, Weak and Blurred vision    What concerns do you have today about your diabetes? None     Do you have any of these symptoms? (Select all that apply)  No numbness or tingling in feet.  No redness, sores or blisters on feet.  No complaints of excessive thirst.  No reports of blurry vision.  No significant changes to weight.    Have you had a diabetic eye exam in the last 12 months? No    Did have a hip " "steroid injection and blood sugars ran high for 2-2.5 weeks          Hyperlipidemia Follow-Up  Atorvastatin 80mg qd    Are you regularly taking any medication or supplement to lower your cholesterol?   Yes- statin    Are you having muscle aches or other side effects that you think could be caused by your cholesterol lowering medication?  No    Hypertension Follow-up  Lisinopril 2.5mg every day, metoprolol 25mg bid    Do you check your blood pressure regularly outside of the clinic? No     Are you following a low salt diet? Yes    Are your blood pressures ever more than 140 on the top number (systolic) OR more   than 90 on the bottom number (diastolic), for example 140/90? N/A    BP Readings from Last 2 Encounters:   09/26/22 132/74   08/25/22 120/84     Hemoglobin A1C (%)   Date Value   04/22/2022 6.3 (H)   10/13/2021 6.9 (H)   05/18/2021 11.6 (H)   12/29/2017 12.0 (H)     LDL Cholesterol Calculated (mg/dL)   Date Value   05/18/2021 195 (H)   02/02/2015 90     - Right hip pain he is following with [facility].    - Lung xrays - needs follow up CT Scan    Right shoulder mass: was told by [facility] that it's not \"sinister\" but continues to cause some pain.     Review of Systems   Constitutional, HEENT, cardiovascular, pulmonary, gi and gu systems are negative, except as otherwise noted.      Objective    /74   Pulse 81   Temp 97.9  F (36.6  C) (Tympanic)   Resp 18   Ht 1.778 m (5' 10\")   Wt 101.7 kg (224 lb 4 oz)   SpO2 97%   BMI 32.18 kg/m    Body mass index is 32.18 kg/m .  Physical Exam   GENERAL: healthy, alert and no distress  NECK: no adenopathy, no asymmetry, masses, or scars and thyroid normal to palpation  RESP: lungs clear to auscultation - no rales, rhonchi or wheezes  CV: regular rate and rhythm, normal S1 S2, no S3 or S4, no murmur, click or rub, no peripheral edema and peripheral pulses strong  ABDOMEN: soft, nontender, no hepatosplenomegaly, no masses and bowel sounds normal  MS: no " gross musculoskeletal defects noted, no edema    Results for orders placed or performed in visit on 09/26/22 (from the past 24 hour(s))   Hemoglobin A1c   Result Value Ref Range    Hemoglobin A1C 6.8 (H) 0.0 - 5.6 %                    Left arm;

## 2023-03-03 ENCOUNTER — APPOINTMENT (OUTPATIENT)
Dept: NEPHROLOGY | Facility: CLINIC | Age: 38
End: 2023-03-03

## 2023-03-06 ENCOUNTER — APPOINTMENT (OUTPATIENT)
Dept: OPHTHALMOLOGY | Facility: CLINIC | Age: 38
End: 2023-03-06
Payer: MEDICARE

## 2023-03-06 ENCOUNTER — NON-APPOINTMENT (OUTPATIENT)
Age: 38
End: 2023-03-06

## 2023-03-06 PROCEDURE — 92004 COMPRE OPH EXAM NEW PT 1/>: CPT

## 2023-07-12 NOTE — ED ADULT NURSE NOTE - CAS EDN DISCHARGE INTERVENTIONS
PAST MEDICAL HISTORY:  Current smoker     High cholesterol on no med    History of aortic stenosis     HTN (hypertension)     Lupus erythematosus     Shearer syndrome      IV intact/Arm band on

## 2023-10-16 NOTE — ED PROVIDER NOTE - OBJECTIVE STATEMENT
Blood work ordered 36M with pmh ESRD on peritoneal dialysis, neuropathy p/w L distal shin erythema x 4 days. Pt was concerned for possible dvt and requesting for duplex. Mild pain to area, non pruritic. Denies any discharge to area, trauma, calf pain, f/c.

## 2023-11-21 NOTE — PROVIDER CONTACT NOTE (OTHER) - ASSESSMENT
Patient has hives on right arm and states also on thigh and back of legs
Patient has a 101 oral temp. pt a0x4, other VSS. Rechecked patient's temp @ 0655 and oral temp is now 99.5.
Pt is stable, not in any sign of distress, denies chest pains or SOB.
patient a&ox4 and resting comfortably in bed
patient a&ox4 resting comfortably in bed
patient is a&ox4 and is on hemodialysis. pt states he urinates very little in general due to being on hemodialysis.
patient is on hemodialysis and states that he does not void much in general. patient ambulates and drinks fluids; verbalizes that he understands purpose of heparin. will tell day RN to monitor voiding and collect urine and stool specimen. pt has not had a BM this shift.
pt brushed teeth prior to temp check, going to  at this time. Richa from  notified to re-check temp at 0920hrs and administer for temp of 100.4 or greater as per NP orders
individual instruction/verbal instruction/written material

## 2023-11-28 ENCOUNTER — EMERGENCY (EMERGENCY)
Facility: HOSPITAL | Age: 38
LOS: 1 days | Discharge: HOME | End: 2023-11-28
Attending: EMERGENCY MEDICINE
Payer: COMMERCIAL

## 2023-11-28 VITALS
RESPIRATION RATE: 18 BRPM | DIASTOLIC BLOOD PRESSURE: 74 MMHG | OXYGEN SATURATION: 99 % | SYSTOLIC BLOOD PRESSURE: 114 MMHG | HEART RATE: 71 BPM

## 2023-11-28 VITALS
HEIGHT: 72 IN | OXYGEN SATURATION: 98 % | HEART RATE: 59 BPM | TEMPERATURE: 98 F | RESPIRATION RATE: 16 BRPM | SYSTOLIC BLOOD PRESSURE: 137 MMHG | DIASTOLIC BLOOD PRESSURE: 88 MMHG | WEIGHT: 141.98 LBS

## 2023-11-28 PROCEDURE — 99283 EMERGENCY DEPT VISIT LOW MDM: CPT

## 2023-11-28 PROCEDURE — 99053 MED SERV 10PM-8AM 24 HR FAC: CPT

## 2023-11-28 RX ORDER — TACROLIMUS 5 MG/1
1 CAPSULE ORAL ONCE
Refills: 0 | Status: DISCONTINUED | OUTPATIENT
Start: 2023-11-28 | End: 2023-11-28

## 2023-11-28 RX ORDER — TACROLIMUS 5 MG/1
3 CAPSULE ORAL ONCE
Refills: 0 | Status: COMPLETED | OUTPATIENT
Start: 2023-11-28 | End: 2023-11-28

## 2023-11-28 RX ADMIN — TACROLIMUS 3 MILLIGRAM(S): 5 CAPSULE ORAL at 07:56

## 2023-11-28 NOTE — ED PROVIDER NOTE - CHILD ABUSE FACILITY
[facility] formerly [facility]

## 2023-11-28 NOTE — ED PROVIDER NOTE - PATIENT PORTAL LINK FT
You can access the FollowMyHealth Patient Portal offered by [facility] by registering at the following website: [email or web address]. By joining [employer]’s FollowMyHealth portal, you will also be able to view your health information using other applications (apps) compatible with our system. You can access the FollowMyHealth Patient Portal offered by [facility] by registering at the following website: [email or web address]. By joining [employer]’s FollowMyHealth portal, you will also be able to view your health information using other applications (apps) compatible with our system. You can access the FollowMyHealth Patient Portal offered by [facility] by registering at the following website: [email or web address]. By joining [employer]’s FollowMyHealth portal, you will also be able to view your health information using other applications (apps) compatible with our system.

## 2023-11-28 NOTE — ED PROVIDER NOTE - OBJECTIVE STATEMENT
37 y/o male, hx of renal transplant, presenting to the ED after running out of his medication, Tacrolimus 1mg. Patient states that he is in the middle of requesting a refill but has not gotten it yet. No other complaints, including fever, chest pain, SOB, weakness/numbness/tingling. 39 y/o male, hx of renal transplant, presenting to the ED after running out of his medication, Tacrolimus 1mg. Patient states that he is in the middle of requesting a refill but has not gotten it yet. No other complaints, including fever, chest pain, SOB, weakness/numbness/tingling.

## 2023-11-28 NOTE — ED PROVIDER NOTE - CLINICAL SUMMARY MEDICAL DECISION MAKING FREE TEXT BOX
39 y/o male, hx of renal transplant, presenting to the ED after running out of his medication, Tacrolimus 1mg. Patient states that he is in the middle of requesting a refill but has not gotten it yet. No other complaints, including fever, chest pain, SOB, weakness/numbness/tingling. Patient stable. Will give a dose of Tacrolimus. likely d/c home.

## 2023-11-28 NOTE — ED PROVIDER NOTE - PROGRESS NOTE DETAILS
MD Nataliya (PGY-2) Received sign out on patient. In brief, 38-year-old male with past medical history of renal transplant, who ran out of medication.  Patient is on tacrolimus 3 mg.  Resting refill.  Will pending administration, to be DC'd. MD Nataliya (PGY-2) Received sign out on patient. In brief, 38-year-old male with past medical history of renal transplant, who ran out of medication.  Patient is on tacrolimus 3 mg.  Awaiting refill.  Will pending administration, to be DC'd. MD Nataliya (PGY-2) spoke with patient, recent renal transplant in 2022.  Patient has been seeing his transplant team every 2 months.  Last seen this month.  Patient with creatinine of 1.43 which appears to be around the baseline since his renal transplant.  Spoke with patient at length about calling his transplant team for acquisition of his tacrolimus.  Patient will call his transplant team.  Discussed with pt results of work up, strict return precautions, and need for follow up.  Pt expressed understanding and agrees with plan.

## 2023-11-28 NOTE — ED PROVIDER NOTE - NSFOLLOWUPINSTRUCTIONS_ED_ALL_ED_FT
You came to the emergency department after running out of  medications. We gave you a dose of your medication. Please contact your transplant doctor regarding refilling your prescription.    PLEASE RETURN TO THE EMERGENCY DEPARTMENT if you experience worsening of your symptoms or any of the following: fever, uncontrollable headache, loss of consciousness, chest pain, difficulty breathing, incontrollable nausea/vomiting, weakness/numbness/tingling.

## 2023-11-28 NOTE — ED ADULT NURSE NOTE - OBJECTIVE STATEMENT
Call 7w nurses station with any questions regarding discharge. [phone]   37 yo male presented as walk in with complaints of need for kidney transplant medication. Pt has no complaints at this time, states he ran out of medication notable to go to to PCP. Pt had transplant done last December 2022. Pt denies any complications.  Denies headache, dizziness, vision changes, chest pain, shortness of breath, abdominal pain, nausea, vomiting, diarrhea, fevers, chills, dysuria, hematuria, recent illness travel or fall.

## 2023-11-28 NOTE — ED ADULT NURSE REASSESSMENT NOTE - NS ED NURSE REASSESS COMMENT FT1
0700 Report received from TSEPHY QUINN Pt AAOx4, NAD, resp nonlabored, skin warm/dry, resting comfortably in bed  . Pt denies headache, dizziness, chest pain, palpitations, SOB, abd pain, n/v/d, urinary symptoms, fevers, chills, weakness at this time. Pt awaiting for medication . Safety maintained with call bell within reach.

## 2023-11-28 NOTE — ED PROVIDER NOTE - PHYSICAL EXAMINATION
On exam, patient is well appearing, NAD  CARDIAC: regular rate rhythm, normal S1/S2  CHEST: CTA BL, no wheeze or crackles  ABDOMEN: normal BS, soft, NT  EXTREMITY: no gross deformity, no edema, good perfusion

## 2023-11-28 NOTE — ED PROVIDER NOTE - ATTENDING CONTRIBUTION TO CARE
Zach Chavis MD, FACEP  patient requesting assistance with prescription refill, will offer prescription here and social work prn versus speaking with his transplant team and insurance company  Patient will ensure he continues to take his medication   The patient was serially evaluated throughout emergency department course by the team. There was no acute deterioration up to this time in the emergency department. The patient has demonstrated clinical improvement and/or stability, feels better at this time according to emergency department team. Agree with goals/plan of emergency department care as described in this physician's electronic medical record, including diagnostics, therapeutics and consultation recommendation as clinically warranted. Will discharge home with close outpatient follow up with primary care physician/provider and specialist if necessary. The patient and/or family was educated on expectant management and return precautions concerning signs and features to return to the emergency department, in layman terms, including but not limited to: nausea, vomiting, fever, chills, the inability to eat, take medications, or drink, persistent/worsening symptoms or any concerns at all. There are no acute or immediate life threatening issues present on history, clinical exam, or any diagnostic evaluation. The patient is a safe disposition home, has capacity and insight into their condition, is ambulatory in the Emergency Department with no further questions and will follow up with their doctor(s) this week. Diagnosis, prognosis, natural history and treatment was discussed with patient and/or family. The patient and/or family were given the opportunity to ask questions and have them answered in full. The patient and/or family are with capacity and insight into the situation, treatment, risks, benefits, alternative therapies, and understand that they can ask any further questions if needed. Patient and/or family/guardian understands anticipatory guidance and was given strict return and follow up precautions. The patient and/or family/guardian has been informed of the necessity to follow up with the PMD/Clinic/follow up as provided within 2-3 days, and the patient and/or family/guardian reports understanding of above with capacity and insight. The patient and/or family/guardian were informed of any results of their tests and are were encouraged to follow up on the findings with their doctor as well as the need to inform their doctor of any results. The patient and/or family/guardian are aware of the need to follow up with repeat testing as applicable and report understanding of the above with capacity and insight. The patient and/or family/guardain was made aware of any pending test results at the time of discharge and of the need to call back for the final results as well as the need to inform their doctor of the results.

## 2024-02-08 NOTE — PROGRESS NOTE ADULT - SUBJECTIVE AND OBJECTIVE BOX
MEDICINE, PROGRESS NOTE [phone]    GABE JOSEPH 32y [id number]    Patient seen and examined.  Patient is a 32y old  Male who presents with a chief complaint of Progressive CKD, needs HD. (09 Jan 2018 18:37)  Pt c/o pain at catheter site and bleeding from catheter site all day.    PAST MEDICAL & SURGICAL HISTORY:  CKD (chronic kidney disease), stage 4 (severe)  Hypertension  Glomerulonephritis  S/P kidney transplant: Right kidney in 2004    MEDICATIONS  (STANDING):  amLODIPine   Tablet 10 milliGRAM(s) Oral daily  dextrose 5%. 1000 milliLiter(s) (50 mL/Hr) IV Continuous <Continuous>  influenza   Vaccine 0.5 milliLiter(s) IntraMuscular once  predniSONE   Tablet 5 milliGRAM(s) Oral daily  tacrolimus 3 milliGRAM(s) Oral two times a day    MEDICATIONS  (PRN):    Allergies    No Known Allergies    Intolerances        PHYSICAL EXAM:  Constitutional: NAD  HEENT: Normocephalic, EOMI  Neck:  No JVD  Respiratory: CTA B/L, No wheezes  Cardiovascular: S1, S2, RRR, + systolic murmur  Gastrointestinal: BS+, soft, NT/ND  Extremities: No peripheral edema  Neurological: AAOX3, no focal deficits  Psychiatric: Normal mood, normal affect  : No P[place]    Vital Signs Last 24 Hrs  T(C): 36.8 (09 Jan 2018 17:20), Max: 36.9 (09 Jan 2018 05:00)  T(F): 98.2 (09 Jan 2018 17:20), Max: 98.5 (09 Jan 2018 05:00)  HR: 89 (09 Jan 2018 17:20) (72 - 89)  BP: 146/98 (09 Jan 2018 17:20) (144/93 - 161/97)  BP(mean): --  RR: 18 (09 Jan 2018 17:20) (18 - 19)  SpO2: 97% (09 Jan 2018 17:20) (97% - 100%)  I&O's Summary    08 Jan 2018 07:01  -  09 Jan 2018 07:00  --------------------------------------------------------  IN: 180 mL / OUT: 1 mL / NET: 179 mL    09 Jan 2018 07:01  -  09 Jan 2018 19:36  --------------------------------------------------------  IN: 0 mL / OUT: 1000 mL / NET: -1000 mL        LABS:                        9.7    11.86 )-----------( 172      ( 09 Jan 2018 07:46 )             29.2     01-09    142  |  96  |  74<H>  ----------------------------<  83  4.1   |  25  |  12.86<H>    Ca    9.6      09 Jan 2018 09:18          Tacrolimus (), Serum: 4.5 ng/mL (01-09 @ 07:46) Detail Level: Zone Photo Preface (Leave Blank If You Do Not Want): Photographs were obtained today

## 2024-04-01 ENCOUNTER — APPOINTMENT (OUTPATIENT)
Dept: NEPHROLOGY | Facility: CLINIC | Age: 39
End: 2024-04-01

## 2024-05-30 NOTE — PROGRESS NOTE ADULT - PROBLEM SELECTOR PLAN 1
E.S.B.L. Klebsiella Pneumonia Peritonitis  now on IV ertapenem--> after discussing with ID--> plan to change to IP Meronpenem one gram x 21 days--> needs insurance removal  c/w manual exchanges  monitor bmp  trend cell count, culture, and gram stain
E.S.B.L. Klebsiella Pneumonia Peritonitis  now on IV ertapenem--> after discussing with ID--> plan to change to IP Meronpenem one gram x 21 days--> needs insurance removal  c/w manual exchanges  monitor bmp  trend cell count, culture, and gram stain daily--> cell count improving and clinically improving
E.S.B.L. Klebsiella Pneumonia Peritonitis  now on IV ertapenem--> after discussing with ID--> plan to change to IP Meronpenem one gram x 21 days--> needs insurance removal  start diflucan 200mg po every other day based on guidelines  c/w manual exchanges  monitor bmp  trend cell count daily
ID f/up noted.  IV Invanz
ID f/up noted.  IV Invanz
ID fiona noted.  IV Invanz
dependent (less than 25% patients effort)

## 2024-06-12 NOTE — PATIENT PROFILE ADULT - HARM RISK FACTORS
Prep Survey      Flowsheet Row Responses   [facility]? [facility]   Is LACE score < 7 ? No   Eligibility Readm Mgmt   Discharge diagnosis Chest pain   Does the patient have one of the following disease processes/diagnoses(primary or secondary)? Other   Does the patient have Home health ordered? No   Is there a DME ordered? Yes   What DME was ordered? [facility] for rolling walker   Prep survey completed? Yes            HARJINDER EWING - Registered Nurse           no
